# Patient Record
Sex: FEMALE | Race: WHITE | NOT HISPANIC OR LATINO | Employment: OTHER | URBAN - METROPOLITAN AREA
[De-identification: names, ages, dates, MRNs, and addresses within clinical notes are randomized per-mention and may not be internally consistent; named-entity substitution may affect disease eponyms.]

---

## 2017-01-23 ENCOUNTER — TRANSCRIBE ORDERS (OUTPATIENT)
Dept: MAMMOGRAPHY | Facility: HOSPITAL | Age: 77
End: 2017-01-23

## 2017-01-23 DIAGNOSIS — Z12.31 VISIT FOR SCREENING MAMMOGRAM: Primary | ICD-10-CM

## 2017-02-21 ENCOUNTER — HOSPITAL ENCOUNTER (OUTPATIENT)
Dept: MAMMOGRAPHY | Facility: HOSPITAL | Age: 77
Discharge: HOME | End: 2017-02-21
Attending: OBSTETRICS & GYNECOLOGY | Admitting: OBSTETRICS & GYNECOLOGY

## 2017-02-21 DIAGNOSIS — Z12.31 VISIT FOR SCREENING MAMMOGRAM: ICD-10-CM

## 2017-02-21 PROCEDURE — G0202 SCR MAMMO BI INCL CAD: HCPCS

## 2017-02-21 PROCEDURE — 77063 BREAST TOMOSYNTHESIS BI: CPT | Performed by: RADIOLOGY

## 2017-02-21 PROCEDURE — G0202 SCR MAMMO BI INCL CAD: HCPCS | Performed by: RADIOLOGY

## 2017-02-21 PROCEDURE — 77063 BREAST TOMOSYNTHESIS BI: CPT

## 2017-06-13 ENCOUNTER — TELEPHONE (OUTPATIENT)
Dept: NEUROSURGERY | Facility: CLINIC | Age: 77
End: 2017-06-13

## 2017-06-13 NOTE — TELEPHONE ENCOUNTER
"Symptoms started a month ago.  No loss of perineal sensation.  No bowel or bladder incontinence.  No focal weakness, but states that legs get tired easily and sometimes they feel they want to \"give out.\"  She gets a pressure type sensation in her groin and UTI has been ruled out.  Pain radiates from low back to lower extremities, sometimes anterolaterally and sometimes posteriorly, to the ankles.  Will discuss with Dr. Botello and advise patient.  "

## 2017-06-13 NOTE — TELEPHONE ENCOUNTER
Provider:  Edson  Caller: Judi Patel  Time of call:   10:27 AM  Phone #:  [phone]  Last visit:   08/05/16  Next visit: none scheduled    Reason for call:         ---Message from Aleksandra Brownlee----    This patient called and is having a numb sensation in her lower extremities when she tries to get up from a sitting position. She doesn't have any current imaging.     Her PCP recommended she f/u w/ Dr. Sandhu. Should she come in and see the PA ? Please let me know and i'll get her scheduled.   Thanks

## 2017-06-16 NOTE — TELEPHONE ENCOUNTER
Please call patient and let her know we have not forgotten about her.  We are still waiting on Dr Botello (Edson) to decide best next option and will call her either later today or Monday.

## 2017-06-22 NOTE — TELEPHONE ENCOUNTER
I spoke with Dr. Botello regarding this patient today and she now has appt scheduled with Dr. Botello, closing encounter.

## 2017-06-26 ENCOUNTER — OFFICE VISIT (OUTPATIENT)
Dept: NEUROSURGERY | Facility: CLINIC | Age: 77
End: 2017-06-26

## 2017-06-26 VITALS
OXYGEN SATURATION: 97 % | SYSTOLIC BLOOD PRESSURE: 134 MMHG | WEIGHT: 162.8 LBS | DIASTOLIC BLOOD PRESSURE: 82 MMHG | HEART RATE: 80 BPM | BODY MASS INDEX: 25.55 KG/M2 | HEIGHT: 67 IN

## 2017-06-26 DIAGNOSIS — M51.36 DEGENERATIVE DISC DISEASE, LUMBAR: ICD-10-CM

## 2017-06-26 DIAGNOSIS — G89.29 CHRONIC MIDLINE LOW BACK PAIN WITHOUT SCIATICA: ICD-10-CM

## 2017-06-26 DIAGNOSIS — M54.50 ACUTE BILATERAL LOW BACK PAIN WITHOUT SCIATICA: ICD-10-CM

## 2017-06-26 DIAGNOSIS — M47.817 LUMBOSACRAL SPONDYLOSIS WITHOUT MYELOPATHY: ICD-10-CM

## 2017-06-26 DIAGNOSIS — M51.36 DDD (DEGENERATIVE DISC DISEASE), LUMBAR: Primary | ICD-10-CM

## 2017-06-26 DIAGNOSIS — M41.86 OTHER FORM OF SCOLIOSIS OF LUMBAR SPINE: ICD-10-CM

## 2017-06-26 DIAGNOSIS — M54.50 CHRONIC MIDLINE LOW BACK PAIN WITHOUT SCIATICA: ICD-10-CM

## 2017-06-26 PROCEDURE — 99213 OFFICE O/P EST LOW 20 MIN: CPT | Performed by: NEUROLOGICAL SURGERY

## 2017-06-26 NOTE — PROGRESS NOTES
Patient: Judi Ptael  :  1940  Chart #:  [id number]    Date of Service: 17    Chief Complaint:   Chief Complaint   Patient presents with   • Back Pain       Back Pain   Chronicity: Patient returns today for a follow-up on her low back pain. The current episode started more than 1 year ago (She states that this has been bothersome for a little over a year now.). Progression since onset: Patient states that she wobbles when she walks. The pain is present in the lumbar spine (She states that her lower extremities are not numb.). The quality of the pain is described as burning. Radiates to: She feels like her legs are giving out on her. The pain is at a severity of 0/10 (She is not having any back pain today.). The pain is mild. The pain is worse during the day (She complains of some soreness in her right hip.). The symptoms are aggravated by bending. Stiffness is present in the morning. Risk factors include lack of exercise and sedentary lifestyle (Patient is having pressure in the vaginal region.  She complains of some burning that runs down her lower extremities bilaterally.). She has tried bed rest, ice, heat and NSAIDs (She is currently seeing her urologist for the vaginal pain.  ) for the symptoms. The treatment provided mild relief.     She has a fullness in the bladder region but has a normal exam from her urologist;  She sometimes feels the legs are weak and almost give out.  She has some abnormal sensation in the legs but not numbness or tingling.  She has not had any recent imaging studies.    Radiographic Images:   Scoliosis x-rays dated 16 show good alignment however there is degenerative arthritis noted. There is sigmoid scoliosis in lumbar region. Some straightening of the lumbar curve but SVA is normal. PI is 46 deg; LL (L1-S1) is 42 deg.        Past Medical History:   Diagnosis Date   • Anxiety    • Asthma    • Back problem    • Compression fracture of T12 vertebra    • Cystocele     • Epilepsy    • Family history of hip fracture     mother, father   • Hyperlipidemia    • Hypertension    • IBS (irritable bowel syndrome)    • Osteoporosis    • Postmenopausal    • Seizure disorder    • Vaginal atrophy    • Vitamin D deficiency      Current Outpatient Prescriptions   Medication Sig Dispense Refill   • albuterol (PROVENTIL HFA;VENTOLIN HFA) 108 (90 BASE) MCG/ACT inhaler Inhale 2 puffs every 4 (four) hours as needed for shortness of air.     • albuterol (PROVENTIL) (2.5 MG/3ML) 0.083% nebulizer solution Take 2.5 mg by nebulization every 4 (four) hours as needed for shortness of air.     • Calcium-Phosphorus-Vitamin D (CITRACAL +D3 PO) Take  by mouth 2 (two) times a day.     • Cholecalciferol (VITAMIN D) 2000 UNITS capsule Take 2,000 Units by mouth daily.     • Coenzyme Q10 (COQ10) 200 MG capsule Take  by mouth daily.     • lamoTRIgine (LaMICtal) 100 MG tablet Take 100 mg by mouth daily.     • MAGNESIUM PO Take  by mouth daily.     • meloxicam (MOBIC) 7.5 MG tablet Take 1 tablet by mouth 2 (two) times a day. 60 tablet 1   • Multiple Vitamins-Minerals (ZINC PO) Take  by mouth 2 (two) times a day.     • Omega-3 Fatty Acids (FISH OIL PO) Take 800 mg by mouth daily.     • Ospemifene 60 MG tablet Take 60 mg by mouth Daily. 90 tablet 3   • Probiotic Product (PROBIOTIC PO) Take  by mouth daily. 50 billion     • TURMERIC CURCUMIN PO Take  by mouth daily.     • Biotin 2.5 MG capsule Take  by mouth daily.     • losartan (COZAAR) 50 MG tablet Take 50 mg by mouth daily.     • methocarbamol (ROBAXIN) 750 MG tablet Take 1 tablet by mouth 2 (two) times a day. 60 tablet 1   • montelukast (SINGULAIR) 10 MG tablet Take 10 mg by mouth every night.       No current facility-administered medications for this visit.       Allergies   Allergen Reactions   • Oxycodone-Acetaminophen    • Sulfa Antibiotics      Social History     Social History   • Marital status:      Spouse name: N/A   • Number of children: N/A   •  "Years of education: N/A     Social History Main Topics   • Smoking status: Never Smoker   • Smokeless tobacco: None   • Alcohol use No   • Drug use: No   • Sexual activity: Yes     Partners: Male     Birth control/ protection: Surgical     Other Topics Concern   • None     Social History Narrative     Family History   Problem Relation Age of Onset   • Fibromyalgia Daughter    • Thyroid cancer Daughter    • Hypothyroidism Daughter      Past Surgical History:   Procedure Laterality Date   • ABDOMINAL SURGERY     • APPENDECTOMY     • BASAL CELL CARCINOMA EXCISION Right 01/12/2010    Nose   • CHOLECYSTECTOMY  01/01/1991   • CYST REMOVAL  01/01/1990    Right Foot   • DILATATION AND CURETTAGE     • FUNCTIONAL ENDOSCOPIC SINUS SURGERY  11/16/2010    Dr. Mathews   • KNEE ARTHROSCOPY Left 01/01/2006   • KNEE ARTHROSCOPY W/ MENISCAL REPAIR Right 11/01/2010   • SINUS SURGERY     • TOTAL ABDOMINAL HYSTERECTOMY WITH SALPINGO OOPHORECTOMY Bilateral    • WRIST SURGERY Left 01/01/2004     Review of Systems   HENT: Positive for tinnitus.    Genitourinary: Positive for frequency.   Musculoskeletal: Positive for back pain.   All other systems reviewed and are negative.    Vitals:    06/26/17 0856   BP: 134/82   BP Location: Right arm   Patient Position: Sitting   Cuff Size: Adult   Pulse: 80   SpO2: 97%   Weight: 162 lb 12.8 oz (73.8 kg)   Height: 67\" (170.2 cm)     Physical Exam  Neurologic Exam  Constitutional: She is oriented to person, place, and time. Vital signs are normal. She appears well-developed and well-nourished. No distress.   Neat healthy female   Cardiovascular:   Pulses:  Carotid pulses are 2+ on the right side, and 2+ on the left side.  Radial pulses are 2+ on the right side, and 2+ on the left side.   Dorsalis pedis pulses are 1+ on the right side, and 1+ on the left side.   Posterior tibial pulses are 2+ on the right side, and 2+ on the left side.   Musculoskeletal:   Lumbar back: She exhibits deformity but no " pain. She exhibits normal range of motion and no spasm.   Mild stiffness; Lef thoracolumbar scoliosis; SLR caused mild back pain; No atrophy; hip ROM normal     Neurologic Exam      Mental Status   Oriented to person, place, and time.   Attention: normal. Concentration: normal.   Speech: speech is normal   Level of consciousness: alert  Knowledge: good and consistent with education.   Normal comprehension.      Cranial Nerves   Cranial nerves II through XII intact.      Motor Exam   Muscle bulk: normal  Overall muscle tone: normal    Strength   Strength 5/5 throughout.      Sensory Exam   Light touch normal.   Proprioception normal.      Gait, Coordination, and Reflexes      Gait  Gait: normal (list to right when tired)     Tremor   Resting tremor: absent  Intention tremor: absent  Action tremor: absent     Reflexes   Right brachioradialis: 1+  Left brachioradialis: 1+  Right biceps: 1+  Left biceps: 1+  Right triceps: 1+  Left triceps: 1+  Right patellar: 2+  Left patellar: 2+  Right achilles: 1+  Left achilles: 1+  Right Buck: absent  Left Buck: absent  Right ankle clonus: absent  Left ankle clonus: absent     Judi was seen today for back pain.    Diagnoses and all orders for this visit:    DDD (degenerative disc disease), lumbar  -     MRI Lumbar Spine Without Contrast; Future    Degenerative disc disease, lumbar  -     MRI Lumbar Spine Without Contrast; Future    Chronic midline low back pain without sciatica  -     MRI Lumbar Spine Without Contrast; Future    Other form of scoliosis of lumbar spine  -     MRI Lumbar Spine Without Contrast; Future    Lumbosacral spondylosis without myelopathy  -     MRI Lumbar Spine Without Contrast; Future    Acute bilateral low back pain without sciatica  -     MRI Lumbar Spine Without Contrast; Future      Plan: order lumbar spine MRI;  Monitor symptoms for review at next visit after MRI.    I, Dr. Ilir Botello, personally performed the services described in the  documentation as scribed in my presence, and the documentation is both accurate and complete.    Ilir Botello MD   Scribed for Ilir Botello MD by Rosalia Polo, CACHORRO @. 6/26/2017  9:16 AM

## 2017-08-03 ENCOUNTER — HOSPITAL ENCOUNTER (OUTPATIENT)
Dept: MRI IMAGING | Facility: HOSPITAL | Age: 77
Discharge: HOME | End: 2017-08-03
Attending: NEUROLOGICAL SURGERY

## 2017-08-07 ENCOUNTER — HOSPITAL ENCOUNTER (OUTPATIENT)
Dept: MRI IMAGING | Facility: HOSPITAL | Age: 77
Discharge: HOME | End: 2017-08-07
Attending: NEUROLOGICAL SURGERY | Admitting: NEUROLOGICAL SURGERY

## 2017-08-07 DIAGNOSIS — M41.86 OTHER FORM OF SCOLIOSIS OF LUMBAR SPINE: ICD-10-CM

## 2017-08-07 DIAGNOSIS — M54.50 CHRONIC MIDLINE LOW BACK PAIN WITHOUT SCIATICA: ICD-10-CM

## 2017-08-07 DIAGNOSIS — M51.36 DEGENERATIVE DISC DISEASE, LUMBAR: ICD-10-CM

## 2017-08-07 DIAGNOSIS — M54.50 ACUTE BILATERAL LOW BACK PAIN WITHOUT SCIATICA: ICD-10-CM

## 2017-08-07 DIAGNOSIS — G89.29 CHRONIC MIDLINE LOW BACK PAIN WITHOUT SCIATICA: ICD-10-CM

## 2017-08-07 DIAGNOSIS — M47.817 LUMBOSACRAL SPONDYLOSIS WITHOUT MYELOPATHY: ICD-10-CM

## 2017-08-07 DIAGNOSIS — M51.36 DDD (DEGENERATIVE DISC DISEASE), LUMBAR: ICD-10-CM

## 2017-08-07 PROCEDURE — 72148 MRI LUMBAR SPINE W/O DYE: CPT

## 2017-09-25 ENCOUNTER — HOSPITAL ENCOUNTER (OUTPATIENT)
Dept: GENERAL RADIOLOGY | Facility: HOSPITAL | Age: 77
Discharge: HOME | End: 2017-09-25
Attending: NEUROLOGICAL SURGERY | Admitting: NEUROLOGICAL SURGERY

## 2017-09-25 ENCOUNTER — OFFICE VISIT (OUTPATIENT)
Dept: NEUROSURGERY | Facility: CLINIC | Age: 77
End: 2017-09-25

## 2017-09-25 VITALS
HEIGHT: 67 IN | BODY MASS INDEX: 25.9 KG/M2 | SYSTOLIC BLOOD PRESSURE: 130 MMHG | TEMPERATURE: 97.9 F | HEART RATE: 73 BPM | DIASTOLIC BLOOD PRESSURE: 74 MMHG | OXYGEN SATURATION: 96 % | WEIGHT: 165 LBS

## 2017-09-25 DIAGNOSIS — M54.50 CHRONIC BILATERAL LOW BACK PAIN WITHOUT SCIATICA: ICD-10-CM

## 2017-09-25 DIAGNOSIS — M54.50 ACUTE BILATERAL LOW BACK PAIN WITHOUT SCIATICA: ICD-10-CM

## 2017-09-25 DIAGNOSIS — M51.36 DEGENERATIVE DISC DISEASE, LUMBAR: ICD-10-CM

## 2017-09-25 DIAGNOSIS — M51.36 DDD (DEGENERATIVE DISC DISEASE), LUMBAR: Primary | ICD-10-CM

## 2017-09-25 DIAGNOSIS — M51.36 DDD (DEGENERATIVE DISC DISEASE), LUMBAR: ICD-10-CM

## 2017-09-25 DIAGNOSIS — G89.29 CHRONIC BILATERAL LOW BACK PAIN WITHOUT SCIATICA: ICD-10-CM

## 2017-09-25 DIAGNOSIS — M47.817 LUMBOSACRAL SPONDYLOSIS WITHOUT MYELOPATHY: ICD-10-CM

## 2017-09-25 DIAGNOSIS — M41.86 OTHER FORM OF SCOLIOSIS OF LUMBAR SPINE: ICD-10-CM

## 2017-09-25 DIAGNOSIS — M41.20 SCOLIOSIS (AND KYPHOSCOLIOSIS), IDIOPATHIC: ICD-10-CM

## 2017-09-25 PROCEDURE — 99213 OFFICE O/P EST LOW 20 MIN: CPT | Performed by: NEUROLOGICAL SURGERY

## 2017-09-25 PROCEDURE — 72082 X-RAY EXAM ENTIRE SPI 2/3 VW: CPT

## 2017-09-25 NOTE — PROGRESS NOTES
Patient: Judi Patel  :  1940  Chart #:  [id number]    Date of Service: 17    Chief Complaint:   Chief Complaint   Patient presents with   • Back Pain       Back Pain   Chronicity: Patient returns today for a follow-up on her low back pain. Episode onset: She no longer has the pressure in her back that she did before.. The problem occurs intermittently. The problem has been gradually improving (She interprets for the deaf on , so prolonged sitting tends to increase her pain.  She is very careful not to lift anything heavy. ) since onset. The pain is present in the lumbar spine. The quality of the pain is described as aching (Patient states that she continues to wobble in the morning.). The pain does not radiate. The pain is at a severity of 2/10. The pain is mild. Worse during: She has some stiffness in the morning. The symptoms are aggravated by bending. Stiffness is present: Her  broke his foot, so she has had to do the gardening.  This increases her pain. Pertinent negatives include no abdominal pain, chest pain, dysuria, fever, headaches, numbness or weakness. Risk factors include sedentary lifestyle (She has some balance issues with her right lower extremity.). She has tried bed rest and heat (She has not had to take any anti-inflammatories.  Her activities are not limited.) for the symptoms. The treatment provided moderate relief.     She has some pain and stiffness in the low back in the morning.  She has more discomfort than pain.  She does not use any NSAIDs.  She has not had any radicular leg pain.      Radiographic Images:   MRI of the lumbar spine dated 17 shows lumbar scoliosis and lumbar degenerative changes very similar to previous 2016 exam. No clearly new disease, new trauma or new abnormality of alignment is seen.  2. Multilevel bony foraminal stenosis, and relatively mild L2-3 and L3-4 canal stenosis, but not significantly changed from previous 2016  study.  She has scoliosis concaved to the left from L2-S1.  She has thoracolumbar scoliosis with the apex at L1-L2.  She has disc dessication throughout the thoracolumbar spine.  She has an old compression fracture at T-12 with lateral recess stenosis at L5-S1 on the left, and L4-L5 on the left.  She has modic changes at L1-L2.      Scoliosis x-rays dated 08/01/16 show good alignment however there is degenerative arthritis noted. There is sigmoid scoliosis in lumbar region. Some straightening of the lumbar curve but SVA is normal. PI is 46 deg; LL (L1-S1) is 42 deg.     Past Medical History:   Diagnosis Date   • Anxiety    • Asthma    • Back problem    • Compression fracture of T12 vertebra    • Cystocele    • Epilepsy    • Family history of hip fracture     mother, father   • Hyperlipidemia    • Hypertension    • IBS (irritable bowel syndrome)    • Osteoporosis    • Postmenopausal    • Seizure disorder    • Vaginal atrophy    • Vitamin D deficiency      Current Outpatient Prescriptions   Medication Sig Dispense Refill   • albuterol (PROVENTIL HFA;VENTOLIN HFA) 108 (90 BASE) MCG/ACT inhaler Inhale 2 puffs every 4 (four) hours as needed for shortness of air.     • albuterol (PROVENTIL) (2.5 MG/3ML) 0.083% nebulizer solution Take 2.5 mg by nebulization every 4 (four) hours as needed for shortness of air.     • Biotin 2.5 MG capsule Take  by mouth daily.     • Calcium-Phosphorus-Vitamin D (CITRACAL +D3 PO) Take  by mouth 2 (two) times a day.     • Cholecalciferol (VITAMIN D) 2000 UNITS capsule Take 2,000 Units by mouth daily.     • Coenzyme Q10 (COQ10) 200 MG capsule Take  by mouth daily.     • lamoTRIgine (LaMICtal) 100 MG tablet Take 100 mg by mouth daily.     • losartan (COZAAR) 50 MG tablet Take 50 mg by mouth daily.     • MAGNESIUM PO Take  by mouth daily.     • meloxicam (MOBIC) 7.5 MG tablet Take 1 tablet by mouth 2 (two) times a day. 60 tablet 1   • montelukast (SINGULAIR) 10 MG tablet Take 10 mg by mouth every  night.     • Multiple Vitamins-Minerals (ZINC PO) Take  by mouth 2 (two) times a day.     • Omega-3 Fatty Acids (FISH OIL PO) Take 800 mg by mouth daily.     • Probiotic Product (PROBIOTIC PO) Take  by mouth daily. 50 billion     • TURMERIC CURCUMIN PO Take  by mouth daily.       No current facility-administered medications for this visit.       Allergies   Allergen Reactions   • Oxycodone-Acetaminophen    • Sulfa Antibiotics      Social History     Social History   • Marital status:      Spouse name: N/A   • Number of children: N/A   • Years of education: N/A     Social History Main Topics   • Smoking status: Never Smoker   • Smokeless tobacco: Never Used   • Alcohol use No   • Drug use: No   • Sexual activity: Yes     Partners: Male     Birth control/ protection: Surgical     Other Topics Concern   • None     Social History Narrative     Family History   Problem Relation Age of Onset   • Fibromyalgia Daughter    • Thyroid cancer Daughter    • Hypothyroidism Daughter      Past Surgical History:   Procedure Laterality Date   • ABDOMINAL SURGERY     • APPENDECTOMY     • BASAL CELL CARCINOMA EXCISION Right 01/12/2010    Nose   • CHOLECYSTECTOMY  01/01/1991   • CYST REMOVAL  01/01/1990    Right Foot   • DILATATION AND CURETTAGE     • FUNCTIONAL ENDOSCOPIC SINUS SURGERY  11/16/2010    Dr. Mathews   • KNEE ARTHROSCOPY Left 01/01/2006   • KNEE ARTHROSCOPY W/ MENISCAL REPAIR Right 11/01/2010   • SINUS SURGERY     • TOTAL ABDOMINAL HYSTERECTOMY WITH SALPINGO OOPHORECTOMY Bilateral    • WRIST SURGERY Left 01/01/2004     Review of Systems   Constitutional: Negative for activity change, appetite change, chills, diaphoresis, fatigue, fever and unexpected weight change.   HENT: Positive for tinnitus. Negative for congestion, dental problem, drooling, ear discharge, ear pain, facial swelling, hearing loss, mouth sores, nosebleeds, postnasal drip, rhinorrhea, sinus pressure, sneezing, sore throat, trouble swallowing and  "voice change.    Eyes: Negative for photophobia, pain, discharge, redness, itching and visual disturbance.   Respiratory: Negative for apnea, cough, choking, chest tightness, shortness of breath, wheezing and stridor.    Cardiovascular: Negative for chest pain, palpitations and leg swelling.   Gastrointestinal: Negative for abdominal distention, abdominal pain, anal bleeding, blood in stool, constipation, diarrhea, nausea, rectal pain and vomiting.   Endocrine: Negative for cold intolerance, heat intolerance, polydipsia, polyphagia and polyuria.   Genitourinary: Negative for decreased urine volume, difficulty urinating, dysuria, enuresis, flank pain, frequency, genital sores, hematuria and urgency.   Musculoskeletal: Positive for back pain. Negative for arthralgias, gait problem, joint swelling, myalgias, neck pain and neck stiffness.   Skin: Negative for color change, pallor, rash and wound.   Allergic/Immunologic: Negative for environmental allergies, food allergies and immunocompromised state.   Neurological: Negative for dizziness, tremors, seizures, syncope, facial asymmetry, speech difficulty, weakness, light-headedness, numbness and headaches.   Hematological: Negative for adenopathy. Does not bruise/bleed easily.   Psychiatric/Behavioral: Negative for agitation, behavioral problems, confusion, decreased concentration, dysphoric mood, hallucinations, self-injury, sleep disturbance and suicidal ideas. The patient is not nervous/anxious and is not hyperactive.    All other systems reviewed and are negative.    Vitals:    09/25/17 1214   BP: 130/74   BP Location: Right arm   Patient Position: Sitting   Pulse: 73   Temp: 97.9 °F (36.6 °C)   TempSrc: Temporal Artery    SpO2: 96%   Weight: 165 lb (74.8 kg)   Height: 67\" (170.2 cm)     Physical Exam  Neurologic Exam  Constitutional: She is oriented to person, place, and time. Vital signs are normal. She appears well-developed and well-nourished. No distress.   Neat " healthy female   Cardiovascular:   Pulses:  Carotid pulses are 2+ on the right side, and 2+ on the left side.  Radial pulses are 2+ on the right side, and 2+ on the left side.   Dorsalis pedis pulses are 1+ on the right side, and 1+ on the left side.   Posterior tibial pulses are 2+ on the right side, and 2+ on the left side.   Musculoskeletal:   Lumbar back: She exhibits deformity but no pain. She exhibits normal range of motion and no spasm.   Mild stiffness; Left thoracolumbar scoliosis; SLR caused mild back pain; No atrophy; hip ROM normal      Neurologic Exam       Mental Status   Oriented to person, place, and time.   Attention: normal. Concentration: normal.   Speech: speech is normal   Level of consciousness: alert  Knowledge: good and consistent with education.   Normal comprehension.       Cranial Nerves   Cranial nerves II through XII intact.       Motor Exam   Muscle bulk: normal  Overall muscle tone: normal     Strength   Strength 5/5 throughout.       Sensory Exam   Light touch normal.   Proprioception normal.       Gait, Coordination, and Reflexes       Gait  Gait: normal (list to right when tired)     Tremor   Resting tremor: absent  Intention tremor: absent  Action tremor: absent      Reflexes   Right brachioradialis: 1+  Left brachioradialis: 1+  Right biceps: 1+  Left biceps: 1+  Right triceps: 1+  Left triceps: 1+  Right patellar: 2+  Left patellar: 2+  Right achilles: 1+  Left achilles: 1+  Right Buck: absent  Left Buck: absent  Right ankle clonus: absent  Left ankle clonus: absent      Judi was seen today for back pain.    Diagnoses and all orders for this visit:    DDD (degenerative disc disease), lumbar  -     XR Spine Scoliosis 2 or 3 Views; Future    Acute bilateral low back pain without sciatica  -     XR Spine Scoliosis 2 or 3 Views; Future    Degenerative disc disease, lumbar  -     XR Spine Scoliosis 2 or 3 Views; Future    Scoliosis (and kyphoscoliosis), idiopathic  -     XR  Spine Scoliosis 2 or 3 Views; Future    Other form of scoliosis of lumbar spine  -     XR Spine Scoliosis 2 or 3 Views; Future    Chronic bilateral low back pain without sciatica  -     XR Spine Scoliosis 2 or 3 Views; Future    Lumbosacral spondylosis without myelopathy  -     XR Spine Scoliosis 2 or 3 Views; Future      Plan: Order full spine scoliosis x-rays;  Monitor symptoms for review after study    I, Dr. Ilir Botello, personally performed the services described in the documentation as scribed in my presence, and the documentation is both accurate and complete.    Ilir Botello MD   Scribed for Ilir Botello MD by Rosalia Polo CMA @. 9/25/2017  1:00 PM

## 2017-11-01 ENCOUNTER — OFFICE VISIT (OUTPATIENT)
Dept: OBSTETRICS AND GYNECOLOGY | Facility: CLINIC | Age: 77
End: 2017-11-01

## 2017-11-01 VITALS
WEIGHT: 165.4 LBS | SYSTOLIC BLOOD PRESSURE: 150 MMHG | BODY MASS INDEX: 25.96 KG/M2 | DIASTOLIC BLOOD PRESSURE: 90 MMHG | HEIGHT: 67 IN

## 2017-11-01 DIAGNOSIS — N95.2 VAGINAL ATROPHY: ICD-10-CM

## 2017-11-01 DIAGNOSIS — Z01.411 ENCOUNTER FOR GYNECOLOGICAL EXAMINATION WITH ABNORMAL FINDING: ICD-10-CM

## 2017-11-01 DIAGNOSIS — N81.11 MIDLINE CYSTOCELE: ICD-10-CM

## 2017-11-01 DIAGNOSIS — M85.89 OSTEOPENIA OF MULTIPLE SITES: ICD-10-CM

## 2017-11-01 DIAGNOSIS — Z78.0 MENOPAUSE: Primary | ICD-10-CM

## 2017-11-01 PROCEDURE — 99397 PER PM REEVAL EST PAT 65+ YR: CPT | Performed by: OBSTETRICS & GYNECOLOGY

## 2017-11-01 NOTE — PROGRESS NOTES
Chief Complaint   Patient presents with   • Gynecologic Exam       Judi Patel is a 77 y.o. year old  presenting to be seen for her annual exam.This patient has previously had an AH/BSO.  She has had a cholecystectomy and an appendectomy.  She denies severe menopausal symptoms.  She has a diagnosis of osteopenia of the hip, femoral neck, and radius.  She has had 3 doses of Prolia subcutaneously every 6 months.  She has had no fractures.  She was diagnosed with vaginal atrophy last year and Osphena was prescribed however she did not get that prescription filled.  She complains of vaginal dryness, irritation, and superficial dyspareunia.  She has some urinary urgency but denies stress incontinence.    SCREENING TESTS    Year 2012   Age                         PAP                         HPV high risk                         Mammogram     benign benign                   YOMI score                         Breast MRI                         Lipids                         Vitamin D                         Colonoscopy                         DEXA  Frax (hip/any)     osteopenia                    Ovarian Screen                             She exercises regularly: yes.  She wears her seat belt: yes.  She has concerns about domestic violence: no.  She has noticed changes in height: no    GYN screening history:  · Last mammogram: was done on approximately 2017 and the result was: Birads I (Normal).  · Last DEXA: was done on approximately 2016 and the results were: osteopenia of hips and osteopenia of the femoral neck and osteopenia of the radius.    No Additional Complaints Reported    The following portions of the patient's history were reviewed and updated as appropriate:vital signs and   She  does not have any pertinent problems on file.  She  has a past surgical history that includes Abdominal  surgery; Appendectomy; Sinus surgery; Cyst Removal (01/01/1990); Cholecystectomy (01/01/1991); Wrist surgery (Left, 01/01/2004); Knee arthroscopy (Left, 01/01/2006); Excision basal cell carcinoma (Right, 01/12/2010); Knee arthroscopy w/ meniscal repair (Right, 11/01/2010); Functional endoscopic sinus surgery (11/16/2010); Total abdominal hysterectomy w/ bilateral salpingoophorectomy (Bilateral); and Dilation and curettage of uterus.  Her family history includes Fibromyalgia in her daughter; Hypothyroidism in her daughter; Thyroid cancer in her daughter.  She  reports that she has never smoked. She has never used smokeless tobacco. She reports that she does not drink alcohol or use illicit drugs.  Current Outpatient Prescriptions   Medication Sig Dispense Refill   • albuterol (PROVENTIL HFA;VENTOLIN HFA) 108 (90 BASE) MCG/ACT inhaler Inhale 2 puffs every 4 (four) hours as needed for shortness of air.     • albuterol (PROVENTIL) (2.5 MG/3ML) 0.083% nebulizer solution Take 2.5 mg by nebulization every 4 (four) hours as needed for shortness of air.     • Calcium-Phosphorus-Vitamin D (CITRACAL +D3 PO) Take  by mouth 2 (two) times a day.     • Cholecalciferol (VITAMIN D) 2000 UNITS capsule Take 2,000 Units by mouth daily.     • Coenzyme Q10 (COQ10) 200 MG capsule Take  by mouth daily.     • lamoTRIgine (LaMICtal) 100 MG tablet Take 100 mg by mouth daily.     • MAGNESIUM PO Take  by mouth daily.     • meloxicam (MOBIC) 7.5 MG tablet Take 1 tablet by mouth 2 (two) times a day. 60 tablet 1   • Multiple Vitamins-Minerals (ZINC PO) Take  by mouth 2 (two) times a day.     • Omega-3 Fatty Acids (FISH OIL PO) Take 800 mg by mouth daily.     • Probiotic Product (PROBIOTIC PO) Take  by mouth daily. 50 billion     • TURMERIC CURCUMIN PO Take  by mouth daily.     • Biotin 2.5 MG capsule Take  by mouth daily.     • losartan (COZAAR) 50 MG tablet Take 50 mg by mouth daily.     • montelukast (SINGULAIR) 10 MG tablet Take 10 mg by mouth  "every night.     • Ospemifene 60 MG tablet Take 60 mg by mouth Daily. 90 tablet 3     No current facility-administered medications for this visit.      She is allergic to oxycodone-acetaminophen and sulfa antibiotics..    Review of Systems  A comprehensive review of systems was taken.  Constitutional: negative for fever, chills, activity change, appetite change, fatigue and unexpected weight change.  Respiratory: positive for wheezing  Cardiovascular: negative  Gastrointestinal: negative  Genitourinary:positive for urgency  Musculoskeletal:negative  Behavioral/Psych: negative       /90  Ht 67\" (170.2 cm)  Wt 165 lb 6.4 oz (75 kg)  LMP  (LMP Unknown)  BMI 25.91 kg/m2    Physical Exam    General:  alert; cooperative; well developed; well nourished   Skin:  No suspicious lesions seen   Thyroid: normal to inspection and palpation   Lungs:  clear to auscultation bilaterally   Heart:  regular rate and rhythm, S1, S2 normal, no murmur, click, rub or gallop   Breasts:  Examined in supine position  Symmetric without masses or skin dimpling  Nipples normal without inversion, lesions or discharge  There are no palpable axillary nodes   Abdomen: soft, non-tender; no masses  no umbilical or inginual hernias are present  no hepato-splenomegaly   Pelvis: Clinical staff was present for exam  External genitalia:  normal appearance of the external genitalia including Bartholin's and Bobo's glands.  Vaginal:  atrophic mucosal changes are present;  Cervix:  absent.  Uterus:  absent.  Adnexa:  absent, bilateral.  Rectal:  anus visually normal appearing. recto-vaginal exam unremarkable and confirms findings;     Lab Review   No data reviewed    Imaging  Mammogram results- benign,  and DEXA- osteopenia         ASSESSMENT  Problems Addressed this Visit        Musculoskeletal and Integument    Osteopenia    Relevant Orders    DEXA Bone Density Axial       Genitourinary    Menopause - Primary    Vaginal atrophy    Relevant " Medications    Ospemifene 60 MG tablet    Midline cystocele      Other Visit Diagnoses     Encounter for gynecological examination with abnormal finding              PLAN    Medications prescribed this encounter:    New Medications Ordered This Visit   Medications   • Ospemifene 60 MG tablet     Sig: Take 60 mg by mouth Daily.     Dispense:  90 tablet     Refill:  3   · DEXA in June of 2018  · Prolia in April of 2018  · Calcium, 600 mg/ Vit. D, 400 IU daily; regular weight-bearing exercise  · Follow up: 12 month(s)  *Please note that portions of this documentation may have been completed with a voice recognition program.  Efforts were made to edit this dictation, but occasional words may have been mistranscribed.       This note was electronically signed.    JANNIE Lopez MD  November 1, 2017  10:51 AM

## 2017-11-14 ENCOUNTER — OFFICE VISIT (OUTPATIENT)
Dept: NEUROSURGERY | Facility: CLINIC | Age: 77
End: 2017-11-14

## 2017-11-14 VITALS
WEIGHT: 166.2 LBS | DIASTOLIC BLOOD PRESSURE: 78 MMHG | SYSTOLIC BLOOD PRESSURE: 132 MMHG | HEIGHT: 67 IN | TEMPERATURE: 98.7 F | BODY MASS INDEX: 26.09 KG/M2

## 2017-11-14 DIAGNOSIS — M54.50 CHRONIC BILATERAL LOW BACK PAIN WITHOUT SCIATICA: ICD-10-CM

## 2017-11-14 DIAGNOSIS — G89.29 CHRONIC MIDLINE LOW BACK PAIN WITHOUT SCIATICA: ICD-10-CM

## 2017-11-14 DIAGNOSIS — M54.50 ACUTE BILATERAL LOW BACK PAIN WITHOUT SCIATICA: ICD-10-CM

## 2017-11-14 DIAGNOSIS — M41.20 SCOLIOSIS (AND KYPHOSCOLIOSIS), IDIOPATHIC: ICD-10-CM

## 2017-11-14 DIAGNOSIS — M51.36 DDD (DEGENERATIVE DISC DISEASE), LUMBAR: Primary | ICD-10-CM

## 2017-11-14 DIAGNOSIS — M47.817 LUMBOSACRAL SPONDYLOSIS WITHOUT MYELOPATHY: ICD-10-CM

## 2017-11-14 DIAGNOSIS — M51.36 DEGENERATIVE DISC DISEASE, LUMBAR: ICD-10-CM

## 2017-11-14 DIAGNOSIS — M54.50 ACUTE MIDLINE LOW BACK PAIN WITHOUT SCIATICA: ICD-10-CM

## 2017-11-14 DIAGNOSIS — M41.86 OTHER FORM OF SCOLIOSIS OF LUMBAR SPINE: ICD-10-CM

## 2017-11-14 DIAGNOSIS — M54.50 CHRONIC MIDLINE LOW BACK PAIN WITHOUT SCIATICA: ICD-10-CM

## 2017-11-14 DIAGNOSIS — G89.29 CHRONIC BILATERAL LOW BACK PAIN WITHOUT SCIATICA: ICD-10-CM

## 2017-11-14 PROCEDURE — 99213 OFFICE O/P EST LOW 20 MIN: CPT | Performed by: NEUROLOGICAL SURGERY

## 2017-11-14 NOTE — PROGRESS NOTES
Patient: Judi Patel  :  1940  Chart #:  [id number]    Date of Service: 17    Chief Complaint:   Chief Complaint   Patient presents with   • Back Pain       Back Pain   Chronicity: Mrs. Patel returns today for a follow up on her low back pain. Episode onset: She states at times her back is more aggrivated than it is painful. The problem occurs intermittently (She has great flexibility today.  Most of her pain is left sided.  She also complains of pain in her right hip.). The problem has been gradually improving (She continues to have issues with her gait.  ) since onset. The pain is present in the lumbar spine. The quality of the pain is described as aching. The pain does not radiate. The pain is at a severity of 3/10. The pain is mild (Her pain increases when she is ambulatoy.). The pain is worse during the day. The symptoms are aggravated by position and standing (Her daughter is a physical therapist and she is continually telling her to straighten up.). Stiffness is present in the morning. Pertinent negatives include no abdominal pain, chest pain, dysuria, fever, headaches, numbness or weakness. Risk factors include lack of exercise and sedentary lifestyle (She doesn't have any issues with walking, however if she goes for too long, her pain will increase.  Patient is osteopenic.). She has tried NSAIDs and heat (She continues to take Mobic for her pain.) for the symptoms. The treatment provided mild relief.     She takes Mobic intermittently;  She has some low back pain more on the left than the right.  She is not having any leg symptoms.      Radiographic Images:   Scoliosis x-rays dated 17 shows she has cervical kyphosis.  Her SVA is 2 cm.  She has a lumbar scoliosis with the curve from T12-L5 measuring 39 degrees.  She had x-rays done 16 which were the same.      Past Medical History:   Diagnosis Date   • Anxiety    • Asthma    • Back problem    • Compression fracture of T12 vertebra     • Cystocele    • Epilepsy    • Family history of hip fracture     mother, father   • Hyperlipidemia    • Hypertension    • IBS (irritable bowel syndrome)    • Osteoporosis    • Postmenopausal    • Seizure disorder    • Vaginal atrophy    • Vitamin D deficiency      Current Outpatient Prescriptions   Medication Sig Dispense Refill   • albuterol (PROVENTIL HFA;VENTOLIN HFA) 108 (90 BASE) MCG/ACT inhaler Inhale 2 puffs every 4 (four) hours as needed for shortness of air.     • albuterol (PROVENTIL) (2.5 MG/3ML) 0.083% nebulizer solution Take 2.5 mg by nebulization every 4 (four) hours as needed for shortness of air.     • Biotin 2.5 MG capsule Take  by mouth daily.     • Calcium-Phosphorus-Vitamin D (CITRACAL +D3 PO) Take  by mouth 2 (two) times a day.     • Cholecalciferol (VITAMIN D) 2000 UNITS capsule Take 2,000 Units by mouth daily.     • Coenzyme Q10 (COQ10) 200 MG capsule Take  by mouth daily.     • lamoTRIgine (LaMICtal) 100 MG tablet Take 100 mg by mouth daily.     • losartan (COZAAR) 50 MG tablet Take 50 mg by mouth daily.     • MAGNESIUM PO Take  by mouth daily.     • montelukast (SINGULAIR) 10 MG tablet Take 10 mg by mouth every night.     • Multiple Vitamins-Minerals (ZINC PO) Take  by mouth 2 (two) times a day.     • Omega-3 Fatty Acids (FISH OIL PO) Take 800 mg by mouth daily.     • Ospemifene 60 MG tablet Take 60 mg by mouth Daily. 90 tablet 3   • Probiotic Product (PROBIOTIC PO) Take  by mouth daily. 50 billion     • TURMERIC CURCUMIN PO Take  by mouth daily.       No current facility-administered medications for this visit.       Allergies   Allergen Reactions   • Oxycodone-Acetaminophen    • Sulfa Antibiotics      Social History     Social History   • Marital status:      Spouse name: N/A   • Number of children: N/A   • Years of education: N/A     Social History Main Topics   • Smoking status: Never Smoker   • Smokeless tobacco: Never Used   • Alcohol use No   • Drug use: No   • Sexual  activity: Yes     Partners: Male     Birth control/ protection: Surgical, Post-menopausal     Other Topics Concern   • None     Social History Narrative     Family History   Problem Relation Age of Onset   • Fibromyalgia Daughter    • Thyroid cancer Daughter    • Hypothyroidism Daughter      Past Surgical History:   Procedure Laterality Date   • ABDOMINAL SURGERY     • APPENDECTOMY     • BASAL CELL CARCINOMA EXCISION Right 01/12/2010    Nose   • CHOLECYSTECTOMY  01/01/1991   • CYST REMOVAL  01/01/1990    Right Foot   • DILATATION AND CURETTAGE     • FUNCTIONAL ENDOSCOPIC SINUS SURGERY  11/16/2010    Dr. Mathews   • KNEE ARTHROSCOPY Left 01/01/2006   • KNEE ARTHROSCOPY W/ MENISCAL REPAIR Right 11/01/2010   • SINUS SURGERY     • TOTAL ABDOMINAL HYSTERECTOMY WITH SALPINGO OOPHORECTOMY Bilateral    • WRIST SURGERY Left 01/01/2004     Review of Systems   Constitutional: Negative for activity change, appetite change, chills, diaphoresis, fatigue, fever and unexpected weight change.   HENT: Positive for tinnitus. Negative for congestion, dental problem, drooling, ear discharge, ear pain, facial swelling, hearing loss, mouth sores, nosebleeds, postnasal drip, rhinorrhea, sinus pressure, sneezing, sore throat, trouble swallowing and voice change.    Eyes: Negative for photophobia, pain, discharge, redness, itching and visual disturbance.   Respiratory: Negative for apnea, cough, choking, chest tightness, shortness of breath, wheezing and stridor.    Cardiovascular: Negative for chest pain, palpitations and leg swelling.   Gastrointestinal: Negative for abdominal distention, abdominal pain, anal bleeding, blood in stool, constipation, diarrhea, nausea, rectal pain and vomiting.   Endocrine: Negative for cold intolerance, heat intolerance, polydipsia, polyphagia and polyuria.   Genitourinary: Negative for decreased urine volume, difficulty urinating, dysuria, enuresis, flank pain, frequency, genital sores, hematuria and  "urgency.   Musculoskeletal: Positive for back pain. Negative for arthralgias, gait problem, joint swelling, myalgias, neck pain and neck stiffness.   Skin: Negative for color change, pallor, rash and wound.   Allergic/Immunologic: Negative for environmental allergies, food allergies and immunocompromised state.   Neurological: Negative for dizziness, tremors, seizures, syncope, facial asymmetry, speech difficulty, weakness, light-headedness, numbness and headaches.   Hematological: Negative for adenopathy. Does not bruise/bleed easily.   Psychiatric/Behavioral: Negative for agitation, behavioral problems, confusion, decreased concentration, dysphoric mood, hallucinations, self-injury, sleep disturbance and suicidal ideas. The patient is not nervous/anxious and is not hyperactive.    All other systems reviewed and are negative.    Vitals:    11/14/17 0940   BP: 132/78   BP Location: Right arm   Patient Position: Sitting   Temp: 98.7 °F (37.1 °C)   TempSrc: Temporal Artery    Weight: 166 lb 3.2 oz (75.4 kg)   Height: 67\" (170.2 cm)     Physical Exam  Neurologic Exam  Constitutional: She is oriented to person, place, and time. Vital signs are normal. She appears well-developed and well-nourished. No distress.   Neat healthy female   Cardiovascular:   Pulses:  Carotid pulses are 2+ on the right side, and 2+ on the left side.  Radial pulses are 2+ on the right side, and 2+ on the left side.   Dorsalis pedis pulses are 1+ on the right side, and 1+ on the left side.   Posterior tibial pulses are 2+ on the right side, and 2+ on the left side.   Musculoskeletal:   Lumbar back: She exhibits deformity but no pain. She exhibits normal range of motion and no spasm.   Mild stiffness; Left thoracolumbar scoliosis; SLR caused mild back pain; No atrophy; hip ROM normal       Neurologic Exam       Mental Status   Oriented to person, place, and time.   Attention: normal. Concentration: normal.   Speech: speech is normal   Level of " consciousness: alert  Knowledge: good and consistent with education.   Normal comprehension.       Cranial Nerves   Cranial nerves II through XII intact.       Motor Exam   Muscle bulk: normal  Overall muscle tone: normal      Strength   Strength 5/5 throughout.       Sensory Exam   Light touch normal.   Proprioception normal.       Gait, Coordination, and Reflexes      Gait: normal (list to right when tired)      Tremor   Resting tremor: absent  Intention tremor: absent  Action tremor: absent      Reflexes   Right brachioradialis: 1+  Left brachioradialis: 1+  Right biceps: 1+  Left biceps: 1+  Right triceps: 1+  Left triceps: 1+  Right patellar: 2+  Left patellar: 2+  Right achilles: 1+  Left achilles: 1+  Right Buck: absent  Left Buck: absent  Right ankle clonus: absent  Left ankle clonus: absent      Judi was seen today for back pain.    Diagnoses and all orders for this visit:    DDD (degenerative disc disease), lumbar    Acute bilateral low back pain without sciatica    Degenerative disc disease, lumbar    Scoliosis (and kyphoscoliosis), idiopathic    Other form of scoliosis of lumbar spine    Chronic bilateral low back pain without sciatica    Lumbosacral spondylosis without myelopathy    Chronic midline low back pain without sciatica    Acute midline low back pain without sciatica      Plan: I recommend using ibuprofen 600 - 800 mg at a time prn for her back pain instead of intermittent mobic.  I do not recommend any surgery at this time.  I will see her again prn if she has more trouble with her back or develops new neurologic symptoms.      I, Dr. Ilir Botello, personally performed the services described in the documentation as scribed in my presence, and the documentation is both accurate and complete.    Ilir Botello MD

## 2018-04-16 ENCOUNTER — TRANSCRIBE ORDERS (OUTPATIENT)
Dept: OBSTETRICS AND GYNECOLOGY | Facility: CLINIC | Age: 78
End: 2018-04-16

## 2018-04-16 DIAGNOSIS — Z12.31 VISIT FOR SCREENING MAMMOGRAM: Primary | ICD-10-CM

## 2018-04-17 ENCOUNTER — HOSPITAL ENCOUNTER (OUTPATIENT)
Dept: MAMMOGRAPHY | Facility: HOSPITAL | Age: 78
Discharge: HOME | End: 2018-04-17
Attending: OBSTETRICS & GYNECOLOGY | Admitting: OBSTETRICS & GYNECOLOGY

## 2018-04-17 DIAGNOSIS — Z12.31 VISIT FOR SCREENING MAMMOGRAM: ICD-10-CM

## 2018-04-17 PROCEDURE — 77063 BREAST TOMOSYNTHESIS BI: CPT | Performed by: RADIOLOGY

## 2018-04-17 PROCEDURE — 77067 SCR MAMMO BI INCL CAD: CPT

## 2018-04-17 PROCEDURE — 77063 BREAST TOMOSYNTHESIS BI: CPT

## 2018-04-17 PROCEDURE — 77067 SCR MAMMO BI INCL CAD: CPT | Performed by: RADIOLOGY

## 2018-06-04 ENCOUNTER — HOSPITAL ENCOUNTER (OUTPATIENT)
Dept: BONE DENSITY | Facility: HOSPITAL | Age: 78
Discharge: HOME | End: 2018-06-04
Attending: OBSTETRICS & GYNECOLOGY | Admitting: OBSTETRICS & GYNECOLOGY

## 2018-06-04 DIAGNOSIS — M85.89 OSTEOPENIA OF MULTIPLE SITES: ICD-10-CM

## 2018-06-04 PROCEDURE — 77080 DXA BONE DENSITY AXIAL: CPT

## 2018-09-08 ENCOUNTER — HOSPITAL ENCOUNTER (EMERGENCY)
Facility: HOSPITAL | Age: 78
Discharge: HOME | End: 2018-09-08
Attending: EMERGENCY MEDICINE | Admitting: EMERGENCY MEDICINE

## 2018-09-08 ENCOUNTER — APPOINTMENT (OUTPATIENT)
Dept: GENERAL RADIOLOGY | Facility: HOSPITAL | Age: 78
End: 2018-09-08

## 2018-09-08 ENCOUNTER — APPOINTMENT (OUTPATIENT)
Dept: MRI IMAGING | Facility: HOSPITAL | Age: 78
End: 2018-09-08

## 2018-09-08 ENCOUNTER — APPOINTMENT (OUTPATIENT)
Dept: CARDIOLOGY | Facility: HOSPITAL | Age: 78
End: 2018-09-08
Attending: EMERGENCY MEDICINE

## 2018-09-08 VITALS
WEIGHT: 160 LBS | RESPIRATION RATE: 18 BRPM | HEART RATE: 81 BPM | OXYGEN SATURATION: 98 % | TEMPERATURE: 98.2 F | HEIGHT: 68 IN | SYSTOLIC BLOOD PRESSURE: 163 MMHG | BODY MASS INDEX: 24.25 KG/M2 | DIASTOLIC BLOOD PRESSURE: 87 MMHG

## 2018-09-08 DIAGNOSIS — I10 HYPERTENSION, UNSPECIFIED TYPE: ICD-10-CM

## 2018-09-08 DIAGNOSIS — M54.12 CERVICAL RADICULOPATHY: Primary | ICD-10-CM

## 2018-09-08 LAB
ALBUMIN SERPL-MCNC: 4.61 G/DL (ref 3.2–4.8)
ALBUMIN/GLOB SERPL: 1.6 G/DL (ref 1.5–2.5)
ALP SERPL-CCNC: 49 U/L (ref 25–100)
ALT SERPL W P-5'-P-CCNC: 18 U/L (ref 7–40)
ANION GAP SERPL CALCULATED.3IONS-SCNC: 9 MMOL/L (ref 3–11)
AST SERPL-CCNC: 21 U/L (ref 0–33)
BASOPHILS # BLD AUTO: 0.03 10*3/MM3 (ref 0–0.2)
BASOPHILS NFR BLD AUTO: 0.4 % (ref 0–1)
BH CV ECHO MEAS - BSA(HAYCOCK): 1.9 M^2
BH CV ECHO MEAS - BSA: 1.9 M^2
BH CV ECHO MEAS - BZI_BMI: 24.3 KILOGRAMS/M^2
BH CV ECHO MEAS - BZI_METRIC_HEIGHT: 172.7 CM
BH CV ECHO MEAS - BZI_METRIC_WEIGHT: 72.6 KG
BH CV UPPER VENOUS LEFT AXILLARY AUGMENT: NORMAL
BH CV UPPER VENOUS LEFT AXILLARY COMPRESS: NORMAL
BH CV UPPER VENOUS LEFT AXILLARY PHASIC: NORMAL
BH CV UPPER VENOUS LEFT AXILLARY SPONT: NORMAL
BH CV UPPER VENOUS LEFT BASILIC FOREARM COMPRESS: NORMAL
BH CV UPPER VENOUS LEFT BASILIC UPPER COMPRESS: NORMAL
BH CV UPPER VENOUS LEFT BRACHIAL COMPRESS: NORMAL
BH CV UPPER VENOUS LEFT CEPHALIC FOREARM COMPRESS: NORMAL
BH CV UPPER VENOUS LEFT CEPHALIC UPPER COMPRESS: NORMAL
BH CV UPPER VENOUS LEFT INTERNAL JUGULAR AUGMENT: NORMAL
BH CV UPPER VENOUS LEFT INTERNAL JUGULAR COMPRESS: NORMAL
BH CV UPPER VENOUS LEFT INTERNAL JUGULAR PHASIC: NORMAL
BH CV UPPER VENOUS LEFT RADIAL COMPRESS: NORMAL
BH CV UPPER VENOUS LEFT SUBCLAVIAN AUGMENT: NORMAL
BH CV UPPER VENOUS LEFT SUBCLAVIAN PHASIC: NORMAL
BH CV UPPER VENOUS LEFT ULNAR COMPRESS: NORMAL
BH CV UPPER VENOUS RIGHT INTERNAL JUGULAR AUGMENT: NORMAL
BH CV UPPER VENOUS RIGHT INTERNAL JUGULAR COMPRESS: NORMAL
BH CV UPPER VENOUS RIGHT INTERNAL JUGULAR PHASIC: NORMAL
BILIRUB SERPL-MCNC: 0.4 MG/DL (ref 0.3–1.2)
BUN BLD-MCNC: 20 MG/DL (ref 9–23)
BUN/CREAT SERPL: 29.9 (ref 7–25)
CALCIUM SPEC-SCNC: 9.6 MG/DL (ref 8.7–10.4)
CHLORIDE SERPL-SCNC: 104 MMOL/L (ref 99–109)
CO2 SERPL-SCNC: 28 MMOL/L (ref 20–31)
CREAT BLD-MCNC: 0.67 MG/DL (ref 0.6–1.3)
DEPRECATED RDW RBC AUTO: 45.2 FL (ref 37–54)
EOSINOPHIL # BLD AUTO: 0.03 10*3/MM3 (ref 0–0.3)
EOSINOPHIL NFR BLD AUTO: 0.4 % (ref 0–3)
ERYTHROCYTE [DISTWIDTH] IN BLOOD BY AUTOMATED COUNT: 13.7 % (ref 11.3–14.5)
GFR SERPL CREATININE-BSD FRML MDRD: 86 ML/MIN/1.73
GLOBULIN UR ELPH-MCNC: 2.9 GM/DL
GLUCOSE BLD-MCNC: 98 MG/DL (ref 70–100)
HCT VFR BLD AUTO: 41.6 % (ref 34.5–44)
HGB BLD-MCNC: 14 G/DL (ref 11.5–15.5)
HOLD SPECIMEN: NORMAL
HOLD SPECIMEN: NORMAL
IMM GRANULOCYTES # BLD: 0.02 10*3/MM3 (ref 0–0.03)
IMM GRANULOCYTES NFR BLD: 0.3 % (ref 0–0.6)
LYMPHOCYTES # BLD AUTO: 1.56 10*3/MM3 (ref 0.6–4.8)
LYMPHOCYTES NFR BLD AUTO: 23.3 % (ref 24–44)
MCH RBC QN AUTO: 30.2 PG (ref 27–31)
MCHC RBC AUTO-ENTMCNC: 33.7 G/DL (ref 32–36)
MCV RBC AUTO: 89.7 FL (ref 80–99)
MONOCYTES # BLD AUTO: 0.53 10*3/MM3 (ref 0–1)
MONOCYTES NFR BLD AUTO: 7.9 % (ref 0–12)
NEUTROPHILS # BLD AUTO: 4.55 10*3/MM3 (ref 1.5–8.3)
NEUTROPHILS NFR BLD AUTO: 68 % (ref 41–71)
PLATELET # BLD AUTO: 243 10*3/MM3 (ref 150–450)
PMV BLD AUTO: 10.4 FL (ref 6–12)
POTASSIUM BLD-SCNC: 4.1 MMOL/L (ref 3.5–5.5)
PROT SERPL-MCNC: 7.5 G/DL (ref 5.7–8.2)
RBC # BLD AUTO: 4.64 10*6/MM3 (ref 3.89–5.14)
SODIUM BLD-SCNC: 141 MMOL/L (ref 132–146)
TROPONIN I SERPL-MCNC: 0 NG/ML (ref 0–0.07)
WBC NRBC COR # BLD: 6.7 10*3/MM3 (ref 3.5–10.8)
WHOLE BLOOD HOLD SPECIMEN: NORMAL
WHOLE BLOOD HOLD SPECIMEN: NORMAL

## 2018-09-08 PROCEDURE — 96374 THER/PROPH/DIAG INJ IV PUSH: CPT

## 2018-09-08 PROCEDURE — 80053 COMPREHEN METABOLIC PANEL: CPT | Performed by: EMERGENCY MEDICINE

## 2018-09-08 PROCEDURE — 25010000002 ONDANSETRON PER 1 MG: Performed by: PHYSICIAN ASSISTANT

## 2018-09-08 PROCEDURE — 25010000002 KETOROLAC TROMETHAMINE PER 15 MG: Performed by: EMERGENCY MEDICINE

## 2018-09-08 PROCEDURE — 85025 COMPLETE CBC W/AUTO DIFF WBC: CPT | Performed by: EMERGENCY MEDICINE

## 2018-09-08 PROCEDURE — 71045 X-RAY EXAM CHEST 1 VIEW: CPT

## 2018-09-08 PROCEDURE — 25010000002 PROMETHAZINE PER 50 MG: Performed by: PHYSICIAN ASSISTANT

## 2018-09-08 PROCEDURE — 25010000002 ONDANSETRON PER 1 MG: Performed by: EMERGENCY MEDICINE

## 2018-09-08 PROCEDURE — 93005 ELECTROCARDIOGRAM TRACING: CPT | Performed by: EMERGENCY MEDICINE

## 2018-09-08 PROCEDURE — 99285 EMERGENCY DEPT VISIT HI MDM: CPT

## 2018-09-08 PROCEDURE — 96375 TX/PRO/DX INJ NEW DRUG ADDON: CPT

## 2018-09-08 PROCEDURE — 93971 EXTREMITY STUDY: CPT

## 2018-09-08 PROCEDURE — 84484 ASSAY OF TROPONIN QUANT: CPT

## 2018-09-08 PROCEDURE — 93005 ELECTROCARDIOGRAM TRACING: CPT

## 2018-09-08 PROCEDURE — 96376 TX/PRO/DX INJ SAME DRUG ADON: CPT

## 2018-09-08 PROCEDURE — 72141 MRI NECK SPINE W/O DYE: CPT

## 2018-09-08 PROCEDURE — 25010000002 HYDROMORPHONE PER 4 MG: Performed by: EMERGENCY MEDICINE

## 2018-09-08 RX ORDER — HYDROCODONE BITARTRATE AND ACETAMINOPHEN 7.5; 325 MG/1; MG/1
1 TABLET ORAL EVERY 6 HOURS PRN
Qty: 12 TABLET | Refills: 0 | Status: SHIPPED | OUTPATIENT
Start: 2018-09-08 | End: 2020-01-14

## 2018-09-08 RX ORDER — LOSARTAN POTASSIUM 50 MG/1
50 TABLET ORAL ONCE
Status: COMPLETED | OUTPATIENT
Start: 2018-09-08 | End: 2018-09-08

## 2018-09-08 RX ORDER — CYCLOBENZAPRINE HCL 10 MG
10 TABLET ORAL ONCE
Status: COMPLETED | OUTPATIENT
Start: 2018-09-08 | End: 2018-09-08

## 2018-09-08 RX ORDER — ONDANSETRON 2 MG/ML
4 INJECTION INTRAMUSCULAR; INTRAVENOUS ONCE
Status: COMPLETED | OUTPATIENT
Start: 2018-09-08 | End: 2018-09-08

## 2018-09-08 RX ORDER — ONDANSETRON 4 MG/1
4 TABLET, ORALLY DISINTEGRATING ORAL EVERY 6 HOURS PRN
Qty: 15 TABLET | Refills: 0 | Status: SHIPPED | OUTPATIENT
Start: 2018-09-08 | End: 2019-10-22

## 2018-09-08 RX ORDER — LORAZEPAM 1 MG/1
1 TABLET ORAL ONCE
Status: DISCONTINUED | OUTPATIENT
Start: 2018-09-08 | End: 2018-09-08

## 2018-09-08 RX ORDER — LOSARTAN POTASSIUM 50 MG/1
50 TABLET ORAL DAILY
Qty: 30 TABLET | Refills: 0 | Status: SHIPPED | OUTPATIENT
Start: 2018-09-08 | End: 2018-10-08

## 2018-09-08 RX ORDER — HYDROMORPHONE HYDROCHLORIDE 1 MG/ML
0.5 INJECTION, SOLUTION INTRAMUSCULAR; INTRAVENOUS; SUBCUTANEOUS ONCE
Status: COMPLETED | OUTPATIENT
Start: 2018-09-08 | End: 2018-09-08

## 2018-09-08 RX ORDER — CYCLOBENZAPRINE HCL 10 MG
10 TABLET ORAL 3 TIMES DAILY PRN
Qty: 15 TABLET | Refills: 0 | Status: SHIPPED | OUTPATIENT
Start: 2018-09-08 | End: 2019-10-22

## 2018-09-08 RX ORDER — KETOROLAC TROMETHAMINE 15 MG/ML
15 INJECTION, SOLUTION INTRAMUSCULAR; INTRAVENOUS ONCE
Status: COMPLETED | OUTPATIENT
Start: 2018-09-08 | End: 2018-09-08

## 2018-09-08 RX ORDER — PROMETHAZINE HYDROCHLORIDE 25 MG/ML
12.5 INJECTION, SOLUTION INTRAMUSCULAR; INTRAVENOUS ONCE
Status: COMPLETED | OUTPATIENT
Start: 2018-09-08 | End: 2018-09-08

## 2018-09-08 RX ADMIN — PROMETHAZINE HYDROCHLORIDE 12.5 MG: 25 INJECTION INTRAMUSCULAR; INTRAVENOUS at 20:59

## 2018-09-08 RX ADMIN — HYDROMORPHONE HYDROCHLORIDE 0.5 MG: 1 INJECTION, SOLUTION INTRAMUSCULAR; INTRAVENOUS; SUBCUTANEOUS at 19:40

## 2018-09-08 RX ADMIN — ONDANSETRON 4 MG: 2 INJECTION INTRAMUSCULAR; INTRAVENOUS at 19:39

## 2018-09-08 RX ADMIN — LOSARTAN POTASSIUM 50 MG: 50 TABLET ORAL at 21:38

## 2018-09-08 RX ADMIN — HYDROMORPHONE HYDROCHLORIDE 0.5 MG: 1 INJECTION, SOLUTION INTRAMUSCULAR; INTRAVENOUS; SUBCUTANEOUS at 16:59

## 2018-09-08 RX ADMIN — ONDANSETRON 4 MG: 2 INJECTION INTRAMUSCULAR; INTRAVENOUS at 16:58

## 2018-09-08 RX ADMIN — CYCLOBENZAPRINE HYDROCHLORIDE 10 MG: 10 TABLET, FILM COATED ORAL at 21:38

## 2018-09-08 RX ADMIN — KETOROLAC TROMETHAMINE 15 MG: 15 INJECTION, SOLUTION INTRAMUSCULAR; INTRAVENOUS at 19:39

## 2018-09-08 NOTE — ED PROVIDER NOTES
Subjective   Pt is a 78 yo female presenting to ED with left arm pain. She states the pain began gradually about 2 weeks ago and has worsened. She experiences aching and shooting pain from her left shoulder / neck down to her fingers with the majority of her pain on the outer aspect of her arm and into all of her fingers. The pain has radiated into the left lateral aspect of her chest as well. She denies numbness, tingling or weakness. She denies specific midline neck pain or limited movement of neck or arm. She has been seen by her PCP and Physical Therapy. She was placed on Prednisone several days ago and given a Cortizone shot with no relief. She denies swelling or redness to left arm. No reports of injury or prior hx of left arm pain. She has been followed by Neurosurgery Dr. Botello in the past for lumbar stenosis. Pt and family concerned due to noticeable elevation in her BP with new left arm pain. She denies prior cardiac hx. She has significant hx for HTN, HLD, IBS, Epilepsy, Asthma and Anxiety. She admits to not taking her BP meds as prescribed because she did not think she needed them and her only daily med is Lamictal along with OTC supplements / vitamins.         History provided by:  Patient and relative  Arm Pain   Associated symptoms: myalgias (Left arm)    Associated symptoms: no abdominal pain, no chest pain, no congestion, no cough, no diarrhea, no ear pain, no fever, no headaches, no nausea, no rash, no shortness of breath, no sore throat and no vomiting        Review of Systems   Constitutional: Negative for chills and fever.   HENT: Negative for congestion, ear pain, sore throat and trouble swallowing.    Eyes: Negative for pain, redness and visual disturbance.   Respiratory: Negative for cough, chest tightness and shortness of breath.    Cardiovascular: Negative for chest pain and leg swelling.   Gastrointestinal: Negative for abdominal pain, constipation, diarrhea, nausea and vomiting.    Genitourinary: Negative for difficulty urinating, dysuria, flank pain, hematuria and vaginal bleeding.   Musculoskeletal: Positive for myalgias (Left arm). Negative for arthralgias, back pain, joint swelling, neck pain and neck stiffness.   Skin: Negative for rash and wound.   Neurological: Negative for dizziness, syncope, speech difficulty, weakness, numbness and headaches.   Psychiatric/Behavioral: Negative for confusion.   All other systems reviewed and are negative.      Past Medical History:   Diagnosis Date   • Anxiety    • Asthma    • Back problem    • Compression fracture of T12 vertebra (CMS/HCC)    • Cystocele    • Epilepsy (CMS/HCC)    • Family history of hip fracture     mother, father   • Hyperlipidemia    • Hypertension    • IBS (irritable bowel syndrome)    • Osteoporosis    • Postmenopausal    • Seizure disorder (CMS/HCC)    • Vaginal atrophy    • Vitamin D deficiency        Allergies   Allergen Reactions   • Oxycodone-Acetaminophen    • Sulfa Antibiotics        Past Surgical History:   Procedure Laterality Date   • ABDOMINAL SURGERY     • APPENDECTOMY     • BASAL CELL CARCINOMA EXCISION Right 01/12/2010    Nose   • CHOLECYSTECTOMY  01/01/1991   • CYST REMOVAL  01/01/1990    Right Foot   • DILATATION AND CURETTAGE     • FUNCTIONAL ENDOSCOPIC SINUS SURGERY  11/16/2010    Dr. Mathews   • KNEE ARTHROSCOPY Left 01/01/2006   • KNEE ARTHROSCOPY W/ MENISCAL REPAIR Right 11/01/2010   • SINUS SURGERY     • TOTAL ABDOMINAL HYSTERECTOMY WITH SALPINGO OOPHORECTOMY Bilateral    • WRIST SURGERY Left 01/01/2004       Family History   Problem Relation Age of Onset   • Fibromyalgia Daughter    • Thyroid cancer Daughter    • Hypothyroidism Daughter        Social History     Social History   • Marital status:      Social History Main Topics   • Smoking status: Never Smoker   • Smokeless tobacco: Never Used   • Alcohol use No   • Drug use: No   • Sexual activity: Yes     Partners: Male     Birth control/  protection: Surgical, Post-menopausal     Other Topics Concern   • Not on file           Objective   Physical Exam   Constitutional: She is oriented to person, place, and time. Vital signs are normal. She appears well-developed.   HENT:   Head: Atraumatic.   Nose: Nose normal.   Mouth/Throat: Mucous membranes are normal.   Eyes: Pupils are equal, round, and reactive to light. Conjunctivae, EOM and lids are normal.   Neck: Normal range of motion. Neck supple.   Cardiovascular: Normal rate, regular rhythm and normal heart sounds.    Pulmonary/Chest: Effort normal and breath sounds normal. She has no wheezes.   Abdominal: Soft. She exhibits no distension. There is no tenderness. There is no rebound and no guarding.   Musculoskeletal: Normal range of motion. She exhibits no edema.        Cervical back: She exhibits tenderness (Left lateral soft tissue, no midline TTP). She exhibits normal range of motion and no swelling.        Thoracic back: She exhibits normal range of motion and no tenderness.        Lumbar back: She exhibits normal range of motion and no tenderness.   Diffuse TTP along lateral aspect of left arm from shoulder down to left hand with no obvious deformity, swelling, erythema, altered sensation. Full ROM, 5/5 strength.    Neurological: She is alert and oriented to person, place, and time. No sensory deficit.   Skin: Skin is warm and dry. No rash noted. No erythema.   Psychiatric: Her speech is normal and behavior is normal. Her mood appears anxious.   Nursing note and vitals reviewed.      Procedures           ED Course      Re-examined patient several times. Pt feeling better but still having episodes of pain radiating down left arm. Pt's family explains they recently found out patient has not been taking her BP med (Losartan) for possibly 1-2 years and that she has high anxiety and needs to be back on anxiety meds as well. She has been followed by Neurosurgery Dr. Botello in the past and will f/u with  him as well as PCP. Pt restarted on Losartan and will keep a BP log at home. Pt will finish steroids from PCP and will be given short course of Norco and Flexeril from ED. Went over concern sx to return to ED. Pt and family agreeable with ED course and discharge plan. Discussed occipital cyst and f/u with PCP.    Reviewed old records.     DIANA query complete. Treatment plan to include limited course of prescribed  controlled substance. Risks including addiction, benefits, and alternatives presented to patient.       Recent Results (from the past 24 hour(s))   Comprehensive Metabolic Panel    Collection Time: 09/08/18  4:56 PM   Result Value Ref Range    Glucose 98 70 - 100 mg/dL    BUN 20 9 - 23 mg/dL    Creatinine 0.67 0.60 - 1.30 mg/dL    Sodium 141 132 - 146 mmol/L    Potassium 4.1 3.5 - 5.5 mmol/L    Chloride 104 99 - 109 mmol/L    CO2 28.0 20.0 - 31.0 mmol/L    Calcium 9.6 8.7 - 10.4 mg/dL    Total Protein 7.5 5.7 - 8.2 g/dL    Albumin 4.61 3.20 - 4.80 g/dL    ALT (SGPT) 18 7 - 40 U/L    AST (SGOT) 21 0 - 33 U/L    Alkaline Phosphatase 49 25 - 100 U/L    Total Bilirubin 0.4 0.3 - 1.2 mg/dL    eGFR Non African Amer 86 >60 mL/min/1.73    Globulin 2.9 gm/dL    A/G Ratio 1.6 1.5 - 2.5 g/dL    BUN/Creatinine Ratio 29.9 (H) 7.0 - 25.0    Anion Gap 9.0 3.0 - 11.0 mmol/L   CBC Auto Differential    Collection Time: 09/08/18  4:56 PM   Result Value Ref Range    WBC 6.70 3.50 - 10.80 10*3/mm3    RBC 4.64 3.89 - 5.14 10*6/mm3    Hemoglobin 14.0 11.5 - 15.5 g/dL    Hematocrit 41.6 34.5 - 44.0 %    MCV 89.7 80.0 - 99.0 fL    MCH 30.2 27.0 - 31.0 pg    MCHC 33.7 32.0 - 36.0 g/dL    RDW 13.7 11.3 - 14.5 %    RDW-SD 45.2 37.0 - 54.0 fl    MPV 10.4 6.0 - 12.0 fL    Platelets 243 150 - 450 10*3/mm3    Neutrophil % 68.0 41.0 - 71.0 %    Lymphocyte % 23.3 (L) 24.0 - 44.0 %    Monocyte % 7.9 0.0 - 12.0 %    Eosinophil % 0.4 0.0 - 3.0 %    Basophil % 0.4 0.0 - 1.0 %    Immature Grans % 0.3 0.0 - 0.6 %    Neutrophils, Absolute 4.55  1.50 - 8.30 10*3/mm3    Lymphocytes, Absolute 1.56 0.60 - 4.80 10*3/mm3    Monocytes, Absolute 0.53 0.00 - 1.00 10*3/mm3    Eosinophils, Absolute 0.03 0.00 - 0.30 10*3/mm3    Basophils, Absolute 0.03 0.00 - 0.20 10*3/mm3    Immature Grans, Absolute 0.02 0.00 - 0.03 10*3/mm3   Light Blue Top    Collection Time: 09/08/18  4:56 PM   Result Value Ref Range    Extra Tube hold for add-on    Green Top (Gel)    Collection Time: 09/08/18  4:56 PM   Result Value Ref Range    Extra Tube Hold for add-ons.    Lavender Top    Collection Time: 09/08/18  4:56 PM   Result Value Ref Range    Extra Tube hold for add-on    Gold Top - SST    Collection Time: 09/08/18  4:56 PM   Result Value Ref Range    Extra Tube Hold for add-ons.    POC Troponin, Rapid    Collection Time: 09/08/18  5:01 PM   Result Value Ref Range    Troponin I 0.00 0.00 - 0.07 ng/mL   Duplex Venous Upper Extremity - Left    Collection Time: 09/08/18  6:04 PM   Result Value Ref Range    BSA 1.9 m^2     CV ECHO MEKHI - BZI_BMI 24.3 kilograms/m^2     CV ECHO MEKHI - BSA(HAYCOCK) 1.9 m^2     CV ECHO MEKHI - BZI_METRIC_WEIGHT 72.6 kg     CV ECHO MEKHI - BZI_METRIC_HEIGHT 172.7 cm    Right Internal Jugular Augment Y     Right Internal Jugular Compress C     Right Internal Jugular Phasic Y     Left Internal Jugular Augment Y     Left Internal Jugular Compress C     Left Internal Jugular Phasic Y     Left Subclavian Augment Y     Left Subclavian Phasic Y     Left Axillary Augment Y     Left Axillary Compress C     Left Axillary Phasic Y     Left Axillary Spont Y     Left Brachial Compress C     Left Radial Compress C     Left Ulnar Compress C     Left Basilic Upper Compress C     Left Basilic Forearm Compress C     Left Cephalic Upper Compress C     Left Cephalic Forearm Compress C      Note: In addition to lab results from this visit, the labs listed above may include labs taken at another facility or during a different encounter within the last 24 hours. Please  correlate lab times with ED admission and discharge times for further clarification of the services performed during this visit.    MRI Cervical Spine Without Contrast   Final Result     Moderate chronic degenerative changes resulting in LEFT C4-C5 foraminal    narrowing detailed above.        Markedly disc height from C3-C4 to C6-7 and type II endplate changes with a    capacious spinal canal without spinal stenosis.        Normal appearing cervical cord without evidence of cord compression, no    abnormal cord signal changes and no evidence of syrinx.         THIS DOCUMENT HAS BEEN ELECTRONICALLY SIGNED BY KAILEE TIERNEY MD      XR Chest 1 View   Preliminary Result   Stable chest exam with no evidence of active disease.       DICTATED:   9/8/2018   EDITED/ls :   9/8/2018                 Vitals:    09/08/18 2000 09/08/18 2030 09/08/18 2100 09/08/18 2130   BP: 176/98 (!) 188/106 177/98 163/87   BP Location:       Patient Position:       Pulse:       Resp:       Temp:       TempSrc:       SpO2: 96% 100% 95% 98%   Weight:       Height:         Medications   HYDROmorphone (DILAUDID) injection 0.5 mg (0.5 mg Intravenous Given 9/8/18 1659)   ondansetron (ZOFRAN) injection 4 mg (4 mg Intravenous Given 9/8/18 1658)   HYDROmorphone (DILAUDID) injection 0.5 mg (0.5 mg Intravenous Given 9/8/18 1940)   ketorolac (TORADOL) injection 15 mg (15 mg Intravenous Given 9/8/18 1939)   ondansetron (ZOFRAN) injection 4 mg (4 mg Intravenous Given 9/8/18 1939)   cyclobenzaprine (FLEXERIL) tablet 10 mg (10 mg Oral Given 9/8/18 2138)   losartan (COZAAR) tablet 50 mg (50 mg Oral Given 9/8/18 2138)   promethazine (PHENERGAN) injection 12.5 mg (12.5 mg Intravenous Given 9/8/18 2059)     ECG/EMG Results (last 24 hours)     Procedure Component Value Units Date/Time    ECG 12 Lead [[id number]] Collected:  09/08/18 1417     Updated:  09/08/18 1417                  MDM      Final diagnoses:   Cervical radiculopathy   Hypertension, unspecified type             Shraddha Perez PA  09/08/18 2210       Shraddha Perez PA  09/08/18 2212

## 2018-09-10 ENCOUNTER — TELEPHONE (OUTPATIENT)
Dept: NEUROSURGERY | Facility: CLINIC | Age: 78
End: 2018-09-10

## 2018-09-10 NOTE — TELEPHONE ENCOUNTER
Provider:  Edson  Caller: Judi Patel  Time of call:   10:32 AM  Phone #:  [phone]  Last visit:   11/14/17  Next visit: 09/24/18    Reason for call:       Patient was in our ER over the weekend with severe left arm pain and increased BP due to pain (see ER note 09/08/18 in [facility]). She said Carolyne was unable to schedule her sooner than 09/24. I spoke to Carolyne and she said that the patient told her that she was supposed to leave on vacation on 09/17 and asked for her appt to be after that.     I called patient, she confirmed was Carolyne said but that if her pain was still this back she would not go on vacation next week. I adv pt that there were no openings for Dr. Sandhu or Jenna GRIMALDO this week, but that there were just a few openings for Dr. Sandhu / Jenna GRIMALDO next week so that if she wants to move her appt up sooner she would need to call us soon, she understood. She said she has an appt with her PCP today and if she decided that she wants to be seen next week instead she would give us a call.

## 2018-09-13 ENCOUNTER — APPOINTMENT (OUTPATIENT)
Dept: SLEEP MEDICINE | Facility: HOSPITAL | Age: 78
End: 2018-09-13

## 2018-09-24 ENCOUNTER — OFFICE VISIT (OUTPATIENT)
Dept: NEUROSURGERY | Facility: CLINIC | Age: 78
End: 2018-09-24

## 2018-09-24 VITALS
SYSTOLIC BLOOD PRESSURE: 122 MMHG | BODY MASS INDEX: 25.01 KG/M2 | HEIGHT: 68 IN | TEMPERATURE: 98.6 F | WEIGHT: 165 LBS | DIASTOLIC BLOOD PRESSURE: 76 MMHG

## 2018-09-24 DIAGNOSIS — M41.20 SCOLIOSIS (AND KYPHOSCOLIOSIS), IDIOPATHIC: ICD-10-CM

## 2018-09-24 DIAGNOSIS — M50.30 DEGENERATIVE DISC DISEASE, CERVICAL: Primary | ICD-10-CM

## 2018-09-24 PROCEDURE — 99213 OFFICE O/P EST LOW 20 MIN: CPT | Performed by: NEUROLOGICAL SURGERY

## 2018-09-24 RX ORDER — LISINOPRIL AND HYDROCHLOROTHIAZIDE 20; 12.5 MG/1; MG/1
1 TABLET ORAL DAILY
COMMUNITY
End: 2019-10-22

## 2018-09-24 RX ORDER — NABUMETONE 750 MG/1
750 TABLET, FILM COATED ORAL 2 TIMES DAILY
Qty: 60 TABLET | Refills: 1 | Status: SHIPPED | OUTPATIENT
Start: 2018-09-24 | End: 2018-11-06

## 2018-09-24 RX ORDER — GABAPENTIN 300 MG/1
300 CAPSULE ORAL
Qty: 90 CAPSULE | Refills: 0 | Status: SHIPPED | OUTPATIENT
Start: 2018-09-24 | End: 2018-12-20 | Stop reason: SDUPTHER

## 2018-09-24 RX ORDER — METHOCARBAMOL 750 MG/1
750 TABLET, FILM COATED ORAL 2 TIMES DAILY
Qty: 30 TABLET | Refills: 1 | Status: SHIPPED | OUTPATIENT
Start: 2018-09-24 | End: 2019-10-22

## 2018-09-24 NOTE — PROGRESS NOTES
Judi Patel  1940  [id number]                        CHIEF COMPLAINT:  [Cervical and left arm pain ]         MEDICAL HISTORY SINCE LAST ENCOUNTER:  [This 77-year-old female presents with a history of pain in her neck radiating to her left upper extremity unassociated with numbness or weakness.  Pain extends stent was from her elbow to the volar aspect of her arm into her fifth digit.  She has no pain at the elbow, she has no symptoms to suggest ulnar or median neuropathy.  She has been followed by Dr. Botello in the past with lumbar spinal stenosis.    She has been at physical therapy with her daughter with cervical traction which provide some relief.  She is referred for neurosurgical consultation.  She has had EMG/NCV and cervical MRI. ]           Past Medical History:   Diagnosis Date   • Anxiety    • Asthma    • Back problem    • Compression fracture of T12 vertebra (CMS/HCC)    • Cystocele    • Epilepsy (CMS/HCC)    • Family history of hip fracture     mother, father   • Hyperlipidemia    • Hypertension    • IBS (irritable bowel syndrome)    • Osteoporosis    • Postmenopausal    • Seizure disorder (CMS/HCC)    • Vaginal atrophy    • Vitamin D deficiency               Past Surgical History:   Procedure Laterality Date   • ABDOMINAL SURGERY     • APPENDECTOMY     • BASAL CELL CARCINOMA EXCISION Right 01/12/2010    Nose   • CHOLECYSTECTOMY  01/01/1991   • CYST REMOVAL  01/01/1990    Right Foot   • DILATATION AND CURETTAGE     • FUNCTIONAL ENDOSCOPIC SINUS SURGERY  11/16/2010    Dr. Mathews   • KNEE ARTHROSCOPY Left 01/01/2006   • KNEE ARTHROSCOPY W/ MENISCAL REPAIR Right 11/01/2010   • SINUS SURGERY     • TOTAL ABDOMINAL HYSTERECTOMY WITH SALPINGO OOPHORECTOMY Bilateral    • WRIST SURGERY Left 01/01/2004              Family History   Problem Relation Age of Onset   • Fibromyalgia Daughter    • Thyroid cancer Daughter    • Hypothyroidism Daughter               Social History     Social History   • Marital  status:      Spouse name: N/A   • Number of children: N/A   • Years of education: N/A     Occupational History   • Not on file.     Social History Main Topics   • Smoking status: Never Smoker   • Smokeless tobacco: Never Used   • Alcohol use No   • Drug use: No   • Sexual activity: Yes     Partners: Male     Birth control/ protection: Surgical, Post-menopausal     Other Topics Concern   • Not on file     Social History Narrative   • No narrative on file              Allergies   Allergen Reactions   • Oxycodone-Acetaminophen    • Sulfa Antibiotics               Current Outpatient Prescriptions:   •  albuterol (PROVENTIL HFA;VENTOLIN HFA) 108 (90 BASE) MCG/ACT inhaler, Inhale 2 puffs every 4 (four) hours as needed for shortness of air., Disp: , Rfl:   •  albuterol (PROVENTIL) (2.5 MG/3ML) 0.083% nebulizer solution, Take 2.5 mg by nebulization every 4 (four) hours as needed for shortness of air., Disp: , Rfl:   •  Biotin 2.5 MG capsule, Take  by mouth daily., Disp: , Rfl:   •  Calcium-Phosphorus-Vitamin D (CITRACAL +D3 PO), Take  by mouth 2 (two) times a day., Disp: , Rfl:   •  Cholecalciferol (VITAMIN D) 2000 UNITS capsule, Take 2,000 Units by mouth daily., Disp: , Rfl:   •  Coenzyme Q10 (COQ10) 200 MG capsule, Take  by mouth daily., Disp: , Rfl:   •  cyclobenzaprine (FLEXERIL) 10 MG tablet, Take 1 tablet by mouth 3 (Three) Times a Day As Needed for Muscle Spasms for up to 15 doses., Disp: 15 tablet, Rfl: 0  •  HYDROcodone-acetaminophen (NORCO) 7.5-325 MG per tablet, Take 1 tablet by mouth Every 6 (Six) Hours As Needed for Moderate Pain  for up to 12 doses., Disp: 12 tablet, Rfl: 0  •  lamoTRIgine (LaMICtal) 100 MG tablet, Take 100 mg by mouth daily., Disp: , Rfl:   •  lisinopril-hydrochlorothiazide (PRINZIDE,ZESTORETIC) 20-12.5 MG per tablet, Take 1 tablet by mouth Daily., Disp: , Rfl:   •  MAGNESIUM PO, Take  by mouth daily., Disp: , Rfl:   •  montelukast (SINGULAIR) 10 MG tablet, Take 10 mg by mouth every  night., Disp: , Rfl:   •  Multiple Vitamins-Minerals (ZINC PO), Take  by mouth 2 (two) times a day., Disp: , Rfl:   •  Omega-3 Fatty Acids (FISH OIL PO), Take 800 mg by mouth daily., Disp: , Rfl:   •  ondansetron ODT (ZOFRAN-ODT) 4 MG disintegrating tablet, Take 1 tablet by mouth Every 6 (Six) Hours As Needed for Nausea or Vomiting for up to 15 doses., Disp: 15 tablet, Rfl: 0  •  Ospemifene 60 MG tablet, Take 60 mg by mouth Daily., Disp: 90 tablet, Rfl: 3  •  Probiotic Product (PROBIOTIC PO), Take  by mouth daily. 50 billion, Disp: , Rfl:   •  TURMERIC CURCUMIN PO, Take  by mouth daily., Disp: , Rfl:   •  losartan (COZAAR) 50 MG tablet, Take 1 tablet by mouth Daily for 30 days., Disp: 30 tablet, Rfl: 0         Review of Systems   Constitutional: Positive for activity change. Negative for appetite change, chills, diaphoresis, fatigue, fever and unexpected weight change.   HENT: Positive for tinnitus. Negative for congestion, dental problem, drooling, ear discharge, ear pain, facial swelling, hearing loss, mouth sores, nosebleeds, postnasal drip, rhinorrhea, sinus pressure, sneezing, sore throat, trouble swallowing and voice change.    Eyes: Negative for photophobia, pain, discharge, redness, itching and visual disturbance.   Respiratory: Negative for apnea, cough, choking, chest tightness, shortness of breath, wheezing and stridor.    Cardiovascular: Negative for chest pain, palpitations and leg swelling.   Gastrointestinal: Negative for abdominal distention, abdominal pain, anal bleeding, blood in stool, constipation, diarrhea, nausea, rectal pain and vomiting.   Endocrine: Negative for cold intolerance, heat intolerance, polydipsia, polyphagia and polyuria.   Genitourinary: Negative for decreased urine volume, difficulty urinating, dysuria, enuresis, flank pain, frequency, genital sores, hematuria and urgency.   Musculoskeletal: Negative for arthralgias, back pain, gait problem, joint swelling, myalgias, neck pain  "and neck stiffness.   Skin: Negative for color change, pallor, rash and wound.   Allergic/Immunologic: Negative for environmental allergies, food allergies and immunocompromised state.   Neurological: Positive for tremors, seizures and numbness. Negative for dizziness, syncope, facial asymmetry, speech difficulty, weakness, light-headedness and headaches.   Hematological: Negative for adenopathy. Does not bruise/bleed easily.   Psychiatric/Behavioral: Negative for agitation, behavioral problems, confusion, decreased concentration, dysphoric mood, hallucinations, self-injury, sleep disturbance and suicidal ideas. The patient is not nervous/anxious and is not hyperactive.                Vitals:    09/24/18 1427   BP: 122/76   BP Location: Right arm   Patient Position: Sitting   Temp: 98.6 °F (37 °C)   TempSrc: Temporal Artery    Weight: 74.8 kg (165 lb)   Height: 172.7 cm (67.99\")               EXAMINATION: She has slight decreased range of motion of the cervical spine.  However I'm unable to detect weakness, sensory loss or reflex asymmetry.  Her gait is normal.  No Babinski Marsha or clonus.            MEDICAL DECISION MAKING: The EMG and NCV is been interpreted as showing a mild C5 6-C6 radiculopathy.  The cervical MRI shows cervical degenerative changes.  An suggest and neural foraminal closure at C4/C5 on the left.  Otherwise although there is extensive arthritic changes there is no compromise of either central canal or the Cruz.           ASSESSMENT/DISPOSITION: I've given her a prescription for physical therapy to my: Given her a prescription also for Neurontin 300 mg at night, Relafen 750 mg twice a day Robaxin 750 night.  She will call me in 1 week to let me know how things are going.  If she continues to have issues I would recommend cervical myelography.              I APPRECIATE THE OPPORTUNITY OF THIS REFERRAL. PLEASE CALL IF ANY       QUESTIONS [phone]    Scribed for Ziyad Sandhu MD by " Sundar Shrestha CMA. 9/24/2018  2:52 PM    I have read and concur with the information provided by the scribe.  Ziyad Sandhu MD

## 2018-09-24 NOTE — PATIENT INSTRUCTIONS
Call Dr. Sandhu on a Monday  with an update.   Ask for  Marianne  and leave a message for  Dr. Sandhu.  He will call you back at the end of the day as soon as he can.     [phone]

## 2018-10-25 DIAGNOSIS — N95.2 VAGINAL ATROPHY: ICD-10-CM

## 2018-10-26 RX ORDER — OSPEMIFENE 60 MG/1
TABLET, FILM COATED ORAL
Qty: 90 TABLET | OUTPATIENT
Start: 2018-10-26

## 2018-10-26 NOTE — TELEPHONE ENCOUNTER
Judi has an appointment 11/6/18 for annual exam.  I called her to see if she needs a prescription refill prior to that time.  She states that she does not need it at this time.  We agreed that I would deny this request, and Dr. Lopez will renew X 1 year at the time of her annual exam.

## 2018-11-06 ENCOUNTER — OFFICE VISIT (OUTPATIENT)
Dept: OBSTETRICS AND GYNECOLOGY | Facility: CLINIC | Age: 78
End: 2018-11-06

## 2018-11-06 VITALS
DIASTOLIC BLOOD PRESSURE: 72 MMHG | SYSTOLIC BLOOD PRESSURE: 120 MMHG | BODY MASS INDEX: 26.4 KG/M2 | HEIGHT: 67 IN | WEIGHT: 168.2 LBS

## 2018-11-06 DIAGNOSIS — N95.2 VAGINAL ATROPHY: ICD-10-CM

## 2018-11-06 DIAGNOSIS — M85.89 OSTEOPENIA OF MULTIPLE SITES: ICD-10-CM

## 2018-11-06 DIAGNOSIS — N94.11 SUPERFICIAL DYSPAREUNIA: ICD-10-CM

## 2018-11-06 DIAGNOSIS — Z78.0 MENOPAUSE: Primary | ICD-10-CM

## 2018-11-06 DIAGNOSIS — Z01.419 ENCOUNTER FOR GYNECOLOGICAL EXAMINATION WITHOUT ABNORMAL FINDING: ICD-10-CM

## 2018-11-06 PROCEDURE — 99397 PER PM REEVAL EST PAT 65+ YR: CPT | Performed by: OBSTETRICS & GYNECOLOGY

## 2018-11-06 NOTE — PROGRESS NOTES
Chief Complaint   Patient presents with   • Gynecologic Exam     patient has been taking Osphena 60 mg as directed X 1 yr.  patient states that she does not feel that it is working.  c/o dyspareunia.       Judi Patel is a 78 y.o. year old  presenting to be seen for her annual exam.  This patient has previously had an AH/BSO.  She has had an appendectomy and a cholecystectomy.  She developed superficial dyspareunia and vaginal atrophy.  She was treated with Osphena, 60 mg daily and states that this has not improved her symptoms.  She desires a change in medication.  She denies bowel or urinary symptoms.  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  She is treated with Prolia subcutaneous injections, twice a year with rheumatology.  She has no side effects on this medication.    SCREENING TESTS    Year 2012   Age                         PAP                         HPV high risk                         Mammogram      benign benign                  YOMI score                         Breast MRI                         Lipids                         Vitamin D                         Colonoscopy                         DEXA  Frax (hip/any)       osteopenia                  Ovarian Screen                             She exercises regularly: yes.  She wears her seat belt: yes.  She has concerns about domestic violence: no.  She has noticed changes in height: no    GYN screening history:  · Last mammogram: was done on approximately 2018 and the result was: Birads I (Normal).  · Last DEXA: was done on approximately 2018 and the results were: osteopenia of hips, osteopenia of spine and osteopenia of the femoral neck and  radius.    No Additional Complaints Reported    The following portions of the patient's history were  reviewed and updated as appropriate:vital signs and   She  has a past medical history of Anxiety; Asthma; Back problem; Compression fracture of T12 vertebra (CMS/HCC); Cystocele; Epilepsy (CMS/HCC); Family history of hip fracture; Hyperlipidemia; Hypertension; IBS (irritable bowel syndrome); Osteoporosis; Pinched nerve in neck; Postmenopausal; Seizure disorder (CMS/HCC); Vaginal atrophy; and Vitamin D deficiency.  She  does not have any pertinent problems on file.  She  has a past surgical history that includes Abdominal surgery; Appendectomy; Sinus surgery; Cyst Removal (01/01/1990); Cholecystectomy (01/01/1991); Wrist surgery (Left, 01/01/2004); Knee arthroscopy (Left, 01/01/2006); Excision basal cell carcinoma (Right, 01/12/2010); Knee arthroscopy w/ meniscal repair (Right, 11/01/2010); Functional endoscopic sinus surgery (11/16/2010); Total abdominal hysterectomy w/ bilateral salpingoophorectomy (Bilateral); and Dilation and curettage of uterus.  Her family history includes Fibromyalgia in her daughter; Hypothyroidism in her daughter; Thyroid cancer in her daughter.  She  reports that she has never smoked. She has never used smokeless tobacco. She reports that she does not drink alcohol or use drugs.  Current Outpatient Prescriptions   Medication Sig Dispense Refill   • albuterol (PROVENTIL HFA;VENTOLIN HFA) 108 (90 BASE) MCG/ACT inhaler Inhale 2 puffs every 4 (four) hours as needed for shortness of air.     • albuterol (PROVENTIL) (2.5 MG/3ML) 0.083% nebulizer solution Take 2.5 mg by nebulization every 4 (four) hours as needed for shortness of air.     • Calcium-Phosphorus-Vitamin D (CITRACAL +D3 PO) Take  by mouth 2 (two) times a day.     • Cholecalciferol (VITAMIN D) 2000 UNITS capsule Take 2,000 Units by mouth daily.     • Coenzyme Q10 (COQ10) 200 MG capsule Take  by mouth daily.     • cyclobenzaprine (FLEXERIL) 10 MG tablet Take 1 tablet by mouth 3 (Three) Times a Day As Needed for Muscle Spasms for up to  "15 doses. 15 tablet 0   • gabapentin (NEURONTIN) 300 MG capsule Take 1 capsule by mouth every night at bedtime. 90 capsule 0   • HYDROcodone-acetaminophen (NORCO) 7.5-325 MG per tablet Take 1 tablet by mouth Every 6 (Six) Hours As Needed for Moderate Pain  for up to 12 doses. 12 tablet 0   • lamoTRIgine (LaMICtal) 100 MG tablet Take 100 mg by mouth daily.     • lisinopril-hydrochlorothiazide (PRINZIDE,ZESTORETIC) 20-12.5 MG per tablet Take 1 tablet by mouth Daily.     • MAGNESIUM PO Take  by mouth daily.     • methocarbamol (ROBAXIN) 750 MG tablet Take 1 tablet by mouth 2 (Two) Times a Day. 30 tablet 1   • montelukast (SINGULAIR) 10 MG tablet Take 10 mg by mouth every night.     • Multiple Vitamins-Minerals (ZINC PO) Take  by mouth 2 (two) times a day.     • Omega-3 Fatty Acids (FISH OIL PO) Take 800 mg by mouth daily.     • ondansetron ODT (ZOFRAN-ODT) 4 MG disintegrating tablet Take 1 tablet by mouth Every 6 (Six) Hours As Needed for Nausea or Vomiting for up to 15 doses. 15 tablet 0   • Probiotic Product (PROBIOTIC PO) Take  by mouth daily. 50 billion     • TURMERIC CURCUMIN PO Take  by mouth daily.       No current facility-administered medications for this visit.      She is allergic to oxycodone-acetaminophen and sulfa antibiotics..    Review of Systems  A comprehensive review of systems was taken.  Constitutional: negative for fever, chills, activity change, appetite change, fatigue and unexpected weight change.  Respiratory: negative  Cardiovascular: negative  Gastrointestinal: positive for urgency of stool  Genitourinary:negative  Musculoskeletal:negative  Behavioral/Psych: negative       /72   Ht 170.2 cm (67\")   Wt 76.3 kg (168 lb 3.2 oz)   LMP  (LMP Unknown)   BMI 26.34 kg/m²     Physical Exam    General:  alert; cooperative; well developed; well nourished   Skin:  No suspicious lesions seen   Thyroid: normal to inspection and palpation   Lungs:  clear to auscultation bilaterally   Heart:  " regular rate and rhythm, S1, S2 normal, no murmur, click, rub or gallop   Breasts:  Examined in supine position  Symmetric without masses or skin dimpling  Nipples normal without inversion, lesions or discharge  There are no palpable axillary nodes   Abdomen: soft, non-tender; no masses  no umbilical or inginual hernias are present  no hepato-splenomegaly   Pelvis: Clinical staff was present for exam  External genitalia:  normal appearance of the external genitalia including Bartholin's and [place]'s glands.  Vaginal:  normal pink mucosa without prolapse or lesions.  Cervix:  absent.  Uterus:  absent.  Adnexa:  absent, bilateral.  Rectal:  anus visually normal appearing. recto-vaginal exam unremarkable and confirms findings;     Lab Review   No data reviewed    Imaging  Mammogram results- Birads I, and DEXA- Severe osteopenia of the radius and right femoral neck, with mild osteopenia of the spine and right hip         ASSESSMENT  Problems Addressed this Visit        Musculoskeletal and Integument    Osteopenia       Genitourinary    Menopause - Primary    Vaginal atrophy      Other Visit Diagnoses     Encounter for gynecological examination without abnormal finding        Superficial dyspareunia              PLAN    · Medications prescribed this encounter:  No orders of the defined types were placed in this encounter.  · Rheumatology follow-up  · I have discontinued Osphena and started the patient on compounded, estradiol cream 0.1 mg/gram to be inserted as 1 g twice a week;she will notify me if she does not have a good response.  · Calcium, 600 mg/ Vit. D, 400 IU daily; regular weight-bearing exercise  · Follow up: 12 month(s)  *Please note that portions of this documentation may have been completed with a voice recognition program.  Efforts were made to edit this dictation, but occasional words may have been mistranscribed.       This note was electronically signed.    JANNIE Lopez MD  November 6, 2018  11:15  AM

## 2018-11-12 ENCOUNTER — TRANSCRIBE ORDERS (OUTPATIENT)
Dept: ADMINISTRATIVE | Facility: HOSPITAL | Age: 78
End: 2018-11-12

## 2018-11-12 ENCOUNTER — HOSPITAL ENCOUNTER (OUTPATIENT)
Dept: GENERAL RADIOLOGY | Facility: HOSPITAL | Age: 78
Discharge: HOME | End: 2018-11-12
Attending: FAMILY MEDICINE | Admitting: FAMILY MEDICINE

## 2018-11-12 DIAGNOSIS — R05.3 PERSISTENT COUGH: Primary | ICD-10-CM

## 2018-11-12 PROCEDURE — 71046 X-RAY EXAM CHEST 2 VIEWS: CPT

## 2018-12-20 DIAGNOSIS — M50.30 DDD (DEGENERATIVE DISC DISEASE), CERVICAL: Primary | ICD-10-CM

## 2018-12-20 RX ORDER — GABAPENTIN 300 MG/1
CAPSULE ORAL
Qty: 90 CAPSULE | Refills: 0 | Status: SHIPPED | OUTPATIENT
Start: 2018-12-20 | End: 2019-03-18 | Stop reason: SDUPTHER

## 2018-12-20 NOTE — TELEPHONE ENCOUNTER
Is patient still having pain? Dr. Sandhu suggested cervical myelogram in his note from Sept. May need to see her if this is the case

## 2018-12-20 NOTE — TELEPHONE ENCOUNTER
I approved the refill. Need to check with Jenna or Dr. Sandhu to see if we can proceed with myelogram or if she should be seen first. Thanks

## 2018-12-20 NOTE — TELEPHONE ENCOUNTER
She does still have significant neck, shoulder and arm pain with some tingling in her pinky, left side. She is ready to proceed with the myelogram however she will be unavailable next week. She also does require a refill of the gabapentin.

## 2018-12-20 NOTE — TELEPHONE ENCOUNTER
Provider:  Edson  Caller: Taisha  Time of call:   n/a  Phone #:  [phone]  Last visit:  9/24/18  Next visit: none    Reason for call:         Automated refill request.

## 2019-03-18 DIAGNOSIS — M50.30 DDD (DEGENERATIVE DISC DISEASE), CERVICAL: ICD-10-CM

## 2019-03-18 RX ORDER — GABAPENTIN 300 MG/1
CAPSULE ORAL
Qty: 90 CAPSULE | Refills: 0 | Status: SHIPPED | OUTPATIENT
Start: 2019-03-18 | End: 2019-06-16 | Stop reason: SDUPTHER

## 2019-03-18 NOTE — TELEPHONE ENCOUNTER
Prescription called into [facility] pharmacy. Automated refill request, no further action required, closing encounter.

## 2019-03-18 NOTE — TELEPHONE ENCOUNTER
Provider: Edson  Caller: Pharm  Time of call:  610a  Phone #:  [phone]  Surgery:  n/a  Surgery Date: n/a    Last visit: 9/24/18  Next visit: STELLA ORTIZ:         09/10/2018 Gabapentin 300MG 1941 14 14 [address]  [facility] [address] [place] [address] 1    09/25/2018 Gabapentin 300MG 1941 90 90 [address]AD [facility] [address] 1    12/22/2018 Gabapentin 300MG 1941 90 90 [address] [facility] [address] 1    Requested Prescriptions     Pending Prescriptions Disp Refills   • gabapentin (NEURONTIN) 300 MG capsule [Pharmacy Med Name: GABAPENTIN 300 MG CAPSULE] 90 capsule 0     Sig: TAKE ONE CAPSULE BY MOUTH EVERY NIGHT AT BEDTIME       Automated refill request. It looks like pt was supposed to have Myelogram with Dr. Awad but pt never followed through.     If pt wants to proceed, okay to sched Myelo, or does pt need to be re-evaluated?

## 2019-06-16 DIAGNOSIS — M50.30 DDD (DEGENERATIVE DISC DISEASE), CERVICAL: ICD-10-CM

## 2019-06-17 RX ORDER — GABAPENTIN 300 MG/1
CAPSULE ORAL
Qty: 90 CAPSULE | Refills: 0 | OUTPATIENT
Start: 2019-06-17 | End: 2020-01-14

## 2019-06-17 NOTE — TELEPHONE ENCOUNTER
Provider: Edson  Caller: Pharm/mahesh  Time of call:  n/a  Phone #:  n/a    Last visit: 9/24/18  Next visit: STELLA ORTIZ:         09/25/2018 Gabapentin 300MG 1941 90 90 [address] AW[address] [facility] [address] [place] [address] 1  12/22/2018 Gabapentin 300MG 1941 90 90 [address] [facility] [address] 1  03/22/2019 Gabapentin 300MG 1941 90 90 [address] [facility] [address] [place] [address] 1    Reason for call:         Automated refill request.

## 2019-07-22 ENCOUNTER — TRANSCRIBE ORDERS (OUTPATIENT)
Dept: ADMINISTRATIVE | Facility: HOSPITAL | Age: 79
End: 2019-07-22

## 2019-07-22 DIAGNOSIS — Z12.31 VISIT FOR SCREENING MAMMOGRAM: Primary | ICD-10-CM

## 2019-07-23 ENCOUNTER — HOSPITAL ENCOUNTER (OUTPATIENT)
Dept: MAMMOGRAPHY | Facility: HOSPITAL | Age: 79
Discharge: HOME | End: 2019-07-23
Admitting: OBSTETRICS & GYNECOLOGY

## 2019-07-23 DIAGNOSIS — Z12.31 VISIT FOR SCREENING MAMMOGRAM: ICD-10-CM

## 2019-07-23 PROCEDURE — 77063 BREAST TOMOSYNTHESIS BI: CPT | Performed by: RADIOLOGY

## 2019-07-23 PROCEDURE — 77067 SCR MAMMO BI INCL CAD: CPT | Performed by: RADIOLOGY

## 2019-07-23 PROCEDURE — 77063 BREAST TOMOSYNTHESIS BI: CPT

## 2019-07-23 PROCEDURE — 77067 SCR MAMMO BI INCL CAD: CPT

## 2019-10-22 ENCOUNTER — OFFICE VISIT (OUTPATIENT)
Dept: OBSTETRICS AND GYNECOLOGY | Facility: CLINIC | Age: 79
End: 2019-10-22

## 2019-10-22 VITALS
SYSTOLIC BLOOD PRESSURE: 126 MMHG | BODY MASS INDEX: 26.56 KG/M2 | WEIGHT: 169.2 LBS | DIASTOLIC BLOOD PRESSURE: 76 MMHG | HEIGHT: 67 IN

## 2019-10-22 DIAGNOSIS — N95.2 VAGINAL ATROPHY: ICD-10-CM

## 2019-10-22 DIAGNOSIS — N81.10 CYSTOCELE WITH RECTOCELE: ICD-10-CM

## 2019-10-22 DIAGNOSIS — Z78.0 MENOPAUSE: Primary | ICD-10-CM

## 2019-10-22 DIAGNOSIS — N81.6 CYSTOCELE WITH RECTOCELE: ICD-10-CM

## 2019-10-22 PROCEDURE — 99213 OFFICE O/P EST LOW 20 MIN: CPT | Performed by: OBSTETRICS & GYNECOLOGY

## 2019-10-22 RX ORDER — OMEPRAZOLE 40 MG/1
1 CAPSULE, DELAYED RELEASE ORAL AS NEEDED
COMMUNITY

## 2019-10-22 RX ORDER — ALBUTEROL SULFATE 90 UG/1
2 AEROSOL, METERED RESPIRATORY (INHALATION) AS NEEDED
COMMUNITY
End: 2020-01-14

## 2019-10-22 RX ORDER — ESTRADIOL 0.1 MG/G
CREAM VAGINAL
Qty: 42.5 G | Refills: 2 | Status: SHIPPED | OUTPATIENT
Start: 2019-10-22 | End: 2020-01-14 | Stop reason: SDUPTHER

## 2019-10-22 RX ORDER — LOSARTAN POTASSIUM AND HYDROCHLOROTHIAZIDE 12.5; 1 MG/1; MG/1
1 TABLET ORAL DAILY
COMMUNITY

## 2019-10-22 RX ORDER — EZETIMIBE 10 MG/1
1 TABLET ORAL DAILY
COMMUNITY

## 2019-10-22 RX ORDER — PROPRANOLOL HYDROCHLORIDE 40 MG/1
1 TABLET ORAL EVERY 12 HOURS SCHEDULED
COMMUNITY
End: 2021-02-23

## 2019-10-22 NOTE — PROGRESS NOTES
Chief Complaint   Patient presents with   • Abdominal Pain     bloaating, nausea   • Vaginal Prolapse     c/o pressure       Judi Patel is a 79 y.o. year old  presenting to be seen because of complaints of vaginal fullness and dryness.  This patient has previously had a AH/BSO.  She has had an appendectomy and a cholecystectomy.  She is known to have a grade 1 midline cystocele and rectocele.  She has no urinary symptoms.  She has no bowel symptoms.  She has been complaining of abdominal bloating and nausea.  She has an appointment with Dr. Cortez.  She is treated for severe osteopenia with Prolia subcutaneous injections.    Social History     Substance and Sexual Activity   Sexual Activity Yes   • Partners: Male   • Birth control/protection: Surgical, Post-menopausal     SCREENING TESTS    Year 2012   Age                         PAP                         HPV high risk                         Mammogram       benign benign                 YOMI score                         Breast MRI                         Lipids                         Vitamin D                         Colonoscopy                         DEXA  Frax (hip/any)                         Ovarian Screen                             No Additional Complaints Reported    The following portions of the patient's history were reviewed and updated as appropriate:vital signs and   She  has a past medical history of Anxiety, Asthma, Back problem, Compression fracture of T12 vertebra (CMS/HCC), Cystocele, Epilepsy (CMS/HCC), Family history of hip fracture, Hyperlipidemia, Hypertension, IBS (irritable bowel syndrome), Osteoporosis, Pinched nerve in neck, Postmenopausal, Seizure disorder (CMS/HCC), Vaginal atrophy, and Vitamin D deficiency.  She does not have any pertinent problems on file.  She  has a past surgical history that includes Abdominal  surgery; Appendectomy; Sinus surgery; Cyst Removal (01/01/1990); Cholecystectomy (01/01/1991); Wrist surgery (Left, 01/01/2004); Knee arthroscopy (Left, 01/01/2006); Excision basal cell carcinoma (Right, 01/12/2010); Knee arthroscopy w/ meniscal repair (Right, 11/01/2010); Functional endoscopic sinus surgery (11/16/2010); Total abdominal hysterectomy w/ bilateral salpingoophorectomy (Bilateral); and Dilation and curettage of uterus.  Current Outpatient Medications   Medication Sig Dispense Refill   • albuterol sulfate HFA (VENTOLIN HFA) 108 (90 Base) MCG/ACT inhaler Inhale 2 puffs As Needed.     • Calcium-Phosphorus-Vitamin D (CITRACAL +D3 PO) Take  by mouth 2 (two) times a day.     • Cholecalciferol (VITAMIN D) 2000 UNITS capsule Take 2,000 Units by mouth daily.     • Coenzyme Q10 (COQ10) 200 MG capsule Take  by mouth daily.     • denosumab (PROLIA) 60 MG/ML solution prefilled syringe syringe Inject 1 mL as directed Every 6 (Six) Months.     • ezetimibe (ZETIA) 10 MG tablet Take 1 tablet by mouth Daily.     • Fluticasone Furoate-Vilanterol (BREO ELLIPTA) 200-25 MCG/INH inhaler Breo Ellipta 200 mcg-25 mcg/dose powder for inhalation   Inhale 1 puff every day by inhalation route.     • gabapentin (NEURONTIN) 300 MG capsule TAKE ONE CAPSULE BY MOUTH EVERY NIGHT AT BEDTIME 90 capsule 0   • HYDROcodone-acetaminophen (NORCO) 7.5-325 MG per tablet Take 1 tablet by mouth Every 6 (Six) Hours As Needed for Moderate Pain  for up to 12 doses. 12 tablet 0   • lamoTRIgine (LaMICtal) 100 MG tablet Take 100 mg by mouth daily.     • losartan-hydrochlorothiazide (HYZAAR) 100-12.5 MG per tablet Take 1 tablet by mouth Daily.     • MAGNESIUM PO Take  by mouth daily.     • montelukast (SINGULAIR) 10 MG tablet Take 10 mg by mouth every night.     • Multiple Vitamins-Minerals (ZINC PO) Take  by mouth 2 (two) times a day.     • mupirocin (BACTROBAN) 2 % ointment mupirocin 2 % topical ointment   APPLY A SMALL AMOUNT TO THE AFFECTED AREA BY  "TOPICAL ROUTE 3 TIMES PER DAY     • Omega-3 Fatty Acids (FISH OIL PO) Take 800 mg by mouth daily.     • omeprazole (priLOSEC) 40 MG capsule Take 1 capsule by mouth As Needed.     • Probiotic Product (PROBIOTIC PO) Take  by mouth daily. 50 billion     • propranolol (INDERAL) 40 MG tablet Take 1 tablet by mouth Every 12 (Twelve) Hours.     • sertraline (ZOLOFT) 50 MG tablet Take 1 tablet by mouth Daily.     • TURMERIC CURCUMIN PO Take  by mouth daily.       No current facility-administered medications for this visit.      She is allergic to oxycodone-acetaminophen and sulfa antibiotics..        Review of Systems  A comprehensive review of systems was done.  Constitutional: negative for fever, chills, activity change, appetite change, fatigue and unexpected weight change.  Respiratory: negative  Cardiovascular: negative  Gastrointestinal: positive for nausea and bloating  Genitourinary:positive for vaginal dryness  Musculoskeletal:negative  Behavioral/Psych: negative          /76   Ht 170.2 cm (67\")   Wt 76.7 kg (169 lb 3.2 oz)   LMP  (LMP Unknown)   Breastfeeding? No   BMI 26.50 kg/m²     Physical Exam    General:  alert; cooperative; well developed; well nourished   Skin:  Not performed.   Thyroid: not examined   Lungs:  clear to auscultation bilaterally   Heart:  regular rate and rhythm, S1, S2 normal, no murmur, click, rub or gallop   Breasts:  Not performed.   Abdomen: soft, non-tender; no masses  no umbilical or inguinal hernias are present  no hepato-splenomegaly   Pelvis: Clinical staff was present for exam  External genitalia:  normal appearance of the external genitalia including Bartholin's and [place]'s glands.  Vaginal:  atrophic mucosal changes are present;  Cervix:  absent.  Uterus:  absent.  Adnexa:  absent, bilateral.  Rectal:  anus visually normal appearing. recto-vaginal exam unremarkable and confirms findings;  Cystocele GRADE 1  Rectocele GRADE 1     Lab Review   No data " reviewed    Imaging   Mammogram results    Medical counseling was given to patient for the following topics: diagnostic results, instructions for management, risk factor reductions, impressions, risks and benefits of treatment options and importance of treatment compliance . Total time of the encounter was 21 minute(s) and 12 minute(s)  was spent in face to face counseling.  I have counseled the patient that her grade 1 midline cystocele and grade 1 rectocele are stable.  I have counseled her that since she discontinued Osphena her vaginal atrophy and dryness have worsened significantly.  I have counseled her that this needs to be treated, and she prefers an estrogen vaginal cream.  I have counseled her that the estrogen cream is primarily absorbed locally and there are not significant systemic side effects.  I have reviewed with her the risks of cardiovascular disease and breast disease.  I have strongly counseled the patient to have gastrointestinal follow-up for her bloating and nausea.          ASSESSMENT  Problems Addressed this Visit        Genitourinary    Menopause - Primary    Vaginal atrophy      Other Visit Diagnoses     Cystocele with rectocele               Substance History:    reports that she has never smoked. She has never used smokeless tobacco.    reports that she does not drink alcohol.    reports that she does not use drugs.    Substance use counseling is not indicated based on patient history.      PLAN    · Medications prescribed this encounter:  No orders of the defined types were placed in this encounter.  · GI follow-up  · Estradiol vaginal cream, 0.5 g intravaginally twice a week  · Follow up: 3 month(s) for a wellness visit and to assess her response to estrogen vaginal cream  · Calcium, 600 mg/ Vit. D, 400 IU daily     *Please note that portions of this documentation may have been completed with a voice recognition program.  Efforts were made to edit this dictation, but occasional words  may have been mistranscribed.     This note was electronically signed.    JANNIE Lopez MD  October 22, 2019  10:25 AM

## 2019-12-03 ENCOUNTER — OFFICE VISIT (OUTPATIENT)
Dept: GASTROENTEROLOGY | Facility: CLINIC | Age: 79
End: 2019-12-03

## 2019-12-03 VITALS
BODY MASS INDEX: 27.42 KG/M2 | HEART RATE: 76 BPM | OXYGEN SATURATION: 98 % | DIASTOLIC BLOOD PRESSURE: 78 MMHG | TEMPERATURE: 97.4 F | RESPIRATION RATE: 20 BRPM | WEIGHT: 175.08 LBS | SYSTOLIC BLOOD PRESSURE: 122 MMHG

## 2019-12-03 DIAGNOSIS — K21.9 GASTROESOPHAGEAL REFLUX DISEASE WITHOUT ESOPHAGITIS: ICD-10-CM

## 2019-12-03 DIAGNOSIS — R14.0 ABDOMINAL BLOATING: ICD-10-CM

## 2019-12-03 DIAGNOSIS — K58.2 IRRITABLE BOWEL SYNDROME WITH CONSTIPATION AND DIARRHEA: Primary | ICD-10-CM

## 2019-12-03 PROCEDURE — 99213 OFFICE O/P EST LOW 20 MIN: CPT | Performed by: INTERNAL MEDICINE

## 2019-12-03 RX ORDER — CELECOXIB 200 MG/1
200 CAPSULE ORAL DAILY
COMMUNITY
End: 2020-01-14

## 2019-12-03 NOTE — PROGRESS NOTES
Chief Complaint: Nick is here today because of abdominal discomfort change in her bowel habits and bloating.      HPI over the last several months Judi has noticed intermittent diarrhea.  She has known irritable bowel syndrome.  She has been laxative dependent for years.  She is using MiraLAX.  He adjusts it accordingly.  She has not had a regular bowel movement for some time.  She also is complaining of abdominal bloating and some left upper quadrant discomfort.  Because of the change in her symptoms she is here for evaluation.  Her last colonoscopy was done in 2016 by Dr. Cortez.    Past Medical History:   Past Medical History:   Diagnosis Date   • Anxiety    • Asthma    • Back problem    • Compression fracture of T12 vertebra (CMS/HCC)    • Cystocele    • Epilepsy (CMS/HCC)    • Family history of hip fracture     mother, father   • Hyperlipidemia    • Hypertension    • IBS (irritable bowel syndrome)    • Osteoporosis    • Pinched nerve in neck    • Postmenopausal    • Seizure disorder (CMS/HCC)    • Vaginal atrophy    • Vitamin D deficiency        Family History:  Family History   Problem Relation Age of Onset   • Fibromyalgia Daughter    • Thyroid cancer Daughter    • Hypothyroidism Daughter    • Breast cancer Sister 53   • Ovarian cancer Neg Hx        Social History:   reports that she has never smoked. She has never used smokeless tobacco. She reports that she does not drink alcohol or use drugs.    Medications:     Current Outpatient Medications:   •  albuterol sulfate HFA (VENTOLIN HFA) 108 (90 Base) MCG/ACT inhaler, Inhale 2 puffs As Needed., Disp: , Rfl:   •  Calcium-Phosphorus-Vitamin D (CITRACAL +D3 PO), Take  by mouth 2 (two) times a day., Disp: , Rfl:   •  celecoxib (CeleBREX) 200 MG capsule, Take 200 mg by mouth Daily., Disp: , Rfl:   •  Cholecalciferol (VITAMIN D) 2000 UNITS capsule, Take 2,000 Units by mouth daily., Disp: , Rfl:   •  Coenzyme Q10 (COQ10) 200 MG capsule, Take  by mouth daily.,  Disp: , Rfl:   •  denosumab (PROLIA) 60 MG/ML solution prefilled syringe syringe, Inject 1 mL as directed Every 6 (Six) Months., Disp: , Rfl:   •  estradiol (ESTRACE VAGINAL) 0.1 MG/GM vaginal cream, Insert 0.5 gm intravaginally 2 times each week, Disp: 42.5 g, Rfl: 2  •  ezetimibe (ZETIA) 10 MG tablet, Take 1 tablet by mouth Daily., Disp: , Rfl:   •  Fluticasone Furoate-Vilanterol (BREO ELLIPTA) 200-25 MCG/INH inhaler, Breo Ellipta 200 mcg-25 mcg/dose powder for inhalation  Inhale 1 puff every day by inhalation route., Disp: , Rfl:   •  gabapentin (NEURONTIN) 300 MG capsule, TAKE ONE CAPSULE BY MOUTH EVERY NIGHT AT BEDTIME, Disp: 90 capsule, Rfl: 0  •  HYDROcodone-acetaminophen (NORCO) 7.5-325 MG per tablet, Take 1 tablet by mouth Every 6 (Six) Hours As Needed for Moderate Pain  for up to 12 doses., Disp: 12 tablet, Rfl: 0  •  lamoTRIgine (LaMICtal) 100 MG tablet, Take 100 mg by mouth daily., Disp: , Rfl:   •  losartan-hydrochlorothiazide (HYZAAR) 100-12.5 MG per tablet, Take 1 tablet by mouth Daily., Disp: , Rfl:   •  MAGNESIUM PO, Take  by mouth daily., Disp: , Rfl:   •  montelukast (SINGULAIR) 10 MG tablet, Take 10 mg by mouth every night., Disp: , Rfl:   •  Multiple Vitamins-Minerals (ZINC PO), Take  by mouth 2 (two) times a day., Disp: , Rfl:   •  mupirocin (BACTROBAN) 2 % ointment, mupirocin 2 % topical ointment  APPLY A SMALL AMOUNT TO THE AFFECTED AREA BY TOPICAL ROUTE 3 TIMES PER DAY, Disp: , Rfl:   •  Omega-3 Fatty Acids (FISH OIL PO), Take 800 mg by mouth daily., Disp: , Rfl:   •  omeprazole (priLOSEC) 40 MG capsule, Take 1 capsule by mouth As Needed., Disp: , Rfl:   •  Probiotic Product (PROBIOTIC PO), Take  by mouth daily. 50 billion, Disp: , Rfl:   •  propranolol (INDERAL) 40 MG tablet, Take 1 tablet by mouth Every 12 (Twelve) Hours., Disp: , Rfl:   •  sertraline (ZOLOFT) 50 MG tablet, Take 1 tablet by mouth Daily., Disp: , Rfl:   •  TURMERIC CURCUMIN PO, Take  by mouth daily., Disp: , Rfl:      Allergies:  Oxycodone-acetaminophen and Sulfa antibiotics    Review of Systems   Constitutional: Negative.    HENT: Negative.    Eyes: Negative.    Respiratory: Negative.    Cardiovascular: Negative.    Gastrointestinal: Negative.    Endocrine: Negative.    Genitourinary: Negative.    Musculoskeletal: Negative.    Skin: Negative.    Allergic/Immunologic: Negative.    Neurological: Negative.    Hematological: Negative.    Psychiatric/Behavioral: Negative.                 Neck: Normal range of motion. Neck supple.   Cardiovascular: Normal rate, regular rhythm and normal heart sounds.    Pulmonary/Chest: Effort normal and breath sounds normal. No respiratory distress. No  wheezes.   Abdominal: Soft. Bowel sounds are normal. No hepatosplenomegaly  Skin: Skin is warm and dry.   Psychiatric: Mood, memory, affect and judgment normal.           Assessment and Plan Judi has irritable bowel.  Her stool is fluffy and not formed.  I have asked her to add Benefiber to her regimen.  I have also suggested that she go on align.  If her symptoms do not improve she will notify us.  She did mention that she occasionally has some dysphasia.  I have asked her to follow-up on that and if it recurs or becomes more frequent to contact us for appointment to perform an upper endoscopy.        ICD-10-CM ICD-9-CM   1. Irritable bowel syndrome with constipation and diarrhea K58.2 564.1   2. Abdominal bloating R14.0 787.3   3. Gastroesophageal reflux disease without esophagitis K21.9 530.81     Toro Cortez M.D.  [facility]  EMR Dragon/Transcription disclaimer:   Much of this encounter note is an electronic transcription/translation of spoken language to printed text. The electronic translation of spoken language may permit erroneous, or at times, nonsensical words or phrases to be inadvertently transcribed; Although I have reviewed the note for such errors, some may still exist.

## 2020-01-14 ENCOUNTER — OFFICE VISIT (OUTPATIENT)
Dept: OBSTETRICS AND GYNECOLOGY | Facility: CLINIC | Age: 80
End: 2020-01-14

## 2020-01-14 VITALS
DIASTOLIC BLOOD PRESSURE: 80 MMHG | BODY MASS INDEX: 27.12 KG/M2 | WEIGHT: 172.8 LBS | SYSTOLIC BLOOD PRESSURE: 128 MMHG | HEIGHT: 67 IN

## 2020-01-14 DIAGNOSIS — N95.2 VAGINAL ATROPHY: ICD-10-CM

## 2020-01-14 DIAGNOSIS — Z01.419 ENCOUNTER FOR GYNECOLOGICAL EXAMINATION WITHOUT ABNORMAL FINDING: ICD-10-CM

## 2020-01-14 DIAGNOSIS — Z78.0 MENOPAUSE: Primary | ICD-10-CM

## 2020-01-14 DIAGNOSIS — M85.89 OSTEOPENIA OF MULTIPLE SITES: ICD-10-CM

## 2020-01-14 PROCEDURE — 99397 PER PM REEVAL EST PAT 65+ YR: CPT | Performed by: OBSTETRICS & GYNECOLOGY

## 2020-01-14 RX ORDER — ESTRADIOL 0.1 MG/G
CREAM VAGINAL
Qty: 42.5 G | Refills: 2 | Status: SHIPPED | OUTPATIENT
Start: 2020-01-14 | End: 2020-12-16 | Stop reason: SDUPTHER

## 2020-01-14 NOTE — PROGRESS NOTES
Chief Complaint   Patient presents with   • Gynecologic Exam   • Med Refill       Judi Patel is a 79 y.o. year old  presenting to be seen for her annual exam.  This patient has a past history of an appendectomy; a cholecystectomy; and an abdominal hysterectomy/bilateral salpingo-oophorectomy.  She has a stable grade 1 rectocele which is asymptomatic.  She denies urinary symptoms.  She has been treated with Prolia subcutaneous injections for 3 years to treat osteopenia.  She does have a history of a compression fracture of the spine.  (By definition she is osteoporotic).    SCREENING TESTS    Year 2012   Age                         PAP                         HPV high risk                         Mammogram       benign benign                 YOMI score                         Breast MRI                         Lipids                         Vitamin D                         Colonoscopy                         DEXA  Frax (hip/any)       osteopenia                  Ovarian Screen                             She exercises regularly: yes.  She wears her seat belt: yes.  She has concerns about domestic violence: no.  She has noticed changes in height: no    GYN screening history:  · Last mammogram: was done on approximately 2019 and the result was: Birads I (Normal)..    No Additional Complaints Reported    The following portions of the patient's history were reviewed and updated as appropriate:vital signs and   She  has a past medical history of Anxiety, Asthma, Back problem, Compression fracture of T12 vertebra (CMS/HCC), Cystocele, Epilepsy (CMS/HCC), Family history of hip fracture, Hyperlipidemia, Hypertension, IBS (irritable bowel syndrome), Menopause, Osteoporosis, Pinched nerve in neck, Seizure disorder (CMS/HCC), Vaginal atrophy, and Vitamin D deficiency.  She does not have any pertinent  problems on file.  She  has a past surgical history that includes Abdominal surgery; Appendectomy; Sinus surgery; Cyst Removal (01/01/1990); Cholecystectomy (01/01/1991); Wrist surgery (Left, 01/01/2004); Knee arthroscopy (Left, 01/01/2006); Excision basal cell carcinoma (Right, 01/12/2010); Knee arthroscopy w/ meniscal repair (Right, 11/01/2010); Functional endoscopic sinus surgery (11/16/2010); Total abdominal hysterectomy w/ bilateral salpingoophorectomy (Bilateral); and Dilation and curettage of uterus.  Her family history includes Breast cancer (age of onset: 53) in her sister; Fibromyalgia in her daughter; Hypothyroidism in her daughter; Thyroid cancer in her daughter.  She  reports that she has never smoked. She has never used smokeless tobacco. She reports that she does not drink alcohol or use drugs.  Current Outpatient Medications   Medication Sig Dispense Refill   • Calcium-Phosphorus-Vitamin D (CITRACAL +D3 PO) Take  by mouth 2 (two) times a day.     • Cholecalciferol (VITAMIN D) 2000 UNITS capsule Take 2,000 Units by mouth daily.     • denosumab (PROLIA) 60 MG/ML solution prefilled syringe syringe Inject 1 mL as directed Every 6 (Six) Months.     • estradiol (ESTRACE VAGINAL) 0.1 MG/GM vaginal cream Insert 0.5 gm intravaginally 2 times each week 42.5 g 2   • ezetimibe (ZETIA) 10 MG tablet Take 1 tablet by mouth Daily.     • lamoTRIgine (LaMICtal) 100 MG tablet Take 100 mg by mouth daily.     • losartan-hydrochlorothiazide (HYZAAR) 100-12.5 MG per tablet Take 1 tablet by mouth Daily.     • MAGNESIUM PO Take  by mouth daily.     • Multiple Vitamins-Minerals (ZINC PO) Take  by mouth 2 (two) times a day.     • omeprazole (priLOSEC) 40 MG capsule Take 1 capsule by mouth As Needed.     • propranolol (INDERAL) 40 MG tablet Take 1 tablet by mouth Every 12 (Twelve) Hours.     • sertraline (ZOLOFT) 50 MG tablet Take 1 tablet by mouth Daily.       No current facility-administered medications for this visit.   "    She is allergic to oxycodone-acetaminophen and sulfa antibiotics..    Review of Systems  A comprehensive review of systems was taken.  Constitutional: negative for fever, chills, activity change, appetite change, fatigue and unexpected weight change.  Respiratory: negative  Cardiovascular: negative  Gastrointestinal: negative  Genitourinary:negative  Musculoskeletal:negative  Behavioral/Psych: negative       /80   Ht 170.2 cm (67\")   Wt 78.4 kg (172 lb 12.8 oz)   LMP  (LMP Unknown)   Breastfeeding No   BMI 27.06 kg/m²     Physical Exam    General:  alert; cooperative; well developed; well nourished   Skin:  No suspicious lesions seen   Thyroid: normal to inspection and palpation   Lungs:  clear to auscultation bilaterally   Heart:  regular rate and rhythm, S1, S2 normal, no murmur, click, rub or gallop   Breasts:  Examined in supine position  Symmetric without masses or skin dimpling  Nipples normal without inversion, lesions or discharge  There are no palpable axillary nodes  Fibrocystic changes are present both breasts without a discrete mass   Abdomen: soft, non-tender; no masses  no umbilical or inguinal hernias are present  no hepato-splenomegaly   Pelvis: Clinical staff was present for exam  External genitalia:  normal appearance of the external genitalia including Bartholin's and [place]'s glands.  Vaginal:  normal pink mucosa without prolapse or lesions.  Cervix:  absent.  Uterus:  absent.  Adnexa:  absent, bilateral.  Rectal:  anus visually normal appearing. recto-vaginal exam unremarkable and confirms findings;  Rectocele GRADE 1     Lab Review   No data reviewed    Imaging  Mammogram results         ASSESSMENT  Problems Addressed this Visit        Musculoskeletal and Integument    Osteopenia    Relevant Orders    DEXA Bone Density Axial       Genitourinary    Menopause - Primary    Vaginal atrophy    Relevant Medications    estradiol (ESTRACE VAGINAL) 0.1 MG/GM vaginal cream      Other Visit " Diagnoses     Encounter for gynecological examination without abnormal finding                  Substance History:   reports that she has never smoked. She has never used smokeless tobacco.   reports that she does not drink alcohol.   reports that she does not use drugs.    Substance use counseling is not indicated based on patient history.      PLAN    Medications prescribed this encounter:    New Medications Ordered This Visit   Medications   • estradiol (ESTRACE VAGINAL) 0.1 MG/GM vaginal cream     Sig: Insert 0.5 gm intravaginally 2 times each week     Dispense:  42.5 g     Refill:  2   · Monthly self breast assessment and annual breast imaging  · DEXA in June 2020. Follow up at [facility]  · Calcium, 600 mg/ Vit. D, 400 IU daily; regular weight-bearing exercise  · Follow up: 12 month(s)  *Please note that portions of this documentation may have been completed with a voice recognition program.  Efforts were made to edit this dictation, but occasional words may have been mistranscribed.       This note was electronically signed.    JANNIE Lopez MD  January 14, 2020  11:41 AM

## 2020-06-08 ENCOUNTER — HOSPITAL ENCOUNTER (OUTPATIENT)
Dept: BONE DENSITY | Facility: HOSPITAL | Age: 80
Discharge: HOME | End: 2020-06-08
Admitting: OBSTETRICS & GYNECOLOGY

## 2020-06-08 DIAGNOSIS — M85.89 OSTEOPENIA OF MULTIPLE SITES: ICD-10-CM

## 2020-06-08 PROCEDURE — 77080 DXA BONE DENSITY AXIAL: CPT

## 2020-09-22 ENCOUNTER — TRANSCRIBE ORDERS (OUTPATIENT)
Dept: OBSTETRICS AND GYNECOLOGY | Facility: CLINIC | Age: 80
End: 2020-09-22

## 2020-09-22 DIAGNOSIS — Z12.31 VISIT FOR SCREENING MAMMOGRAM: Primary | ICD-10-CM

## 2020-12-15 ENCOUNTER — HOSPITAL ENCOUNTER (OUTPATIENT)
Dept: MAMMOGRAPHY | Facility: HOSPITAL | Age: 80
Discharge: HOME | End: 2020-12-15
Admitting: OBSTETRICS & GYNECOLOGY

## 2020-12-15 DIAGNOSIS — Z12.31 VISIT FOR SCREENING MAMMOGRAM: ICD-10-CM

## 2020-12-15 PROCEDURE — 77067 SCR MAMMO BI INCL CAD: CPT | Performed by: RADIOLOGY

## 2020-12-15 PROCEDURE — 77067 SCR MAMMO BI INCL CAD: CPT

## 2020-12-15 PROCEDURE — 77063 BREAST TOMOSYNTHESIS BI: CPT

## 2020-12-15 PROCEDURE — 77063 BREAST TOMOSYNTHESIS BI: CPT | Performed by: RADIOLOGY

## 2020-12-16 RX ORDER — ESTRADIOL 0.1 MG/G
CREAM VAGINAL
Qty: 42.5 G | Refills: 0 | Status: SHIPPED | OUTPATIENT
Start: 2020-12-16 | End: 2022-02-02

## 2020-12-16 NOTE — TELEPHONE ENCOUNTER
Appointment 1/27/21.  Patient requests refill on estradiol vaginal cream.    Dr. Lopez, please approve prescription.

## 2021-01-27 ENCOUNTER — OFFICE VISIT (OUTPATIENT)
Dept: OBSTETRICS AND GYNECOLOGY | Facility: CLINIC | Age: 81
End: 2021-01-27

## 2021-01-27 VITALS
BODY MASS INDEX: 26.81 KG/M2 | HEIGHT: 67 IN | SYSTOLIC BLOOD PRESSURE: 120 MMHG | DIASTOLIC BLOOD PRESSURE: 82 MMHG | WEIGHT: 170.8 LBS

## 2021-01-27 DIAGNOSIS — M85.852 OSTEOPENIA OF BOTH HIPS: ICD-10-CM

## 2021-01-27 DIAGNOSIS — Z01.419 ENCOUNTER FOR GYNECOLOGICAL EXAMINATION WITHOUT ABNORMAL FINDING: ICD-10-CM

## 2021-01-27 DIAGNOSIS — N81.11 MIDLINE CYSTOCELE: ICD-10-CM

## 2021-01-27 DIAGNOSIS — Z78.0 MENOPAUSE: Primary | ICD-10-CM

## 2021-01-27 DIAGNOSIS — N95.2 VAGINAL ATROPHY: ICD-10-CM

## 2021-01-27 DIAGNOSIS — M85.851 OSTEOPENIA OF BOTH HIPS: ICD-10-CM

## 2021-01-27 PROCEDURE — 99397 PER PM REEVAL EST PAT 65+ YR: CPT | Performed by: OBSTETRICS & GYNECOLOGY

## 2021-01-27 RX ORDER — LAMOTRIGINE 100 MG/1
1 TABLET ORAL 2 TIMES DAILY
COMMUNITY

## 2021-01-27 NOTE — PROGRESS NOTES
Chief Complaint   Patient presents with   • Gynecologic Exam   • Med Refill     discuss use of estrogen vaginal cream vs Osphena   • Abdominal Pain       Judi Patel is a 80 y.o. year old  presenting to be seen for her annual exam.  This patient has a prior history of abdominal hysterectomy/bilateral salpingo-oophorectomy; and appendectomy; and a cholecystectomy.  She is menopausal and does not use systemic estrogen replacement therapy.  Vaginal atrophy is treated with compounded estradiol cream, 0.1 mg/gram in a dose of 0.5 g intravaginally twice a week.  She has relief of symptoms and has no side effects on this medication.  She has a history of osteopenia and a prior compression fracture of the spine.  [facility] and is treated with Prolia subcutaneous injections twice a year.  She is in her fourth year of Prolia therapy.  She has no side effects on this medication.  She denies bowel or urinary symptoms.    SCREENING TESTS    Year 2012   Age                         PAP                         HPV high risk                         Mammogram        bengn benign                YOMI score                         Breast MRI                         Lipids                         Vitamin D                         Colonoscopy                         DEXA  Frax (hip/any)         osteopenia                Ovarian Screen                             She exercises regularly: yes.  She wears her seat belt: yes.  She has concerns about domestic violence: no.  She has noticed changes in height: no    GYN screening history:  · Last mammogram: was done on approximately 12/15/2020 and the result was: Birads I (Normal).  · Last DEXA: was done on approximately 2020 and the results were: osteopenia of hips.    No Additional Complaints Reported    The following portions of the patient's  history were reviewed and updated as appropriate:vital signs and   She  has a past medical history of Anxiety, Asthma, Back problem, Compression fracture of T12 vertebra (CMS/HCC), Cystocele, Epilepsy (CMS/HCC), Family history of hip fracture, Hyperlipidemia, Hypertension, IBS (irritable bowel syndrome), Menopause, Osteoporosis, Pinched nerve in neck, Seizure disorder (CMS/HCC), Vaginal atrophy, and Vitamin D deficiency.  She does not have any pertinent problems on file.  She  has a past surgical history that includes Abdominal surgery; Appendectomy; Sinus surgery; Cyst Removal (01/01/1990); Cholecystectomy (01/01/1991); Wrist surgery (Left, 01/01/2004); Knee arthroscopy (Left, 01/01/2006); Excision basal cell carcinoma (Right, 01/12/2010); Knee arthroscopy w/ meniscal repair (Right, 11/01/2010); Functional endoscopic sinus surgery (11/16/2010); Total abdominal hysterectomy w/ bilateral salpingoophorectomy (Bilateral); and Dilation and curettage of uterus.  Her family history includes Breast cancer (age of onset: 53) in her sister; Fibromyalgia in her daughter; Hypothyroidism in her daughter; Thyroid cancer in her daughter.  She  reports that she has never smoked. She has never used smokeless tobacco. She reports that she does not drink alcohol or use drugs.  Current Outpatient Medications   Medication Sig Dispense Refill   • Calcium-Phosphorus-Vitamin D (CITRACAL +D3 PO) Take  by mouth 2 (two) times a day.     • Cholecalciferol (VITAMIN D) 2000 UNITS capsule Take 1,000 Units by mouth Daily.     • denosumab (PROLIA) 60 MG/ML solution prefilled syringe syringe Inject 1 mL as directed Every 6 (Six) Months.     • estradiol (ESTRACE VAGINAL) 0.1 MG/GM vaginal cream 0.5 grams by vaginal route 2 X week 42.5 g 0   • ezetimibe (ZETIA) 10 MG tablet Take 1 tablet by mouth Daily.     • lamoTRIgine (LaMICtal) 100 MG tablet Take 1 tablet by mouth 2 (two) times a day.     • losartan-hydrochlorothiazide (HYZAAR) 100-12.5 MG per  "tablet Take 1 tablet by mouth Daily.     • omeprazole (priLOSEC) 40 MG capsule Take 1 capsule by mouth As Needed.     • propranolol (INDERAL) 40 MG tablet Take 1 tablet by mouth Every 12 (Twelve) Hours.     • sertraline (ZOLOFT) 50 MG tablet Take 1 tablet by mouth Daily.       No current facility-administered medications for this visit.      She is allergic to oxycodone-acetaminophen and sulfa antibiotics..    Review of Systems  A review of systems was taken.  She denies cough, fever, shortness of breath, and loss of her sense of taste or smell  Constitutional: negative for fever, chills, activity change, appetite change, fatigue and unexpected weight change.  Respiratory: negative  Cardiovascular: negative  Gastrointestinal: negative  Genitourinary:negative  Musculoskeletal:negative  Behavioral/Psych: negative     Counseling/Anticipatory Guidance Discussed: nutrition, physical activity, healthy weight, injury prevention, immunizations, screenings and self-breast exam      /82   Ht 170.2 cm (67\")   Wt 77.5 kg (170 lb 12.8 oz)   LMP  (LMP Unknown)   Breastfeeding No   BMI 26.75 kg/m²     MEDICALLY INDICATED   Physical Exam    General:  alert; cooperative; well developed; well nourished   Skin:  No suspicious lesions seen   Thyroid: normal to inspection and palpation   Lungs:  breathing is unlabored  clear to auscultation bilaterally   Heart:  regular rate and rhythm, S1, S2 normal, no murmur, click, rub or gallop  normal apical impulse   Breasts:  Examined in supine position  Symmetric without masses or skin dimpling  Nipples normal without inversion, lesions or discharge  There are no palpable axillary nodes   Abdomen: soft, non-tender; no masses  no umbilical or inguinal hernias are present  no hepato-splenomegaly   Pelvis: Clinical staff was present for exam  External genitalia:  normal appearance of the external genitalia including Bartholin's and [place]'s glands.  Vaginal:  normal pink mucosa without " prolapse or lesions.  Cervix:  absent.  Uterus:  absent.  Adnexa:  absent, bilateral.  Rectal:  anus visually normal appearing. recto-vaginal exam unremarkable and confirms findings;                              Grade I midline cystocele- stable    Lab Review   No data reviewed    Imaging  Mammogram results and DEXA        Advance directives- YES      ASSESSMENT  Problems Addressed this Visit        Other    Menopause - Primary    Vaginal atrophy    Osteopenia    Midline cystocele      Other Visit Diagnoses     Encounter for gynecological examination without abnormal finding          Diagnoses       Codes Comments    Menopause    -  Primary ICD-10-CM: Z78.0  ICD-9-CM: 627.2     Osteopenia of both hips     ICD-10-CM: M85.851, M85.852  ICD-9-CM: 733.90     Vaginal atrophy     ICD-10-CM: N95.2  ICD-9-CM: 627.3     Midline cystocele     ICD-10-CM: N81.11  ICD-9-CM: 618.01     Encounter for gynecological examination without abnormal finding     ICD-10-CM: Z01.419  ICD-9-CM: V72.31               Substance History:   reports that she has never smoked. She has never used smokeless tobacco.   reports no history of alcohol use.   reports no history of drug use.    Substance use counseling is not indicated based on patient history.      PLAN    · Medications prescribed this encounter:  No orders of the defined types were placed in this encounter.  · I have counseled the patient and the importance of continued regular exercise and follow-up at the [facility] for Prolia injections.  · Monthly self breast assessment and annual breast imaging  · Compounded estradiol cream, 0.1 mg/gram in a dose of 0.5 g intravaginally twice a week  · Calcium, 600 mg/ Vit. D, 400 IU daily; regular weight-bearing exercise  · Follow up: 12 month(s)  *Please note that portions of this documentation may have been completed with a voice recognition program.  Efforts were made to edit this dictation, but occasional words may have been  mistranscribed.       This note was electronically signed.    JANNIE Lopez MD  January 27, 2021  10:46 EST

## 2021-02-23 ENCOUNTER — OFFICE VISIT (OUTPATIENT)
Dept: GASTROENTEROLOGY | Facility: CLINIC | Age: 81
End: 2021-02-23

## 2021-02-23 DIAGNOSIS — K64.9 HEMORRHOIDS, UNSPECIFIED HEMORRHOID TYPE: ICD-10-CM

## 2021-02-23 DIAGNOSIS — K62.5 RECTAL BLEEDING: ICD-10-CM

## 2021-02-23 DIAGNOSIS — F45.8 AEROPHAGIA: ICD-10-CM

## 2021-02-23 DIAGNOSIS — R14.0 ABDOMINAL BLOATING: ICD-10-CM

## 2021-02-23 DIAGNOSIS — K58.2 IRRITABLE BOWEL SYNDROME WITH CONSTIPATION AND DIARRHEA: Primary | ICD-10-CM

## 2021-02-23 PROCEDURE — 99214 OFFICE O/P EST MOD 30 MIN: CPT | Performed by: INTERNAL MEDICINE

## 2021-02-23 RX ORDER — PROPRANOLOL HYDROCHLORIDE 60 MG/1
TABLET ORAL
COMMUNITY
Start: 2021-02-05 | End: 2022-02-02 | Stop reason: DRUGHIGH

## 2021-02-23 RX ORDER — DENOSUMAB 60 MG/ML
1 INJECTION SUBCUTANEOUS
COMMUNITY
End: 2021-10-29 | Stop reason: ALTCHOICE

## 2021-02-23 NOTE — PROGRESS NOTES
Patient Name: Judi Patel elected today and consented to a telehealth visit with phone only  YOB: 1940   Medical Record number: [id number]       Chief Complaint: Judi is been having problems with her bowels bloating some rectal bleeding and aerophagia.    HPI Judi is an established patient of mine.  She has a very redundant colon.  Over the last several weeks she has had problems with increasing bloating.  She is occasionally had some rectal bleeding.  This is associated with straining she is trying to pass gas or get rid of the excessive bloating.  She could not relate anything historically to what she was eating that contributed to this.  She has been on no new medications.  Her weight actually has been stable as was asked because she has been avoiding eating at times because of the bloating.  She does admit readily to consuming most of her liquids with a straw.  The blood that she notes again is when she strains and its on the tissue paper only.  There is been no blood in her stool.  Her last colonoscopy was approximately 5 years ago.  No polyps were seen.  Her terminal ileum was also looked at.  She is had no fevers chills or other constitutional symptoms.  Her recent GYN exam annual was unremarkable.  She does have osteoporosis.  Past Medical History:   Past Medical History:   Diagnosis Date   • Anxiety    • Asthma    • Back problem    • Compression fracture of T12 vertebra (CMS/HCC)    • Cystocele    • Epilepsy (CMS/HCC)    • Family history of hip fracture     mother, father   • Hyperlipidemia    • Hypertension    • IBS (irritable bowel syndrome)    • Menopause    • Osteoporosis    • Pinched nerve in neck    • Seizure disorder (CMS/HCC)    • Vaginal atrophy    • Vitamin D deficiency        Family History:  Family History   Problem Relation Age of Onset   • Fibromyalgia Daughter    • Thyroid cancer Daughter    • Hypothyroidism Daughter    • Breast cancer Sister 53   • Ovarian cancer Neg Hx         Social History:   reports that she has never smoked. She has never used smokeless tobacco. She reports that she does not drink alcohol or use drugs.    Medications:     Current Outpatient Medications:   •  Breo Ellipta 200-25 MCG/INH inhaler, , Disp: , Rfl:   •  Calcium-Phosphorus-Vitamin D (CITRACAL +D3 PO), Take  by mouth 2 (two) times a day., Disp: , Rfl:   •  Cholecalciferol (VITAMIN D) 2000 UNITS capsule, Take 1,000 Units by mouth Daily., Disp: , Rfl:   •  denosumab (PROLIA) 60 MG/ML solution prefilled syringe syringe, Inject 1 mL as directed Every 6 (Six) Months., Disp: , Rfl:   •  estradiol (ESTRACE VAGINAL) 0.1 MG/GM vaginal cream, 0.5 grams by vaginal route 2 X week, Disp: 42.5 g, Rfl: 0  •  ezetimibe (ZETIA) 10 MG tablet, Take 1 tablet by mouth Daily., Disp: , Rfl:   •  lamoTRIgine (LaMICtal) 100 MG tablet, Take 1 tablet by mouth 2 (two) times a day., Disp: , Rfl:   •  losartan-hydrochlorothiazide (HYZAAR) 100-12.5 MG per tablet, Take 1 tablet by mouth Daily., Disp: , Rfl:   •  omeprazole (priLOSEC) 40 MG capsule, Take 1 capsule by mouth As Needed., Disp: , Rfl:   •  propranolol (INDERAL) 60 MG tablet, , Disp: , Rfl:   •  sertraline (ZOLOFT) 50 MG tablet, Take 1 tablet by mouth Daily., Disp: , Rfl:   •  denosumab (Prolia) 60 MG/ML solution prefilled syringe syringe, 1 mL., Disp: , Rfl:     Allergies:  Oxycodone-acetaminophen and Sulfa antibiotics    Review of Systems        Assessment and Plan Judi was instructed to go on the probiotic align.  I think this will help quite a bit with the bloating.  I also instructed her to avoid using a straw.  The pursing of her lips and sucking is contributing and causing sure a lot of the aerophagia and bloating.  She is continued on her Benefiber.  I also told her that if she was straining that she could use some MiraLAX on a as needed basis.  I did not want her doing this.  With regards to her hemorrhoid it is nontender it only bleeds occasionally.  I told her  if this became a problem I could have her referred to the colorectal people or she could come in and we could evaluate this in person.  It only occurs once or twice a month and it is a scant amount of blood.  All of her questions were answered.  She did write down the align the probiotic to stop.  At least 30 minutes was spent on this visit pre during and post care.  Appropriate [id number] was billed.      ICD-10-CM ICD-9-CM   1. Irritable bowel syndrome with constipation and diarrhea  K58.2 564.1   2. Abdominal bloating  R14.0 787.3   3. Aerophagia  F45.8 306.4   4. Rectal bleeding  K62.5 569.3   5. Hemorrhoids, unspecified hemorrhoid type  K64.9 455.6     Toro Cortez M.D.  [facility]     EMR Dragon/Transcription disclaimer:   Much of this encounter note is an electronic transcription/translation of spoken language to printed text. The electronic translation of spoken language may permit erroneous, or at times, nonsensical words or phrases to be inadvertently transcribed; Although I have reviewed the note for such errors, some may still exist.

## 2021-10-29 ENCOUNTER — OFFICE VISIT (OUTPATIENT)
Dept: PULMONOLOGY | Facility: CLINIC | Age: 81
End: 2021-10-29

## 2021-10-29 VITALS
SYSTOLIC BLOOD PRESSURE: 122 MMHG | HEIGHT: 67 IN | TEMPERATURE: 97.6 F | OXYGEN SATURATION: 95 % | DIASTOLIC BLOOD PRESSURE: 72 MMHG | HEART RATE: 78 BPM | BODY MASS INDEX: 27 KG/M2 | WEIGHT: 172 LBS

## 2021-10-29 DIAGNOSIS — J32.9 CHRONIC SINUSITIS, UNSPECIFIED LOCATION: ICD-10-CM

## 2021-10-29 DIAGNOSIS — J45.909 ASTHMA, UNSPECIFIED ASTHMA SEVERITY, UNSPECIFIED WHETHER COMPLICATED, UNSPECIFIED WHETHER PERSISTENT: Primary | ICD-10-CM

## 2021-10-29 DIAGNOSIS — J30.2 SEASONAL ALLERGIES: ICD-10-CM

## 2021-10-29 DIAGNOSIS — R06.02 SHORTNESS OF BREATH: ICD-10-CM

## 2021-10-29 PROCEDURE — 99204 OFFICE O/P NEW MOD 45 MIN: CPT | Performed by: INTERNAL MEDICINE

## 2021-10-29 RX ORDER — ALBUTEROL SULFATE 90 UG/1
AEROSOL, METERED RESPIRATORY (INHALATION) AS NEEDED
COMMUNITY
Start: 2021-09-20

## 2021-10-29 NOTE — PROGRESS NOTES
New Pulmonary Patient Office Visit      Patient Name: Judi Patel    Referring Physician: Gerald Jaramillo MD    Chief Complaint:    Chief Complaint   Patient presents with   • Shortness of Breath       History of Present Illness: Judi Patel is a 81 y.o. female who is here today to establish care with Pulmonary.      Very pleasant 81-year-old female with past medical history of asthma diagnosed back in 2014, seasonal allergies, osteopenia, GERD and chronic sinusitis presenting for initial evaluation.  Patient was previously seen here in 2015 by Dr. Claros.  She was placed on Advair at that time.  However patient was having more symptoms of cough and wheezing and chest tightness so she was seen by her allergist and started on allergy shots about 2 years ago.  Also she was switched over from Advair to Breo and albuterol combination which she has been doing for 2 years.  States that every since she switched to Breo and albuterol combination she has not had any further episodes of bronchitis.  She denies any ongoing cough symptoms.  Denies any chest pain or wheezing.  No chest tightness.  She has been doing well.  However she has noticed getting slight more short of breath compared to before.  She finds herself very shallow breathing.  She denies any hemoptysis.  No recent flareups requiring prednisone use or emergency room visits or hospitalization.    Patient also sees ENT apparently for chronic sinusitis and has been on treatment for them.  She was told that surgery likely will not help her.  Patient is quite limited in physical activity as she has pretty significant kyphoscoliosis and has been seen by neurosurgery and orthopedics before she was told that surgery likely will not help with her pain and other disability symptoms.  Patient is unable to do much activity.  Advised to start some water exercises and patient has been thinking about that.    Patient denies any leg edema.  Denies any orthopnea or  PND symptoms.    Patient has  received COVID-19 immunization.  She has also received influenza vaccination for the season as well.      Subjective      Review of Systems:   Review of Systems   Constitutional: Negative.    HENT: Positive for hearing loss, postnasal drip, sinus pressure, tinnitus and voice change.    Respiratory: Positive for shortness of breath and wheezing.    Cardiovascular: Negative.    Gastrointestinal: Positive for abdominal pain and rectal pain.   Endocrine: Negative.    Musculoskeletal: Positive for back pain and gait problem.   Skin: Negative.    Neurological: Positive for tremors and seizures.   Hematological: Negative.    Psychiatric/Behavioral: Negative.    All other systems reviewed and are negative.      Past Medical History:   Past Medical History:   Diagnosis Date   • Anxiety    • Asthma    • Back problem    • Compression fracture of T12 vertebra (HCC)    • Cystocele    • Epilepsy (HCC)    • Family history of hip fracture     mother, father   • Hyperlipidemia    • Hypertension    • IBS (irritable bowel syndrome)    • Menopause    • Osteoporosis    • Pinched nerve in neck    • Seizure disorder (HCC)    • Vaginal atrophy    • Vitamin D deficiency        Past Surgical History:   Past Surgical History:   Procedure Laterality Date   • ABDOMINAL SURGERY     • APPENDECTOMY     • BASAL CELL CARCINOMA EXCISION Right 01/12/2010    Nose   • CHOLECYSTECTOMY  01/01/1991   • CYST REMOVAL  01/01/1990    Right Foot   • DILATATION AND CURETTAGE     • FUNCTIONAL ENDOSCOPIC SINUS SURGERY  11/16/2010    Dr. Mathews   • KNEE ARTHROSCOPY Left 01/01/2006   • KNEE ARTHROSCOPY W/ MENISCAL REPAIR Right 11/01/2010   • SINUS SURGERY     • TOTAL ABDOMINAL HYSTERECTOMY WITH SALPINGO OOPHORECTOMY Bilateral    • WRIST SURGERY Left 01/01/2004       Family History:   Family History   Problem Relation Age of Onset   • Fibromyalgia Daughter    • Thyroid cancer Daughter    • Hypothyroidism Daughter    • Breast cancer  "Sister 53   • Ovarian cancer Neg Hx        Social History:   Social History     Socioeconomic History   • Marital status:    Tobacco Use   • Smoking status: Never Smoker   • Smokeless tobacco: Never Used   Substance and Sexual Activity   • Alcohol use: No   • Drug use: No   • Sexual activity: Yes     Partners: Male     Birth control/protection: Surgical, Post-menopausal       Medications:     Current Outpatient Medications:   •  albuterol sulfate  (90 Base) MCG/ACT inhaler, , Disp: , Rfl:   •  Breo Ellipta 200-25 MCG/INH inhaler, , Disp: , Rfl:   •  Calcium-Phosphorus-Vitamin D (CITRACAL +D3 PO), Take  by mouth 2 (two) times a day., Disp: , Rfl:   •  Cholecalciferol (VITAMIN D) 2000 UNITS capsule, Take 1,000 Units by mouth Daily., Disp: , Rfl:   •  denosumab (PROLIA) 60 MG/ML solution prefilled syringe syringe, Inject 1 mL as directed Every 6 (Six) Months., Disp: , Rfl:   •  ezetimibe (ZETIA) 10 MG tablet, Take 1 tablet by mouth Daily., Disp: , Rfl:   •  lamoTRIgine (LaMICtal) 100 MG tablet, Take 1 tablet by mouth 2 (two) times a day., Disp: , Rfl:   •  losartan-hydrochlorothiazide (HYZAAR) 100-12.5 MG per tablet, Take 1 tablet by mouth Daily., Disp: , Rfl:   •  omeprazole (priLOSEC) 40 MG capsule, Take 1 capsule by mouth As Needed., Disp: , Rfl:   •  propranolol (INDERAL) 60 MG tablet, , Disp: , Rfl:   •  sertraline (ZOLOFT) 50 MG tablet, Take 1 tablet by mouth Daily., Disp: , Rfl:   •  estradiol (ESTRACE VAGINAL) 0.1 MG/GM vaginal cream, 0.5 grams by vaginal route 2 X week, Disp: 42.5 g, Rfl: 0    Allergies:   Allergies   Allergen Reactions   • Oxycodone-Acetaminophen Nausea And Vomiting   • Sulfa Antibiotics Hives       Objective     Physical Exam:  Vital Signs:   Vitals:    10/29/21 1311   BP: 122/72   Pulse: 78   Temp: 97.6 °F (36.4 °C)   SpO2: 95%  Comment: resting, room air   Weight: 78 kg (172 lb)   Height: 170.2 cm (67\")       Physical Exam  Vitals and nursing note reviewed.   Constitutional:  "      General: She is not in acute distress.     Appearance: She is well-developed. She is not diaphoretic.   HENT:      Head: Normocephalic and atraumatic.      Comments: NO thrush noted     Nose: Nose normal.      Mouth/Throat:      Pharynx: No oropharyngeal exudate.   Eyes:      General: No scleral icterus.        Right eye: No discharge.         Left eye: No discharge.      Pupils: Pupils are equal, round, and reactive to light.   Neck:      Thyroid: No thyromegaly.      Trachea: No tracheal deviation.   Cardiovascular:      Rate and Rhythm: Normal rate and regular rhythm.      Heart sounds: Normal heart sounds. No murmur heard.  No friction rub. No gallop.    Pulmonary:      Effort: Pulmonary effort is normal. No respiratory distress.      Breath sounds: Normal breath sounds. No stridor. No wheezing or rales.   Abdominal:      General: There is no distension.      Palpations: Abdomen is soft.   Musculoskeletal:         General: No tenderness.      Cervical back: Neck supple.      Right lower leg: No edema.      Left lower leg: No edema.      Comments: Clubbing: none   Lymphadenopathy:      Cervical: No cervical adenopathy.   Neurological:      Mental Status: She is alert and oriented to person, place, and time.      Cranial Nerves: No cranial nerve deficit.      Coordination: Coordination normal.   Psychiatric:         Behavior: Behavior normal.         Thought Content: Thought content normal.         Judgment: Judgment normal.         Results Review:   I reviewed the patient's new clinical results.     Chest x-ray reviewed personally and does not show any acute cardiopulmonary disease process.  No infiltrates or suspicious lung nodules or masses noted.  No pleural effusions.    Assessment / Plan      Assessment:   Problem List Items Addressed This Visit        Pulmonary and Pneumonias    Asthma - Primary    Relevant Medications    Breo Ellipta 200-25 MCG/INH inhaler    albuterol sulfate  (90 Base)  MCG/ACT inhaler    Other Relevant Orders    XR Chest PA & Lateral      Other Visit Diagnoses     Shortness of breath        Chronic sinusitis, unspecified location        Seasonal allergies              Plan:   1.  Patient  with past medical history of asthma presenting with intermittent shortness of breath with activity.  She really does not complain about any ongoing bronchospastic symptoms such as chest tightness, wheezing or chronic cough.  She seems to be tolerating Breo inhaler well without any ongoing side effects from medication.  She is rinsing mouth after.  She is not having any thrush symptoms either.  Discussed with her that her asthma seems to be under very good control with her current medication regimen.  Discussed that if she was having any other ongoing symptoms we could try her on a different inhaler such as Spiriva but given that her symptoms are under control and has not had a flareup for 2 years I do not think we can do better than that.  I advised her to continue using albuterol as needed which he only uses once a week maybe.  Continue Breo inhaler.  No side effects noted.  Prescription renewed for another year for now.    2.  Patient does have osteopenia and is worried about effect of inhaled steroids.  She is on calcium and vitamin D as well as Prolia.  Advised that if she continues to have frequent flareup requiring prednisone that will likely cause more damage to her bones inhaled steroids at this point.  If her symptoms remain under control 1 could try her on lower dose of Breo such as 100 mcg dose.   patient then told me that she is doing very well with therapy and she does not  necessity want to change anything for now.  Will monitor.    3.  Patient is up-to-date on immunization including COVID-19 as well as influenza vaccination.    4.  GERD control advised.  Continue chronic sinusitis management as destabilization of that might lead to asthma flareups as well.    5.  One of the other  etiology of her exertional shortness of breath could be deconditioning.  She is not limited in her activity due to chronic back pain and gait difficulties.  Advised to consider water aerobics to see if that will help improve her stamina.  Upper body exercises advised as well.    Patient had no further questions at this time.  Will follow.  Patient set of PFTs but otherwise we will monitor her clinically.  Istates that she had a tough time doing PFTs and she is not looking forward to it.  Discussed that if her symptoms change will consider getting she is comfortable with this plan.    Follow Up:   1 year or sooner as needed    Discussed plan of care in detail with patient today. Patient verbally understands and agrees.      Steve Hilliard MD  Pulmonary Critical Care and Sleep Medicine      Please note that portions of this note may have been completed with a voice recognition program. Efforts were made to edit the dictations, but occasionally words are mistranscribed.

## 2022-01-31 ENCOUNTER — HOSPITAL ENCOUNTER (OUTPATIENT)
Dept: GENERAL RADIOLOGY | Facility: HOSPITAL | Age: 82
Discharge: HOME | End: 2022-01-31
Admitting: FAMILY MEDICINE

## 2022-01-31 ENCOUNTER — TRANSCRIBE ORDERS (OUTPATIENT)
Dept: ADMINISTRATIVE | Facility: HOSPITAL | Age: 82
End: 2022-01-31

## 2022-01-31 DIAGNOSIS — R07.9 CHEST PAIN, UNSPECIFIED TYPE: ICD-10-CM

## 2022-01-31 DIAGNOSIS — R07.9 CHEST PAIN, UNSPECIFIED TYPE: Primary | ICD-10-CM

## 2022-01-31 PROCEDURE — 71046 X-RAY EXAM CHEST 2 VIEWS: CPT

## 2022-02-01 ENCOUNTER — TRANSCRIBE ORDERS (OUTPATIENT)
Dept: ADMINISTRATIVE | Facility: HOSPITAL | Age: 82
End: 2022-02-01

## 2022-02-01 DIAGNOSIS — R07.9 CHEST PAIN, UNSPECIFIED TYPE: Primary | ICD-10-CM

## 2022-02-02 ENCOUNTER — OFFICE VISIT (OUTPATIENT)
Dept: OBSTETRICS AND GYNECOLOGY | Facility: CLINIC | Age: 82
End: 2022-02-02

## 2022-02-02 VITALS
SYSTOLIC BLOOD PRESSURE: 142 MMHG | DIASTOLIC BLOOD PRESSURE: 88 MMHG | HEIGHT: 67 IN | WEIGHT: 173.8 LBS | BODY MASS INDEX: 27.28 KG/M2

## 2022-02-02 DIAGNOSIS — N95.2 ATROPHY OF VAGINA: ICD-10-CM

## 2022-02-02 DIAGNOSIS — Z01.411 ENCOUNTER FOR GYNECOLOGICAL EXAMINATION WITH ABNORMAL FINDING: Primary | ICD-10-CM

## 2022-02-02 PROCEDURE — 99397 PER PM REEVAL EST PAT 65+ YR: CPT | Performed by: NURSE PRACTITIONER

## 2022-02-02 RX ORDER — ESTRADIOL 10 UG/1
1 INSERT VAGINAL 2 TIMES WEEKLY
Qty: 8 TABLET | Refills: 12 | Status: SHIPPED | OUTPATIENT
Start: 2022-02-03 | End: 2022-04-15

## 2022-02-02 RX ORDER — PROPRANOLOL HYDROCHLORIDE 80 MG/1
1 TABLET ORAL DAILY
COMMUNITY
Start: 2022-01-27

## 2022-02-02 RX ORDER — AZELASTINE 1 MG/ML
SPRAY, METERED NASAL
COMMUNITY
Start: 2022-01-25 | End: 2022-04-15

## 2022-02-02 NOTE — PROGRESS NOTES
Chief Complaint  Judi Patel is a 81 y.o.  female presenting for Gynecologic Exam, Med Refill (patient desires prescription for Vagifem.), and Dyspareunia (occasional post-coital bleeding.)    History of Present Illness  Very pleasant, alert & articulate 82 yo, delightful woman here for gyn exam.  She is reminiscing with Nallely (my MA) re: doing life here with Dr. Lopez for > 35 yrs.  See c/o above.  Mammo is just a month late; she will call to sched the appt.  DEXA scan is scheduled for /July.  Colonoscopy is up to date.  She has appt in March with her gastroenterologist.  (Having intermittent bloating feeling and excessive gas.  Has intermittent bldg hemorrhoid.)  Otherwise, ROS neg.    The following portions of the patient's history were reviewed and updated as appropriate: allergies, current medications, past family history, past medical history, past social history, past surgical history and problem list.    Allergies   Allergen Reactions   • Oxycodone-Acetaminophen Nausea And Vomiting   • Sulfa Antibiotics Hives         Current Outpatient Medications:   •  albuterol sulfate  (90 Base) MCG/ACT inhaler, , Disp: , Rfl:   •  azelastine (ASTELIN) 0.1 % nasal spray, , Disp: , Rfl:   •  Breo Ellipta 200-25 MCG/INH inhaler, Inhale 1 puff Daily., Disp: 1 each, Rfl: 11  •  Calcium-Phosphorus-Vitamin D (CITRACAL +D3 PO), Take  by mouth 2 (two) times a day., Disp: , Rfl:   •  Cholecalciferol (VITAMIN D) 2000 UNITS capsule, Take 1,000 Units by mouth Daily., Disp: , Rfl:   •  denosumab (PROLIA) 60 MG/ML solution prefilled syringe syringe, Inject 1 mL as directed Every 6 (Six) Months., Disp: , Rfl:   •  [START ON 2/3/2022] estradiol (Yuvafem) 10 MCG tablet vaginal tablet, Insert 1 tablet into the vagina 2 (Two) Times a Week., Disp: 8 tablet, Rfl: 12  •  ezetimibe (ZETIA) 10 MG tablet, Take 1 tablet by mouth Daily., Disp: , Rfl:   •  lamoTRIgine (LaMICtal) 100 MG tablet, Take 1 tablet by mouth 2 (two) times  "a day., Disp: , Rfl:   •  losartan-hydrochlorothiazide (HYZAAR) 100-12.5 MG per tablet, Take 1 tablet by mouth Daily., Disp: , Rfl:   •  omeprazole (priLOSEC) 40 MG capsule, Take 1 capsule by mouth As Needed., Disp: , Rfl:   •  propranolol (INDERAL) 80 MG tablet, Take 1 tablet by mouth Daily., Disp: , Rfl:   •  sertraline (ZOLOFT) 50 MG tablet, Take 1 tablet by mouth Daily., Disp: , Rfl:     Past Medical History:   Diagnosis Date   • Anxiety    • Asthma    • Back problem    • Compression fracture of T12 vertebra (HCC)    • Cystocele    • Epilepsy (HCC)    • Family history of hip fracture     mother, father   • Hyperlipidemia    • Hypertension    • IBS (irritable bowel syndrome)    • Menopause    • Osteoporosis    • Pinched nerve in neck    • Seizure disorder (HCC)    • Vaginal atrophy    • Vitamin D deficiency         Past Surgical History:   Procedure Laterality Date   • ABDOMINAL SURGERY     • APPENDECTOMY     • BASAL CELL CARCINOMA EXCISION Right 01/12/2010    Nose   • CHOLECYSTECTOMY  01/01/1991   • CYST REMOVAL  01/01/1990    Right Foot   • DILATATION AND CURETTAGE     • FUNCTIONAL ENDOSCOPIC SINUS SURGERY  11/16/2010    Dr. Mathews   • KNEE ARTHROSCOPY Left 01/01/2006   • KNEE ARTHROSCOPY W/ MENISCAL REPAIR Right 11/01/2010   • SINUS SURGERY     • TOTAL ABDOMINAL HYSTERECTOMY WITH SALPINGO OOPHORECTOMY Bilateral    • WRIST SURGERY Left 01/01/2004       Objective  /88   Ht 170.2 cm (67\")   Wt 78.8 kg (173 lb 12.8 oz)   LMP  (LMP Unknown)   Breastfeeding No   BMI 27.22 kg/m²     Physical Exam  Exam conducted with a chaperone present.   Constitutional:       Appearance: Normal appearance.   HENT:      Head: Normocephalic.   Neck:      Thyroid: No thyroid mass or thyromegaly.   Cardiovascular:      Rate and Rhythm: Normal rate and regular rhythm.      Heart sounds: Normal heart sounds.   Pulmonary:      Effort: Pulmonary effort is normal.      Breath sounds: Normal breath sounds.   Chest:   Breasts: "      Right: No inverted nipple, mass, nipple discharge, axillary adenopathy or supraclavicular adenopathy.      Left: No inverted nipple, mass, nipple discharge, axillary adenopathy or supraclavicular adenopathy.       Abdominal:      Palpations: Abdomen is soft. There is no mass.      Tenderness: There is no abdominal tenderness.   Genitourinary:     General: Normal vulva.      Labia:         Right: No lesion.         Left: No lesion.       Vagina: Erythema present.      Uterus: Absent.       Adnexa:         Right: No mass or tenderness.          Left: No mass or tenderness.        Comments: The outer 3/4 of vaginal vault is well estrogenized, but marked atrophy with some stippling up at the apex.  We discussed trying to get the medicine up a little higher.  Anus appears wnl.  No rectal exam performed.  Lymphadenopathy:      Upper Body:      Right upper body: No supraclavicular or axillary adenopathy.      Left upper body: No supraclavicular or axillary adenopathy.   Neurological:      Mental Status: She is alert.         Assessment/Plan   Diagnoses and all orders for this visit:    1. Encounter for gynecological examination with abnormal finding (Primary)    2. Atrophy of vagina    Other orders  -     estradiol (Yuvafem) 10 MCG tablet vaginal tablet; Insert 1 tablet into the vagina 2 (Two) Times a Week.  Dispense: 8 tablet; Refill: 12    Couns re: SBE, mammo & other pvt health procedures.      Procedures        Return in about 1 year (around 2/2/2023) for Annual physical.    Aleksandra Medina, APRN  02/02/2022

## 2022-02-21 ENCOUNTER — TRANSCRIBE ORDERS (OUTPATIENT)
Dept: ADMINISTRATIVE | Facility: HOSPITAL | Age: 82
End: 2022-02-21

## 2022-02-21 DIAGNOSIS — Z12.31 VISIT FOR SCREENING MAMMOGRAM: Primary | ICD-10-CM

## 2022-03-01 ENCOUNTER — OFFICE VISIT (OUTPATIENT)
Dept: GASTROENTEROLOGY | Facility: CLINIC | Age: 82
End: 2022-03-01

## 2022-03-01 DIAGNOSIS — K62.5 RECTAL BLEEDING: ICD-10-CM

## 2022-03-01 DIAGNOSIS — K58.2 IRRITABLE BOWEL SYNDROME WITH CONSTIPATION AND DIARRHEA: Primary | ICD-10-CM

## 2022-03-01 DIAGNOSIS — R14.0 ABDOMINAL BLOATING: ICD-10-CM

## 2022-03-01 PROBLEM — Z78.9 STATIN INTOLERANCE: Status: ACTIVE | Noted: 2022-03-01

## 2022-03-01 PROBLEM — R06.83 SNORING: Status: ACTIVE | Noted: 2022-03-01

## 2022-03-01 PROBLEM — J30.9 ALLERGIC RHINITIS: Status: ACTIVE | Noted: 2022-03-01

## 2022-03-01 PROBLEM — M62.81 MUSCLE WEAKNESS: Status: ACTIVE | Noted: 2018-09-13

## 2022-03-01 PROBLEM — R25.2 MUSCLE CRAMPS: Status: ACTIVE | Noted: 2022-03-01

## 2022-03-01 PROBLEM — R25.1 TREMOR: Status: ACTIVE | Noted: 2018-09-13

## 2022-03-01 PROBLEM — K21.9 GASTRO-ESOPHAGEAL REFLUX DISEASE WITHOUT ESOPHAGITIS: Status: ACTIVE | Noted: 2022-03-01

## 2022-03-01 PROBLEM — R53.83 FATIGUE: Status: ACTIVE | Noted: 2022-03-01

## 2022-03-01 PROBLEM — R93.89 ABNORMAL CHEST X-RAY: Status: ACTIVE | Noted: 2022-03-01

## 2022-03-01 PROBLEM — J45.40 MODERATE PERSISTENT ASTHMA: Status: ACTIVE | Noted: 2022-03-01

## 2022-03-01 PROBLEM — R32 INCONTINENCE: Status: ACTIVE | Noted: 2022-03-01

## 2022-03-01 PROBLEM — M62.830 SPASM OF BACK MUSCLES: Status: ACTIVE | Noted: 2018-10-05

## 2022-03-01 PROBLEM — E78.2 MIXED HYPERLIPIDEMIA: Status: ACTIVE | Noted: 2022-03-01

## 2022-03-01 PROBLEM — M54.2 NECK PAIN: Status: ACTIVE | Noted: 2018-09-13

## 2022-03-01 PROBLEM — J32.9 CHRONIC INFECTION OF SINUS: Status: ACTIVE | Noted: 2022-03-01

## 2022-03-01 PROCEDURE — 99213 OFFICE O/P EST LOW 20 MIN: CPT | Performed by: INTERNAL MEDICINE

## 2022-03-01 RX ORDER — CALCIUM CARBONATE/VITAMIN D3 500 MG-10
TABLET,CHEWABLE ORAL DAILY
COMMUNITY
Start: 2022-02-02 | End: 2022-11-01 | Stop reason: SDUPTHER

## 2022-03-01 NOTE — PROGRESS NOTES
Patient Name: Judi Patel   YOB: 1940   Medical Record number: [id number] Judi is an established patient of mine.  She requested and is receiving her healthcare via telehealth today      Chief Complaint: Bloating rectal bleeding and excessive gas      HPI Judi is an established patient of mine.  She has longstanding irritable bowel.  She has had problems both with diarrhea and constipation.  She had an episode of severe constipation.  This prompted a small bout of rectal bleeding that she relates to a hemorrhoid.  She felt a bulge and a pop.  This cleared up spontaneously.  She continues to have problems with borborygmi and bloating postprandially.  She had been using a straw and some carbonated beverages.  She is avoided this.  She is maintained on just some Benefiber and align.  Her bowels are moving 2-3 times a day.  Unfortunately because of knee and hip pain she is not as mobile as she normally is.  She complains of postprandial borborygmi and bloating.  Her weight stable.  Recent pelvic exam was unremarkable.  Because of her complaints she sought telehealth care today.    Past Medical History:   Past Medical History:   Diagnosis Date   • Anxiety    • Asthma    • Back problem    • Compression fracture of T12 vertebra (HCC)    • Cystocele    • Epilepsy (HCC)    • Family history of hip fracture     mother, father   • Hyperlipidemia    • Hypertension    • IBS (irritable bowel syndrome)    • Menopause    • Osteoporosis    • Pinched nerve in neck    • Seizure disorder (HCC)    • Vaginal atrophy    • Vitamin D deficiency        Family History:  Family History   Problem Relation Age of Onset   • Fibromyalgia Daughter    • Thyroid cancer Daughter    • Hypothyroidism Daughter    • Breast cancer Sister 53   • Ovarian cancer Neg Hx        Social History:   reports that she has never smoked. She has never used smokeless tobacco. She reports that she does not drink alcohol and does not use  drugs.    Medications:     Current Outpatient Medications:   •  albuterol sulfate  (90 Base) MCG/ACT inhaler, , Disp: , Rfl:   •  azelastine (ASTELIN) 0.1 % nasal spray, , Disp: , Rfl:   •  Breo Ellipta 200-25 MCG/INH inhaler, Inhale 1 puff Daily., Disp: 1 each, Rfl: 11  •  Calcium Carb-Cholecalciferol 500-10 MG-MCG chewable tablet, , Disp: , Rfl:   •  Calcium-Phosphorus-Vitamin D (CITRACAL +D3 PO), Take  by mouth 2 (two) times a day., Disp: , Rfl:   •  Cholecalciferol (VITAMIN D) 2000 UNITS capsule, Take 1,000 Units by mouth Daily., Disp: , Rfl:   •  denosumab (PROLIA) 60 MG/ML solution prefilled syringe syringe, Inject 1 mL as directed Every 6 (Six) Months., Disp: , Rfl:   •  estradiol (Yuvafem) 10 MCG tablet vaginal tablet, Insert 1 tablet into the vagina 2 (Two) Times a Week., Disp: 8 tablet, Rfl: 12  •  ezetimibe (ZETIA) 10 MG tablet, Take 1 tablet by mouth Daily., Disp: , Rfl:   •  lamoTRIgine (LaMICtal) 100 MG tablet, Take 1 tablet by mouth 2 (two) times a day., Disp: , Rfl:   •  losartan-hydrochlorothiazide (HYZAAR) 100-12.5 MG per tablet, Take 1 tablet by mouth Daily., Disp: , Rfl:   •  omeprazole (priLOSEC) 40 MG capsule, Take 1 capsule by mouth As Needed., Disp: , Rfl:   •  propranolol (INDERAL) 80 MG tablet, Take 1 tablet by mouth Daily., Disp: , Rfl:   •  sertraline (ZOLOFT) 50 MG tablet, Take 1 tablet by mouth Daily., Disp: , Rfl:     Allergies:  Oxycodone-acetaminophen and Sulfa antibiotics    Review of Systems   Gastrointestinal: Positive for abdominal distention and abdominal pain.   All other systems reviewed and are negative.        Assessment and Plan Judi has known irritable bowel.  I recommend that she start on some IBgard peppermint oil 30 minutes prior to each meal.  I hope this alleviates some of her bloating.  I have asked her to contact her primary care physician to see if she can get something to ease her complaints of her knees and hips so she can increase her ambulation.  I have  asked her to give the IBgard 4 to 6 weeks to see if it improves her symptoms and if not to check back with us.  At least 20 minutes of time was spent before during and after the visit and the appropriate [id number] was billed        ICD-10-CM ICD-9-CM   1. Irritable bowel syndrome with constipation and diarrhea  K58.2 564.1   2. Abdominal bloating  R14.0 787.3   3. Rectal bleeding  K62.5 569.3       Please note that portions of this note were completed with a voice recognition program.

## 2022-03-14 ENCOUNTER — HOSPITAL ENCOUNTER (OUTPATIENT)
Dept: CARDIOLOGY | Facility: HOSPITAL | Age: 82
Discharge: HOME | End: 2022-03-14
Admitting: FAMILY MEDICINE

## 2022-03-14 VITALS
BODY MASS INDEX: 27.27 KG/M2 | WEIGHT: 173.72 LBS | HEIGHT: 67 IN | DIASTOLIC BLOOD PRESSURE: 102 MMHG | SYSTOLIC BLOOD PRESSURE: 150 MMHG | HEART RATE: 85 BPM

## 2022-03-14 DIAGNOSIS — R07.9 CHEST PAIN, UNSPECIFIED TYPE: ICD-10-CM

## 2022-03-14 LAB
BH CV NUCLEAR PRIOR STUDY: 3
BH CV REST NUCLEAR ISOTOPE DOSE: 9.9 MCI
BH CV STRESS BP STAGE 1: NORMAL
BH CV STRESS DURATION MIN STAGE 1: 3
BH CV STRESS DURATION MIN STAGE 2: 1
BH CV STRESS DURATION SEC STAGE 1: 0
BH CV STRESS DURATION SEC STAGE 2: 30
BH CV STRESS GRADE STAGE 1: 10
BH CV STRESS GRADE STAGE 2: 12
BH CV STRESS HR STAGE 1: 137
BH CV STRESS HR STAGE 2: 151
BH CV STRESS METS STAGE 1: 5
BH CV STRESS METS STAGE 2: 7.5
BH CV STRESS NUCLEAR ISOTOPE DOSE: 32.9 MCI
BH CV STRESS O2 STAGE 1: 96
BH CV STRESS O2 STAGE 2: 95
BH CV STRESS PROTOCOL 1: NORMAL
BH CV STRESS RECOVERY BP: NORMAL MMHG
BH CV STRESS RECOVERY HR: 94 BPM
BH CV STRESS RECOVERY O2: 98 %
BH CV STRESS SPEED STAGE 1: 1.7
BH CV STRESS SPEED STAGE 2: 2.5
BH CV STRESS STAGE 1: 1
BH CV STRESS STAGE 2: 2
LV EF NUC BP: 76 %
MAXIMAL PREDICTED HEART RATE: 140 BPM
PERCENT MAX PREDICTED HR: 109.29 %
STRESS BASELINE BP: NORMAL MMHG
STRESS BASELINE HR: 82 BPM
STRESS O2 SAT REST: 96 %
STRESS PERCENT HR: 129 %
STRESS POST ESTIMATED WORKLOAD: 5.8 METS
STRESS POST EXERCISE DUR MIN: 4 MIN
STRESS POST EXERCISE DUR SEC: 30 SEC
STRESS POST O2 SAT PEAK: 95 %
STRESS POST PEAK BP: NORMAL MMHG
STRESS POST PEAK HR: 153 BPM
STRESS TARGET HR: 119 BPM

## 2022-03-14 PROCEDURE — A9500 TC99M SESTAMIBI: HCPCS | Performed by: FAMILY MEDICINE

## 2022-03-14 PROCEDURE — 0 TECHNETIUM SESTAMIBI: Performed by: FAMILY MEDICINE

## 2022-03-14 PROCEDURE — 78452 HT MUSCLE IMAGE SPECT MULT: CPT | Performed by: INTERNAL MEDICINE

## 2022-03-14 PROCEDURE — 93018 CV STRESS TEST I&R ONLY: CPT | Performed by: INTERNAL MEDICINE

## 2022-03-14 PROCEDURE — 93017 CV STRESS TEST TRACING ONLY: CPT

## 2022-03-14 PROCEDURE — 78452 HT MUSCLE IMAGE SPECT MULT: CPT

## 2022-03-14 RX ADMIN — TECHNETIUM TC 99M SESTAMIBI 1 DOSE: 1 INJECTION INTRAVENOUS at 13:10

## 2022-03-14 RX ADMIN — TECHNETIUM TC 99M SESTAMIBI 1 DOSE: 1 INJECTION INTRAVENOUS at 10:40

## 2022-03-17 ENCOUNTER — APPOINTMENT (OUTPATIENT)
Dept: MAMMOGRAPHY | Facility: HOSPITAL | Age: 82
End: 2022-03-17

## 2022-04-13 NOTE — PROGRESS NOTES
[facility] Cardiology    Encounter Date: 04/15/2022    Patient ID: Judi Patel is a 81 y.o. female.  : 1940     PCP: Gerald Jaramillo MD       Chief Complaint: Chest Pain and Shortness of Breath      PROBLEM LIST:  1. Chest pain  a. MPS 2015, Dr. Don: Negative treadmill stress for angina or diagnostic ST changes, normal perfusion scan with no evident of infarction or exercise-induced ischemia.  b. MPS 2022-Dr. Kirby: No significant ST or T wave changes.  EF greater than 70%.  Myocardial perfusion imaging no evidence of ischemia.  Low risk study.   2. Hypertension  3. Hyperlipidemia  4. IBS  5. Epilepsy  6. Asthma   7. Anxiety    History of Present Illness: Judi Patel is a 81 y.o. female who presents to the cardiology office today to establish care. She has a medical history significant for hypertension and hyperlipidemia. She has no history of coronary disease. She developed an episode of chest pressure in late January that lasted a few days. Her PCP ordered a stress test, results listed above. She does have occasional shortness of breath with exertion but notes that she has asthma and that this is not progressive. She has occasional palpitations and edema, but denies any dizziness, syncope, or orthopnea. No stroke like symptoms.     Past Medical History:   Past Medical History:   Diagnosis Date   • Anxiety    • Asthma    • Back problem    • Compression fracture of T12 vertebra (HCC)    • Cystocele    • Epilepsy (HCC)    • Family history of hip fracture     mother, father   • Hyperlipidemia    • Hypertension    • IBS (irritable bowel syndrome)    • Menopause    • Osteoporosis    • Pinched nerve in neck    • Seizure disorder (HCC)    • Vaginal atrophy    • Vitamin D deficiency        Past Surgical History:   Past Surgical History:   Procedure Laterality Date   • ABDOMINAL SURGERY     • APPENDECTOMY     • BASAL CELL CARCINOMA EXCISION Right 2010    Nose    • CHOLECYSTECTOMY  01/01/1991   • CYST REMOVAL  01/01/1990    Right Foot   • DILATATION AND CURETTAGE     • FUNCTIONAL ENDOSCOPIC SINUS SURGERY  11/16/2010    Dr. Mathews   • KNEE ARTHROSCOPY Left 01/01/2006   • KNEE ARTHROSCOPY W/ MENISCAL REPAIR Right 11/01/2010   • SINUS SURGERY     • TOTAL ABDOMINAL HYSTERECTOMY WITH SALPINGO OOPHORECTOMY Bilateral    • WRIST SURGERY Left 01/01/2004       Family History:   Family History   Problem Relation Age of Onset   • Fibromyalgia Daughter    • Thyroid cancer Daughter    • Hypothyroidism Daughter    • Breast cancer Sister 53   • Ovarian cancer Neg Hx        Social History:   Social History     Socioeconomic History   • Marital status:    Tobacco Use   • Smoking status: Never Smoker   • Smokeless tobacco: Never Used   Vaping Use   • Vaping Use: Never used   Substance and Sexual Activity   • Alcohol use: No   • Drug use: No   • Sexual activity: Yes     Partners: Male     Birth control/protection: Surgical, Post-menopausal       Tobacco History:   Social History     Tobacco Use   Smoking Status Never Smoker   Smokeless Tobacco Never Used       Medications:     Current Outpatient Medications:   •  albuterol sulfate  (90 Base) MCG/ACT inhaler, , Disp: , Rfl:   •  Calcium Carb-Cholecalciferol 500-10 MG-MCG chewable tablet, , Disp: , Rfl:   •  Calcium-Phosphorus-Vitamin D (CITRACAL +D3 PO), Take  by mouth 2 (two) times a day., Disp: , Rfl:   •  Cholecalciferol (VITAMIN D) 2000 UNITS capsule, Take 1,000 Units by mouth Daily., Disp: , Rfl:   •  denosumab (PROLIA) 60 MG/ML solution prefilled syringe syringe, Inject 1 mL as directed Every 6 (Six) Months., Disp: , Rfl:   •  ezetimibe (ZETIA) 10 MG tablet, Take 1 tablet by mouth Daily., Disp: , Rfl:   •  FIBER COMPLETE PO, Take 1 capsule by mouth Daily., Disp: , Rfl:   •  fluticasone-salmeterol (ADVAIR) 100-50 MCG/DOSE DISKUS, Inhale 1 puff 2 (Two) Times a Day., Disp: , Rfl:   •  lamoTRIgine (LaMICtal) 100 MG tablet,  "Take 1 tablet by mouth 2 (two) times a day., Disp: , Rfl:   •  losartan-hydrochlorothiazide (HYZAAR) 100-12.5 MG per tablet, Take 1 tablet by mouth Daily., Disp: , Rfl:   •  omeprazole (priLOSEC) 40 MG capsule, Take 1 capsule by mouth As Needed., Disp: , Rfl:   •  Probiotic Product (ALIGN PO), Take 1 capsule by mouth Daily., Disp: , Rfl:   •  propranolol (INDERAL) 80 MG tablet, Take 1 tablet by mouth Daily., Disp: , Rfl:   •  sertraline (ZOLOFT) 50 MG tablet, Take 1 tablet by mouth Daily., Disp: , Rfl:     Allergies:   Allergies   Allergen Reactions   • Oxycodone-Acetaminophen Nausea And Vomiting   • Sulfa Antibiotics Hives     The following portions of the patient's history were reviewed and updated as appropriate: allergies, current medications, past family history, past medical history, past social history, past surgical history and problem list.    Review of Systems   Constitution: Negative for chills, fever, fatigue, generalized weakness.   Cardiovascular: Negative for chest pain, dyspnea on exertion, leg swelling, palpitations, orthopnea, and syncope.   Respiratory: Negative for cough, shortness of breath, and wheezing.  HENT: Negative for ear pain, nosebleeds, and tinnitus.  Gastrointestinal: Negative for abdominal pain, constipation, diarrhea, nausea and vomiting.   Genitourinary: No urinary symptoms.  Musculoskeletal: Negative for muscle cramps.  Neurological: Negative for dizziness, headaches, loss of balance, numbness, and symptoms of stroke.  Psychiatric: Normal mental status.     All other systems reviewed and are negative.        Objective:     /84 (BP Location: Left arm, Patient Position: Sitting)   Pulse 68   Ht 170.2 cm (67\")   Wt 80.3 kg (177 lb)   LMP  (LMP Unknown)   SpO2 99%   BMI 27.72 kg/m²      Physical Exam  Constitutional: Patient appears well-developed and well-nourished.   HENT: HEENT exam unremarkable.   Neck: Neck supple. No JVD present. No carotid bruits.   Cardiovascular: " Normal rate, regular rhythm and normal heart sounds. No murmur heard.   2+ symmetric pulses.   Pulmonary/Chest: Breath sounds normal. Does not exhibit tenderness.   Abdominal: Abdomen benign.   Musculoskeletal: Does not exhibit edema.   Neurological: Neurological exam unremarkable.   Vitals reviewed.    Data Review:   Lab review 2/1/2022:  BMP: , BUN 23, creatinine 0.62, , K4.1, CL 97, CO2 29, CA 9.9  Lipid panel: , , HDL 97,   CBC: WBC 5.9, RBC 4.28, Hgb 13.3, HCT 38.8,       ECG 12 Lead    Date/Time: 4/15/2022 11:11 AM  Performed by: Joann Cowan PA-C  Authorized by: Joann Cowan PA-C   Comparison: not compared with previous ECG   Previous ECG: no previous ECG available  Rhythm: sinus rhythm  Rate: normal  BPM: 68                 Assessment:      Diagnosis Plan   1. Other chest pain  The patient had an episode of angina in late January and reports history of similar chest pain episodes occasionally in the past.  Subsequently stress test was obtained by PCP and there is no evidence of ischemia.  No recurrent angina since then.   2. Primary hypertension   to be well controlled.  Continue antihypertensive regimen.   3. Mixed hyperlipidemia   HDL to LDL ratio favorable.  Continue Zetia for triglyceride management.     Plan:   Stable cardiac status.  Continue risk factor management.  Continue current medications.   FU in 6 MO, sooner as needed.  Thank you for allowing us to participate in the care of your patient.     Scribed for Matt Don MD by Joann Cowan PA-C. 4/15/2022  12:40 EDT     I, Matt Don MD, personally performed the services described in this documentation as scribed by the above named individual in my presence, and it is both accurate and complete.  4/15/2022  13:15 EDT        Part of this note may be an electronic transcription/translation of spoken language to printed text using the Dragon Dictation System.

## 2022-04-15 ENCOUNTER — OFFICE VISIT (OUTPATIENT)
Dept: CARDIOLOGY | Facility: CLINIC | Age: 82
End: 2022-04-15

## 2022-04-15 VITALS
HEART RATE: 68 BPM | OXYGEN SATURATION: 99 % | HEIGHT: 67 IN | DIASTOLIC BLOOD PRESSURE: 84 MMHG | WEIGHT: 177 LBS | BODY MASS INDEX: 27.78 KG/M2 | SYSTOLIC BLOOD PRESSURE: 146 MMHG

## 2022-04-15 DIAGNOSIS — I10 PRIMARY HYPERTENSION: ICD-10-CM

## 2022-04-15 DIAGNOSIS — E78.2 MIXED HYPERLIPIDEMIA: ICD-10-CM

## 2022-04-15 DIAGNOSIS — R07.89 OTHER CHEST PAIN: Primary | ICD-10-CM

## 2022-04-15 PROBLEM — R07.9 CHEST PAIN: Status: ACTIVE | Noted: 2022-04-15

## 2022-04-15 PROCEDURE — 93000 ELECTROCARDIOGRAM COMPLETE: CPT | Performed by: INTERNAL MEDICINE

## 2022-04-15 PROCEDURE — 99203 OFFICE O/P NEW LOW 30 MIN: CPT | Performed by: INTERNAL MEDICINE

## 2022-05-12 ENCOUNTER — HOSPITAL ENCOUNTER (OUTPATIENT)
Dept: MAMMOGRAPHY | Facility: HOSPITAL | Age: 82
Discharge: HOME | End: 2022-05-12
Admitting: FAMILY MEDICINE

## 2022-05-12 ENCOUNTER — APPOINTMENT (OUTPATIENT)
Dept: MAMMOGRAPHY | Facility: HOSPITAL | Age: 82
End: 2022-05-12

## 2022-05-12 DIAGNOSIS — Z12.31 VISIT FOR SCREENING MAMMOGRAM: ICD-10-CM

## 2022-05-12 PROCEDURE — 77063 BREAST TOMOSYNTHESIS BI: CPT | Performed by: RADIOLOGY

## 2022-05-12 PROCEDURE — 77067 SCR MAMMO BI INCL CAD: CPT

## 2022-05-12 PROCEDURE — 77067 SCR MAMMO BI INCL CAD: CPT | Performed by: RADIOLOGY

## 2022-05-12 PROCEDURE — 77063 BREAST TOMOSYNTHESIS BI: CPT

## 2022-11-01 ENCOUNTER — OFFICE VISIT (OUTPATIENT)
Dept: NEUROLOGY | Facility: CLINIC | Age: 82
End: 2022-11-01

## 2022-11-01 VITALS
OXYGEN SATURATION: 98 % | HEIGHT: 67 IN | TEMPERATURE: 96.9 F | HEART RATE: 69 BPM | BODY MASS INDEX: 27.47 KG/M2 | WEIGHT: 175 LBS | DIASTOLIC BLOOD PRESSURE: 82 MMHG | SYSTOLIC BLOOD PRESSURE: 128 MMHG

## 2022-11-01 DIAGNOSIS — G40.909 SEIZURE DISORDER: Primary | ICD-10-CM

## 2022-11-01 DIAGNOSIS — R25.1 TREMOR: ICD-10-CM

## 2022-11-01 PROCEDURE — 99204 OFFICE O/P NEW MOD 45 MIN: CPT | Performed by: PSYCHIATRY & NEUROLOGY

## 2022-11-01 RX ORDER — ALPRAZOLAM 1 MG/1
TABLET ORAL
Qty: 2 TABLET | Refills: 0 | Status: SHIPPED | OUTPATIENT
Start: 2022-11-01

## 2022-11-01 NOTE — PROGRESS NOTES
Subjective      Patient ID: Judi Patel is a 82 y.o. female     Chief Complaint   Patient presents with   • Tremors     NP        History of Present Illness    82 y.o. female self referred for essential tremor.      Tremor in hands and voice worsening in last 3 years.  Interferes with ADL's.  Wishes to pursue other options.     Trouble writing, holding a glass.    L > R.     Last sz over 20 years ago.        Reviewed medical records:    Previously followed by Dr Rosas for Sz and tremors.     CPSz tx with LTG.  Inderal for tremors.      Could not tolerate TPM, Mysoline.      Past Medical History:   Diagnosis Date   • Anxiety    • Asthma    • Back problem    • Compression fracture of T12 vertebra (HCC)    • Cystocele    • Epilepsy (HCC)    • Family history of hip fracture     mother, father   • Hyperlipidemia    • Hypertension    • IBS (irritable bowel syndrome)    • Menopause    • Osteoporosis    • Pinched nerve in neck    • Seizure disorder (HCC)    • Vaginal atrophy    • Vitamin D deficiency      Family History   Problem Relation Age of Onset   • Fibromyalgia Daughter    • Thyroid cancer Daughter    • Hypothyroidism Daughter    • Breast cancer Sister 53   • Ovarian cancer Neg Hx      Social History     Socioeconomic History   • Marital status:    Tobacco Use   • Smoking status: Never   • Smokeless tobacco: Never   Vaping Use   • Vaping Use: Never used   Substance and Sexual Activity   • Alcohol use: No   • Drug use: No   • Sexual activity: Yes     Partners: Male     Birth control/protection: Surgical, Post-menopausal       Review of Systems   Constitutional: Negative for activity change, fatigue and unexpected weight change.   HENT: Negative for tinnitus and trouble swallowing.    Eyes: Negative for photophobia and visual disturbance.   Respiratory: Negative for apnea, cough and choking.    Cardiovascular: Negative for leg swelling.   Gastrointestinal: Negative for nausea and vomiting.   Endocrine:  "Negative for cold intolerance and heat intolerance.   Genitourinary: Negative for difficulty urinating, frequency, menstrual problem and urgency.   Musculoskeletal: Negative for back pain, gait problem, myalgias and neck pain.   Skin: Negative for color change and rash.   Allergic/Immunologic: Negative for immunocompromised state.   Neurological: Positive for tremors and seizures. Negative for dizziness, syncope, facial asymmetry, speech difficulty, weakness, light-headedness, numbness and headaches.   Hematological: Negative for adenopathy. Does not bruise/bleed easily.   Psychiatric/Behavioral: Negative for behavioral problems, confusion, decreased concentration, hallucinations and sleep disturbance.       Objective     Vitals:    11/01/22 1039   BP: 128/82   Pulse: 69   Temp: 96.9 °F (36.1 °C)   SpO2: 98%   Weight: 79.4 kg (175 lb)   Height: 170.2 cm (67.01\")       Neurologic Exam     Mental Status   Oriented to person, place, and time.   Speech: speech is normal   Level of consciousness: alert  Knowledge: good and consistent with education.   Normal comprehension.     Cranial Nerves   Cranial nerves II through XII intact.     CN II   Visual fields full to confrontation.   Visual acuity: normal  Right visual field deficit: none  Left visual field deficit: none     CN III, IV, VI   Pupils are equal, round, and reactive to light.  Extraocular motions are normal.   Nystagmus: none   Diplopia: none  Ophthalmoparesis: none  Upgaze: normal  Downgaze: normal  Conjugate gaze: present    CN V   Facial sensation intact.   Right corneal reflex: normal  Left corneal reflex: normal    CN VII   Right facial weakness: none  Left facial weakness: none    CN VIII   Hearing: intact    CN IX, X   Palate: symmetric  Right gag reflex: normal  Left gag reflex: normal    CN XI   Right sternocleidomastoid strength: normal  Left sternocleidomastoid strength: normal    CN XII   Tongue: not atrophic  Fasciculations: absent  Tongue " deviation: none    Motor Exam   Muscle bulk: normal  Overall muscle tone: normal    Strength   Strength 5/5 throughout.     Sensory Exam   Light touch normal.     Gait, Coordination, and Reflexes     Gait  Gait: normal    Tremor   Resting tremor: absent  Intention tremor: absent  Action tremor: left arm and right arm    Reflexes   Reflexes 2+ except as noted.       Physical Exam  Eyes:      Extraocular Movements: EOM normal.      Pupils: Pupils are equal, round, and reactive to light.   Neurological:      Mental Status: She is oriented to person, place, and time.      Cranial Nerves: Cranial nerves 2-12 are intact.      Motor: Motor strength is normal.      Gait: Gait is intact.   Psychiatric:         Speech: Speech normal.         Hospital Outpatient Visit on 03/14/2022   Component Date Value Ref Range Status   • Target HR (85%) 03/14/2022 119  bpm Final   • Max. Pred. HR (100%) 03/14/2022 140  bpm Final   • BH CV STRESS PROTOCOL 1 03/14/2022 Jorge   Final   • Stage 1 03/14/2022 1   Final   • HR Stage 1 03/14/2022 137   Final   • BP Stage 1 03/14/2022 182/110   Final   • O2 Stage 1 03/14/2022 96   Final   • Duration Min Stage 1 03/14/2022 3   Final   • Duration Sec Stage 1 03/14/2022 0   Final   • Grade Stage 1 03/14/2022 10   Final   • Speed Stage 1 03/14/2022 1.7   Final   • BH CV STRESS METS STAGE 1 03/14/2022 5   Final   • Stage 2 03/14/2022 2   Final   • HR Stage 2 03/14/2022 151   Final   • O2 Stage 2 03/14/2022 95   Final   • Duration Min Stage 2 03/14/2022 1   Final   • Duration Sec Stage 2 03/14/2022 30   Final   • Grade Stage 2 03/14/2022 12   Final   • Speed Stage 2 03/14/2022 2.5   Final   • BH CV STRESS METS STAGE 2 03/14/2022 7.5   Final   • Baseline HR 03/14/2022 82  bpm Final   • Baseline BP 03/14/2022 150/102  mmHg Final   • O2 sat rest 03/14/2022 96  % Final   • Peak HR 03/14/2022 153  bpm Final   • Percent Max Pred HR 03/14/2022 109.29  % Final   • Percent Target HR 03/14/2022 129  % Final   •  Peak BP 03/14/2022 194/104  mmHg Final   • O2 sat peak 03/14/2022 95  % Final   • Recovery HR 03/14/2022 94  bpm Final   • Recovery BP 03/14/2022 164/92  mmHg Final   • Recovery O2 03/14/2022 98  % Final   • Exercise duration (min) 03/14/2022 4  min Final   • Exercise duration (sec) 03/14/2022 30  sec Final   • Estimated workload 03/14/2022 5.8  METS Final   • BH CV REST NUCLEAR ISOTOPE DOSE 03/14/2022 9.9  mCi Final   • BH CV STRESS NUCLEAR ISOTOPE DOSE 03/14/2022 32.9  mCi Final   • Nuc Stress EF 03/14/2022 76  % Final   • Nuclear Prior Study 03/14/2022 3   Final         Assessment & Plan     Problem List Items Addressed This Visit        Neuro    Seizure disorder (HCC) - Primary (Chronic)    Current Assessment & Plan     Continue LTG          Relevant Medications    lamoTRIgine (LaMICtal) 100 MG tablet    ALPRAZolam (Xanax) 1 MG tablet    Other Relevant Orders    MRI Brain With & Without Contrast    Tremor (Chronic)    Current Assessment & Plan     MRI Brain    Refer to Dr Restrepo         Relevant Medications    ALPRAZolam (Xanax) 1 MG tablet    Other Relevant Orders    Ambulatory Referral to Neurosurgery (Completed)    MRI Brain With & Without Contrast          No follow-ups on file.

## 2022-11-07 ENCOUNTER — TELEPHONE (OUTPATIENT)
Dept: NEUROLOGY | Facility: CLINIC | Age: 82
End: 2022-11-07

## 2022-11-07 NOTE — TELEPHONE ENCOUNTER
Provider: PEPPER  Caller: PATIENT  Relationship to Patient: SELF  Phone Number: [phone]  Reason for Call: PATIENT WAS SEEN ON 11/1/22 AND WAS TOLD SHE WOULD BE SCHEDULED FOR AN MRI AND HAS NOT HEARD ANYTHING BACK.  PLEASE CALL PATIENT REGARDING STATUS OF THIS APPT.     PLEASE REVIEW AND ADVISE     THANK YOU

## 2022-12-12 ENCOUNTER — HOSPITAL ENCOUNTER (OUTPATIENT)
Dept: MRI IMAGING | Facility: HOSPITAL | Age: 82
Discharge: HOME | End: 2022-12-12
Admitting: PSYCHIATRY & NEUROLOGY

## 2022-12-12 DIAGNOSIS — R25.1 TREMOR: ICD-10-CM

## 2022-12-12 DIAGNOSIS — G40.909 SEIZURE DISORDER: ICD-10-CM

## 2022-12-12 PROCEDURE — 82565 ASSAY OF CREATININE: CPT

## 2022-12-12 PROCEDURE — A9577 INJ MULTIHANCE: HCPCS | Performed by: PSYCHIATRY & NEUROLOGY

## 2022-12-12 PROCEDURE — 0 GADOBENATE DIMEGLUMINE 529 MG/ML SOLUTION: Performed by: PSYCHIATRY & NEUROLOGY

## 2022-12-12 PROCEDURE — 70553 MRI BRAIN STEM W/O & W/DYE: CPT

## 2022-12-12 RX ADMIN — GADOBENATE DIMEGLUMINE 15 ML: 529 INJECTION, SOLUTION INTRAVENOUS at 16:21

## 2022-12-13 ENCOUNTER — TELEPHONE (OUTPATIENT)
Dept: NEUROLOGY | Facility: CLINIC | Age: 82
End: 2022-12-13

## 2022-12-13 NOTE — TELEPHONE ENCOUNTER
----- Message from Jamil Corea MD sent at 12/13/2022  7:48 AM EST -----  Notify pt MRI is normal for age

## 2022-12-17 LAB — CREAT BLDA-MCNC: 0.6 MG/DL (ref 0.6–1.3)

## 2023-03-07 ENCOUNTER — OFFICE VISIT (OUTPATIENT)
Dept: NEUROLOGY | Facility: CLINIC | Age: 83
End: 2023-03-07
Payer: MEDICARE

## 2023-03-07 VITALS — BODY MASS INDEX: 28.05 KG/M2 | WEIGHT: 178.7 LBS | HEART RATE: 63 BPM | OXYGEN SATURATION: 98 % | HEIGHT: 67 IN

## 2023-03-07 DIAGNOSIS — G40.909 SEIZURE DISORDER: Primary | Chronic | ICD-10-CM

## 2023-03-07 DIAGNOSIS — R25.1 TREMOR: Chronic | ICD-10-CM

## 2023-03-07 PROCEDURE — 99214 OFFICE O/P EST MOD 30 MIN: CPT | Performed by: PSYCHIATRY & NEUROLOGY

## 2023-03-07 RX ORDER — AZELASTINE 1 MG/ML
1 SPRAY, METERED NASAL
COMMUNITY

## 2023-03-07 RX ORDER — CALCIUM CARBONATE/VITAMIN D3 500 MG-10
TABLET,CHEWABLE ORAL
COMMUNITY

## 2023-03-07 RX ORDER — CYCLOBENZAPRINE HCL 10 MG
1 TABLET ORAL 3 TIMES DAILY
COMMUNITY
Start: 2023-01-28

## 2023-03-07 RX ORDER — DOCUSATE SODIUM 100 MG/1
CAPSULE, LIQUID FILLED ORAL
COMMUNITY
Start: 2023-01-05

## 2023-03-07 NOTE — PROGRESS NOTES
"Chief Complaint  Results (Check up on MRI results )    Subjective        Judi Patel presents to [facility] NEUROLOGY [facility]     History of Present Illness    82 y.o. female returns in follow up.  Last visit on 22 continued LTG, ordered MRI Brain, referred to NS.      MRI Brain, my review of films, 22 mild atrophy,     Wishes to have referral for DBS>    Tremor in hands and voice worsening in last 3 years.  Interferes with ADL's.        Trouble writing, holding a glass.     L > R.      Last sz over 20 years ago.          Reviewed medical records:     Previously followed by Dr Rosas for Sz and tremors.      CPSz tx with LTG.  Inderal for tremors.       Could not tolerate TPM, Mysoline.      Objective   Vital Signs:  Pulse 63   Ht 170.2 cm (67\")   Wt 81.1 kg (178 lb 11.2 oz)   SpO2 98%   BMI 27.99 kg/m²   Estimated body mass index is 27.99 kg/m² as calculated from the following:    Height as of this encounter: 170.2 cm (67\").    Weight as of this encounter: 81.1 kg (178 lb 11.2 oz).             Physical Exam  Eyes:      Extraocular Movements: EOM normal.      Pupils: Pupils are equal, round, and reactive to light.   Neurological:      Mental Status: She is oriented to person, place, and time.      Cranial Nerves: Cranial nerves 2-12 are intact.      Motor: Motor strength is normal.      Gait: Gait is intact.   Psychiatric:         Speech: Speech normal.          Neurologic Exam     Mental Status   Oriented to person, place, and time.   Speech: speech is normal   Level of consciousness: alert  Knowledge: good and consistent with education.   Normal comprehension.     Cranial Nerves   Cranial nerves II through XII intact.     CN II   Visual fields full to confrontation.   Visual acuity: normal  Right visual field deficit: none  Left visual field deficit: none     CN III, IV, VI   Pupils are equal, round, and reactive to light.  Extraocular motions are normal.   Nystagmus: none "   Diplopia: none  Ophthalmoparesis: none  Upgaze: normal  Downgaze: normal  Conjugate gaze: present    CN V   Facial sensation intact.   Right corneal reflex: normal  Left corneal reflex: normal    CN VII   Right facial weakness: none  Left facial weakness: none    CN VIII   Hearing: intact    CN IX, X   Palate: symmetric  Right gag reflex: normal  Left gag reflex: normal    CN XI   Right sternocleidomastoid strength: normal  Left sternocleidomastoid strength: normal    CN XII   Tongue: not atrophic  Fasciculations: absent  Tongue deviation: none    Motor Exam   Muscle bulk: normal  Overall muscle tone: normal    Strength   Strength 5/5 throughout.     Sensory Exam   Light touch normal.     Gait, Coordination, and Reflexes     Gait  Gait: normal    Tremor   Resting tremor: absent  Intention tremor: absent  Action tremor: left arm and right arm    Reflexes   Reflexes 2+ except as noted.      Result Review :  The following data was reviewed by: Jamil Corea MD on 03/07/2023:  Common labs    Common Labs 12/12/22   Creatinine 0.60      Comments are available for some flowsheets but are not being displayed.           Data reviewed: Radiologic studies MRI Brain             Assessment and Plan   Diagnoses and all orders for this visit:    1. Seizure disorder (HCC) (Primary)  Assessment & Plan:  Continue Lamictal       2. Tremor  Assessment & Plan:  Refer to  for DBS     Orders:  -     Ambulatory Referral to Neurosurgery           Follow Up   No follow-ups on file.  Patient was given instructions and counseling regarding her condition or for health maintenance advice. Please see specific information pulled into the AVS if appropriate.

## 2023-09-29 ENCOUNTER — HOSPITAL ENCOUNTER (OUTPATIENT)
Dept: MAMMOGRAPHY | Facility: HOSPITAL | Age: 83
Discharge: HOME | End: 2023-09-29
Admitting: STUDENT IN AN ORGANIZED HEALTH CARE EDUCATION/TRAINING PROGRAM
Payer: MEDICARE

## 2023-09-29 DIAGNOSIS — Z12.31 VISIT FOR SCREENING MAMMOGRAM: ICD-10-CM

## 2023-09-29 PROCEDURE — 77067 SCR MAMMO BI INCL CAD: CPT

## 2023-09-29 PROCEDURE — 77063 BREAST TOMOSYNTHESIS BI: CPT

## 2023-12-14 ENCOUNTER — TRANSCRIBE ORDERS (OUTPATIENT)
Dept: ADMINISTRATIVE | Facility: HOSPITAL | Age: 83
End: 2023-12-14
Payer: MEDICARE

## 2023-12-14 ENCOUNTER — HOSPITAL ENCOUNTER (OUTPATIENT)
Dept: MAMMOGRAPHY | Facility: HOSPITAL | Age: 83
Discharge: HOME | End: 2023-12-14
Payer: MEDICARE

## 2023-12-14 ENCOUNTER — HOSPITAL ENCOUNTER (OUTPATIENT)
Dept: ULTRASOUND IMAGING | Facility: HOSPITAL | Age: 83
Discharge: HOME | End: 2023-12-14
Payer: MEDICARE

## 2023-12-14 DIAGNOSIS — R92.8 ABNORMAL MAMMOGRAM: Primary | ICD-10-CM

## 2023-12-14 DIAGNOSIS — R92.8 ABNORMAL MAMMOGRAM: ICD-10-CM

## 2023-12-14 PROCEDURE — 76642 ULTRASOUND BREAST LIMITED: CPT

## 2023-12-14 PROCEDURE — 77065 DX MAMMO INCL CAD UNI: CPT

## 2023-12-14 PROCEDURE — G0279 TOMOSYNTHESIS, MAMMO: HCPCS

## 2024-01-18 ENCOUNTER — HOSPITAL ENCOUNTER (OUTPATIENT)
Dept: ULTRASOUND IMAGING | Facility: HOSPITAL | Age: 84
Discharge: HOME | End: 2024-01-18
Payer: MEDICARE

## 2024-01-18 ENCOUNTER — HOSPITAL ENCOUNTER (OUTPATIENT)
Dept: MAMMOGRAPHY | Facility: HOSPITAL | Age: 84
Discharge: HOME | End: 2024-01-18
Payer: MEDICARE

## 2024-01-18 DIAGNOSIS — R92.8 ABNORMAL MAMMOGRAM: ICD-10-CM

## 2024-01-18 PROCEDURE — A4648 IMPLANTABLE TISSUE MARKER: HCPCS

## 2024-01-18 PROCEDURE — 25010000002 LIDOCAINE 1 % SOLUTION: Performed by: RADIOLOGY

## 2024-01-18 RX ORDER — LIDOCAINE HYDROCHLORIDE 10 MG/ML
5 INJECTION, SOLUTION INFILTRATION; PERINEURAL ONCE
Status: COMPLETED | OUTPATIENT
Start: 2024-01-18 | End: 2024-01-18

## 2024-01-18 RX ORDER — LIDOCAINE HYDROCHLORIDE AND EPINEPHRINE 10; 10 MG/ML; UG/ML
10 INJECTION, SOLUTION INFILTRATION; PERINEURAL ONCE
Status: COMPLETED | OUTPATIENT
Start: 2024-01-18 | End: 2024-01-18

## 2024-01-18 RX ADMIN — LIDOCAINE HYDROCHLORIDE,EPINEPHRINE BITARTRATE 1 ML: 10; .01 INJECTION, SOLUTION INFILTRATION; PERINEURAL at 15:04

## 2024-01-18 RX ADMIN — Medication 2 ML: at 15:04

## 2024-01-18 NOTE — PROGRESS NOTES
Alert and orientated. family member present for post procedure instructions. Denies discomfort, no active bleeding, steri-strips not visualized, gauze dressing intact. Cold packs given. Verbalizes and demonstrates understanding of post-care instructions, written copy given.

## 2024-01-23 ENCOUNTER — TELEPHONE (OUTPATIENT)
Dept: MAMMOGRAPHY | Facility: HOSPITAL | Age: 84
End: 2024-01-23
Payer: MEDICARE

## 2024-01-23 LAB
CYTO UR: NORMAL
LAB AP CASE REPORT: NORMAL
LAB AP CLINICAL INFORMATION: NORMAL
LAB AP DIAGNOSIS COMMENT: NORMAL
PATH REPORT.FINAL DX SPEC: NORMAL
PATH REPORT.GROSS SPEC: NORMAL

## 2024-01-23 NOTE — TELEPHONE ENCOUNTER
Referring provider's office notified pathology returned as cancer and patient will be notified.     Patient notified of pathology results and recommendation. Verbalizes understanding. Denies discomfort.. Denies signs and symptoms of infection.     Patient desires first available for surgical consult. Patient will be notified of appointment. Patient verbalized understanding.    Reviewed what would be discussed at surgical consult visit, including detailed explanation of pathology report & imaging reports; treatment options & pros/cons, availability of breast nurse navigator. Patient encouraged to call back or contact breast nurse navigator with questions or concerns. Patient verbalized understanding. Breast cancer information packet offered and accepted. Patient information sent to breast nurse navigator for evaluation.

## 2024-01-23 NOTE — TELEPHONE ENCOUNTER
Patient notified of surgical consult appointment with Dr JONATHAN Platt on 1/29/24 @ 7656. Patient given office contact & location information. Told to bring photo ID, list of prescription & OTC medications, insurance information. May be accompanied by family member or friend for support. Encouraged pt to call back or contact surgeon's office with further questions. Patient verbalized understanding.

## 2024-01-25 ENCOUNTER — CONSULT (OUTPATIENT)
Dept: ONCOLOGY | Facility: CLINIC | Age: 84
End: 2024-01-25
Payer: MEDICARE

## 2024-01-25 ENCOUNTER — HOSPITAL ENCOUNTER (OUTPATIENT)
Dept: MRI IMAGING | Facility: HOSPITAL | Age: 84
Discharge: HOME | End: 2024-01-25
Admitting: SURGERY
Payer: MEDICARE

## 2024-01-25 VITALS
SYSTOLIC BLOOD PRESSURE: 163 MMHG | WEIGHT: 177 LBS | DIASTOLIC BLOOD PRESSURE: 92 MMHG | HEIGHT: 67 IN | BODY MASS INDEX: 27.78 KG/M2 | TEMPERATURE: 96.2 F | HEART RATE: 63 BPM | OXYGEN SATURATION: 97 %

## 2024-01-25 DIAGNOSIS — C50.112 MALIGNANT NEOPLASM OF CENTRAL PORTION OF LEFT BREAST IN FEMALE, ESTROGEN RECEPTOR NEGATIVE: Primary | ICD-10-CM

## 2024-01-25 DIAGNOSIS — C50.919 MALIGNANT NEOPLASM OF FEMALE BREAST, UNSPECIFIED ESTROGEN RECEPTOR STATUS, UNSPECIFIED LATERALITY, UNSPECIFIED SITE OF BREAST: ICD-10-CM

## 2024-01-25 DIAGNOSIS — Z17.1 MALIGNANT NEOPLASM OF CENTRAL PORTION OF LEFT BREAST IN FEMALE, ESTROGEN RECEPTOR NEGATIVE: Primary | ICD-10-CM

## 2024-01-25 LAB — CREAT BLDA-MCNC: 0.6 MG/DL (ref 0.6–1.3)

## 2024-01-25 PROCEDURE — 82565 ASSAY OF CREATININE: CPT

## 2024-01-25 PROCEDURE — C8908 MRI W/O FOL W/CONT, BREAST,: HCPCS

## 2024-01-25 PROCEDURE — C8937 CAD BREAST MRI: HCPCS

## 2024-01-25 PROCEDURE — 0 GADOBENATE DIMEGLUMINE 529 MG/ML SOLUTION: Performed by: SURGERY

## 2024-01-25 PROCEDURE — A9577 INJ MULTIHANCE: HCPCS | Performed by: SURGERY

## 2024-01-25 RX ADMIN — GADOBENATE DIMEGLUMINE 16 ML: 529 INJECTION, SOLUTION INTRAVENOUS at 14:11
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
## 2024-01-26 ENCOUNTER — TRANSCRIBE ORDERS (OUTPATIENT)
Dept: ADMINISTRATIVE | Facility: HOSPITAL | Age: 84
End: 2024-01-26
Payer: MEDICARE

## 2024-01-26 ENCOUNTER — TELEPHONE (OUTPATIENT)
Dept: MRI IMAGING | Facility: HOSPITAL | Age: 84
End: 2024-01-26
Payer: MEDICARE

## 2024-01-26 DIAGNOSIS — Z17.1 MALIGNANT NEOPLASM OF CENTRAL PORTION OF LEFT BREAST IN FEMALE, ESTROGEN RECEPTOR NEGATIVE: Primary | ICD-10-CM

## 2024-01-26 DIAGNOSIS — C50.412 MALIGNANT NEOPLASM OF UPPER-OUTER QUADRANT OF LEFT FEMALE BREAST, UNSPECIFIED ESTROGEN RECEPTOR STATUS: Primary | ICD-10-CM

## 2024-01-26 DIAGNOSIS — C50.112 MALIGNANT NEOPLASM OF CENTRAL PORTION OF LEFT BREAST IN FEMALE, ESTROGEN RECEPTOR NEGATIVE: Primary | ICD-10-CM

## 2024-01-30 ENCOUNTER — TRANSCRIBE ORDERS (OUTPATIENT)
Dept: ADMINISTRATIVE | Facility: HOSPITAL | Age: 84
End: 2024-01-30
Payer: MEDICARE

## 2024-01-30 DIAGNOSIS — C50.412 MALIGNANT NEOPLASM OF UPPER-OUTER QUADRANT OF LEFT FEMALE BREAST, UNSPECIFIED ESTROGEN RECEPTOR STATUS: Primary | ICD-10-CM

## 2024-02-12 ENCOUNTER — HOSPITAL ENCOUNTER (OUTPATIENT)
Dept: MAMMOGRAPHY | Facility: HOSPITAL | Age: 84
Discharge: HOME | End: 2024-02-12
Payer: MEDICARE

## 2024-02-12 ENCOUNTER — PRE-ADMISSION TESTING (OUTPATIENT)
Dept: PREADMISSION TESTING | Facility: HOSPITAL | Age: 84
End: 2024-02-12
Payer: MEDICARE

## 2024-02-12 DIAGNOSIS — C50.412 MALIGNANT NEOPLASM OF UPPER-OUTER QUADRANT OF LEFT FEMALE BREAST, UNSPECIFIED ESTROGEN RECEPTOR STATUS: ICD-10-CM

## 2024-02-12 LAB
ALBUMIN SERPL-MCNC: 4.2 G/DL (ref 3.5–5.2)
ALBUMIN/GLOB SERPL: 1.5 G/DL
ALP SERPL-CCNC: 55 U/L (ref 39–117)
ALT SERPL W P-5'-P-CCNC: 19 U/L (ref 1–33)
ANION GAP SERPL CALCULATED.3IONS-SCNC: 7 MMOL/L (ref 5–15)
AST SERPL-CCNC: 18 U/L (ref 1–32)
BILIRUB SERPL-MCNC: 0.4 MG/DL (ref 0–1.2)
BUN SERPL-MCNC: 21 MG/DL (ref 8–23)
BUN/CREAT SERPL: 29.2 (ref 7–25)
CALCIUM SPEC-SCNC: 10.2 MG/DL (ref 8.6–10.5)
CHLORIDE SERPL-SCNC: 99 MMOL/L (ref 98–107)
CO2 SERPL-SCNC: 30 MMOL/L (ref 22–29)
CREAT SERPL-MCNC: 0.72 MG/DL (ref 0.57–1)
DEPRECATED RDW RBC AUTO: 44.4 FL (ref 37–54)
EGFRCR SERPLBLD CKD-EPI 2021: 83.6 ML/MIN/1.73
ERYTHROCYTE [DISTWIDTH] IN BLOOD BY AUTOMATED COUNT: 13.2 % (ref 12.3–15.4)
GLOBULIN UR ELPH-MCNC: 2.8 GM/DL
GLUCOSE SERPL-MCNC: 115 MG/DL (ref 65–99)
HCT VFR BLD AUTO: 41.8 % (ref 34–46.6)
HGB BLD-MCNC: 13.9 G/DL (ref 12–15.9)
MCH RBC QN AUTO: 30.5 PG (ref 26.6–33)
MCHC RBC AUTO-ENTMCNC: 33.3 G/DL (ref 31.5–35.7)
MCV RBC AUTO: 91.7 FL (ref 79–97)
PLATELET # BLD AUTO: 224 10*3/MM3 (ref 140–450)
PMV BLD AUTO: 10.7 FL (ref 6–12)
POTASSIUM SERPL-SCNC: 4.6 MMOL/L (ref 3.5–5.2)
PROT SERPL-MCNC: 7 G/DL (ref 6–8.5)
QT INTERVAL: 426 MS
QTC INTERVAL: 414 MS
RBC # BLD AUTO: 4.56 10*6/MM3 (ref 3.77–5.28)
SODIUM SERPL-SCNC: 136 MMOL/L (ref 136–145)
WBC NRBC COR # BLD AUTO: 6.15 10*3/MM3 (ref 3.4–10.8)

## 2024-02-12 PROCEDURE — 19283 PERQ DEV BREAST 1ST STRTCTC: CPT | Performed by: RADIOLOGY

## 2024-02-12 PROCEDURE — A4648 IMPLANTABLE TISSUE MARKER: HCPCS

## 2024-02-12 PROCEDURE — 85027 COMPLETE CBC AUTOMATED: CPT

## 2024-02-12 PROCEDURE — 36415 COLL VENOUS BLD VENIPUNCTURE: CPT

## 2024-02-12 PROCEDURE — 25010000002 LIDOCAINE 1 % SOLUTION: Performed by: RADIOLOGY

## 2024-02-12 PROCEDURE — 93005 ELECTROCARDIOGRAM TRACING: CPT

## 2024-02-12 PROCEDURE — 80053 COMPREHEN METABOLIC PANEL: CPT

## 2024-02-12 PROCEDURE — 77065 DX MAMMO INCL CAD UNI: CPT | Performed by: RADIOLOGY

## 2024-02-12 RX ORDER — LIDOCAINE HYDROCHLORIDE 10 MG/ML
10 INJECTION, SOLUTION INFILTRATION; PERINEURAL ONCE
Status: COMPLETED | OUTPATIENT
Start: 2024-02-12 | End: 2024-02-12

## 2024-02-12 RX ADMIN — LIDOCAINE HYDROCHLORIDE 10 ML: 10 INJECTION, SOLUTION INFILTRATION; PERINEURAL at 08:41

## 2024-02-14 ENCOUNTER — LAB REQUISITION (OUTPATIENT)
Dept: LAB | Facility: HOSPITAL | Age: 84
End: 2024-02-14
Payer: MEDICARE

## 2024-02-14 ENCOUNTER — HOSPITAL ENCOUNTER (OUTPATIENT)
Dept: MAMMOGRAPHY | Facility: HOSPITAL | Age: 84
Discharge: HOME | End: 2024-02-14
Payer: MEDICARE

## 2024-02-14 ENCOUNTER — HOSPITAL ENCOUNTER (OUTPATIENT)
Dept: NUCLEAR MEDICINE | Facility: HOSPITAL | Age: 84
Discharge: HOME | End: 2024-02-14
Payer: MEDICARE

## 2024-02-14 DIAGNOSIS — C50.412 MALIGNANT NEOPLASM OF UPPER-OUTER QUADRANT OF LEFT FEMALE BREAST, UNSPECIFIED ESTROGEN RECEPTOR STATUS: ICD-10-CM

## 2024-02-14 DIAGNOSIS — C50.412 MALIGNANT NEOPLASM OF UPPER-OUTER QUADRANT OF LEFT FEMALE BREAST: ICD-10-CM

## 2024-02-14 PROCEDURE — 38792 RA TRACER ID OF SENTINL NODE: CPT

## 2024-02-14 PROCEDURE — 88307 TISSUE EXAM BY PATHOLOGIST: CPT | Performed by: SURGERY

## 2024-02-14 PROCEDURE — 76098 X-RAY EXAM SURGICAL SPECIMEN: CPT

## 2024-02-14 PROCEDURE — A9541 TC99M SULFUR COLLOID: HCPCS | Performed by: SURGERY

## 2024-02-14 PROCEDURE — 88360 TUMOR IMMUNOHISTOCHEM/MANUAL: CPT | Performed by: SURGERY

## 2024-02-14 PROCEDURE — 0 TECHNETIUM FILTERED SULFUR COLLOID: Performed by: SURGERY

## 2024-02-14 RX ADMIN — TECHNETIUM TC 99M SULFUR COLLOID 1 DOSE: KIT at 07:40

## 2024-02-16 LAB
CYTO UR: NORMAL
LAB AP CASE REPORT: NORMAL
LAB AP CLINICAL INFORMATION: NORMAL
LAB AP SPECIAL STAINS: NORMAL
PATH REPORT.FINAL DX SPEC: NORMAL
PATH REPORT.GROSS SPEC: NORMAL

## 2024-02-19 ENCOUNTER — LAB (OUTPATIENT)
Dept: LAB | Facility: HOSPITAL | Age: 84
End: 2024-02-19
Payer: MEDICARE

## 2024-02-19 ENCOUNTER — CLINICAL SUPPORT (OUTPATIENT)
Dept: GENETICS | Facility: HOSPITAL | Age: 84
End: 2024-02-19

## 2024-02-19 DIAGNOSIS — Z80.3 FAMILY HISTORY OF BREAST CANCER: ICD-10-CM

## 2024-02-19 DIAGNOSIS — C50.919 MALIGNANT NEOPLASM OF BREAST IN FEMALE, ESTROGEN RECEPTOR NEGATIVE, UNSPECIFIED LATERALITY, UNSPECIFIED SITE OF BREAST: Primary | ICD-10-CM

## 2024-02-19 DIAGNOSIS — Z17.1 MALIGNANT NEOPLASM OF BREAST IN FEMALE, ESTROGEN RECEPTOR NEGATIVE, UNSPECIFIED LATERALITY, UNSPECIFIED SITE OF BREAST: Primary | ICD-10-CM

## 2024-02-19 DIAGNOSIS — Z80.0 FAMILY HISTORY OF PANCREATIC CANCER: ICD-10-CM

## 2024-02-19 DIAGNOSIS — Z13.79 GENETIC TESTING: Primary | ICD-10-CM

## 2024-02-19 PROCEDURE — 96040: CPT | Performed by: GENETIC COUNSELOR, MS

## 2024-02-20 NOTE — PROGRESS NOTES
Judi Patel is an 83-year-old female who was referred for genetic counseling due to a personal and family history of cancer.  Ms. Patel was accompanied by her daughter Aleksandra for today's appointment.  Ms. Patel was diagnosed with a triple negative breast cancer at age 83 and has undergone a lumpectomy.  Ms. Patel had a KAREN-BSO at age 45 due to endometriosis.  Ms. Patel was interested in discussing her risk for a hereditary cancer syndrome, and decided to pursue genetic testing.   Ms. Patel opted to pursue comprehensive testing via the CancerNext panel ordered through [employer] which includes BRCA1/2 and 34 additional genes associated with an increased risk of breast cancer or other cancers (APC, ADIA, AXIN2, BARD1, BMPR1A, BRCA1, BRCA2, BRIP1, CDH1, CDK4, CDKN2A, CHEK2, DICER1, EPCAM, GREM1, HOXB13, MLH1, MRE11A, MSH2, MSH3, MSH6, MUTYH, NBN, NF1, NTHL1, PALB2, PMS2, POLD1, POLE, PTEN, RAD51C, RAD51D, RECQL, SMAD4, SMARCA4, STK11, and TP53). Blood draw was coordinated today at [facility], and results are expected in 2-3 weeks.     PERTINENT FAMILY HISTORY:  Sister:  Pancreatic cancer, 75  Sister:  Breast cancer (ER+), 52  Pat. Grandfather: Kidney cancer, 82  Daughter: Follicular thyroid cancer, 36    Melanoma, 51    RISK ASSESSMENT:  Ms. Patel's personal history of breast cancer in combination with the family history of pancreatic cancer and breast cancer raises the question of a hereditary cancer syndrome.   NCCN guidelines recommend BRCA1/2 testing for individuals diagnosed with triple negative breast cancer diagnosed at any age, therefore, testing is appropriate for Ms. Patel.  We discussed that standard approach to BRCA1/2 testing is via a multigene panel that evaluates BRCA1/2 and multiple other genes known to impact cancer risk.  This risk assessment is based on the family history information provided at the time of the appointment.  The assessment could change in the future should new  information be obtained.     GENETIC COUNSELING (35 minutes): We reviewed the family history information in detail.  Cases of breast cancer follow three general patterns: sporadic, familial, and hereditary.  While most cancer is sporadic, some cases appear to occur in family clusters.  These cases are said to be familial and account for 10-20% of breast cancer cases.  Familial cases may be due to a combination of shared genes and environmental factors among family members.  In even fewer cases (5-10%), the risk for cancer is inherited, and the genes responsible for the increased cancer risk are known.       Family histories typical of hereditary cancer syndromes usually include multiple first- and second-degree relatives diagnosed with cancer types that define a syndrome.  These cases tend to be diagnosed at younger-than-expected ages and can be bilateral or multifocal.  The cancer in these families follows an autosomal dominant inheritance pattern, which indicates the likely presence of a mutation in a cancer susceptibility gene.  Children and siblings of an individual believed to carry this mutation have a 50% chance of inheriting that mutation, thereby inheriting the increased risk to develop cancer.  These mutations can be passed down from the maternal or the paternal lineage.     Hereditary breast cancer accounts for 5-10% of all cases of breast cancer.  A significant proportion of hereditary breast cancer can be attributed to mutations in the BRCA1 and BRCA2 genes.  Mutations in these genes confer an increased risk for breast cancer, ovarian cancer, male breast cancer, prostate cancer and pancreatic cancer.  There are other genes that are known to be associated with an increased risk for breast cancer, and other cancers. Genes carry varying degrees of risk, and management recommendations vary by gene. In order to get as much information as possible regarding Ms. Patel's personal risks and potential risks for  her family, testing was pursued through a multigene panel that would look at several other genes known to increase the risk for breast cancer and other cancers.     GENETIC TESTING:  The risks, benefits and limitations of genetic testing and implications for clinical management following testing were reviewed.  DNA test results can influence decisions regarding screening, prevention and surgical management.  Genetic testing can have significant psychological implications for both individuals and families.  Also discussed was the possibility of insurance discrimination based on genetic test results and the laws ([place]) in place to prevent this.     We discussed panel testing, which would involve testing for BRCA1/2 as well as several other cancer susceptibility genes at the same time.  The benefits and limitations of genetic testing were discussed and Ms. Patel decided to pursue testing. The implications of a positive or negative test result were discussed. We discussed the possibility that, in some cases, genetic test results may be uninformative due to the identification of a genetic variant of uncertain significance. These variants may or may not be associated with an increased cancer risk.  VUSs are frequently reported through multigene panel testing, given the presence of genetic variation in the population and the number of genes being analyzed. Given her personal history, a negative test result would not eliminate all breast cancer risk to her relatives, although the risk would not be as high as it would with positive genetic testing.          PLAN: Per the patients request, her daughter Aleksandra will be contacted by telephone once results are available.  Genetic counseling remains available to Ms. Patel and her family in the future, and she is welcome to contact us at [phone] with any questions she may have.        Maria Del Carmen Almonte MS, [facility] [place], [facility]  Licensed Certified Genetic Counselor

## 2024-02-28 ENCOUNTER — OFFICE VISIT (OUTPATIENT)
Dept: RADIATION ONCOLOGY | Facility: HOSPITAL | Age: 84
End: 2024-02-28
Payer: MEDICARE

## 2024-02-28 ENCOUNTER — OFFICE VISIT (OUTPATIENT)
Dept: ONCOLOGY | Facility: CLINIC | Age: 84
End: 2024-02-28
Payer: MEDICARE

## 2024-02-28 ENCOUNTER — HOSPITAL ENCOUNTER (OUTPATIENT)
Dept: RADIATION ONCOLOGY | Facility: HOSPITAL | Age: 84
Setting detail: RADIATION/ONCOLOGY SERIES
Discharge: HOME | End: 2024-02-28
Payer: MEDICARE

## 2024-02-28 VITALS
BODY MASS INDEX: 27.54 KG/M2 | RESPIRATION RATE: 20 BRPM | SYSTOLIC BLOOD PRESSURE: 179 MMHG | OXYGEN SATURATION: 98 % | WEIGHT: 175.9 LBS | TEMPERATURE: 97.9 F | DIASTOLIC BLOOD PRESSURE: 81 MMHG | HEART RATE: 58 BPM

## 2024-02-28 VITALS
OXYGEN SATURATION: 98 % | BODY MASS INDEX: 27.47 KG/M2 | DIASTOLIC BLOOD PRESSURE: 81 MMHG | HEIGHT: 67 IN | WEIGHT: 175 LBS | SYSTOLIC BLOOD PRESSURE: 179 MMHG | HEART RATE: 58 BPM | TEMPERATURE: 97.9 F | RESPIRATION RATE: 20 BRPM

## 2024-02-28 DIAGNOSIS — Z17.1 MALIGNANT NEOPLASM OF CENTRAL PORTION OF LEFT BREAST IN FEMALE, ESTROGEN RECEPTOR NEGATIVE: Primary | ICD-10-CM

## 2024-02-28 DIAGNOSIS — C50.112 MALIGNANT NEOPLASM OF CENTRAL PORTION OF LEFT BREAST IN FEMALE, ESTROGEN RECEPTOR NEGATIVE: Primary | ICD-10-CM

## 2024-02-28 NOTE — PROGRESS NOTES
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 female  with recent diagnosis of breast cancer.  She has completed breast conserving surgery and was referred for consideration of adjuvant radiotherapy.  She was found to have suspicious change in the left breast on screening mammography 2023.  Follow-up mammography 2023 showed asymmetry in the left breast upper outer quadrant.  On ultrasound there was a suspicious hypoechoic lesion at 1230 in the left breast measuring 0.6 cm.  Biopsy 2024 showed grade 2 invasive ductal carcinoma.  MRI 2024 showed a larger 4.3 cm area in the left mid and upper breast.  Lumpectomy was performed 2024.  The invasive cancer measured only 0.8 cm and margins were clear by at least 0.2 cm on the invasive component.  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  Initial surgical margins were less than 1 mm medial and posterior.  Extended surgical margins showed additional small focus of DCIS but final margins were clear by at least 0.6 cm.  This was triple negative with no evidence of estrogen receptor, progesterone receptor or HER2.  Left sentinel lymph node biopsy was negative.    Postoperatively, she has been healing very well.    Allergy:   Allergies   Allergen Reactions    Oxycodone-Acetaminophen Nausea And Vomiting    Sulfa Antibiotics Hives    Sulfamethoxazole-Trimethoprim Other (See Comments)       Social History:   Social History     Socioeconomic History    Marital status:    Tobacco Use    Smoking  status: Never    Smokeless tobacco: Never   Vaping Use    Vaping Use: Never used   Substance and Sexual Activity    Alcohol use: No    Drug use: No    Sexual activity: Yes     Partners: Male     Birth control/protection: Surgical, Post-menopausal       Past Medical History:   Past Medical History:   Diagnosis Date    Anxiety     Asthma     Back problem     Breast cancer     Compression fracture of T12 vertebra     Cystocele     Epilepsy     Family history of hip fracture     mother, father    Hyperlipidemia     Hypertension     IBS (irritable bowel syndrome)     Menopause     Osteoporosis     Pinched nerve in neck     Seizure disorder     Vaginal atrophy     Vitamin D deficiency        Family History: family history includes Alzheimer's disease in her mother; Breast cancer (age of onset: 53) in her sister; Fibromyalgia in her daughter; Hypothyroidism in her daughter; Parkinsonism in her father; Thyroid cancer in her daughter.     Surgical History:   Past Surgical History:   Procedure Laterality Date    ABDOMINAL SURGERY      APPENDECTOMY      BASAL CELL CARCINOMA EXCISION Right 01/12/2010    Nose    CHOLECYSTECTOMY  01/01/1991    CYST REMOVAL  01/01/1990    Right Foot    DILATATION AND CURETTAGE      FUNCTIONAL ENDOSCOPIC SINUS SURGERY  11/16/2010    Dr. Mathews    KNEE ARTHROSCOPY Left 01/01/2006    KNEE ARTHROSCOPY W/ MENISCAL REPAIR Right 11/01/2010    REPLACEMENT TOTAL KNEE      SINUS SURGERY      TOTAL ABDOMINAL HYSTERECTOMY WITH SALPINGO OOPHORECTOMY Bilateral     WRIST SURGERY Left 01/01/2004        Review of Systems:   Review of Systems   Respiratory:  Positive for cough, shortness of breath and wheezing.    Musculoskeletal:  Positive for arthralgias, neck pain and neck stiffness.   Neurological:  Positive for seizures.        History of seizures 20 years ago         Age of menses: 14  Age of menopause: 51  Hormone replacement: No  Personal history of breast cancer: No  Family history of breast cancer:  sister  Age of first live birth: 23        Objective   VITAL SIGNS:   Vitals:    02/28/24 0950   BP: 179/81   Pulse: 58   Resp: 20   Temp: 97.9 °F (36.6 °C)   TempSrc: Skin   SpO2: 98%   Weight: 79.8 kg (175 lb 14.4 oz)   PainSc: 0-No pain        Karnofsky score: 80       Physical Exam:   Physical Exam  Vitals and nursing note reviewed.   Constitutional:       Appearance: She is well-developed.   HENT:      Head: Normocephalic and atraumatic.   Cardiovascular:      Rate and Rhythm: Normal rate and regular rhythm.      Heart sounds: Normal heart sounds. No murmur heard.  Pulmonary:      Effort: Pulmonary effort is normal.      Breath sounds: Normal breath sounds. No wheezing or rales.   Chest:   Breasts:     Left: Skin change (5 cm induration UOQ left breast.  well healing circumareolar incision.) present. No nipple discharge or tenderness.       Abdominal:      General: Bowel sounds are normal. There is no distension.      Palpations: Abdomen is soft.      Tenderness: There is no abdominal tenderness.   Musculoskeletal:         General: No tenderness. Normal range of motion.      Cervical back: Normal range of motion and neck supple.   Lymphadenopathy:      Cervical: No cervical adenopathy.      Upper Body:      Right upper body: No supraclavicular adenopathy.      Left upper body: No supraclavicular adenopathy.   Skin:     General: Skin is warm and dry.   Neurological:      Mental Status: She is alert and oriented to person, place, and time.      Sensory: No sensory deficit.   Psychiatric:         Behavior: Behavior normal.         Thought Content: Thought content normal.         Judgment: Judgment normal.            The following portions of the patient's history were reviewed and updated as appropriate: allergies, current medications, past family history, past medical history, past social history, past surgical history, and problem list.    Assessment:   Assessment      Carcinoma of the left breast upper outer  quadrant, stage IB (T1b, N0, M0).  This is triple negative.  She is 2 weeks status post breast conserving surgery and healing well.  She has extensive high-grade ductal carcinoma in situ.  Final surgical margins were negative on the invasive component and on the DCIS.  She is very healthy for her age.  I would recommend adjuvant radiotherapy for this lady.  Informed consent was obtained today.  I would recommend whole breast radiation.  She is seeing Dr. Victoria for consideration of systemic treatment but at her age she is not likely to receive chemotherapy.    RECOMMENDATIONS: She will return next week for simulation.  The left breast will receive a dose of 40.05 Mar delivered in 15 daily fractions over 3 weeks.      I spent a total of 50 minutes on todays visit, with more than 40 minutes in direct face to face communication, and the remainder of the time spent in reviewing the relevant history, records, available imaging, and for documentation.    Follow Up:   Return in about 1 week (around 3/6/2024) for Simulation.  There are no diagnoses linked to this encounter.     Feng Farah MD
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
## 2024-02-28 NOTE — LETTER
2024     Trey Campo MD  [address] [facility]  1st Floor  [facility] [id number]    Patient: Judi Patel   YOB: 1940   Date of Visit: 2024       Dear Trey Campo MD,    Thank you for referring Judi Patel to me for evaluation. Below is a copy of my consult note.    If you have questions, please do not hesitate to call me. I look forward to following Judi along with you.         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 female  with recent diagnosis of breast cancer.  She has completed breast conserving surgery and was referred for consideration of adjuvant radiotherapy.  She was found to have suspicious change in the left breast on screening mammography 2023.  Follow-up mammography 2023 showed asymmetry in the left breast upper outer quadrant.  On ultrasound there was a suspicious hypoechoic lesion at 1230 in the left breast measuring 0.6 cm.  Biopsy 2024 showed grade 2 invasive ductal carcinoma.  MRI 2024 showed a larger 4.3 cm area in the left mid and upper breast.  Lumpectomy was performed 2024.  The invasive cancer measured only 0.8 cm and margins were clear by at least 0.2 cm on the invasive component.  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  Initial surgical margins were less than 1 mm medial and posterior.  Extended surgical margins showed additional small focus of DCIS but final margins were clear by at least 0.6 cm.  This  was triple negative with no evidence of estrogen receptor, progesterone receptor or HER2.  Left sentinel lymph node biopsy was negative.    Postoperatively, she has been healing very well.    Allergy:   Allergies   Allergen Reactions   • Oxycodone-Acetaminophen Nausea And Vomiting   • Sulfa Antibiotics Hives   • Sulfamethoxazole-Trimethoprim Other (See Comments)       Social History:   Social History     Socioeconomic History   • Marital status:    Tobacco Use   • Smoking status: Never   • Smokeless tobacco: Never   Vaping Use   • Vaping Use: Never used   Substance and Sexual Activity   • Alcohol use: No   • Drug use: No   • Sexual activity: Yes     Partners: Male     Birth control/protection: Surgical, Post-menopausal       Past Medical History:   Past Medical History:   Diagnosis Date   • Anxiety    • Asthma    • Back problem    • Breast cancer    • Compression fracture of T12 vertebra    • Cystocele    • Epilepsy    • Family history of hip fracture     mother, father   • Hyperlipidemia    • Hypertension    • IBS (irritable bowel syndrome)    • Menopause    • Osteoporosis    • Pinched nerve in neck    • Seizure disorder    • Vaginal atrophy    • Vitamin D deficiency        Family History: family history includes Alzheimer's disease in her mother; Breast cancer (age of onset: 53) in her sister; Fibromyalgia in her daughter; Hypothyroidism in her daughter; Parkinsonism in her father; Thyroid cancer in her daughter.     Surgical History:   Past Surgical History:   Procedure Laterality Date   • ABDOMINAL SURGERY     • APPENDECTOMY     • BASAL CELL CARCINOMA EXCISION Right 01/12/2010    Nose   • CHOLECYSTECTOMY  01/01/1991   • CYST REMOVAL  01/01/1990    Right Foot   • DILATATION AND CURETTAGE     • FUNCTIONAL ENDOSCOPIC SINUS SURGERY  11/16/2010    Dr. Mathews   • KNEE ARTHROSCOPY Left 01/01/2006   • KNEE ARTHROSCOPY W/ MENISCAL REPAIR Right 11/01/2010   • REPLACEMENT TOTAL KNEE     • SINUS SURGERY     •  TOTAL ABDOMINAL HYSTERECTOMY WITH SALPINGO OOPHORECTOMY Bilateral    • WRIST SURGERY Left 01/01/2004        Review of Systems:   Review of Systems   Respiratory:  Positive for cough, shortness of breath and wheezing.    Musculoskeletal:  Positive for arthralgias, neck pain and neck stiffness.   Neurological:  Positive for seizures.        History of seizures 20 years ago         Age of menses: 14  Age of menopause: 51  Hormone replacement: No  Personal history of breast cancer: No  Family history of breast cancer: sister  Age of first live birth: 23        Objective  VITAL SIGNS:   Vitals:    02/28/24 0950   BP: 179/81   Pulse: 58   Resp: 20   Temp: 97.9 °F (36.6 °C)   TempSrc: Skin   SpO2: 98%   Weight: 79.8 kg (175 lb 14.4 oz)   PainSc: 0-No pain        Karnofsky score: 80       Physical Exam:   Physical Exam  Vitals and nursing note reviewed.   Constitutional:       Appearance: She is well-developed.   HENT:      Head: Normocephalic and atraumatic.   Cardiovascular:      Rate and Rhythm: Normal rate and regular rhythm.      Heart sounds: Normal heart sounds. No murmur heard.  Pulmonary:      Effort: Pulmonary effort is normal.      Breath sounds: Normal breath sounds. No wheezing or rales.   Chest:   Breasts:     Left: Skin change (5 cm induration UOQ left breast.  well healing circumareolar incision.) present. No nipple discharge or tenderness.       Abdominal:      General: Bowel sounds are normal. There is no distension.      Palpations: Abdomen is soft.      Tenderness: There is no abdominal tenderness.   Musculoskeletal:         General: No tenderness. Normal range of motion.      Cervical back: Normal range of motion and neck supple.   Lymphadenopathy:      Cervical: No cervical adenopathy.      Upper Body:      Right upper body: No supraclavicular adenopathy.      Left upper body: No supraclavicular adenopathy.   Skin:     General: Skin is warm and dry.   Neurological:      Mental Status: She is alert and  oriented to person, place, and time.      Sensory: No sensory deficit.   Psychiatric:         Behavior: Behavior normal.         Thought Content: Thought content normal.         Judgment: Judgment normal.            The following portions of the patient's history were reviewed and updated as appropriate: allergies, current medications, past family history, past medical history, past social history, past surgical history, and problem list.    Assessment:   Assessment     Carcinoma of the left breast upper outer quadrant, stage IB (T1b, N0, M0).  This is triple negative.  She is 2 weeks status post breast conserving surgery and healing well.  She has extensive high-grade ductal carcinoma in situ.  Final surgical margins were negative on the invasive component and on the DCIS.  She is very healthy for her age.  I would recommend adjuvant radiotherapy for this lady.  Informed consent was obtained today.  I would recommend whole breast radiation.  She is seeing Dr. Victoria for consideration of systemic treatment but at her age she is not likely to receive chemotherapy.    RECOMMENDATIONS: She will return next week for simulation.  The left breast will receive a dose of 40.05 Mar delivered in 15 daily fractions over 3 weeks.      I spent a total of 50 minutes on todays visit, with more than 40 minutes in direct face to face communication, and the remainder of the time spent in reviewing the relevant history, records, available imaging, and for documentation.    Follow Up:   Return in about 1 week (around 3/6/2024) for Simulation.  There are no diagnoses linked to this encounter.     Feng Farah MD

## 2024-03-04 ENCOUNTER — DOCUMENTATION (OUTPATIENT)
Dept: GENETICS | Facility: HOSPITAL | Age: 84
End: 2024-03-04
Payer: MEDICARE

## 2024-03-04 NOTE — PROGRESS NOTES
Judi Patel is an 83-year-old female who was referred for genetic counseling due to a personal and family history of cancer.  Ms. Patel was accompanied by her daughter Aleksandra for today's appointment.  Ms. Patel was diagnosed with a triple negative breast cancer at age 83 and has undergone a lumpectomy.  Ms. Patel had a KAREN-BSO at age 45 due to endometriosis.  Ms. Patel was interested in discussing her risk for a hereditary cancer syndrome, and decided to pursue genetic testing.   Ms. Patel opted to pursue comprehensive testing via the CancerNext panel ordered through [facility] which includes BRCA1/2 and 32 additional genes associated with an increased risk of breast cancer or other cancers (APC, ADIA, BARD1, BMPR1A, BRCA1, BRCA2, BRIP1, CDH1, CDK4, CDKN2A, CHEK2, DICER1, EPCAM, GREM1, HOXB13, MLH1, MRE11A, MSH2, MSH3, MSH6, MUTYH, NBN, NF1, NTHL1, PALB2, PMS2, POLD1, POLE, PTEN, RAD51C, RAD51D, SMAD4, SMARCA4, STK11, and TP53). Genetic testing was negative for known deleterious/pathogenic germline mutations in the 34 genes included on this panel.  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 VUSs are differences in DNA that may or may not affect the function of the gene.  VUSs are frequently reported through multigene panel testing, given the number of genes being evaluated and the presence of genetic variation in the population.  The majority (estimated to be >90%) of VUSs are eventually reclassified as benign gene variation. It is not recommended that any unaffected relatives be tested for a VUS since this is not a clinically actionable finding.  These results were discussed with Ms. Patel's daughter, as requested by Ms. Patel, by telephone on 3/4/24.     PERTINENT FAMILY HISTORY:  Sister:   Pancreatic cancer, 75  Sister:   Breast cancer (ER+), 52  Pat. Grandfather: Kidney cancer, 82  Daughter:  Follicular thyroid cancer,  36     Melanoma, 51    RISK ASSESSMENT:  Ms. Patel's personal history of breast cancer in combination with the family history of pancreatic cancer and breast cancer raises the question of a hereditary cancer syndrome.   NCCN guidelines recommend BRCA1/2 testing for individuals diagnosed with triple negative breast cancer diagnosed at any age, therefore, testing is appropriate for Ms. Patel.  We discussed that standard approach to BRCA1/2 testing is via a multigene panel that evaluates BRCA1/2 and multiple other genes known to impact cancer risk.  This risk assessment is based on the family history information provided at the time of the appointment.  The assessment could change in the future should new information be obtained.     GENETIC COUNSELING: We reviewed the family history information in detail.  Cases of breast cancer follow three general patterns: sporadic, familial, and hereditary.  While most cancer is sporadic, some cases appear to occur in family clusters.  These cases are said to be familial and account for 10-20% of breast cancer cases.  Familial cases may be due to a combination of shared genes and environmental factors among family members.  In even fewer cases (5-10%), the risk for cancer is inherited, and the genes responsible for the increased cancer risk are known.       Family histories typical of hereditary cancer syndromes usually include multiple first- and second-degree relatives diagnosed with cancer types that define a syndrome.  These cases tend to be diagnosed at younger-than-expected ages and can be bilateral or multifocal.  The cancer in these families follows an autosomal dominant inheritance pattern, which indicates the likely presence of a mutation in a cancer susceptibility gene.  Children and siblings of an individual believed to carry this mutation have a 50% chance of inheriting that mutation, thereby inheriting the increased risk to develop cancer.  These mutations can be  passed down from the maternal or the paternal lineage.     Hereditary breast cancer accounts for 5-10% of all cases of breast cancer.  A significant proportion of hereditary breast cancer can be attributed to mutations in the BRCA1 and BRCA2 genes.  Mutations in these genes confer an increased risk for breast cancer, ovarian cancer, male breast cancer, prostate cancer and pancreatic cancer.  There are other genes that are known to be associated with an increased risk for breast cancer, and other cancers. Genes carry varying degrees of risk, and management recommendations vary by gene. In order to get as much information as possible regarding Ms. Patel's personal risks and potential risks for her family, testing was pursued through a multigene panel that would look at several other genes known to increase the risk for breast cancer and other cancers.     GENETIC TESTING:  The risks, benefits and limitations of genetic testing and implications for clinical management following testing were reviewed.  DNA test results can influence decisions regarding screening, prevention and surgical management.  Genetic testing can have significant psychological implications for both individuals and families.  Also discussed was the possibility of insurance discrimination based on genetic test results and the laws ([place]) in place to prevent this.     We discussed panel testing, which would involve testing for BRCA1/2 as well as several other cancer susceptibility genes at the same time.  The benefits and limitations of genetic testing were discussed and Ms. Patel decided to pursue testing. The implications of a positive or negative test result were discussed. We discussed the possibility that, in some cases, genetic test results may be uninformative due to the identification of a genetic variant of uncertain significance. These variants may or may not be associated with an increased cancer risk.  VUSs are frequently reported through  multigene panel testing, given the presence of genetic variation in the population and the number of genes being analyzed. Given her personal history, a negative test result would not eliminate all breast cancer risk to her relatives, although the risk would not be as high as it would with positive genetic testing.       TEST RESULTS:  Genetic testing was negative for known pathogenic/deleterious mutations by sequencing and rearrangement testing of the 34 genes on the CancerNext panel.     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 VUSs are not clinically actionable findings, and therefore this result does not impact management in any way or lead to testing relatives.  ClinVar (NIH variant classification database) was referenced, and there were three other genetic testing laboratories currently classifying this specific VUS as likely benign. The identification of a VUS is common in multigene panel testing, given the number of genes being evaluated and the presence of genetic variation in the population.  The majority (>90%) of VUSs are ultimately reclassified as benign variation.  If this VUS is ever reclassified, a new report will be issued by the laboratory and released directly to the ordering physician, and our office. This assessment is based on the information provided at the time of the consultation.    Cancer Prevention:  Despite genetic test results that were negative for known deleterious/pathogenic mutations, Ms. Patel's female relatives may have a somewhat increased lifetime risk for breast cancer based on family history.  Relatives may have a personalized risk assessment performed to quantify their breast cancer risk using a family history based risk model, such as the Tyrer-Cuzick model.  Surveillance for individuals with a high lifetime risk of breast cancer (>20%), based on NCCN guidelines, would consist of semi-annual clinical breast exams and monthly self-breast  exams starting by age 18 and annual mammography starting 10 years younger than the earliest diagnosis in a close relative.  According to an American Cancer Society expert panel and NCCN guidelines, annual breast MRI should be offered to women whose lifetime risk of breast cancer is 20-25 percent or more.      PLAN: Genetic counseling remains available to Ms. Patel and her family in the future. Ms. Patel's daughter was interested in pursuing genetic testing due to her personal and paternal family history, and we encouraged her to request a referral from her provider.  We encourage Ms. Patel to contact us at [phone] with any questions she may have.        Maria Del Carmen Almonte MS, [facility], C  Licensed Certified Genetic Counselor    Cc: MD Melany Deng MD

## 2024-03-06 ENCOUNTER — HOSPITAL ENCOUNTER (OUTPATIENT)
Dept: RADIATION ONCOLOGY | Facility: HOSPITAL | Age: 84
Setting detail: RADIATION/ONCOLOGY SERIES
Discharge: HOME | End: 2024-03-06
Payer: MEDICARE

## 2024-03-06 PROCEDURE — 77290 THER RAD SIMULAJ FIELD CPLX: CPT | Performed by: RADIOLOGY

## 2024-03-06 PROCEDURE — 77333 RADIATION TREATMENT AID(S): CPT | Performed by: RADIOLOGY

## 2024-03-15 PROCEDURE — 77295 3-D RADIOTHERAPY PLAN: CPT | Performed by: RADIOLOGY

## 2024-03-15 PROCEDURE — 77300 RADIATION THERAPY DOSE PLAN: CPT | Performed by: RADIOLOGY

## 2024-03-15 PROCEDURE — 77334 RADIATION TREATMENT AID(S): CPT | Performed by: RADIOLOGY

## 2024-03-19 ENCOUNTER — HOSPITAL ENCOUNTER (OUTPATIENT)
Dept: RADIATION ONCOLOGY | Facility: HOSPITAL | Age: 84
Discharge: HOME | End: 2024-03-19

## 2024-03-19 VITALS — WEIGHT: 173.5 LBS | BODY MASS INDEX: 27.17 KG/M2

## 2024-03-19 PROCEDURE — 77280 THER RAD SIMULAJ FIELD SMPL: CPT | Performed by: RADIOLOGY

## 2024-03-20 ENCOUNTER — HOSPITAL ENCOUNTER (OUTPATIENT)
Dept: RADIATION ONCOLOGY | Facility: HOSPITAL | Age: 84
Discharge: HOME | End: 2024-03-20

## 2024-03-20 PROCEDURE — 77412 RADIATION TX DELIVERY LVL 3: CPT | Performed by: RADIOLOGY

## 2024-03-21 ENCOUNTER — HOSPITAL ENCOUNTER (OUTPATIENT)
Dept: RADIATION ONCOLOGY | Facility: HOSPITAL | Age: 84
Discharge: HOME | End: 2024-03-21

## 2024-03-21 PROCEDURE — 77412 RADIATION TX DELIVERY LVL 3: CPT | Performed by: RADIOLOGY

## 2024-03-21 PROCEDURE — 77336 RADIATION PHYSICS CONSULT: CPT | Performed by: RADIOLOGY

## 2024-03-22 ENCOUNTER — HOSPITAL ENCOUNTER (OUTPATIENT)
Dept: RADIATION ONCOLOGY | Facility: HOSPITAL | Age: 84
Discharge: HOME | End: 2024-03-22

## 2024-03-22 PROCEDURE — 77412 RADIATION TX DELIVERY LVL 3: CPT | Performed by: RADIOLOGY

## 2024-03-25 ENCOUNTER — HOSPITAL ENCOUNTER (OUTPATIENT)
Dept: RADIATION ONCOLOGY | Facility: HOSPITAL | Age: 84
Discharge: HOME | End: 2024-03-25
Payer: MEDICARE

## 2024-03-25 PROCEDURE — 77412 RADIATION TX DELIVERY LVL 3: CPT | Performed by: RADIOLOGY

## 2024-03-26 ENCOUNTER — HOSPITAL ENCOUNTER (OUTPATIENT)
Dept: RADIATION ONCOLOGY | Facility: HOSPITAL | Age: 84
Discharge: HOME | End: 2024-03-26

## 2024-03-26 VITALS — WEIGHT: 173 LBS | BODY MASS INDEX: 27.09 KG/M2

## 2024-03-26 PROCEDURE — 77412 RADIATION TX DELIVERY LVL 3: CPT | Performed by: RADIOLOGY

## 2024-03-26 PROCEDURE — 77417 THER RADIOLOGY PORT IMAGE(S): CPT | Performed by: RADIOLOGY

## 2024-03-27 ENCOUNTER — HOSPITAL ENCOUNTER (OUTPATIENT)
Dept: RADIATION ONCOLOGY | Facility: HOSPITAL | Age: 84
Discharge: HOME | End: 2024-03-27

## 2024-03-27 PROCEDURE — 77412 RADIATION TX DELIVERY LVL 3: CPT | Performed by: RADIOLOGY

## 2024-03-27 PROCEDURE — 77387 GUIDANCE FOR RADJ TX DLVR: CPT | Performed by: RADIOLOGY

## 2024-03-28 ENCOUNTER — HOSPITAL ENCOUNTER (OUTPATIENT)
Dept: RADIATION ONCOLOGY | Facility: HOSPITAL | Age: 84
Discharge: HOME | End: 2024-03-28

## 2024-03-28 PROCEDURE — 77412 RADIATION TX DELIVERY LVL 3: CPT | Performed by: RADIOLOGY

## 2024-03-28 PROCEDURE — 77336 RADIATION PHYSICS CONSULT: CPT | Performed by: RADIOLOGY

## 2024-03-29 ENCOUNTER — HOSPITAL ENCOUNTER (OUTPATIENT)
Dept: RADIATION ONCOLOGY | Facility: HOSPITAL | Age: 84
Discharge: HOME | End: 2024-03-29

## 2024-03-29 PROCEDURE — 77387 GUIDANCE FOR RADJ TX DLVR: CPT | Performed by: RADIOLOGY

## 2024-03-29 PROCEDURE — 77412 RADIATION TX DELIVERY LVL 3: CPT | Performed by: RADIOLOGY

## 2024-04-01 ENCOUNTER — HOSPITAL ENCOUNTER (OUTPATIENT)
Dept: RADIATION ONCOLOGY | Facility: HOSPITAL | Age: 84
Setting detail: RADIATION/ONCOLOGY SERIES
Discharge: HOME | End: 2024-04-01
Payer: MEDICARE

## 2024-04-01 ENCOUNTER — HOSPITAL ENCOUNTER (OUTPATIENT)
Dept: RADIATION ONCOLOGY | Facility: HOSPITAL | Age: 84
Setting detail: RADIATION/ONCOLOGY SERIES
End: 2024-04-01
Payer: MEDICARE

## 2024-04-01 PROCEDURE — 77412 RADIATION TX DELIVERY LVL 3: CPT | Performed by: RADIOLOGY

## 2024-04-02 ENCOUNTER — DOCUMENTATION (OUTPATIENT)
Dept: RADIATION ONCOLOGY | Facility: HOSPITAL | Age: 84
End: 2024-04-02
Payer: MEDICARE

## 2024-04-02 ENCOUNTER — HOSPITAL ENCOUNTER (OUTPATIENT)
Dept: RADIATION ONCOLOGY | Facility: HOSPITAL | Age: 84
Discharge: HOME | End: 2024-04-02

## 2024-04-02 VITALS
OXYGEN SATURATION: 95 % | WEIGHT: 173.3 LBS | HEART RATE: 55 BPM | BODY MASS INDEX: 27.14 KG/M2 | RESPIRATION RATE: 20 BRPM | SYSTOLIC BLOOD PRESSURE: 177 MMHG | DIASTOLIC BLOOD PRESSURE: 83 MMHG

## 2024-04-02 VITALS
DIASTOLIC BLOOD PRESSURE: 83 MMHG | OXYGEN SATURATION: 95 % | RESPIRATION RATE: 20 BRPM | SYSTOLIC BLOOD PRESSURE: 177 MMHG | HEART RATE: 55 BPM

## 2024-04-02 PROCEDURE — 77412 RADIATION TX DELIVERY LVL 3: CPT | Performed by: RADIOLOGY

## 2024-04-02 PROCEDURE — 77417 THER RADIOLOGY PORT IMAGE(S): CPT | Performed by: RADIOLOGY

## 2024-04-03 ENCOUNTER — HOSPITAL ENCOUNTER (OUTPATIENT)
Dept: RADIATION ONCOLOGY | Facility: HOSPITAL | Age: 84
Discharge: HOME | End: 2024-04-03

## 2024-04-03 PROCEDURE — 77412 RADIATION TX DELIVERY LVL 3: CPT | Performed by: RADIOLOGY

## 2024-04-04 ENCOUNTER — HOSPITAL ENCOUNTER (OUTPATIENT)
Dept: RADIATION ONCOLOGY | Facility: HOSPITAL | Age: 84
Discharge: HOME | End: 2024-04-04

## 2024-04-04 PROCEDURE — 77336 RADIATION PHYSICS CONSULT: CPT | Performed by: RADIOLOGY

## 2024-04-04 PROCEDURE — 77412 RADIATION TX DELIVERY LVL 3: CPT | Performed by: RADIOLOGY

## 2024-04-05 ENCOUNTER — HOSPITAL ENCOUNTER (OUTPATIENT)
Dept: RADIATION ONCOLOGY | Facility: HOSPITAL | Age: 84
Discharge: HOME | End: 2024-04-05

## 2024-04-05 PROCEDURE — 77412 RADIATION TX DELIVERY LVL 3: CPT | Performed by: RADIOLOGY

## 2024-04-08 ENCOUNTER — HOSPITAL ENCOUNTER (OUTPATIENT)
Dept: RADIATION ONCOLOGY | Facility: HOSPITAL | Age: 84
Discharge: HOME | End: 2024-04-08
Payer: MEDICARE

## 2024-04-08 PROCEDURE — 77412 RADIATION TX DELIVERY LVL 3: CPT | Performed by: RADIOLOGY

## 2024-04-09 ENCOUNTER — HOSPITAL ENCOUNTER (OUTPATIENT)
Dept: RADIATION ONCOLOGY | Facility: HOSPITAL | Age: 84
Discharge: HOME | End: 2024-04-09

## 2024-04-09 VITALS — BODY MASS INDEX: 26.98 KG/M2 | WEIGHT: 172.3 LBS

## 2024-04-09 PROCEDURE — 77412 RADIATION TX DELIVERY LVL 3: CPT | Performed by: RADIOLOGY

## 2024-05-09 ENCOUNTER — OFFICE VISIT (OUTPATIENT)
Dept: RADIATION ONCOLOGY | Facility: HOSPITAL | Age: 84
End: 2024-05-09
Payer: MEDICARE

## 2024-05-09 ENCOUNTER — HOSPITAL ENCOUNTER (OUTPATIENT)
Dept: RADIATION ONCOLOGY | Facility: HOSPITAL | Age: 84
Setting detail: RADIATION/ONCOLOGY SERIES
End: 2024-05-09
Payer: MEDICARE

## 2024-05-09 VITALS
WEIGHT: 177.3 LBS | BODY MASS INDEX: 27.83 KG/M2 | RESPIRATION RATE: 18 BRPM | OXYGEN SATURATION: 94 % | TEMPERATURE: 98 F | HEART RATE: 65 BPM | DIASTOLIC BLOOD PRESSURE: 77 MMHG | SYSTOLIC BLOOD PRESSURE: 162 MMHG | HEIGHT: 67 IN

## 2024-05-09 DIAGNOSIS — Z17.1 MALIGNANT NEOPLASM OF CENTRAL PORTION OF LEFT BREAST IN FEMALE, ESTROGEN RECEPTOR NEGATIVE: Primary | ICD-10-CM

## 2024-05-09 DIAGNOSIS — C50.112 MALIGNANT NEOPLASM OF CENTRAL PORTION OF LEFT BREAST IN FEMALE, ESTROGEN RECEPTOR NEGATIVE: Primary | ICD-10-CM

## 2024-05-09 PROCEDURE — G0463 HOSPITAL OUTPT CLINIC VISIT: HCPCS

## 2024-05-28 ENCOUNTER — LAB (OUTPATIENT)
Dept: LAB | Facility: HOSPITAL | Age: 84
End: 2024-05-28
Payer: MEDICARE

## 2024-05-28 ENCOUNTER — OFFICE VISIT (OUTPATIENT)
Dept: ONCOLOGY | Facility: CLINIC | Age: 84
End: 2024-05-28
Payer: MEDICARE

## 2024-05-28 VITALS
WEIGHT: 175 LBS | TEMPERATURE: 97.1 F | OXYGEN SATURATION: 95 % | HEART RATE: 61 BPM | DIASTOLIC BLOOD PRESSURE: 72 MMHG | SYSTOLIC BLOOD PRESSURE: 169 MMHG | BODY MASS INDEX: 27.47 KG/M2 | HEIGHT: 67 IN

## 2024-05-28 DIAGNOSIS — Z17.1 MALIGNANT NEOPLASM OF CENTRAL PORTION OF LEFT BREAST IN FEMALE, ESTROGEN RECEPTOR NEGATIVE: ICD-10-CM

## 2024-05-28 DIAGNOSIS — C50.112 MALIGNANT NEOPLASM OF CENTRAL PORTION OF LEFT BREAST IN FEMALE, ESTROGEN RECEPTOR NEGATIVE: Primary | ICD-10-CM

## 2024-05-28 DIAGNOSIS — C50.112 MALIGNANT NEOPLASM OF CENTRAL PORTION OF LEFT BREAST IN FEMALE, ESTROGEN RECEPTOR NEGATIVE: ICD-10-CM

## 2024-05-28 DIAGNOSIS — Z17.1 MALIGNANT NEOPLASM OF CENTRAL PORTION OF LEFT BREAST IN FEMALE, ESTROGEN RECEPTOR NEGATIVE: Primary | ICD-10-CM

## 2024-05-28 LAB
ALBUMIN SERPL-MCNC: 4.1 G/DL (ref 3.5–5.2)
ALBUMIN/GLOB SERPL: 1.3 G/DL
ALP SERPL-CCNC: 60 U/L (ref 39–117)
ALT SERPL W P-5'-P-CCNC: 16 U/L (ref 1–33)
ANION GAP SERPL CALCULATED.3IONS-SCNC: 8 MMOL/L (ref 5–15)
AST SERPL-CCNC: 23 U/L (ref 1–32)
BASOPHILS # BLD AUTO: 0.02 10*3/MM3 (ref 0–0.2)
BASOPHILS NFR BLD AUTO: 0.3 % (ref 0–1.5)
BILIRUB SERPL-MCNC: 0.2 MG/DL (ref 0–1.2)
BUN SERPL-MCNC: 19 MG/DL (ref 8–23)
BUN/CREAT SERPL: 31.7 (ref 7–25)
CALCIUM SPEC-SCNC: 9.4 MG/DL (ref 8.6–10.5)
CHLORIDE SERPL-SCNC: 98 MMOL/L (ref 98–107)
CO2 SERPL-SCNC: 30 MMOL/L (ref 22–29)
CREAT SERPL-MCNC: 0.6 MG/DL (ref 0.57–1)
DEPRECATED RDW RBC AUTO: 43.4 FL (ref 37–54)
EGFRCR SERPLBLD CKD-EPI 2021: 89.7 ML/MIN/1.73
EOSINOPHIL # BLD AUTO: 0.13 10*3/MM3 (ref 0–0.4)
EOSINOPHIL NFR BLD AUTO: 2.2 % (ref 0.3–6.2)
ERYTHROCYTE [DISTWIDTH] IN BLOOD BY AUTOMATED COUNT: 13.3 % (ref 12.3–15.4)
GLOBULIN UR ELPH-MCNC: 3.2 GM/DL
GLUCOSE SERPL-MCNC: 88 MG/DL (ref 65–99)
HCT VFR BLD AUTO: 37.7 % (ref 34–46.6)
HGB BLD-MCNC: 12.9 G/DL (ref 12–15.9)
IMM GRANULOCYTES # BLD AUTO: 0.01 10*3/MM3 (ref 0–0.05)
IMM GRANULOCYTES NFR BLD AUTO: 0.2 % (ref 0–0.5)
LYMPHOCYTES # BLD AUTO: 0.94 10*3/MM3 (ref 0.7–3.1)
LYMPHOCYTES NFR BLD AUTO: 16.1 % (ref 19.6–45.3)
MCH RBC QN AUTO: 30.1 PG (ref 26.6–33)
MCHC RBC AUTO-ENTMCNC: 34.2 G/DL (ref 31.5–35.7)
MCV RBC AUTO: 87.9 FL (ref 79–97)
MONOCYTES # BLD AUTO: 0.6 10*3/MM3 (ref 0.1–0.9)
MONOCYTES NFR BLD AUTO: 10.3 % (ref 5–12)
NEUTROPHILS NFR BLD AUTO: 4.14 10*3/MM3 (ref 1.7–7)
NEUTROPHILS NFR BLD AUTO: 70.9 % (ref 42.7–76)
PLATELET # BLD AUTO: 239 10*3/MM3 (ref 140–450)
PMV BLD AUTO: 9.5 FL (ref 6–12)
POTASSIUM SERPL-SCNC: 4 MMOL/L (ref 3.5–5.2)
PROT SERPL-MCNC: 7.3 G/DL (ref 6–8.5)
RBC # BLD AUTO: 4.29 10*6/MM3 (ref 3.77–5.28)
SODIUM SERPL-SCNC: 136 MMOL/L (ref 136–145)
WBC NRBC COR # BLD AUTO: 5.84 10*3/MM3 (ref 3.4–10.8)

## 2024-05-28 PROCEDURE — 85025 COMPLETE CBC W/AUTO DIFF WBC: CPT

## 2024-05-28 PROCEDURE — 80053 COMPREHEN METABOLIC PANEL: CPT

## 2024-05-28 PROCEDURE — 36415 COLL VENOUS BLD VENIPUNCTURE: CPT
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
## 2024-06-03 ENCOUNTER — HOSPITAL ENCOUNTER (OUTPATIENT)
Dept: CT IMAGING | Facility: HOSPITAL | Age: 84
Discharge: HOME | End: 2024-06-03
Admitting: INTERNAL MEDICINE
Payer: MEDICARE

## 2024-06-03 DIAGNOSIS — C50.112 MALIGNANT NEOPLASM OF CENTRAL PORTION OF LEFT BREAST IN FEMALE, ESTROGEN RECEPTOR NEGATIVE: ICD-10-CM

## 2024-06-03 DIAGNOSIS — Z17.1 MALIGNANT NEOPLASM OF CENTRAL PORTION OF LEFT BREAST IN FEMALE, ESTROGEN RECEPTOR NEGATIVE: ICD-10-CM

## 2024-06-03 PROCEDURE — 74177 CT ABD & PELVIS W/CONTRAST: CPT

## 2024-06-03 PROCEDURE — 25510000001 IOPAMIDOL 61 % SOLUTION: Performed by: INTERNAL MEDICINE

## 2024-06-03 PROCEDURE — 71260 CT THORAX DX C+: CPT

## 2024-06-03 RX ADMIN — IOPAMIDOL 85 ML: 612 INJECTION, SOLUTION INTRAVENOUS at 14:55

## 2024-06-06 ENCOUNTER — TRANSCRIBE ORDERS (OUTPATIENT)
Dept: ADMINISTRATIVE | Facility: HOSPITAL | Age: 84
End: 2024-06-06
Payer: MEDICARE

## 2024-06-06 DIAGNOSIS — C50.412 MALIGNANT NEOPLASM OF UPPER-OUTER QUADRANT OF LEFT FEMALE BREAST, UNSPECIFIED ESTROGEN RECEPTOR STATUS: Primary | ICD-10-CM

## 2024-06-17 ENCOUNTER — TELEPHONE (OUTPATIENT)
Dept: ONCOLOGY | Facility: CLINIC | Age: 84
End: 2024-06-17
Payer: MEDICARE

## 2024-06-17 DIAGNOSIS — Z17.1 MALIGNANT NEOPLASM OF CENTRAL PORTION OF LEFT BREAST IN FEMALE, ESTROGEN RECEPTOR NEGATIVE: Primary | ICD-10-CM

## 2024-06-17 DIAGNOSIS — C50.112 MALIGNANT NEOPLASM OF CENTRAL PORTION OF LEFT BREAST IN FEMALE, ESTROGEN RECEPTOR NEGATIVE: Primary | ICD-10-CM

## 2024-06-17 NOTE — TELEPHONE ENCOUNTER
Patient's daughter called patient got prescribed Valium from another provider, but when she saw Dr. Victoria last time she said she could prescribe something else for anxiety and she said no at the time, but they think the Valium is causing side effects. Please call.

## 2024-06-17 NOTE — TELEPHONE ENCOUNTER
"Spoke with patient and her daughter, Aleksandra (on verbal release), at 11:26.  This nurse was told that Dr. Victoria offered her something for depression/anxiety at her last office visit but patient declined.  When patient saw her neurologist 2 days later, she agreed  to be placed on Valium for anxiety and voice tremor. Patient's daughter stated since she started Valium she has had 2 episodes of dizziness and lightheadedness.  Aleksandra stated her BP and heart rate have been increased since and patient saw her PCP whom increased her BP medication dose.  She stated patient see's her cardiologist in 2 weeks.  Patient stated the Valium has not made her tremor better and she now has tremor in her lips and chin and she didn't before.  Patient stated she is a \"people person and I just don't care about doing anything now.\" Patient stated \"I cry for no reason.\"  Patient alert and oriented x4 and denied any suicidal ideation, self harm or thoughts of harming others.  Dr. De Jesus, on call physician, notified of the above and per MD, patient contacted back and offered referral to [facility], [facility]ITT[facility] Rivera, for evaluation and treatment and advised to contact her neurologist and let them know about what has happened since beginning Valium.   Patient verbalized understanding and agreed.  Patient's daughter stated they will contact her neurologist now.  Referral placed and message sent to scheduling to get patient scheduled.  "

## 2024-06-19 ENCOUNTER — OFFICE VISIT (OUTPATIENT)
Dept: PSYCHIATRY | Facility: CLINIC | Age: 84
End: 2024-06-19
Payer: MEDICARE

## 2024-06-19 DIAGNOSIS — F41.9 ANXIETY DISORDER, UNSPECIFIED TYPE: Primary | ICD-10-CM

## 2024-06-19 DIAGNOSIS — F43.21 GRIEF: ICD-10-CM

## 2024-06-19 PROCEDURE — 1160F RVW MEDS BY RX/DR IN RCRD: CPT | Performed by: NURSE PRACTITIONER

## 2024-06-19 PROCEDURE — 1159F MED LIST DOCD IN RCRD: CPT | Performed by: NURSE PRACTITIONER

## 2024-06-19 PROCEDURE — 90792 PSYCH DIAG EVAL W/MED SRVCS: CPT | Performed by: NURSE PRACTITIONER

## 2024-06-19 RX ORDER — SERTRALINE HYDROCHLORIDE 25 MG/1
25 TABLET, FILM COATED ORAL DAILY
Qty: 30 TABLET | Refills: 1 | Status: SHIPPED | OUTPATIENT
Start: 2024-06-19 | End: 2024-06-19 | Stop reason: SDUPTHER

## 2024-06-19 RX ORDER — AMLODIPINE BESYLATE 5 MG/1
5 TABLET ORAL DAILY
COMMUNITY

## 2024-06-19 RX ORDER — SERTRALINE HYDROCHLORIDE 25 MG/1
TABLET, FILM COATED ORAL
Qty: 30 TABLET | Refills: 1 | Status: SHIPPED | OUTPATIENT
Start: 2024-06-19

## 2024-06-19 NOTE — PROGRESS NOTES
"        Subjective   Judi Patel is a  83 y.o. female who is here today for initial appointment with her daughter in person face to face.  Patient was referred by: Dr. De Jesus who was on call for Dr. ANANDA Victoria who is treating patient for triple negative breast cancer. Patient is s/p lumpectomy and SLN bx. She had completed radiation treatments to breast end of April.   of renal cancer two weeks after patient was diagnosed with breast cancer. She lives in her own home with all her adult children in their own homes on same acreage in [facility].          Chief Complaint:  depression, worry        History of Present Illness  Patient reports she was on 25 mg of sertraline for anxiety/ irritability/ depressive symptoms by her PCP the year her  was diagnosed and treated for renal cancer. This was increased in January after her   and she was being treated for breast cancer. She has been on 50 mg for six months. Less irritable however family has noticed her being less socially engaged, less interested in things she used to be ie quilting \"she would be quilting all the time\". She reports frustration from inability to do things she used to. Has now increased [place], feeling her breathing is impaired and being seen for this, B/p meds being adjusted. Has increased essential tremor in neck voice which medications have not been helpful for. She gets frustrated not being able to do things she used to do \"was very independent'.  Isn't going to [employer] exercises at [employer] anymore. Denies SI, denies HI or AVH. Denies PTSD, OCD or scott. Cont to miss her  after 60 + years of marriage. Doesn't like to cook anymore and used to love it. Sleeping well at night. Does bible study each morning. Denies alcohol, nicotine, or illicit drug use.     The following portions of the patient's history were reviewed and updated as appropriate: allergies, current medications, past family history, past " "medical history, past social history, past surgical history, and problem list.    Evaluated the six pillars of health: Nutrition, Activity, Sleep, Social Engagement, Stress Management, decreasing toxins ie nicotine, alcohol, illicit drugs     Past Psych History: for anxiety and depressive symptoms sertraline 25 mg for about a year then 50 mg for about 6 months through her PCP. Dr. Rosas neurologist had placed pt on diazepam 2 mg bid for tremor and anxiety however \"had side effects of not feeling right, leg strength difficult and not thinking sharp\". She stopped this after about three weeks on it.     Substance Abuse: denies all    DIANA REVIEWED: no red flags       Family Psychiatric History:  family history includes Alzheimer's disease in her mother; Breast cancer (age of onset: 53) in her sister; Fibromyalgia in her daughter; Hypothyroidism in her daughter; Parkinsonism in her father; Thyroid cancer in her daughter.      Social History: Grew up in [place], [place]. Had 2 sisters one is alive and lives in [place]. Both parents . 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 Had been  over 60 years until Blair's death from renal cancer in 2024. Has three  adult children who all built houses around her home and are socially engaged with her and helpful. She likes to sew [employer], [employer] study and goes to [employer] every  morning and evening. Her son is the  of the [employer]. Patient believes in God and Raciel as her Savior.      Medical/Surgical History:  Past Medical History:   Diagnosis Date    Anxiety     Asthma     Back problem     Breast cancer     Compression fracture of T12 vertebra     Cystocele     Epilepsy     Family history of hip fracture     mother, father    Hyperlipidemia     Hypertension     IBS (irritable bowel syndrome)     Menopause     Osteoporosis     Pinched nerve in neck     Seizure disorder     Vaginal atrophy     Vitamin D deficiency      Past Surgical History:   Procedure " Laterality Date    ABDOMINAL SURGERY      APPENDECTOMY      BASAL CELL CARCINOMA EXCISION Right 01/12/2010    Nose    CHOLECYSTECTOMY  01/01/1991    CYST REMOVAL  01/01/1990    Right Foot    DILATATION AND CURETTAGE      FUNCTIONAL ENDOSCOPIC SINUS SURGERY  11/16/2010    Dr. Mathews    KNEE ARTHROSCOPY Left 01/01/2006    KNEE ARTHROSCOPY W/ MENISCAL REPAIR Right 11/01/2010    REPLACEMENT TOTAL KNEE      SINUS SURGERY      TOTAL ABDOMINAL HYSTERECTOMY WITH SALPINGO OOPHORECTOMY Bilateral     WRIST SURGERY Left 01/01/2004       Allergies   Allergen Reactions    Oxycodone-Acetaminophen Nausea And Vomiting    Sulfa Antibiotics Hives    Sulfamethoxazole-Trimethoprim Other (See Comments)       Current Medications:   Current Outpatient Medications   Medication Sig Dispense Refill    sertraline (ZOLOFT) 25 MG tablet Take one tablet daily with the 50 mg tablet daily 30 tablet 1    albuterol sulfate  (90 Base) MCG/ACT inhaler As Needed for Shortness of Air or Wheezing.      amLODIPine (NORVASC) 5 MG tablet Take 1 tablet by mouth Daily.      azelastine (ASTELIN) 0.1 % nasal spray 1 spray into the nostril(s) as directed by provider.      Calcium Citrate-Vitamin D (CITRACAL+D) 315-5 MG-MCG per tablet Citracal plus D 315 mg-5 mcg (200 unit) tablet      denosumab (PROLIA) 60 MG/ML solution prefilled syringe syringe Inject 1 mL as directed Every 6 (Six) Months.      ezetimibe (ZETIA) 10 MG tablet Take 1 tablet by mouth Daily.      FIBER COMPLETE PO Take 1 capsule by mouth Daily.      fluticasone-salmeterol (ADVAIR) 100-50 MCG/DOSE DISKUS Inhale 1 puff 2 (Two) Times a Day.      lamoTRIgine (LaMICtal) 100 MG tablet Take 1 tablet by mouth 2 (two) times a day.      omeprazole (priLOSEC) 40 MG capsule Take 1 capsule by mouth As Needed. Twice a day      Probiotic Product (ALIGN PO) Take 1 capsule by mouth Daily.      propranolol (INDERAL) 80 MG tablet Take 1 tablet by mouth Daily.      sertraline (ZOLOFT) 50 MG tablet Take 1  tablet by mouth Daily.       No current facility-administered medications for this visit.       Lab Results:  Lab on 05/28/2024   Component Date Value Ref Range Status    Glucose 05/28/2024 88  65 - 99 mg/dL Final    BUN 05/28/2024 19  8 - 23 mg/dL Final    Creatinine 05/28/2024 0.60  0.57 - 1.00 mg/dL Final    Sodium 05/28/2024 136  136 - 145 mmol/L Final    Potassium 05/28/2024 4.0  3.5 - 5.2 mmol/L Final    Slight hemolysis detected by analyzer. Result may be falsely elevated.    Chloride 05/28/2024 98  98 - 107 mmol/L Final    CO2 05/28/2024 30.0 (H)  22.0 - 29.0 mmol/L Final    Calcium 05/28/2024 9.4  8.6 - 10.5 mg/dL Final    Total Protein 05/28/2024 7.3  6.0 - 8.5 g/dL Final    Albumin 05/28/2024 4.1  3.5 - 5.2 g/dL Final    ALT (SGPT) 05/28/2024 16  1 - 33 U/L Final    AST (SGOT) 05/28/2024 23  1 - 32 U/L Final    Alkaline Phosphatase 05/28/2024 60  39 - 117 U/L Final    Total Bilirubin 05/28/2024 0.2  0.0 - 1.2 mg/dL Final    Globulin 05/28/2024 3.2  gm/dL Final    Calculated Result    A/G Ratio 05/28/2024 1.3  g/dL Final    BUN/Creatinine Ratio 05/28/2024 31.7 (H)  7.0 - 25.0 Final    Anion Gap 05/28/2024 8.0  5.0 - 15.0 mmol/L Final    eGFR 05/28/2024 89.7  >60.0 mL/min/1.73 Final    WBC 05/28/2024 5.84  3.40 - 10.80 10*3/mm3 Final    RBC 05/28/2024 4.29  3.77 - 5.28 10*6/mm3 Final    Hemoglobin 05/28/2024 12.9  12.0 - 15.9 g/dL Final    Hematocrit 05/28/2024 37.7  34.0 - 46.6 % Final    MCV 05/28/2024 87.9  79.0 - 97.0 fL Final    MCH 05/28/2024 30.1  26.6 - 33.0 pg Final    MCHC 05/28/2024 34.2  31.5 - 35.7 g/dL Final    RDW 05/28/2024 13.3  12.3 - 15.4 % Final    RDW-SD 05/28/2024 43.4  37.0 - 54.0 fl Final    MPV 05/28/2024 9.5  6.0 - 12.0 fL Final    Platelets 05/28/2024 239  140 - 450 10*3/mm3 Final    Neutrophil % 05/28/2024 70.9  42.7 - 76.0 % Final    Lymphocyte % 05/28/2024 16.1 (L)  19.6 - 45.3 % Final    Monocyte % 05/28/2024 10.3  5.0 - 12.0 % Final    Eosinophil % 05/28/2024 2.2  0.3 - 6.2 %  Final    Basophil % 05/28/2024 0.3  0.0 - 1.5 % Final    Immature Grans % 05/28/2024 0.2  0.0 - 0.5 % Final    Neutrophils, Absolute 05/28/2024 4.14  1.70 - 7.00 10*3/mm3 Final    Lymphocytes, Absolute 05/28/2024 0.94  0.70 - 3.10 10*3/mm3 Final    Monocytes, Absolute 05/28/2024 0.60  0.10 - 0.90 10*3/mm3 Final    Eosinophils, Absolute 05/28/2024 0.13  0.00 - 0.40 10*3/mm3 Final    Basophils, Absolute 05/28/2024 0.02  0.00 - 0.20 10*3/mm3 Final    Immature Grans, Absolute 05/28/2024 0.01  0.00 - 0.05 10*3/mm3 Final   Lab Requisition on 02/14/2024   Component Date Value Ref Range Status    Case Report 02/14/2024    Final                    Value:Surgical Pathology Report                         Case: [id number]                                  Authorizing Provider:  Trey Campo MD        Collected:           02/14/2024 08:04 AM          Ordering Location:     [facility]   Received:            02/14/2024 09:37 AM                                 LABORATORY                                                                   Pathologist:           Shon Parker MD                                                          Specimens:   1) - Breast, Left, left breast lumpectomy                                                           2) - [place] Lymph Node, left sentinel node biopsy                                                 3) - Breast, extended superior/posterior margin + medial                                   Clinical Information 02/14/2024    Final                    Value:This result contains rich text formatting which cannot be displayed here.    Final Diagnosis 02/14/2024    Final                    Value:This result contains rich text formatting which cannot be displayed here.    Gross Description 02/14/2024    Final                    Value:This result contains rich text formatting which cannot be displayed here.    Special Stains 02/14/2024    Final                    Value:This result  contains rich text formatting which cannot be displayed here.    Microscopic Description 02/14/2024    Final                    Value:This result contains rich text formatting which cannot be displayed here.   Pre-Admission Testing on 02/12/2024   Component Date Value Ref Range Status    WBC 02/12/2024 6.15  3.40 - 10.80 10*3/mm3 Final    RBC 02/12/2024 4.56  3.77 - 5.28 10*6/mm3 Final    Hemoglobin 02/12/2024 13.9  12.0 - 15.9 g/dL Final    Hematocrit 02/12/2024 41.8  34.0 - 46.6 % Final    MCV 02/12/2024 91.7  79.0 - 97.0 fL Final    MCH 02/12/2024 30.5  26.6 - 33.0 pg Final    MCHC 02/12/2024 33.3  31.5 - 35.7 g/dL Final    RDW 02/12/2024 13.2  12.3 - 15.4 % Final    RDW-SD 02/12/2024 44.4  37.0 - 54.0 fl Final    MPV 02/12/2024 10.7  6.0 - 12.0 fL Final    Platelets 02/12/2024 224  140 - 450 10*3/mm3 Final    Glucose 02/12/2024 115 (H)  65 - 99 mg/dL Final    BUN 02/12/2024 21  8 - 23 mg/dL Final    Creatinine 02/12/2024 0.72  0.57 - 1.00 mg/dL Final    Sodium 02/12/2024 136  136 - 145 mmol/L Final    Potassium 02/12/2024 4.6  3.5 - 5.2 mmol/L Final    Chloride 02/12/2024 99  98 - 107 mmol/L Final    CO2 02/12/2024 30.0 (H)  22.0 - 29.0 mmol/L Final    Calcium 02/12/2024 10.2  8.6 - 10.5 mg/dL Final    Total Protein 02/12/2024 7.0  6.0 - 8.5 g/dL Final    Albumin 02/12/2024 4.2  3.5 - 5.2 g/dL Final    ALT (SGPT) 02/12/2024 19  1 - 33 U/L Final    AST (SGOT) 02/12/2024 18  1 - 32 U/L Final    Alkaline Phosphatase 02/12/2024 55  39 - 117 U/L Final    Total Bilirubin 02/12/2024 0.4  0.0 - 1.2 mg/dL Final    Globulin 02/12/2024 2.8  gm/dL Final    Calculated Result    A/G Ratio 02/12/2024 1.5  g/dL Final    BUN/Creatinine Ratio 02/12/2024 29.2 (H)  7.0 - 25.0 Final    Anion Gap 02/12/2024 7.0  5.0 - 15.0 mmol/L Final    eGFR 02/12/2024 83.6  >60.0 mL/min/1.73 Final    QT Interval 02/12/2024 426  ms Final    QTC Interval 02/12/2024 414  ms Final   Hospital Outpatient Visit on 01/25/2024   Component Date Value Ref  Range Status    Creatinine 01/25/2024 0.60  0.60 - 1.30 mg/dL Final    Serial Number: [id number]  [id number]   Hospital Outpatient Visit on 01/18/2024   Component Date Value Ref Range Status    Case Report 01/18/2024    Final                    Value:Surgical Pathology Report                         Case: [id number]                                  Authorizing Provider:  Ana Foley MD           Collected:           01/18/2024 02:58 PM          Ordering Location:     [facility]   Received:            01/19/2024 06:42 AM                                 [id number]                                                                                  ULTRASOUND                                                                   Pathologist:           Dago Holt MD                                                       Specimen:    Breast, Left,  6 CM FN IRREGULAR MASS                                                Clinical Information 01/18/2024    Final                    Value:This result contains rich text formatting which cannot be displayed here.    Final Diagnosis 01/18/2024    Final                    Value:This result contains rich text formatting which cannot be displayed here.    Comment 01/18/2024    Final                    Value:This result contains rich text formatting which cannot be displayed here.    Gross Description 01/18/2024    Final                    Value:This result contains rich text formatting which cannot be displayed here.    Microscopic Description 01/18/2024    Final                    Value:This result contains rich text formatting which cannot be displayed here.         Review of Systems Constitutional: Negative for appetite change, chills, diaphoresis, fatigue, fever and unexpected weight change.   HENT: Negative for hearing loss, sore throat, trouble swallowing and voice change.    Eyes: Negative for photophobia and visual disturbance.   Respiratory:  Negative for cough, chest tightness and shortness of breath.    Cardiovascular: Negative for chest pain and palpitations.   Gastrointestinal: Negative for abdominal pain, constipation, nausea and vomiting.   Endocrine: Negative for cold intolerance and heat intolerance.   Genitourinary: Negative for dysuria and frequency.   Musculoskeletal: Negative for arthralgia, back pain, joint swelling and neck stiffness.   Skin: Negative for color change and wound.   Allergic/Immunologic: Negative for environmental allergies and immunocompromised state.   Neurological: Negative for dizziness, tremors, seizures, syncope, weakness, light-headedness and headaches.   Hematological: Negative for adenopathy. Does not bruise/bleed easily.    Objective   Physical Exam  not currently breastfeeding.    SERGIO-7:    Over the last two weeks, how often have you been bothered by the following problems?  Feeling nervous, anxious or on edge: Several days  Not being able to stop or control worrying: Several days  Worrying too much about different things: More than half the days  Trouble Relaxing: Not at all  Being so restless that it is hard to sit still: Not at all  Becoming easily annoyed or irritable: Several days  SERGIO 7 Total Score: 5  If you checked any problems, how difficult have these problems made it for you to do your work, take care of things at home, or get along with other people: Somewhat difficult  0-4: Minimal anxiety  5-9: Mild anxiety  10-14: Moderate anxiety  15-21: Severe anxiety    PHQ-9:      6/19/2024     2:00 PM   PHQ-2/PHQ-9 Depression Screening   Little Interest or Pleasure in Doing Things 2-->more than half the days   Feeling Down, Depressed or Hopeless 2-->more than half the days   Trouble Falling or Staying Asleep, or Sleeping Too Much 0-->not at all   Feeling Tired or Having Little Energy 2-->more than half the days   Poor Appetite or Overeating 1-->several days   Feeling Bad about Yourself - or that You are a  Failure or Have Let Yourself or Your Family Down 2-->more than half the days   Trouble Concentrating on Things, Such as Reading the Newspaper or Watching Television 0-->not at all   Moving or Speaking So Slowly that Other People Could Have Noticed? Or the Opposite - Being So Fidgety 2-->more than half the days   PHQ-9: Brief Depression Severity Measure Score 11   If You Checked Off Any Problems, How Difficult Have These Problems Made It For You to Do Your Work, Take Care of Things at Home, or Get Along with Other People? somewhat difficult      5-9: Minimal symptoms  10-14: Major depression mild  15-19: Major depression moderate  Greater then 20: Major depression severe        Mental Status Exam:   Appearance: appropriate  Hygiene:   good  Cooperation:  Cooperative  Eye Contact:  Good  Psychomotor Behavior:   essential tremor neck voice  Mood:  anxious and decreased range  Affect:  Appropriate  Hopelessness: 4  Speech:   has essential tremor so talks WNL volume, but pace is difficult   Thought Process:  Linear  Thought Content:  Normal  Suicidal:  None  Homicidal:  None  Hallucinations:  None  Delusion:  None  Memory:  Intact  Orientation:  Person, Place, Time, and Situation  Reliability:  good  Insight:  Fair  Judgement:  Good  Impulse Control:  Good        Short-term goals: Patient will be compliant with clinic appointments.  Patient will be engaged in therapy, medication compliant with minimal side effects. Patient  will report decrease of symptoms and frequency.    Long-term goals: Patient will have minimal symptoms of mental health disorder with continued treatment. Patient will be compliant with treatment and appointments.       Problem list:   Strengths: patient appears motivated for treatment          Assessment & Plan   Diagnoses and all orders for this visit:    1. Anxiety disorder, unspecified type (Primary)    2. Grief    Other orders  -     Discontinue: sertraline (ZOLOFT) 25 MG tablet; Take 1 tablet by  mouth Daily.  Dispense: 30 tablet; Refill: 1  -     sertraline (ZOLOFT) 25 MG tablet; Take one tablet daily with the 50 mg tablet daily  Dispense: 30 tablet; Refill: 1    TO TAKE SERTRALINE 50 MG AND ADDED 25 MG TOTAL 75 MG DAILY FOR ANXIETY/DEPRESSIVE SYMPTOMS,     A psychological evaluation was conducted in order to assess past and current level of functioning. Areas assessed included, but were not limited to: perception of social support, perception of ability to face and deal with challenges in life (positive functioning), anxiety symptoms, depressive symptoms, perspective on beliefs/belief system, coping skills for stress, intelligence level,  Therapeutic rapport was established.     Assisted patient in processing above session content; acknowledged and normalized patient’s thoughts, feelings, and concerns.  Applied  positive coping skills and behavior management in session. Allowed patient to freely discuss issues without interruption or judgment. Provided safe, confidential environment to facilitate the development of positive therapeutic relationship and encourage open, honest communication.     Assisted patient in identifying risk factors which would indicate the need for higher level of care including thoughts to harm self or others and/or self-harming behavior and encouraged patient to contact this office, call 911, or present to the nearest emergency room should any of these events occur. Discussed crisis intervention services and means to access.  Patient adamantly and convincingly denies current suicidal or homicidal ideation or perceptual disturbance.    Discussed diagnosis and recommendations for treatment:SEE ABOVE FOR MED RECOMMENDATION PT AGREES WITH     PROVIDE: Cognitive Behavioral Therapy and Solution Focused Therapy to improve functioning, maintain stability, and avoid decompensation and the need for higher level of care.        We discussed risks, benefits,goals and side effects of the above  medication and the patient was agreeable with the plan.Patient was educated on the importance of compliance with treatment and follow-up appointments.To call for questions or concerns and return early if necessary. Crisis plan reviewed including going to the Emergency department.       Treatment Plan: stabilize mood,  patient will stay out of the hospital and be at optimal level of functioning, take all medication as prescribed. Patient verbalized  understanding and agreement to plan.      Return in about 4 weeks (around 7/17/2024).

## 2024-06-26 PROBLEM — E78.5 DYSLIPIDEMIA: Status: ACTIVE | Noted: 2024-06-26

## 2024-06-26 NOTE — PROGRESS NOTES
[facility] Cardiology    Encounter Date: 2024    Patient ID: Judi Patel is a 83 y.o. female.  : 1940     PCP: Jone Solorio MD       Chief Complaint: Other chest pain      PROBLEM LIST:  Chest pain  MPS 2015, Dr. Don: Negative treadmill stress for angina or diagnostic ST changes, normal perfusion scan with no evident of infarction or exercise-induced ischemia.  MPS 2022-Dr. Kirby: No significant ST or T wave changes.  EF greater than 70%.  Myocardial perfusion imaging no evidence of ischemia.  Low risk study.   CT chest 2024: no coronary artery calcification  Hypertension  Hyperlipidemia  Breast cancer, diagnosed 2024  IBS  Epilepsy  Asthma   Anxiety    History of Present Illness  Patient presents today for a 1 year follow-up with a history of chest pain and cardiac risk factors. Since last visit, patient reports that she has been under a lot of stress.  Her  passed away in January and then 2 weeks later she was diagnosed with breast cancer.  Additionally, she did have an episode of syncope not too long ago but she was taking Valium at this time.  Valium has been discontinued and she has had no recurrent episodes.  Patient and daughter report that she had a EKG at her PCP and this revealed several PACs.  She has been having dyspnea on exertion and chest x-ray did reveal pleural effusion, felt to be secondary to radiation.  She does have an appointment with pulmonology coming up.  She has been having issues with her blood pressure and her primary care increased her amlodipine but discontinued her losartan-HCTZ.  Her blood pressure has remained elevated. She denies any chest pain or pressure. Had CT chest with no evidence of coronary artery calcification.      Allergies   Allergen Reactions    Oxycodone-Acetaminophen Nausea And Vomiting    Sulfa Antibiotics Hives    Sulfamethoxazole-Trimethoprim Other (See Comments)         Current Outpatient  Medications:     albuterol sulfate  (90 Base) MCG/ACT inhaler, As Needed for Shortness of Air or Wheezing., Disp: , Rfl:     amLODIPine (NORVASC) 5 MG tablet, Take 1 tablet by mouth Daily., Disp: , Rfl:     azelastine (ASTELIN) 0.1 % nasal spray, 1 spray into the nostril(s) as directed by provider., Disp: , Rfl:     Calcium Citrate-Vitamin D (CITRACAL+D) 315-5 MG-MCG per tablet, Citracal plus D 315 mg-5 mcg (200 unit) tablet, Disp: , Rfl:     denosumab (PROLIA) 60 MG/ML solution prefilled syringe syringe, Inject 1 mL as directed Every 6 (Six) Months., Disp: , Rfl:     ezetimibe (ZETIA) 10 MG tablet, Take 1 tablet by mouth Daily., Disp: , Rfl:     FIBER COMPLETE PO, Take 1 capsule by mouth Daily., Disp: , Rfl:     fluticasone-salmeterol (ADVAIR) 100-50 MCG/DOSE DISKUS, Inhale 1 puff 2 (Two) Times a Day., Disp: , Rfl:     guaiFENesin (MUCINEX) 600 MG 12 hr tablet, Take 1 tablet by mouth Daily., Disp: , Rfl:     lamoTRIgine (LaMICtal) 100 MG tablet, Take 1 tablet by mouth 2 (two) times a day., Disp: , Rfl:     omeprazole (priLOSEC) 40 MG capsule, Take 1 capsule by mouth As Needed. Twice a day, Disp: , Rfl:     Probiotic Product (ALIGN PO), Take 1 capsule by mouth Daily., Disp: , Rfl:     propranolol (INDERAL) 80 MG tablet, Take 1 tablet by mouth Daily., Disp: , Rfl:     sertraline (ZOLOFT) 25 MG tablet, Take one tablet daily with the 50 mg tablet daily, Disp: 30 tablet, Rfl: 1    sertraline (ZOLOFT) 50 MG tablet, Take 1 tablet by mouth Daily., Disp: , Rfl:     losartan (Cozaar) 25 MG tablet, Take 1 tablet by mouth Daily., Disp: 90 tablet, Rfl: 1    The following portions of the patient's history were reviewed and updated as appropriate: allergies, current medications, past family history, past medical history, past social history, past surgical history and problem list.    ROS  Review of Systems   14 point ROS negative except for that listed in the HPI.         Objective:     /82 (BP Location: Right arm,  "Patient Position: Sitting, Cuff Size: Adult)   Pulse 64   Ht 171.5 cm (67.5\")   Wt 80.4 kg (177 lb 3.2 oz)   LMP  (LMP Unknown)   SpO2 96%   BMI 27.34 kg/m²      Physical Exam  Constitutional: Patient appears well-developed and well-nourished.   HENT: HEENT exam unremarkable.   Neck: Neck supple. No JVD present.  Cardiovascular: Normal rate, regular rhythm and normal heart sounds. No murmur heard.   2+ symmetric pulses.   Pulmonary/Chest: Breath sounds normal. Does not exhibit tenderness.  Musculoskeletal: Does not exhibit edema.   Neurological: Neurological exam unremarkable.   Vitals reviewed.    Data Review:   Lab date: 01/24/2024  FLP: , , ,   HbA1c: 6.1    Lab Results   Component Value Date    GLUCOSE 88 05/28/2024    BUN 19 05/28/2024    CREATININE 0.60 05/28/2024    EGFR 89.7 05/28/2024    BCR 31.7 (H) 05/28/2024     05/28/2024    K 4.0 05/28/2024    CO2 30.0 (H) 05/28/2024    CALCIUM 9.4 05/28/2024    ALBUMIN 4.1 05/28/2024    AST 23 05/28/2024    ALT 16 05/28/2024     Lab Results   Component Value Date    WBC 5.84 05/28/2024    RBC 4.29 05/28/2024    HGB 12.9 05/28/2024    HCT 37.7 05/28/2024    MCV 87.9 05/28/2024     05/28/2024     Procedures       Advance Care Planning   ACP discussion was held with the patient during this visit. Patient does not have an advance directive, declines further assistance.           Assessment and plan:      Diagnosis Plan   1. Other chest pain  Has had normal myocardial perfusion study in the past and recent CTA chest did not reveal any evidence of coronary artery calcifications.  There is no need for ischemic evaluation at this time.    Adult Transthoracic Echo Complete w/ Color, Spectral and Contrast if necessary per protocol      2. Dyspnea on exertion  Will obtain an echocardiogram to assess LV and valvular function due to history of mitral valve prolapse and radiation for breast cancer.      3. Essential hypertension  " Uncontrolled.  Begin losartan 25 mg daily.  Further increase to 50 mg daily if SBP remains greater than 140 after one week.      4. Dyslipidemia   but HDL is 100, which is favorable.  She has no evidence of coronary artery calcification and there is no need to initiate statin therapy.  Continue Zetia.      5. Abnormal ECG  Holter Monitor - 48 Hour        Obtain 48-hour Holter monitor to assess underlying rate and rhythm due to frequent PACs on EKG at PCP and overall unsteady feeling.  Further recommendations to follow EKG.  Stable cardiac status.   Continue current medications.   FU in 6 MO, sooner as needed.  Thank you for allowing us to participate in the care of your patient.       Joann Cowan PA-C      Please note that portions of this note may have been completed with a voice recognition program. Efforts were made to edit the dictations, but occasionally words are mistranscribed.

## 2024-06-28 ENCOUNTER — OFFICE VISIT (OUTPATIENT)
Dept: CARDIOLOGY | Facility: CLINIC | Age: 84
End: 2024-06-28
Payer: MEDICARE

## 2024-06-28 VITALS
DIASTOLIC BLOOD PRESSURE: 82 MMHG | HEIGHT: 68 IN | SYSTOLIC BLOOD PRESSURE: 152 MMHG | HEART RATE: 64 BPM | WEIGHT: 177.2 LBS | OXYGEN SATURATION: 96 % | BODY MASS INDEX: 26.86 KG/M2

## 2024-06-28 DIAGNOSIS — E78.5 DYSLIPIDEMIA: ICD-10-CM

## 2024-06-28 DIAGNOSIS — I10 ESSENTIAL HYPERTENSION: ICD-10-CM

## 2024-06-28 DIAGNOSIS — R06.09 DYSPNEA ON EXERTION: ICD-10-CM

## 2024-06-28 DIAGNOSIS — R07.89 OTHER CHEST PAIN: Primary | ICD-10-CM

## 2024-06-28 DIAGNOSIS — R94.31 ABNORMAL ECG: ICD-10-CM

## 2024-06-28 RX ORDER — LOSARTAN POTASSIUM 25 MG/1
25 TABLET ORAL DAILY
Qty: 90 TABLET | Refills: 1 | Status: SHIPPED | OUTPATIENT
Start: 2024-06-28

## 2024-06-28 RX ORDER — GUAIFENESIN 600 MG/1
600 TABLET, EXTENDED RELEASE ORAL DAILY
COMMUNITY

## 2024-07-01 ENCOUNTER — TELEPHONE (OUTPATIENT)
Dept: ONCOLOGY | Facility: CLINIC | Age: 84
End: 2024-07-01
Payer: MEDICARE

## 2024-07-01 NOTE — TELEPHONE ENCOUNTER
Patient called her left breast is red and sore, and it is sore underneath her arm as well. Please call.

## 2024-07-01 NOTE — TELEPHONE ENCOUNTER
Spoke with patient at 10:20.  Patient stated her left breast is uncomfortable, not painful.  She stated last night it began getting red, warm and swollen.  She denied fever/chills, nipple discharge.  Patient stated she has to wear her bra to make it more comfortable.  Patient stated she has a pinching feeling in that area. She stated she had her yearly follow up with her cardiologist last week and was told her BP was elevated and they adjusted some medications.  She stated she contacted Dr. Campo's office and was told to call her oncologist.  Dr. Victoria notified and per MD, patient advised that Dr. Campo needs to know and that she needs to follow up with an exam as soon as possible.  Patient offered appointment today and tomorrow.  Patient verbalized understanding and accepted appointment with Dr. Victoria tomorrow.  Message sent to scheduling to schedule patient.

## 2024-07-02 ENCOUNTER — OFFICE VISIT (OUTPATIENT)
Dept: ONCOLOGY | Facility: CLINIC | Age: 84
End: 2024-07-02
Payer: MEDICARE

## 2024-07-02 VITALS
SYSTOLIC BLOOD PRESSURE: 146 MMHG | BODY MASS INDEX: 26.07 KG/M2 | TEMPERATURE: 97.1 F | HEIGHT: 68 IN | DIASTOLIC BLOOD PRESSURE: 69 MMHG | HEART RATE: 91 BPM | OXYGEN SATURATION: 96 % | WEIGHT: 172 LBS

## 2024-07-02 DIAGNOSIS — C50.112 MALIGNANT NEOPLASM OF CENTRAL PORTION OF LEFT BREAST IN FEMALE, ESTROGEN RECEPTOR NEGATIVE: Primary | ICD-10-CM

## 2024-07-02 DIAGNOSIS — Z17.1 MALIGNANT NEOPLASM OF CENTRAL PORTION OF LEFT BREAST IN FEMALE, ESTROGEN RECEPTOR NEGATIVE: Primary | ICD-10-CM

## 2024-07-02 RX ORDER — CEPHALEXIN 500 MG/1
500 CAPSULE ORAL 2 TIMES DAILY
Qty: 14 CAPSULE | Refills: 0 | Status: SHIPPED | OUTPATIENT
Start: 2024-07-02
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
## 2024-07-15 ENCOUNTER — HOSPITAL ENCOUNTER (OUTPATIENT)
Dept: CARDIOLOGY | Facility: HOSPITAL | Age: 84
Discharge: HOME | End: 2024-07-15
Payer: MEDICARE

## 2024-07-15 VITALS — BODY MASS INDEX: 26.99 KG/M2 | WEIGHT: 171.96 LBS | HEIGHT: 67 IN

## 2024-07-15 DIAGNOSIS — R07.89 OTHER CHEST PAIN: ICD-10-CM

## 2024-07-15 LAB
ASCENDING AORTA: 3.3 CM
BH CV ECHO MEAS - AO MAX PG: 8 MMHG
BH CV ECHO MEAS - AO MEAN PG: 4 MMHG
BH CV ECHO MEAS - AO ROOT DIAM: 2 CM
BH CV ECHO MEAS - AO V2 MAX: 140.5 CM/SEC
BH CV ECHO MEAS - AO V2 VTI: 26.4 CM
BH CV ECHO MEAS - AVA(I,D): 2.7 CM2
BH CV ECHO MEAS - EDV(CUBED): 68.9 ML
BH CV ECHO MEAS - EDV(MOD-SP2): 45.8 ML
BH CV ECHO MEAS - EDV(MOD-SP4): 40.8 ML
BH CV ECHO MEAS - EF(MOD-BP): 62 %
BH CV ECHO MEAS - EF(MOD-SP2): 60.5 %
BH CV ECHO MEAS - EF(MOD-SP4): 63.5 %
BH CV ECHO MEAS - ESV(CUBED): 22 ML
BH CV ECHO MEAS - ESV(MOD-SP2): 18.1 ML
BH CV ECHO MEAS - ESV(MOD-SP4): 14.9 ML
BH CV ECHO MEAS - FS: 31.7 %
BH CV ECHO MEAS - IVS/LVPW: 1.08 CM
BH CV ECHO MEAS - IVSD: 1.3 CM
BH CV ECHO MEAS - LA DIMENSION: 3.9 CM
BH CV ECHO MEAS - LAT PEAK E' VEL: 12.5 CM/SEC
BH CV ECHO MEAS - LV DIASTOLIC VOL/BSA (35-75): 21.5 CM2
BH CV ECHO MEAS - LV MASS(C)D: 182.5 GRAMS
BH CV ECHO MEAS - LV MAX PG: 4.9 MMHG
BH CV ECHO MEAS - LV MEAN PG: 2.7 MMHG
BH CV ECHO MEAS - LV SYSTOLIC VOL/BSA (12-30): 7.9 CM2
BH CV ECHO MEAS - LV V1 MAX: 109.9 CM/SEC
BH CV ECHO MEAS - LV V1 VTI: 22.8 CM
BH CV ECHO MEAS - LVIDD: 4.1 CM
BH CV ECHO MEAS - LVIDS: 2.8 CM
BH CV ECHO MEAS - LVOT AREA: 3.1 CM2
BH CV ECHO MEAS - LVOT DIAM: 2 CM
BH CV ECHO MEAS - LVPWD: 1.2 CM
BH CV ECHO MEAS - MED PEAK E' VEL: 8.8 CM/SEC
BH CV ECHO MEAS - MV A MAX VEL: 53.5 CM/SEC
BH CV ECHO MEAS - MV DEC SLOPE: 242 CM/SEC2
BH CV ECHO MEAS - MV DEC TIME: 0.25 SEC
BH CV ECHO MEAS - MV E MAX VEL: 84.3 CM/SEC
BH CV ECHO MEAS - MV E/A: 1.58
BH CV ECHO MEAS - MV MAX PG: 3.5 MMHG
BH CV ECHO MEAS - MV MEAN PG: 1 MMHG
BH CV ECHO MEAS - MV P1/2T: 116.4 MSEC
BH CV ECHO MEAS - MV V2 VTI: 40.2 CM
BH CV ECHO MEAS - MVA(P1/2T): 1.89 CM2
BH CV ECHO MEAS - MVA(VTI): 1.78 CM2
BH CV ECHO MEAS - PA ACC TIME: 0.15 SEC
BH CV ECHO MEAS - SV(LVOT): 71.7 ML
BH CV ECHO MEAS - SV(MOD-SP2): 27.7 ML
BH CV ECHO MEAS - SV(MOD-SP4): 25.9 ML
BH CV ECHO MEAS - SVI(LVOT): 37.8 ML/M2
BH CV ECHO MEAS - SVI(MOD-SP2): 14.6 ML/M2
BH CV ECHO MEAS - SVI(MOD-SP4): 13.7 ML/M2
BH CV ECHO MEAS - TAPSE (>1.6): 2.04 CM
BH CV ECHO MEAS - TR MAX PG: 23.3 MMHG
BH CV ECHO MEAS - TR MAX VEL: 240.2 CM/SEC
BH CV ECHO MEASUREMENTS AVERAGE E/E' RATIO: 7.92
BH CV VAS BP RIGHT ARM: NORMAL MMHG
BH CV XLRA - RV BASE: 3.4 CM
BH CV XLRA - RV LENGTH: 5.5 CM
BH CV XLRA - RV MID: 2.7 CM
BH CV XLRA - TDI S': 13.7 CM/SEC
IVRT: 88 MS
LEFT ATRIUM VOLUME INDEX: 37.2 ML/M2

## 2024-07-15 PROCEDURE — 93306 TTE W/DOPPLER COMPLETE: CPT | Performed by: INTERNAL MEDICINE

## 2024-07-15 PROCEDURE — 93306 TTE W/DOPPLER COMPLETE: CPT

## 2024-07-16 ENCOUNTER — OFFICE VISIT (OUTPATIENT)
Dept: PSYCHIATRY | Facility: CLINIC | Age: 84
End: 2024-07-16
Payer: MEDICARE

## 2024-07-16 ENCOUNTER — HOSPITAL ENCOUNTER (OUTPATIENT)
Dept: CARDIOLOGY | Facility: HOSPITAL | Age: 84
Discharge: HOME | End: 2024-07-16
Payer: MEDICARE

## 2024-07-16 ENCOUNTER — OFFICE VISIT (OUTPATIENT)
Dept: ONCOLOGY | Facility: CLINIC | Age: 84
End: 2024-07-16
Payer: MEDICARE

## 2024-07-16 ENCOUNTER — TELEPHONE (OUTPATIENT)
Dept: CARDIOLOGY | Facility: CLINIC | Age: 84
End: 2024-07-16
Payer: MEDICARE

## 2024-07-16 ENCOUNTER — HOSPITAL ENCOUNTER (OUTPATIENT)
Dept: GENERAL RADIOLOGY | Facility: HOSPITAL | Age: 84
Discharge: HOME | End: 2024-07-16
Payer: MEDICARE

## 2024-07-16 ENCOUNTER — LAB (OUTPATIENT)
Dept: LAB | Facility: HOSPITAL | Age: 84
End: 2024-07-16
Payer: MEDICARE

## 2024-07-16 VITALS
BODY MASS INDEX: 27.47 KG/M2 | DIASTOLIC BLOOD PRESSURE: 73 MMHG | TEMPERATURE: 96.9 F | HEART RATE: 97 BPM | SYSTOLIC BLOOD PRESSURE: 157 MMHG | WEIGHT: 175 LBS | OXYGEN SATURATION: 98 % | HEIGHT: 67 IN

## 2024-07-16 DIAGNOSIS — F41.9 ANXIETY DISORDER, UNSPECIFIED TYPE: Primary | ICD-10-CM

## 2024-07-16 DIAGNOSIS — C50.112 MALIGNANT NEOPLASM OF CENTRAL PORTION OF LEFT BREAST IN FEMALE, ESTROGEN RECEPTOR NEGATIVE: ICD-10-CM

## 2024-07-16 DIAGNOSIS — Z17.1 MALIGNANT NEOPLASM OF CENTRAL PORTION OF LEFT BREAST IN FEMALE, ESTROGEN RECEPTOR NEGATIVE: ICD-10-CM

## 2024-07-16 DIAGNOSIS — F43.21 GRIEF: ICD-10-CM

## 2024-07-16 DIAGNOSIS — M79.89 LEFT LEG SWELLING: ICD-10-CM

## 2024-07-16 DIAGNOSIS — Z17.1 MALIGNANT NEOPLASM OF CENTRAL PORTION OF LEFT BREAST IN FEMALE, ESTROGEN RECEPTOR NEGATIVE: Primary | ICD-10-CM

## 2024-07-16 DIAGNOSIS — C50.112 MALIGNANT NEOPLASM OF CENTRAL PORTION OF LEFT BREAST IN FEMALE, ESTROGEN RECEPTOR NEGATIVE: Primary | ICD-10-CM

## 2024-07-16 LAB
ALBUMIN SERPL-MCNC: 3.9 G/DL (ref 3.5–5.2)
ALBUMIN/GLOB SERPL: 1.1 G/DL
ALP SERPL-CCNC: 58 U/L (ref 39–117)
ALT SERPL W P-5'-P-CCNC: 13 U/L (ref 1–33)
ANION GAP SERPL CALCULATED.3IONS-SCNC: 9 MMOL/L (ref 5–15)
AST SERPL-CCNC: 19 U/L (ref 1–32)
BACTERIA UR QL AUTO: NORMAL /HPF
BASOPHILS # BLD AUTO: 0.03 10*3/MM3 (ref 0–0.2)
BASOPHILS NFR BLD AUTO: 0.5 % (ref 0–1.5)
BH CV LOWER VASCULAR LEFT COMMON FEMORAL AUGMENT: NORMAL
BH CV LOWER VASCULAR LEFT COMMON FEMORAL COMPRESS: NORMAL
BH CV LOWER VASCULAR LEFT COMMON FEMORAL PHASIC: NORMAL
BH CV LOWER VASCULAR LEFT COMMON FEMORAL SPONT: NORMAL
BH CV LOWER VASCULAR LEFT DISTAL FEMORAL COMPRESS: NORMAL
BH CV LOWER VASCULAR LEFT GASTRONEMIUS COMPRESS: NORMAL
BH CV LOWER VASCULAR LEFT GREATER SAPH AK COMPRESS: NORMAL
BH CV LOWER VASCULAR LEFT GREATER SAPH BK COMPRESS: NORMAL
BH CV LOWER VASCULAR LEFT LESSER SAPH COMPRESS: NORMAL
BH CV LOWER VASCULAR LEFT MID FEMORAL AUGMENT: NORMAL
BH CV LOWER VASCULAR LEFT MID FEMORAL COMPRESS: NORMAL
BH CV LOWER VASCULAR LEFT MID FEMORAL PHASIC: NORMAL
BH CV LOWER VASCULAR LEFT MID FEMORAL SPONT: NORMAL
BH CV LOWER VASCULAR LEFT PERONEAL COMPRESS: NORMAL
BH CV LOWER VASCULAR LEFT POPLITEAL AUGMENT: NORMAL
BH CV LOWER VASCULAR LEFT POPLITEAL COMPRESS: NORMAL
BH CV LOWER VASCULAR LEFT POPLITEAL PHASIC: NORMAL
BH CV LOWER VASCULAR LEFT POPLITEAL SPONT: NORMAL
BH CV LOWER VASCULAR LEFT POSTERIOR TIBIAL COMPRESS: NORMAL
BH CV LOWER VASCULAR LEFT PROXIMAL FEMORAL COMPRESS: NORMAL
BH CV LOWER VASCULAR LEFT SAPHENOFEMORAL JUNCTION COMPRESS: NORMAL
BH CV LOWER VASCULAR RIGHT COMMON FEMORAL AUGMENT: NORMAL
BH CV LOWER VASCULAR RIGHT COMMON FEMORAL PHASIC: NORMAL
BH CV LOWER VASCULAR RIGHT COMMON FEMORAL SPONT: NORMAL
BILIRUB SERPL-MCNC: 0.3 MG/DL (ref 0–1.2)
BUN SERPL-MCNC: 20 MG/DL (ref 8–23)
BUN/CREAT SERPL: 26.7 (ref 7–25)
CALCIUM SPEC-SCNC: 9.8 MG/DL (ref 8.6–10.5)
CHLORIDE SERPL-SCNC: 101 MMOL/L (ref 98–107)
CO2 SERPL-SCNC: 29 MMOL/L (ref 22–29)
CREAT SERPL-MCNC: 0.75 MG/DL (ref 0.57–1)
DEPRECATED RDW RBC AUTO: 47.4 FL (ref 37–54)
EGFRCR SERPLBLD CKD-EPI 2021: 79.6 ML/MIN/1.73
EOSINOPHIL # BLD AUTO: 0.12 10*3/MM3 (ref 0–0.4)
EOSINOPHIL NFR BLD AUTO: 1.9 % (ref 0.3–6.2)
ERYTHROCYTE [DISTWIDTH] IN BLOOD BY AUTOMATED COUNT: 14.3 % (ref 12.3–15.4)
GLOBULIN UR ELPH-MCNC: 3.4 GM/DL
GLUCOSE SERPL-MCNC: 131 MG/DL (ref 65–99)
HCT VFR BLD AUTO: 38.7 % (ref 34–46.6)
HGB BLD-MCNC: 12.8 G/DL (ref 12–15.9)
HYALINE CASTS UR QL AUTO: NORMAL /LPF
IMM GRANULOCYTES # BLD AUTO: 0.01 10*3/MM3 (ref 0–0.05)
IMM GRANULOCYTES NFR BLD AUTO: 0.2 % (ref 0–0.5)
LYMPHOCYTES # BLD AUTO: 0.97 10*3/MM3 (ref 0.7–3.1)
LYMPHOCYTES NFR BLD AUTO: 15 % (ref 19.6–45.3)
MCH RBC QN AUTO: 29.6 PG (ref 26.6–33)
MCHC RBC AUTO-ENTMCNC: 33.1 G/DL (ref 31.5–35.7)
MCV RBC AUTO: 89.4 FL (ref 79–97)
MONOCYTES # BLD AUTO: 0.65 10*3/MM3 (ref 0.1–0.9)
MONOCYTES NFR BLD AUTO: 10.1 % (ref 5–12)
NEUTROPHILS NFR BLD AUTO: 4.68 10*3/MM3 (ref 1.7–7)
NEUTROPHILS NFR BLD AUTO: 72.3 % (ref 42.7–76)
PLATELET # BLD AUTO: 195 10*3/MM3 (ref 140–450)
PMV BLD AUTO: 9.6 FL (ref 6–12)
POTASSIUM SERPL-SCNC: 4.7 MMOL/L (ref 3.5–5.2)
PROT SERPL-MCNC: 7.3 G/DL (ref 6–8.5)
RBC # BLD AUTO: 4.33 10*6/MM3 (ref 3.77–5.28)
RBC # UR STRIP: NORMAL /HPF
REF LAB TEST METHOD: NORMAL
SODIUM SERPL-SCNC: 139 MMOL/L (ref 136–145)
SQUAMOUS #/AREA URNS HPF: NORMAL /HPF
WBC # UR STRIP: NORMAL /HPF
WBC NRBC COR # BLD AUTO: 6.46 10*3/MM3 (ref 3.4–10.8)

## 2024-07-16 PROCEDURE — 3077F SYST BP >= 140 MM HG: CPT | Performed by: INTERNAL MEDICINE

## 2024-07-16 PROCEDURE — 93971 EXTREMITY STUDY: CPT

## 2024-07-16 PROCEDURE — 85025 COMPLETE CBC W/AUTO DIFF WBC: CPT

## 2024-07-16 PROCEDURE — 1160F RVW MEDS BY RX/DR IN RCRD: CPT | Performed by: NURSE PRACTITIONER

## 2024-07-16 PROCEDURE — 87086 URINE CULTURE/COLONY COUNT: CPT

## 2024-07-16 PROCEDURE — 36415 COLL VENOUS BLD VENIPUNCTURE: CPT

## 2024-07-16 PROCEDURE — 80053 COMPREHEN METABOLIC PANEL: CPT

## 2024-07-16 PROCEDURE — 1159F MED LIST DOCD IN RCRD: CPT | Performed by: INTERNAL MEDICINE

## 2024-07-16 PROCEDURE — 99214 OFFICE O/P EST MOD 30 MIN: CPT | Performed by: NURSE PRACTITIONER

## 2024-07-16 PROCEDURE — 1159F MED LIST DOCD IN RCRD: CPT | Performed by: NURSE PRACTITIONER

## 2024-07-16 PROCEDURE — 72070 X-RAY EXAM THORAC SPINE 2VWS: CPT

## 2024-07-16 PROCEDURE — 99214 OFFICE O/P EST MOD 30 MIN: CPT | Performed by: INTERNAL MEDICINE

## 2024-07-16 PROCEDURE — 71046 X-RAY EXAM CHEST 2 VIEWS: CPT

## 2024-07-16 PROCEDURE — 81001 URINALYSIS AUTO W/SCOPE: CPT

## 2024-07-16 PROCEDURE — 1160F RVW MEDS BY RX/DR IN RCRD: CPT | Performed by: INTERNAL MEDICINE

## 2024-07-16 PROCEDURE — 1125F AMNT PAIN NOTED PAIN PRSNT: CPT | Performed by: INTERNAL MEDICINE

## 2024-07-16 PROCEDURE — 72100 X-RAY EXAM L-S SPINE 2/3 VWS: CPT

## 2024-07-16 PROCEDURE — 3078F DIAST BP <80 MM HG: CPT | Performed by: INTERNAL MEDICINE

## 2024-07-16 RX ORDER — METOPROLOL TARTRATE 50 MG/1
50 TABLET, FILM COATED ORAL 2 TIMES DAILY
Qty: 180 TABLET | Refills: 3 | Status: SHIPPED | OUTPATIENT
Start: 2024-07-16

## 2024-07-16 RX ORDER — SERTRALINE HYDROCHLORIDE 100 MG/1
100 TABLET, FILM COATED ORAL DAILY
Qty: 30 TABLET | Refills: 1 | Status: SHIPPED | OUTPATIENT
Start: 2024-07-16
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
## 2024-07-16 NOTE — PROGRESS NOTES
"  Subjective   Judi Patel is a 83 y.o. female who is here today for medication management follow up. In person face to face with her daughter present.     TIME IN:1230  TIME OUT:1p    I spent 30 minutes in patient care: reviewing records prior to the visit, assessing the patient, entering orders and documentation.          Chief Complaint: Anxiety , grief     History of Present Illness Patient reports tolerating increase in sertraline to 75 mg daily iwthout side effects. Thinks she was feeling some better, but medication for HTN have had to be adjusted up and she has infection, mastitis in her left breast. She still has a lot of grief and tearful when discussing her 's passing earlier in this year and would have been their 64 year of marriage. She showed this provider her engagement ring stating 63 years ago she got that ring. She doesn't like living with out him. 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 She endorses not feeling in control of her life with his death and her breast cancer. Had completed radiation treatment to her breast this past April and still feels significant fatigue \"but then I'll have a good day but not many\". Has all her adult children on same land in their homes and gets lots of support and attention. Her daughter witnesses pt's anxiety and rumination about problems. Denies adverse effects from medications. Patient talked about current stressors, primarily having a difficult time dealing with health challenges and missing her . Venting of frustrations was conducted. Feelings were processed and validated, both negative and positive. Flushing out worries and concerns was conducted in order to diminish emotional tension. Processing current treatment was conducted. Ways in which patient may take stress off themselves in a purposeful manner was discussed. Addressed   pillars of health: Nutrition, Activity, Sleep, Social Engagement, Stress Management, .  Utilized " motivational interviewing techniques including complex reflections to attempt to assist the patient and focusing on the positive and to maintain and encourage calm outlook.      The following portions of the patient's history were reviewed and updated as appropriate: allergies, current medications, past family history, past medical history, past social history, past surgical history, and problem list.    Review of Systems  A 14 point review of systems was performed and is negative except as noted above.    Objective   Physical Exam  not currently breastfeeding.    Allergies   Allergen Reactions    Oxycodone-Acetaminophen Nausea And Vomiting    Sulfa Antibiotics Hives    Sulfamethoxazole-Trimethoprim Other (See Comments)       Current Medications:   Current Outpatient Medications   Medication Sig Dispense Refill    sertraline (ZOLOFT) 100 MG tablet Take 1 tablet by mouth Daily. 30 tablet 1    albuterol sulfate  (90 Base) MCG/ACT inhaler As Needed for Shortness of Air or Wheezing.      amLODIPine (NORVASC) 5 MG tablet Take 1 tablet by mouth Daily.      azelastine (ASTELIN) 0.1 % nasal spray 1 spray into the nostril(s) as directed by provider.      Calcium Citrate-Vitamin D (CITRACAL+D) 315-5 MG-MCG per tablet Citracal plus D 315 mg-5 mcg (200 unit) tablet      cephalexin (KEFLEX) 500 MG capsule Take 1 capsule by mouth 2 (Two) Times a Day. 14 capsule 0    denosumab (PROLIA) 60 MG/ML solution prefilled syringe syringe Inject 1 mL as directed Every 6 (Six) Months.      ezetimibe (ZETIA) 10 MG tablet Take 1 tablet by mouth Daily.      FIBER COMPLETE PO Take 1 capsule by mouth Daily.      fluticasone-salmeterol (ADVAIR) 100-50 MCG/DOSE DISKUS Inhale 1 puff 2 (Two) Times a Day.      guaiFENesin (MUCINEX) 600 MG 12 hr tablet Take 1 tablet by mouth Daily.      lamoTRIgine (LaMICtal) 100 MG tablet Take 1 tablet by mouth 2 (two) times a day.      losartan (Cozaar) 25 MG tablet Take 1 tablet by mouth Daily. (Patient  taking differently: Take 2 tablets by mouth Daily.) 90 tablet 1    metoprolol tartrate (LOPRESSOR) 50 MG tablet Take 1 tablet by mouth 2 (Two) Times a Day. 180 tablet 3    omeprazole (priLOSEC) 40 MG capsule Take 1 capsule by mouth As Needed. Twice a day      Probiotic Product (ALIGN PO) Take 1 capsule by mouth Daily.       No current facility-administered medications for this visit.       Lab Results:  Lab on 07/16/2024   Component Date Value Ref Range Status    Color, UA 07/16/2024 Yellow  Yellow, Straw Final    Appearance, UA 07/16/2024 Clear  Clear Final    pH, UA 07/16/2024 6.0  5.0 - 8.0 Final    Specific Gravity, UA 07/16/2024 1.018  1.001 - 1.030 Final    Glucose, UA 07/16/2024 Negative  Negative Final    Ketones, UA 07/16/2024 Negative  Negative Final    Bilirubin, UA 07/16/2024 Negative  Negative Final    Blood, UA 07/16/2024 Negative  Negative Final    Protein, UA 07/16/2024 Negative  Negative Final    Leuk Esterase, UA 07/16/2024 Trace (A)  Negative Final    Nitrite, UA 07/16/2024 Negative  Negative Final    Urobilinogen, UA 07/16/2024 0.2 E.U./dL  0.2 - 1.0 E.U./dL Final    Glucose 07/16/2024 131 (H)  65 - 99 mg/dL Final    BUN 07/16/2024 20  8 - 23 mg/dL Final    Creatinine 07/16/2024 0.75  0.57 - 1.00 mg/dL Final    Sodium 07/16/2024 139  136 - 145 mmol/L Final    Potassium 07/16/2024 4.7  3.5 - 5.2 mmol/L Final    Slight hemolysis detected by analyzer. Result may be falsely elevated.    Chloride 07/16/2024 101  98 - 107 mmol/L Final    CO2 07/16/2024 29.0  22.0 - 29.0 mmol/L Final    Calcium 07/16/2024 9.8  8.6 - 10.5 mg/dL Final    Total Protein 07/16/2024 7.3  6.0 - 8.5 g/dL Final    Albumin 07/16/2024 3.9  3.5 - 5.2 g/dL Final    ALT (SGPT) 07/16/2024 13  1 - 33 U/L Final    AST (SGOT) 07/16/2024 19  1 - 32 U/L Final    Alkaline Phosphatase 07/16/2024 58  39 - 117 U/L Final    Total Bilirubin 07/16/2024 0.3  0.0 - 1.2 mg/dL Final    Globulin 07/16/2024 3.4  gm/dL Final    Calculated Result    A/G  Ratio 07/16/2024 1.1  g/dL Final    BUN/Creatinine Ratio 07/16/2024 26.7 (H)  7.0 - 25.0 Final    Anion Gap 07/16/2024 9.0  5.0 - 15.0 mmol/L Final    eGFR 07/16/2024 79.6  >60.0 mL/min/1.73 Final    WBC 07/16/2024 6.46  3.40 - 10.80 10*3/mm3 Final    RBC 07/16/2024 4.33  3.77 - 5.28 10*6/mm3 Final    Hemoglobin 07/16/2024 12.8  12.0 - 15.9 g/dL Final    Hematocrit 07/16/2024 38.7  34.0 - 46.6 % Final    MCV 07/16/2024 89.4  79.0 - 97.0 fL Final    MCH 07/16/2024 29.6  26.6 - 33.0 pg Final    MCHC 07/16/2024 33.1  31.5 - 35.7 g/dL Final    RDW 07/16/2024 14.3  12.3 - 15.4 % Final    RDW-SD 07/16/2024 47.4  37.0 - 54.0 fl Final    MPV 07/16/2024 9.6  6.0 - 12.0 fL Final    Platelets 07/16/2024 195  140 - 450 10*3/mm3 Final    Neutrophil % 07/16/2024 72.3  42.7 - 76.0 % Final    Lymphocyte % 07/16/2024 15.0 (L)  19.6 - 45.3 % Final    Monocyte % 07/16/2024 10.1  5.0 - 12.0 % Final    Eosinophil % 07/16/2024 1.9  0.3 - 6.2 % Final    Basophil % 07/16/2024 0.5  0.0 - 1.5 % Final    Immature Grans % 07/16/2024 0.2  0.0 - 0.5 % Final    Neutrophils, Absolute 07/16/2024 4.68  1.70 - 7.00 10*3/mm3 Final    Lymphocytes, Absolute 07/16/2024 0.97  0.70 - 3.10 10*3/mm3 Final    Monocytes, Absolute 07/16/2024 0.65  0.10 - 0.90 10*3/mm3 Final    Eosinophils, Absolute 07/16/2024 0.12  0.00 - 0.40 10*3/mm3 Final    Basophils, Absolute 07/16/2024 0.03  0.00 - 0.20 10*3/mm3 Final    Immature Grans, Absolute 07/16/2024 0.01  0.00 - 0.05 10*3/mm3 Final    RBC, UA 07/16/2024 0-2  None Seen, 0-2 /HPF Final    WBC, UA 07/16/2024 0-2  None Seen, 0-2 /HPF Final    Bacteria, UA 07/16/2024 None Seen  None Seen, Trace /HPF Final    Squamous Epithelial Cells, UA 07/16/2024 0-2  None Seen, 0-2 /HPF Final    Hyaline Casts, UA 07/16/2024 0-6  0 - 6 /LPF Final    Methodology 07/16/2024 Automated Microscopy   Final   Hospital Outpatient Visit on 07/15/2024   Component Date Value Ref Range Status    EF(MOD-bp) 07/15/2024 62.0  % Final    LVIDd  07/15/2024 4.1  cm Final    LVIDs 07/15/2024 2.8  cm Final    IVSd 07/15/2024 1.30  cm Final    LVPWd 07/15/2024 1.20  cm Final    FS 07/15/2024 31.7  % Final    IVS/LVPW 07/15/2024 1.08  cm Final    ESV(cubed) 07/15/2024 22.0  ml Final    LV Sys Vol (BSA corrected) 07/15/2024 7.9  cm2 Final    EDV(cubed) 07/15/2024 68.9  ml Final    LV Chambers Vol (BSA corrected) 07/15/2024 21.5  cm2 Final    LV mass(C)d 07/15/2024 182.5  grams Final    LVOT area 07/15/2024 3.1  cm2 Final    LVOT diam 07/15/2024 2.00  cm Final    EDV(MOD-sp2) 07/15/2024 45.8  ml Final    EDV(MOD-sp4) 07/15/2024 40.8  ml Final    ESV(MOD-sp2) 07/15/2024 18.1  ml Final    ESV(MOD-sp4) 07/15/2024 14.9  ml Final    SV(MOD-sp2) 07/15/2024 27.7  ml Final    SV(MOD-sp4) 07/15/2024 25.9  ml Final    SVi(MOD-SP2) 07/15/2024 14.6  ml/m2 Final    SVi(MOD-SP4) 07/15/2024 13.7  ml/m2 Final    SVi (LVOT) 07/15/2024 37.8  ml/m2 Final    EF(MOD-sp2) 07/15/2024 60.5  % Final    EF(MOD-sp4) 07/15/2024 63.5  % Final    MV E max alexis 07/15/2024 84.3  cm/sec Final    MV A max alexis 07/15/2024 53.5  cm/sec Final    MV dec time 07/15/2024 0.25  sec Final    MV E/A 07/15/2024 1.58   Final    LA ESV Index (BP) 07/15/2024 37.2  ml/m2 Final    Med Peak E' Alexis 07/15/2024 8.8  cm/sec Final    Lat Peak E' Alexis 07/15/2024 12.5  cm/sec Final    TR max alexis 07/15/2024 240.2  cm/sec Final    Avg E/e' ratio 07/15/2024 7.92   Final    SV(LVOT) 07/15/2024 71.7  ml Final    RV Base 07/15/2024 3.4  cm Final    RV Mid 07/15/2024 2.7  cm Final    RV Length 07/15/2024 5.5  cm Final    TAPSE (>1.6) 07/15/2024 2.04  cm Final    RV S' 07/15/2024 13.7  cm/sec Final    LA dimension (2D)  07/15/2024 3.9  cm Final    LV V1 max 07/15/2024 109.9  cm/sec Final    LV V1 max PG 07/15/2024 4.9  mmHg Final    LV V1 mean PG 07/15/2024 2.7  mmHg Final    LV V1 VTI 07/15/2024 22.8  cm Final    Ao pk alexis 07/15/2024 140.5  cm/sec Final    Ao max PG 07/15/2024 8.0  mmHg Final    Ao mean PG 07/15/2024 4.0  mmHg Final     Ao V2 VTI 07/15/2024 26.4  cm Final    TU(I,D) 07/15/2024 2.7  cm2 Final    MV max PG 07/15/2024 3.5  mmHg Final    MV mean PG 07/15/2024 1.00  mmHg Final    MV V2 VTI 07/15/2024 40.2  cm Final    MV P1/2t 07/15/2024 116.4  msec Final    MVA(P1/2t) 07/15/2024 1.89  cm2 Final    MVA(VTI) 07/15/2024 1.78  cm2 Final    MV dec slope 07/15/2024 242.0  cm/sec2 Final    TR max PG 07/15/2024 23.3  mmHg Final    PA acc time 07/15/2024 0.15  sec Final    Ao root diam 07/15/2024 2.00  cm Final    IVRT 07/15/2024 88.0  ms Final    Ascending aorta 07/15/2024 3.3  cm Final    BH CV VAS BP RIGHT ARM 07/15/2024 138/85  mmHg Final   Lab on 05/28/2024   Component Date Value Ref Range Status    Glucose 05/28/2024 88  65 - 99 mg/dL Final    BUN 05/28/2024 19  8 - 23 mg/dL Final    Creatinine 05/28/2024 0.60  0.57 - 1.00 mg/dL Final    Sodium 05/28/2024 136  136 - 145 mmol/L Final    Potassium 05/28/2024 4.0  3.5 - 5.2 mmol/L Final    Slight hemolysis detected by analyzer. Result may be falsely elevated.    Chloride 05/28/2024 98  98 - 107 mmol/L Final    CO2 05/28/2024 30.0 (H)  22.0 - 29.0 mmol/L Final    Calcium 05/28/2024 9.4  8.6 - 10.5 mg/dL Final    Total Protein 05/28/2024 7.3  6.0 - 8.5 g/dL Final    Albumin 05/28/2024 4.1  3.5 - 5.2 g/dL Final    ALT (SGPT) 05/28/2024 16  1 - 33 U/L Final    AST (SGOT) 05/28/2024 23  1 - 32 U/L Final    Alkaline Phosphatase 05/28/2024 60  39 - 117 U/L Final    Total Bilirubin 05/28/2024 0.2  0.0 - 1.2 mg/dL Final    Globulin 05/28/2024 3.2  gm/dL Final    Calculated Result    A/G Ratio 05/28/2024 1.3  g/dL Final    BUN/Creatinine Ratio 05/28/2024 31.7 (H)  7.0 - 25.0 Final    Anion Gap 05/28/2024 8.0  5.0 - 15.0 mmol/L Final    eGFR 05/28/2024 89.7  >60.0 mL/min/1.73 Final    WBC 05/28/2024 5.84  3.40 - 10.80 10*3/mm3 Final    RBC 05/28/2024 4.29  3.77 - 5.28 10*6/mm3 Final    Hemoglobin 05/28/2024 12.9  12.0 - 15.9 g/dL Final    Hematocrit 05/28/2024 37.7  34.0 - 46.6 % Final    MCV  05/28/2024 87.9  79.0 - 97.0 fL Final    MCH 05/28/2024 30.1  26.6 - 33.0 pg Final    MCHC 05/28/2024 34.2  31.5 - 35.7 g/dL Final    RDW 05/28/2024 13.3  12.3 - 15.4 % Final    RDW-SD 05/28/2024 43.4  37.0 - 54.0 fl Final    MPV 05/28/2024 9.5  6.0 - 12.0 fL Final    Platelets 05/28/2024 239  140 - 450 10*3/mm3 Final    Neutrophil % 05/28/2024 70.9  42.7 - 76.0 % Final    Lymphocyte % 05/28/2024 16.1 (L)  19.6 - 45.3 % Final    Monocyte % 05/28/2024 10.3  5.0 - 12.0 % Final    Eosinophil % 05/28/2024 2.2  0.3 - 6.2 % Final    Basophil % 05/28/2024 0.3  0.0 - 1.5 % Final    Immature Grans % 05/28/2024 0.2  0.0 - 0.5 % Final    Neutrophils, Absolute 05/28/2024 4.14  1.70 - 7.00 10*3/mm3 Final    Lymphocytes, Absolute 05/28/2024 0.94  0.70 - 3.10 10*3/mm3 Final    Monocytes, Absolute 05/28/2024 0.60  0.10 - 0.90 10*3/mm3 Final    Eosinophils, Absolute 05/28/2024 0.13  0.00 - 0.40 10*3/mm3 Final    Basophils, Absolute 05/28/2024 0.02  0.00 - 0.20 10*3/mm3 Final    Immature Grans, Absolute 05/28/2024 0.01  0.00 - 0.05 10*3/mm3 Final   Lab Requisition on 02/14/2024   Component Date Value Ref Range Status    Case Report 02/14/2024    Final                    Value:Surgical Pathology Report                         Case: [id number]                                  Authorizing Provider:  Trey Campo MD        Collected:           02/14/2024 08:04 AM          Ordering Location:     [facility]   Received:            02/14/2024 09:37 AM                                 LABORATORY                                                                   Pathologist:           Shon Parker MD                                                          Specimens:   1) - Breast, Left, left breast lumpectomy                                                           2) - [place] Lymph Node, left sentinel node biopsy                                                 3) - Breast, extended superior/posterior margin + medial                                    Clinical Information 02/14/2024    Final                    Value:This result contains rich text formatting which cannot be displayed here.    Final Diagnosis 02/14/2024    Final                    Value:This result contains rich text formatting which cannot be displayed here.    Gross Description 02/14/2024    Final                    Value:This result contains rich text formatting which cannot be displayed here.    Special Stains 02/14/2024    Final                    Value:This result contains rich text formatting which cannot be displayed here.    Microscopic Description 02/14/2024    Final                    Value:This result contains rich text formatting which cannot be displayed here.   Pre-Admission Testing on 02/12/2024   Component Date Value Ref Range Status    WBC 02/12/2024 6.15  3.40 - 10.80 10*3/mm3 Final    RBC 02/12/2024 4.56  3.77 - 5.28 10*6/mm3 Final    Hemoglobin 02/12/2024 13.9  12.0 - 15.9 g/dL Final    Hematocrit 02/12/2024 41.8  34.0 - 46.6 % Final    MCV 02/12/2024 91.7  79.0 - 97.0 fL Final    MCH 02/12/2024 30.5  26.6 - 33.0 pg Final    MCHC 02/12/2024 33.3  31.5 - 35.7 g/dL Final    RDW 02/12/2024 13.2  12.3 - 15.4 % Final    RDW-SD 02/12/2024 44.4  37.0 - 54.0 fl Final    MPV 02/12/2024 10.7  6.0 - 12.0 fL Final    Platelets 02/12/2024 224  140 - 450 10*3/mm3 Final    Glucose 02/12/2024 115 (H)  65 - 99 mg/dL Final    BUN 02/12/2024 21  8 - 23 mg/dL Final    Creatinine 02/12/2024 0.72  0.57 - 1.00 mg/dL Final    Sodium 02/12/2024 136  136 - 145 mmol/L Final    Potassium 02/12/2024 4.6  3.5 - 5.2 mmol/L Final    Chloride 02/12/2024 99  98 - 107 mmol/L Final    CO2 02/12/2024 30.0 (H)  22.0 - 29.0 mmol/L Final    Calcium 02/12/2024 10.2  8.6 - 10.5 mg/dL Final    Total Protein 02/12/2024 7.0  6.0 - 8.5 g/dL Final    Albumin 02/12/2024 4.2  3.5 - 5.2 g/dL Final    ALT (SGPT) 02/12/2024 19  1 - 33 U/L Final    AST (SGOT) 02/12/2024 18  1 - 32 U/L Final     Alkaline Phosphatase 02/12/2024 55  39 - 117 U/L Final    Total Bilirubin 02/12/2024 0.4  0.0 - 1.2 mg/dL Final    Globulin 02/12/2024 2.8  gm/dL Final    Calculated Result    A/G Ratio 02/12/2024 1.5  g/dL Final    BUN/Creatinine Ratio 02/12/2024 29.2 (H)  7.0 - 25.0 Final    Anion Gap 02/12/2024 7.0  5.0 - 15.0 mmol/L Final    eGFR 02/12/2024 83.6  >60.0 mL/min/1.73 Final    QT Interval 02/12/2024 426  ms Final    QTC Interval 02/12/2024 414  ms Final   Hospital Outpatient Visit on 01/25/2024   Component Date Value Ref Range Status    Creatinine 01/25/2024 0.60  0.60 - 1.30 mg/dL Final    Serial Number: [id number]:  [id number]   Hospital Outpatient Visit on 01/18/2024   Component Date Value Ref Range Status    Case Report 01/18/2024    Final                    Value:Surgical Pathology Report                         Case: [id number]                                  Authorizing Provider:  Ana Foley MD           Collected:           01/18/2024 02:58 PM          Ordering Location:     [facility]   Received:            01/19/2024 06:42 AM                                 [facility]                                                                                  ULTRASOUND                                                                   Pathologist:           Dago Holt MD                                                       Specimen:    Breast, Left,  6 CM FN IRREGULAR MASS                                                Clinical Information 01/18/2024    Final                    Value:This result contains rich text formatting which cannot be displayed here.    Final Diagnosis 01/18/2024    Final                    Value:This result contains rich text formatting which cannot be displayed here.    Comment 01/18/2024    Final                    Value:This result contains rich text formatting which cannot be displayed here.    Gross Description 01/18/2024    Final                     Value:This result contains rich text formatting which cannot be displayed here.    Microscopic Description 01/18/2024    Final                    Value:This result contains rich text formatting which cannot be displayed here.         Appearance: WNL  Hygiene:  good  Cooperation:  Cooperative  Eye Contact:  direct   Psychomotor Behavior:  denies psychomotor agitation/retardation, No EPS, No motor tics  Mood:  grieving   Affect:  congruent   Hopelessness: Denies  Speech:  Normal  Thought Process:  Linear  Thought Content:  Normal  Concentration: Normal   Suicidal: denies  Homicidal:  None  Hallucinations:  None  Delusion:  None  Memory:  Intact  Orientation:  Person, Place, Time and Situation  Reliability:  good  Insight:  Fair  Judgement: good  Impulse Control: good  Estimated Intelligence: average range    DIANA REVIEWED NO RED FLAGS    Assessment & Plan   Diagnoses and all orders for this visit:    1. Anxiety disorder, unspecified type (Primary)    2. Grief    Other orders  -     sertraline (ZOLOFT) 100 MG tablet; Take 1 tablet by mouth Daily.  Dispense: 30 tablet; Refill: 1          PLAN: increase sertraline to 100 mg daily for SERGIO/sadness     We discussed risks, benefits, and side effects of the above medications and the patient was agreeable with the plan. Patient was educated on the importance of compliance with treatment and follow-up appointments.      Provide Cognitive Behavioral Therapy and Solution Focused Therapy to improve functioning, maintain stability, and avoid decompensation and the need for higher level of care.    Counseled patient regarding multimodal approach with encouragement of healthy nutrition, healthy sleep, regular physical mobility, social involvement, counseling, and medication compliance.     Assisted patient in identifying risk factors which would indicate the need for higher level of care including thoughts to harm self or others and/or self-harming behavior and encouraged patient  to contact this office, call 911, or present to the nearest emergency room should any of these events occur. Discussed crisis intervention services and means to access.  Patient adamantly and convincingly denies current suicidal or homicidal ideation or perceptual disturbance.    Treatment Plan: stabilize mood, patient will stay out of psychiatric hospital and be at optimal level of functioning with therapy and take all medication as prescribed. Patient verbalized  understanding and agreement to plan.    Instructed to call for questions or concerns and return early if necessary.     Greater than 50% time was spent in coordination of care, and counseling the patient regarding current assessment, symptoms, plan of care going forward, supportive therapy.  Answered any questions patient had regarding medications and plan of care.    Return in about 6 weeks (around 8/28/2024). At 2pm in person just prior to f/u with Dr. Victoria Medical Oncologist

## 2024-07-16 NOTE — TELEPHONE ENCOUNTER
Please call patient and let her know that her echocardiogram shows normal EF with no hemodynamically significant valvular heart disease.  Her Holter monitor does show a significant amount of PACs.  Please see if she would be willing to discontinue her propranolol and start metoprolol 50 mg twice daily.

## 2024-07-17 ENCOUNTER — TELEPHONE (OUTPATIENT)
Dept: ONCOLOGY | Facility: CLINIC | Age: 84
End: 2024-07-17
Payer: MEDICARE

## 2024-07-17 LAB
BILIRUB UR QL STRIP: NEGATIVE
CLARITY UR: CLEAR
COLOR UR: YELLOW
GLUCOSE UR STRIP-MCNC: NEGATIVE MG/DL
HGB UR QL STRIP.AUTO: NEGATIVE
KETONES UR QL STRIP: NEGATIVE
LEUKOCYTE ESTERASE UR QL STRIP.AUTO: ABNORMAL
NITRITE UR QL STRIP: NEGATIVE
PH UR STRIP.AUTO: 6 [PH] (ref 5–8)
PROT UR QL STRIP: NEGATIVE
SP GR UR STRIP: 1.02 (ref 1–1.03)
UROBILINOGEN UR QL STRIP: ABNORMAL

## 2024-07-17 NOTE — TELEPHONE ENCOUNTER
I called and discussed results with Ms. Patel.  She continues to feel poorly.  She does not have any respiratory symptoms or cough.  She continues to have exquisite pain in her left back and now is experiencing weakness in her legs.  I do not have any explanation for her current symptoms with her UA results, chest x-ray results, or results of her lumbar spine x-rays.  I recommended she go to the ED for reevaluation given clinical worsening as I think she would benefit from further imaging with likely a CT scan of her abdomen pelvis and potentially her spine to further evaluate her acute and worsening symptoms.  I discussed this recommendation with the patient and her daughter by phone.    With regard to her pulmonary infiltrates she is scheduled to see her pulmonologist next week and wants to defer any further chest imaging until that time.

## 2024-07-17 NOTE — TELEPHONE ENCOUNTER
Aleksandra (on verbal release) stated that all of patient's symptoms are the same since Dr. Victoria evaluated her in office yesterday except that patient is now having tingling in her legs and is now weak.  She stated patient was not able to sleep with the pain in her back in the area MD evaluated and it hurts worse with movement.  Advised her that culture of urine sent today per MD request.  Dr. Victoria notified of the above and that urine culture sent today that it wasn't initially performed.  Aleksandra contacted back and advised that there is not an explanation for the tingling or weakness that patient is now experiencing and that it is recommended that she go to the nearest emergency room for evaluation.  Aleksandra verbalized understanding.

## 2024-07-17 NOTE — TELEPHONE ENCOUNTER
Caller: Aleksandra Melton    Relationship: Emergency Contact    Best call back number: [phone]    Caller requesting test results: LABS     What test was performed: LABS    When was the test performed: 7/16/2024    Where was the test performed: ONC OFFICE IN [place]    Additional notes: CALL TO GO OVER THE LAB RESULTS FROM 7/16/2024 THE CBC HAS NOT BEEN RELEASED TO THE PATIENT. POSSIBLY UTI AND INFECTION WITH INFECTION IN HER URINE AND BLOOD TEST.. WILL YOU SEND AN ANTIBIOTIC?    ALSO DISCUSS HER PAIN AND SWELLING ON THE LEFT SIDE OF HER BACK, HURTS TO TOUCH AND  IN A LOT OF PAIN.    IF SCRIPTS ARE SENT SEND TO [facility] [address] [place] [address]

## 2024-07-18 LAB — BACTERIA SPEC AEROBE CULT: NO GROWTH

## 2024-07-22 ENCOUNTER — TRANSCRIBE ORDERS (OUTPATIENT)
Dept: ADMINISTRATIVE | Facility: HOSPITAL | Age: 84
End: 2024-07-22
Payer: MEDICARE

## 2024-07-22 DIAGNOSIS — R20.0 TACTILE ANESTHESIA: Primary | ICD-10-CM

## 2024-07-22 DIAGNOSIS — M54.6 PAIN IN THORACIC SPINE: ICD-10-CM

## 2024-08-02 ENCOUNTER — HOSPITAL ENCOUNTER (OUTPATIENT)
Facility: HOSPITAL | Age: 84
Discharge: HOME | End: 2024-08-02
Payer: MEDICARE

## 2024-08-02 DIAGNOSIS — M54.6 PAIN IN THORACIC SPINE: ICD-10-CM

## 2024-08-02 DIAGNOSIS — R20.0 TACTILE ANESTHESIA: ICD-10-CM

## 2024-08-02 PROCEDURE — A9577 INJ MULTIHANCE: HCPCS | Performed by: SURGERY

## 2024-08-02 PROCEDURE — 72158 MRI LUMBAR SPINE W/O & W/DYE: CPT

## 2024-08-02 PROCEDURE — 0 GADOBENATE DIMEGLUMINE 529 MG/ML SOLUTION: Performed by: SURGERY

## 2024-08-02 RX ADMIN — GADOBENATE DIMEGLUMINE 16 ML: 529 INJECTION, SOLUTION INTRAVENOUS at 08:57

## 2024-08-15 ENCOUNTER — APPOINTMENT (OUTPATIENT)
Facility: HOSPITAL | Age: 84
DRG: 196 | End: 2024-08-15
Payer: MEDICARE

## 2024-08-15 ENCOUNTER — HOSPITAL ENCOUNTER (INPATIENT)
Facility: HOSPITAL | Age: 84
LOS: 8 days | Discharge: HOME | DRG: 196 | End: 2024-08-24
Attending: STUDENT IN AN ORGANIZED HEALTH CARE EDUCATION/TRAINING PROGRAM | Admitting: INTERNAL MEDICINE
Payer: MEDICARE

## 2024-08-15 DIAGNOSIS — R91.8 PULMONARY INFILTRATES: ICD-10-CM

## 2024-08-15 DIAGNOSIS — J45.909 ASTHMA, UNSPECIFIED ASTHMA SEVERITY, UNSPECIFIED WHETHER COMPLICATED, UNSPECIFIED WHETHER PERSISTENT: ICD-10-CM

## 2024-08-15 DIAGNOSIS — R91.8 PULMONARY INFILTRATE: ICD-10-CM

## 2024-08-15 DIAGNOSIS — J18.9 PNEUMONIA: ICD-10-CM

## 2024-08-15 DIAGNOSIS — R06.09 DYSPNEA ON EXERTION: ICD-10-CM

## 2024-08-15 DIAGNOSIS — J96.01 ACUTE RESPIRATORY FAILURE WITH HYPOXIA: ICD-10-CM

## 2024-08-15 DIAGNOSIS — J96.01 ACUTE HYPOXIC RESPIRATORY FAILURE: Primary | ICD-10-CM

## 2024-08-15 DIAGNOSIS — J18.9 MULTIFOCAL PNEUMONIA: ICD-10-CM

## 2024-08-15 LAB
ALBUMIN SERPL-MCNC: 3.4 G/DL (ref 3.5–5.2)
ALBUMIN/GLOB SERPL: 1 G/DL
ALP SERPL-CCNC: 62 U/L (ref 39–117)
ALT SERPL W P-5'-P-CCNC: 36 U/L (ref 1–33)
ANION GAP SERPL CALCULATED.3IONS-SCNC: 13.6 MMOL/L (ref 5–15)
ARTERIAL PATENCY WRIST A: ABNORMAL
AST SERPL-CCNC: 39 U/L (ref 1–32)
ATMOSPHERIC PRESS: ABNORMAL MM[HG]
BASE EXCESS BLDA CALC-SCNC: 3.4 MMOL/L (ref 0–2)
BASOPHILS # BLD AUTO: 0.03 10*3/MM3 (ref 0–0.2)
BASOPHILS NFR BLD AUTO: 0.3 % (ref 0–1.5)
BDY SITE: ABNORMAL
BILIRUB SERPL-MCNC: 0.5 MG/DL (ref 0–1.2)
BILIRUB UR QL STRIP: NEGATIVE
BODY TEMPERATURE: 37
BUN SERPL-MCNC: 14 MG/DL (ref 8–23)
BUN/CREAT SERPL: 26.9 (ref 7–25)
CALCIUM SPEC-SCNC: 9.4 MG/DL (ref 8.6–10.5)
CHLORIDE SERPL-SCNC: 97 MMOL/L (ref 98–107)
CLARITY UR: CLEAR
CO2 BLDA-SCNC: 27.5 MMOL/L (ref 22–33)
CO2 SERPL-SCNC: 25.4 MMOL/L (ref 22–29)
COHGB MFR BLD: 0.8 % (ref 0–2)
COLOR UR: YELLOW
CREAT SERPL-MCNC: 0.52 MG/DL (ref 0.57–1)
D-LACTATE SERPL-SCNC: 0.9 MMOL/L (ref 0.5–2)
DEPRECATED RDW RBC AUTO: 44.1 FL (ref 37–54)
EGFRCR SERPLBLD CKD-EPI 2021: 92.9 ML/MIN/1.73
EOSINOPHIL # BLD AUTO: 0.42 10*3/MM3 (ref 0–0.4)
EOSINOPHIL NFR BLD AUTO: 4.1 % (ref 0.3–6.2)
EPAP: 0
ERYTHROCYTE [DISTWIDTH] IN BLOOD BY AUTOMATED COUNT: 13.7 % (ref 12.3–15.4)
FLUAV RNA RESP QL NAA+PROBE: NOT DETECTED
FLUBV RNA RESP QL NAA+PROBE: NOT DETECTED
GLOBULIN UR ELPH-MCNC: 3.5 GM/DL
GLUCOSE SERPL-MCNC: 115 MG/DL (ref 65–99)
GLUCOSE UR STRIP-MCNC: NEGATIVE MG/DL
HCO3 BLDA-SCNC: 26.5 MMOL/L (ref 20–26)
HCT VFR BLD AUTO: 33.3 % (ref 34–46.6)
HCT VFR BLD CALC: 34.2 %
HGB BLD-MCNC: 11 G/DL (ref 12–15.9)
HGB BLDA-MCNC: 11.2 G/DL (ref 14–18)
HGB UR QL STRIP.AUTO: NEGATIVE
HOLD SPECIMEN: NORMAL
IMM GRANULOCYTES # BLD AUTO: 0.02 10*3/MM3 (ref 0–0.05)
IMM GRANULOCYTES NFR BLD AUTO: 0.2 % (ref 0–0.5)
INHALED O2 CONCENTRATION: 21 %
IPAP: 0
KETONES UR QL STRIP: NEGATIVE
LEUKOCYTE ESTERASE UR QL STRIP.AUTO: NEGATIVE
LYMPHOCYTES # BLD AUTO: 0.85 10*3/MM3 (ref 0.7–3.1)
LYMPHOCYTES NFR BLD AUTO: 8.3 % (ref 19.6–45.3)
MCH RBC QN AUTO: 28.5 PG (ref 26.6–33)
MCHC RBC AUTO-ENTMCNC: 33 G/DL (ref 31.5–35.7)
MCV RBC AUTO: 86.3 FL (ref 79–97)
METHGB BLD QL: 0.7 % (ref 0–1.5)
MODALITY: ABNORMAL
MONOCYTES # BLD AUTO: 1 10*3/MM3 (ref 0.1–0.9)
MONOCYTES NFR BLD AUTO: 9.8 % (ref 5–12)
NEUTROPHILS NFR BLD AUTO: 7.89 10*3/MM3 (ref 1.7–7)
NEUTROPHILS NFR BLD AUTO: 77.3 % (ref 42.7–76)
NITRITE UR QL STRIP: NEGATIVE
NT-PROBNP SERPL-MCNC: 2187 PG/ML (ref 0–1800)
OXYHGB MFR BLDV: 86.6 % (ref 94–99)
PAW @ PEAK INSP FLOW SETTING VENT: 0 CMH2O
PCO2 BLDA: 34.2 MM HG (ref 35–45)
PCO2 TEMP ADJ BLD: 34.2 MM HG (ref 35–45)
PH BLDA: 7.5 PH UNITS (ref 7.35–7.45)
PH UR STRIP.AUTO: 7 [PH] (ref 5–8)
PH, TEMP CORRECTED: 7.5 PH UNITS
PLATELET # BLD AUTO: 347 10*3/MM3 (ref 140–450)
PMV BLD AUTO: 9.9 FL (ref 6–12)
PO2 BLDA: 51.5 MM HG (ref 83–108)
PO2 TEMP ADJ BLD: 51.5 MM HG (ref 83–108)
POTASSIUM SERPL-SCNC: 3.3 MMOL/L (ref 3.5–5.2)
PROCALCITONIN SERPL-MCNC: 0.08 NG/ML (ref 0–0.25)
PROT SERPL-MCNC: 6.9 G/DL (ref 6–8.5)
PROT UR QL STRIP: NEGATIVE
RBC # BLD AUTO: 3.86 10*6/MM3 (ref 3.77–5.28)
RSV RNA RESP QL NAA+PROBE: NOT DETECTED
SARS-COV-2 RNA RESP QL NAA+PROBE: NOT DETECTED
SODIUM SERPL-SCNC: 136 MMOL/L (ref 136–145)
SP GR UR STRIP: 1.01 (ref 1–1.03)
TOTAL RATE: 0 BREATHS/MINUTE
TROPONIN T SERPL HS-MCNC: 12 NG/L
UROBILINOGEN UR QL STRIP: NORMAL
WBC NRBC COR # BLD AUTO: 10.21 10*3/MM3 (ref 3.4–10.8)
WHOLE BLOOD HOLD COAG: NORMAL
WHOLE BLOOD HOLD SPECIMEN: NORMAL

## 2024-08-15 PROCEDURE — 25010000002 PIPERACILLIN SOD-TAZOBACTAM PER 1 G: Performed by: PHYSICIAN ASSISTANT

## 2024-08-15 PROCEDURE — 36415 COLL VENOUS BLD VENIPUNCTURE: CPT

## 2024-08-15 PROCEDURE — 83605 ASSAY OF LACTIC ACID: CPT | Performed by: PHYSICIAN ASSISTANT

## 2024-08-15 PROCEDURE — 83880 ASSAY OF NATRIURETIC PEPTIDE: CPT | Performed by: STUDENT IN AN ORGANIZED HEALTH CARE EDUCATION/TRAINING PROGRAM

## 2024-08-15 PROCEDURE — 82375 ASSAY CARBOXYHB QUANT: CPT

## 2024-08-15 PROCEDURE — 25510000001 IOPAMIDOL PER 1 ML: Performed by: STUDENT IN AN ORGANIZED HEALTH CARE EDUCATION/TRAINING PROGRAM

## 2024-08-15 PROCEDURE — 93005 ELECTROCARDIOGRAM TRACING: CPT | Performed by: EMERGENCY MEDICINE

## 2024-08-15 PROCEDURE — 80053 COMPREHEN METABOLIC PANEL: CPT | Performed by: STUDENT IN AN ORGANIZED HEALTH CARE EDUCATION/TRAINING PROGRAM

## 2024-08-15 PROCEDURE — 71275 CT ANGIOGRAPHY CHEST: CPT

## 2024-08-15 PROCEDURE — 87040 BLOOD CULTURE FOR BACTERIA: CPT | Performed by: PHYSICIAN ASSISTANT

## 2024-08-15 PROCEDURE — 83050 HGB METHEMOGLOBIN QUAN: CPT

## 2024-08-15 PROCEDURE — 25010000002 VANCOMYCIN HCL IN NACL 1.5-0.9 GM/500ML-% SOLUTION: Performed by: PHYSICIAN ASSISTANT

## 2024-08-15 PROCEDURE — 84145 PROCALCITONIN (PCT): CPT | Performed by: PHYSICIAN ASSISTANT

## 2024-08-15 PROCEDURE — 81003 URINALYSIS AUTO W/O SCOPE: CPT | Performed by: PHYSICIAN ASSISTANT

## 2024-08-15 PROCEDURE — 36600 WITHDRAWAL OF ARTERIAL BLOOD: CPT

## 2024-08-15 PROCEDURE — 71045 X-RAY EXAM CHEST 1 VIEW: CPT

## 2024-08-15 PROCEDURE — 93005 ELECTROCARDIOGRAM TRACING: CPT | Performed by: STUDENT IN AN ORGANIZED HEALTH CARE EDUCATION/TRAINING PROGRAM

## 2024-08-15 PROCEDURE — 87637 SARSCOV2&INF A&B&RSV AMP PRB: CPT | Performed by: PHYSICIAN ASSISTANT

## 2024-08-15 PROCEDURE — 25010000002 FUROSEMIDE PER 20 MG: Performed by: PHYSICIAN ASSISTANT

## 2024-08-15 PROCEDURE — 85025 COMPLETE CBC W/AUTO DIFF WBC: CPT | Performed by: STUDENT IN AN ORGANIZED HEALTH CARE EDUCATION/TRAINING PROGRAM

## 2024-08-15 PROCEDURE — 94799 UNLISTED PULMONARY SVC/PX: CPT

## 2024-08-15 PROCEDURE — 99291 CRITICAL CARE FIRST HOUR: CPT

## 2024-08-15 PROCEDURE — 82805 BLOOD GASES W/O2 SATURATION: CPT

## 2024-08-15 PROCEDURE — 25810000003 SODIUM CHLORIDE 0.9 % SOLUTION: Performed by: PHYSICIAN ASSISTANT

## 2024-08-15 PROCEDURE — 84484 ASSAY OF TROPONIN QUANT: CPT | Performed by: STUDENT IN AN ORGANIZED HEALTH CARE EDUCATION/TRAINING PROGRAM

## 2024-08-15 RX ORDER — VANCOMYCIN/0.9 % SOD CHLORIDE 1.5G/250ML
20 PLASTIC BAG, INJECTION (ML) INTRAVENOUS ONCE
Status: COMPLETED | OUTPATIENT
Start: 2024-08-15 | End: 2024-08-15

## 2024-08-15 RX ORDER — IOPAMIDOL 755 MG/ML
100 INJECTION, SOLUTION INTRAVASCULAR
Status: COMPLETED | OUTPATIENT
Start: 2024-08-15 | End: 2024-08-15

## 2024-08-15 RX ORDER — SODIUM CHLORIDE 0.9 % (FLUSH) 0.9 %
10 SYRINGE (ML) INJECTION AS NEEDED
Status: DISCONTINUED | OUTPATIENT
Start: 2024-08-15 | End: 2024-08-24 | Stop reason: HOSPADM

## 2024-08-15 RX ORDER — FUROSEMIDE 10 MG/ML
20 INJECTION INTRAMUSCULAR; INTRAVENOUS ONCE
Status: COMPLETED | OUTPATIENT
Start: 2024-08-15 | End: 2024-08-15

## 2024-08-15 RX ADMIN — PIPERACILLIN SODIUM AND TAZOBACTAM SODIUM 4.5 G: 4; .5 INJECTION, POWDER, LYOPHILIZED, FOR SOLUTION INTRAVENOUS at 18:37

## 2024-08-15 RX ADMIN — FUROSEMIDE 20 MG: 10 INJECTION, SOLUTION INTRAMUSCULAR; INTRAVENOUS at 21:12

## 2024-08-15 RX ADMIN — Medication 1500 MG: at 19:48

## 2024-08-15 RX ADMIN — SODIUM CHLORIDE 2436 ML: 9 INJECTION, SOLUTION INTRAVENOUS at 18:35

## 2024-08-15 RX ADMIN — IOPAMIDOL 75 ML: 755 INJECTION, SOLUTION INTRAVENOUS at 19:37

## 2024-08-16 PROBLEM — J18.9 PNEUMONIA: Status: ACTIVE | Noted: 2024-08-16

## 2024-08-16 PROBLEM — J96.01 ACUTE RESPIRATORY FAILURE WITH HYPOXIA: Status: ACTIVE | Noted: 2024-08-16

## 2024-08-16 LAB
ALBUMIN SERPL-MCNC: 3 G/DL (ref 3.5–5.2)
ALBUMIN/GLOB SERPL: 1.3 G/DL
ALP SERPL-CCNC: 58 U/L (ref 39–117)
ALT SERPL W P-5'-P-CCNC: 36 U/L (ref 1–33)
ANION GAP SERPL CALCULATED.3IONS-SCNC: 9 MMOL/L (ref 5–15)
AST SERPL-CCNC: 37 U/L (ref 1–32)
BASOPHILS # BLD AUTO: 0.05 10*3/MM3 (ref 0–0.2)
BASOPHILS NFR BLD AUTO: 0.6 % (ref 0–1.5)
BILIRUB SERPL-MCNC: 0.3 MG/DL (ref 0–1.2)
BUN SERPL-MCNC: 9 MG/DL (ref 8–23)
BUN/CREAT SERPL: 18.8 (ref 7–25)
CALCIUM SPEC-SCNC: 8.3 MG/DL (ref 8.6–10.5)
CHLORIDE SERPL-SCNC: 97 MMOL/L (ref 98–107)
CK SERPL-CCNC: 37 U/L (ref 20–180)
CO2 SERPL-SCNC: 29 MMOL/L (ref 22–29)
CREAT SERPL-MCNC: 0.48 MG/DL (ref 0.57–1)
CRP SERPL-MCNC: 13.68 MG/DL (ref 0–0.5)
DEPRECATED RDW RBC AUTO: 42.4 FL (ref 37–54)
EGFRCR SERPLBLD CKD-EPI 2021: 94.7 ML/MIN/1.73
EOSINOPHIL # BLD AUTO: 0.46 10*3/MM3 (ref 0–0.4)
EOSINOPHIL NFR BLD AUTO: 5.1 % (ref 0.3–6.2)
ERYTHROCYTE [DISTWIDTH] IN BLOOD BY AUTOMATED COUNT: 13.7 % (ref 12.3–15.4)
GLOBULIN UR ELPH-MCNC: 2.3 GM/DL
GLUCOSE SERPL-MCNC: 194 MG/DL (ref 65–99)
HCT VFR BLD AUTO: 29.1 % (ref 34–46.6)
HGB BLD-MCNC: 9.8 G/DL (ref 12–15.9)
IMM GRANULOCYTES # BLD AUTO: 0.05 10*3/MM3 (ref 0–0.05)
IMM GRANULOCYTES NFR BLD AUTO: 0.6 % (ref 0–0.5)
L PNEUMO1 AG UR QL IA: NEGATIVE
LYMPHOCYTES # BLD AUTO: 0.47 10*3/MM3 (ref 0.7–3.1)
LYMPHOCYTES NFR BLD AUTO: 5.2 % (ref 19.6–45.3)
MCH RBC QN AUTO: 28.3 PG (ref 26.6–33)
MCHC RBC AUTO-ENTMCNC: 33.7 G/DL (ref 31.5–35.7)
MCV RBC AUTO: 84.1 FL (ref 79–97)
MONOCYTES # BLD AUTO: 0.83 10*3/MM3 (ref 0.1–0.9)
MONOCYTES NFR BLD AUTO: 9.1 % (ref 5–12)
MRSA DNA SPEC QL NAA+PROBE: NEGATIVE
NEUTROPHILS NFR BLD AUTO: 7.22 10*3/MM3 (ref 1.7–7)
NEUTROPHILS NFR BLD AUTO: 79.4 % (ref 42.7–76)
NRBC BLD AUTO-RTO: 0 /100 WBC (ref 0–0.2)
PLATELET # BLD AUTO: 308 10*3/MM3 (ref 140–450)
PMV BLD AUTO: 9.6 FL (ref 6–12)
POTASSIUM SERPL-SCNC: 2.9 MMOL/L (ref 3.5–5.2)
PROT SERPL-MCNC: 5.3 G/DL (ref 6–8.5)
QT INTERVAL: 374 MS
QTC INTERVAL: 436 MS
RBC # BLD AUTO: 3.46 10*6/MM3 (ref 3.77–5.28)
S PNEUM AG SPEC QL LA: NEGATIVE
SODIUM SERPL-SCNC: 135 MMOL/L (ref 136–145)
TSH SERPL DL<=0.05 MIU/L-ACNC: 1.22 UIU/ML (ref 0.27–4.2)
WBC NRBC COR # BLD AUTO: 9.08 10*3/MM3 (ref 3.4–10.8)

## 2024-08-16 PROCEDURE — 84132 ASSAY OF SERUM POTASSIUM: CPT | Performed by: INTERNAL MEDICINE

## 2024-08-16 PROCEDURE — 87040 BLOOD CULTURE FOR BACTERIA: CPT | Performed by: NURSE PRACTITIONER

## 2024-08-16 PROCEDURE — 99223 1ST HOSP IP/OBS HIGH 75: CPT | Performed by: INTERNAL MEDICINE

## 2024-08-16 PROCEDURE — 99233 SBSQ HOSP IP/OBS HIGH 50: CPT | Performed by: INTERNAL MEDICINE

## 2024-08-16 PROCEDURE — 25810000003 SODIUM CHLORIDE 0.9 % SOLUTION 250 ML FLEX CONT

## 2024-08-16 PROCEDURE — 97161 PT EVAL LOW COMPLEX 20 MIN: CPT

## 2024-08-16 PROCEDURE — 97166 OT EVAL MOD COMPLEX 45 MIN: CPT

## 2024-08-16 PROCEDURE — 99222 1ST HOSP IP/OBS MODERATE 55: CPT | Performed by: INTERNAL MEDICINE

## 2024-08-16 PROCEDURE — 82550 ASSAY OF CK (CPK): CPT | Performed by: INTERNAL MEDICINE

## 2024-08-16 PROCEDURE — 25010000002 VANCOMYCIN HCL 1.25 G RECONSTITUTED SOLUTION 1 EACH VIAL

## 2024-08-16 PROCEDURE — 80053 COMPREHEN METABOLIC PANEL: CPT | Performed by: NURSE PRACTITIONER

## 2024-08-16 PROCEDURE — 25010000002 HEPARIN (PORCINE) PER 1000 UNITS: Performed by: NURSE PRACTITIONER

## 2024-08-16 PROCEDURE — 85025 COMPLETE CBC W/AUTO DIFF WBC: CPT | Performed by: NURSE PRACTITIONER

## 2024-08-16 PROCEDURE — 86140 C-REACTIVE PROTEIN: CPT | Performed by: INTERNAL MEDICINE

## 2024-08-16 PROCEDURE — 25010000002 PIPERACILLIN SOD-TAZOBACTAM PER 1 G: Performed by: PHYSICIAN ASSISTANT

## 2024-08-16 PROCEDURE — 97535 SELF CARE MNGMENT TRAINING: CPT

## 2024-08-16 PROCEDURE — 94761 N-INVAS EAR/PLS OXIMETRY MLT: CPT

## 2024-08-16 PROCEDURE — 84443 ASSAY THYROID STIM HORMONE: CPT | Performed by: INTERNAL MEDICINE

## 2024-08-16 PROCEDURE — 87449 NOS EACH ORGANISM AG IA: CPT | Performed by: NURSE PRACTITIONER

## 2024-08-16 PROCEDURE — 87641 MR-STAPH DNA AMP PROBE: CPT | Performed by: NURSE PRACTITIONER

## 2024-08-16 PROCEDURE — 25010000002 PIPERACILLIN SOD-TAZOBACTAM PER 1 G: Performed by: NURSE PRACTITIONER

## 2024-08-16 PROCEDURE — 94664 DEMO&/EVAL PT USE INHALER: CPT

## 2024-08-16 PROCEDURE — 87899 AGENT NOS ASSAY W/OPTIC: CPT | Performed by: NURSE PRACTITIONER

## 2024-08-16 PROCEDURE — 94640 AIRWAY INHALATION TREATMENT: CPT

## 2024-08-16 PROCEDURE — 94799 UNLISTED PULMONARY SVC/PX: CPT

## 2024-08-16 RX ORDER — SODIUM CHLORIDE 9 MG/ML
40 INJECTION, SOLUTION INTRAVENOUS AS NEEDED
Status: DISCONTINUED | OUTPATIENT
Start: 2024-08-16 | End: 2024-08-24 | Stop reason: HOSPADM

## 2024-08-16 RX ORDER — PROPRANOLOL HYDROCHLORIDE 10 MG/1
50 TABLET ORAL 2 TIMES DAILY
Status: ON HOLD | COMMUNITY
End: 2024-08-16

## 2024-08-16 RX ORDER — BUDESONIDE AND FORMOTEROL FUMARATE DIHYDRATE 160; 4.5 UG/1; UG/1
1 AEROSOL RESPIRATORY (INHALATION)
Status: DISCONTINUED | OUTPATIENT
Start: 2024-08-16 | End: 2024-08-24 | Stop reason: HOSPADM

## 2024-08-16 RX ORDER — LAMOTRIGINE 100 MG/1
100 TABLET ORAL EVERY 12 HOURS SCHEDULED
Status: DISCONTINUED | OUTPATIENT
Start: 2024-08-16 | End: 2024-08-24 | Stop reason: HOSPADM

## 2024-08-16 RX ORDER — SERTRALINE HYDROCHLORIDE 25 MG/1
25 TABLET, FILM COATED ORAL DAILY
COMMUNITY

## 2024-08-16 RX ORDER — HEPARIN SODIUM 5000 [USP'U]/ML
5000 INJECTION, SOLUTION INTRAVENOUS; SUBCUTANEOUS EVERY 12 HOURS SCHEDULED
Status: DISCONTINUED | OUTPATIENT
Start: 2024-08-16 | End: 2024-08-24 | Stop reason: HOSPADM

## 2024-08-16 RX ORDER — IPRATROPIUM BROMIDE AND ALBUTEROL SULFATE 2.5; .5 MG/3ML; MG/3ML
3 SOLUTION RESPIRATORY (INHALATION) EVERY 4 HOURS PRN
Status: DISCONTINUED | OUTPATIENT
Start: 2024-08-16 | End: 2024-08-24 | Stop reason: HOSPADM

## 2024-08-16 RX ORDER — SODIUM CHLORIDE 0.9 % (FLUSH) 0.9 %
10 SYRINGE (ML) INJECTION EVERY 12 HOURS SCHEDULED
Status: DISCONTINUED | OUTPATIENT
Start: 2024-08-16 | End: 2024-08-24 | Stop reason: HOSPADM

## 2024-08-16 RX ORDER — SODIUM CHLORIDE 0.9 % (FLUSH) 0.9 %
10 SYRINGE (ML) INJECTION AS NEEDED
Status: DISCONTINUED | OUTPATIENT
Start: 2024-08-16 | End: 2024-08-24 | Stop reason: HOSPADM

## 2024-08-16 RX ORDER — SERTRALINE HYDROCHLORIDE 25 MG/1
25 TABLET, FILM COATED ORAL DAILY
Status: DISCONTINUED | OUTPATIENT
Start: 2024-08-16 | End: 2024-08-24 | Stop reason: HOSPADM

## 2024-08-16 RX ORDER — BISACODYL 5 MG/1
5 TABLET, DELAYED RELEASE ORAL DAILY PRN
Status: DISCONTINUED | OUTPATIENT
Start: 2024-08-16 | End: 2024-08-24 | Stop reason: HOSPADM

## 2024-08-16 RX ORDER — LOSARTAN POTASSIUM 25 MG/1
25 TABLET ORAL DAILY
Status: DISCONTINUED | OUTPATIENT
Start: 2024-08-16 | End: 2024-08-24 | Stop reason: HOSPADM

## 2024-08-16 RX ORDER — ACETAMINOPHEN 650 MG/1
650 SUPPOSITORY RECTAL EVERY 4 HOURS PRN
Status: DISCONTINUED | OUTPATIENT
Start: 2024-08-16 | End: 2024-08-24 | Stop reason: HOSPADM

## 2024-08-16 RX ORDER — SERTRALINE HYDROCHLORIDE 100 MG/1
100 TABLET, FILM COATED ORAL DAILY
Status: DISCONTINUED | OUTPATIENT
Start: 2024-08-16 | End: 2024-08-16

## 2024-08-16 RX ORDER — AMOXICILLIN 250 MG
2 CAPSULE ORAL 2 TIMES DAILY PRN
Status: DISCONTINUED | OUTPATIENT
Start: 2024-08-16 | End: 2024-08-24 | Stop reason: HOSPADM

## 2024-08-16 RX ORDER — PROPRANOLOL HYDROCHLORIDE 20 MG/1
80 TABLET ORAL EVERY 12 HOURS SCHEDULED
Status: DISCONTINUED | OUTPATIENT
Start: 2024-08-16 | End: 2024-08-24 | Stop reason: HOSPADM

## 2024-08-16 RX ORDER — PROPRANOLOL HYDROCHLORIDE 80 MG/1
80 CAPSULE, EXTENDED RELEASE ORAL 2 TIMES DAILY
COMMUNITY

## 2024-08-16 RX ORDER — FLUTICASONE FUROATE, UMECLIDINIUM BROMIDE AND VILANTEROL TRIFENATATE 100; 62.5; 25 UG/1; UG/1; UG/1
1 POWDER RESPIRATORY (INHALATION)
COMMUNITY
End: 2024-08-24 | Stop reason: HOSPADM

## 2024-08-16 RX ORDER — POLYETHYLENE GLYCOL 3350 17 G/17G
17 POWDER, FOR SOLUTION ORAL DAILY PRN
Status: DISCONTINUED | OUTPATIENT
Start: 2024-08-16 | End: 2024-08-24 | Stop reason: HOSPADM

## 2024-08-16 RX ORDER — GUAIFENESIN 600 MG/1
600 TABLET, EXTENDED RELEASE ORAL DAILY
Status: DISCONTINUED | OUTPATIENT
Start: 2024-08-16 | End: 2024-08-24 | Stop reason: HOSPADM

## 2024-08-16 RX ORDER — ACETAMINOPHEN 160 MG/5ML
650 SOLUTION ORAL EVERY 4 HOURS PRN
Status: DISCONTINUED | OUTPATIENT
Start: 2024-08-16 | End: 2024-08-24 | Stop reason: HOSPADM

## 2024-08-16 RX ORDER — AMLODIPINE BESYLATE 5 MG/1
5 TABLET ORAL DAILY
Status: DISCONTINUED | OUTPATIENT
Start: 2024-08-16 | End: 2024-08-24 | Stop reason: HOSPADM

## 2024-08-16 RX ORDER — POTASSIUM CHLORIDE 1500 MG/1
40 TABLET, EXTENDED RELEASE ORAL EVERY 4 HOURS
Status: COMPLETED | OUTPATIENT
Start: 2024-08-16 | End: 2024-08-16

## 2024-08-16 RX ORDER — ACETAMINOPHEN 325 MG/1
650 TABLET ORAL EVERY 4 HOURS PRN
Status: DISCONTINUED | OUTPATIENT
Start: 2024-08-16 | End: 2024-08-24 | Stop reason: HOSPADM

## 2024-08-16 RX ORDER — AZELASTINE 1 MG/ML
1 SPRAY, METERED NASAL NIGHTLY PRN
Status: DISCONTINUED | OUTPATIENT
Start: 2024-08-16 | End: 2024-08-24 | Stop reason: HOSPADM

## 2024-08-16 RX ORDER — BISACODYL 10 MG
10 SUPPOSITORY, RECTAL RECTAL DAILY PRN
Status: DISCONTINUED | OUTPATIENT
Start: 2024-08-16 | End: 2024-08-24 | Stop reason: HOSPADM

## 2024-08-16 RX ORDER — IPRATROPIUM BROMIDE AND ALBUTEROL SULFATE 2.5; .5 MG/3ML; MG/3ML
3 SOLUTION RESPIRATORY (INHALATION)
Status: DISCONTINUED | OUTPATIENT
Start: 2024-08-16 | End: 2024-08-24 | Stop reason: HOSPADM

## 2024-08-16 RX ADMIN — AMLODIPINE BESYLATE 5 MG: 5 TABLET ORAL at 09:51

## 2024-08-16 RX ADMIN — SERTRALINE 50 MG: 50 TABLET, FILM COATED ORAL at 19:32

## 2024-08-16 RX ADMIN — LAMOTRIGINE 100 MG: 100 TABLET ORAL at 09:51

## 2024-08-16 RX ADMIN — POTASSIUM CHLORIDE 40 MEQ: 1500 TABLET, EXTENDED RELEASE ORAL at 15:45

## 2024-08-16 RX ADMIN — BUDESONIDE AND FORMOTEROL FUMARATE DIHYDRATE 1 PUFF: 160; 4.5 AEROSOL RESPIRATORY (INHALATION) at 08:43

## 2024-08-16 RX ADMIN — VANCOMYCIN HYDROCHLORIDE 1250 MG: 1.25 INJECTION, POWDER, LYOPHILIZED, FOR SOLUTION INTRAVENOUS at 14:40

## 2024-08-16 RX ADMIN — IPRATROPIUM BROMIDE AND ALBUTEROL SULFATE 3 ML: 2.5; .5 SOLUTION RESPIRATORY (INHALATION) at 16:02

## 2024-08-16 RX ADMIN — PIPERACILLIN SODIUM AND TAZOBACTAM SODIUM 4.5 G: 4; .5 INJECTION, POWDER, LYOPHILIZED, FOR SOLUTION INTRAVENOUS at 00:22

## 2024-08-16 RX ADMIN — IPRATROPIUM BROMIDE AND ALBUTEROL SULFATE 3 ML: 2.5; .5 SOLUTION RESPIRATORY (INHALATION) at 19:35

## 2024-08-16 RX ADMIN — PIPERACILLIN AND TAZOBACTAM 3.38 G: 3; .375 INJECTION, POWDER, LYOPHILIZED, FOR SOLUTION INTRAVENOUS at 09:51

## 2024-08-16 RX ADMIN — Medication 10 ML: at 19:33

## 2024-08-16 RX ADMIN — POTASSIUM CHLORIDE 40 MEQ: 1500 TABLET, EXTENDED RELEASE ORAL at 11:31

## 2024-08-16 RX ADMIN — IPRATROPIUM BROMIDE AND ALBUTEROL SULFATE 3 ML: 2.5; .5 SOLUTION RESPIRATORY (INHALATION) at 12:56

## 2024-08-16 RX ADMIN — BUDESONIDE AND FORMOTEROL FUMARATE DIHYDRATE 1 PUFF: 160; 4.5 AEROSOL RESPIRATORY (INHALATION) at 21:01

## 2024-08-16 RX ADMIN — POTASSIUM CHLORIDE 40 MEQ: 1500 TABLET, EXTENDED RELEASE ORAL at 19:11

## 2024-08-16 RX ADMIN — SERTRALINE 25 MG: 25 TABLET, FILM COATED ORAL at 14:40

## 2024-08-16 RX ADMIN — HEPARIN SODIUM 5000 UNITS: 5000 INJECTION INTRAVENOUS; SUBCUTANEOUS at 09:51

## 2024-08-16 RX ADMIN — DORNASE ALFA 2.5 MG: 1 SOLUTION RESPIRATORY (INHALATION) at 20:59

## 2024-08-16 RX ADMIN — HEPARIN SODIUM 5000 UNITS: 5000 INJECTION INTRAVENOUS; SUBCUTANEOUS at 19:33

## 2024-08-16 RX ADMIN — PIPERACILLIN AND TAZOBACTAM 3.38 G: 3; .375 INJECTION, POWDER, LYOPHILIZED, FOR SOLUTION INTRAVENOUS at 17:03

## 2024-08-16 RX ADMIN — LAMOTRIGINE 100 MG: 100 TABLET ORAL at 19:32

## 2024-08-16 RX ADMIN — IPRATROPIUM BROMIDE AND ALBUTEROL SULFATE 3 ML: 2.5; .5 SOLUTION RESPIRATORY (INHALATION) at 08:43

## 2024-08-16 RX ADMIN — GUAIFENESIN 600 MG: 600 TABLET, EXTENDED RELEASE ORAL at 09:51

## 2024-08-16 RX ADMIN — PROPRANOLOL HYDROCHLORIDE 80 MG: 20 TABLET ORAL at 10:05

## 2024-08-16 NOTE — PLAN OF CARE
Goal Outcome Evaluation:  Plan of Care Reviewed With: patient, daughter           Outcome Evaluation: PT eval complete. Pt presents with minor balance deficits, generalized weakness, MAYORGA, and decreased activity tolerance warranting skilled IP PT to facilitate return to PLOF. Pt amb 40'+40' w/ FWW, CGA +chair follow for safety. SpO2 dropping to 87% on 4L O2, recovering quickly w/ rest and VCs for PLB. PT rec home w/ assist and OP PT when medically appropriate.      Anticipated Discharge Disposition (PT): home with assist, home with outpatient therapy services

## 2024-08-16 NOTE — CONSULTS
Referring Provider: Latesha Duran MD  Reason for Consultation:     Bilateral pulmonary infiltrates    Patient Care Team:  Jone Solorio MD as PCP - General (General Practice)  Matt Don MD as Consulting Physician (Cardiology)  Joann Cowan PA-C as Physician Assistant (Physician Assistant)  Melany Victoria MD as Referring Physician (Hematology and Oncology)  Feng Farah MD as Consulting Physician (Radiation Oncology)  Albert Rosas MD as Consulting Physician (Neurology)  Trey Campo MD as Consulting Physician (General Surgery)      Subjective .     History of present illness: 83-year-old woman non-smoker, recently  January 2024, with a history of asthma, hypertension, dyslipidemia, SVT, seizure disorder, breast cancer postlumpectomy and radiation 4 months prior to admission hospitalized with dyspnea,cough, hypoxia last night.  2 weeks prior to admission she was walking independently.  She has extensive infiltrate in the left lung and patchy infiltrates in the right lung with a left effusion.  These were not present on the June CT scan.  Patient reports being diagnosed with asthma approximately 30 years ago.  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  Even after she retired she continued to have intermittent symptoms and was on Advair as a maintenance inhaler.  She saw a new pulmonary physician at the [facility] in July who told her that she had COPD and changed her to Trelegy.  She already had a home nebulizer that she used as needed.  Approximately 10 to 12 days ago she developed increased wheezing and shortness of air and was using her nebulizer more.  She saw physician at [facility] and was treated with steroid taper and doxycycline.  She finished antibiotics and steroids 2 days prior to admission.  She continued to be breathless and presented to the emergency room last night.  She currently has a  nonproductive cough.  She does have chest tightness.  She denies noxious fume exposure.  She is a native of [place].  She has never been exposed to TB or had a positive test.  When she did have a productive cough it was yellow to green mucus.  She denies hemoptysis.      In the emergency room room air saturation was 82%.  Imaging revealed a multifocal pneumonia.  She did swab negative for COVID, RSV and influenza.  She has gotten all of her outpatient care at the [facility] where she has been evaluated for moderate asthma, allergic rhinitis and recurrent sinusitis.  She does receive allergy shots for her allergic rhinitis.  She did undergo her lumpectomy and node dissection February14, 2024 and had 8 mm invasive cancer with negative margins and negative sentinel node.  Primary lesion was 4.5 cm in size.   She completed radiation April 9, 2024.  CT scan of the chest June 5 revealed no metastatic disease, a sliding hiatal hernia, anterior left upper lobe subpleural changes consistent with radiation.  She was seen for shortness of air July 25 and was wheezing.  She was taking propranolol for benign essential tremor and was transition to metoprolol.  Her tremor worsened and she switched back to propranolol.  Duplex of her left lower extremity was negative for DVT.  Recent CT scan of the chest in June revealed no PE, some pleural-parenchymal scarring and a left upper lobe peripheral scarring from radiation.  Follow-up August 7 noted a negative treadmill stress test and normal perfusion scan.  Echo revealed normal LV systolic function with an EF of 62% and some mild LVH.  She does not have fever.  White count is normal.  ABG revealed a pH of 7.49, CO2 34, O2 of 51 on room air.  proBNP was elevated at 2187 with normal renal function.  Procalcitonin is low at 0.08.    Review of Systems  Review of Systems   Constitutional:  Positive for activity change and fatigue. Negative for fever.   HENT:  Negative for congestion,  mouth sores, nosebleeds and trouble swallowing.    Eyes:  Negative for redness and itching.   Respiratory:  Positive for cough, chest tightness, shortness of breath and wheezing.    Cardiovascular:  Negative for chest pain and leg swelling.   Gastrointestinal:  Positive for constipation. Negative for abdominal pain.   Endocrine: Negative.    Genitourinary:  Negative for dysuria.   Musculoskeletal:  Positive for arthralgias, back pain and neck pain.   Skin:  Negative for rash.   Allergic/Immunologic: Positive for environmental allergies.   Neurological:  Positive for headaches.   Hematological:  Does not bruise/bleed easily.   Psychiatric/Behavioral:  Positive for dysphoric mood. The patient is nervous/anxious.        Current Medications  amLODIPine, 5 mg, Oral, Daily  budesonide-formoterol, 1 puff, Inhalation, BID - RT  guaiFENesin, 600 mg, Oral, Daily  heparin (porcine), 5,000 Units, Subcutaneous, Q12H  ipratropium-albuterol, 3 mL, Nebulization, 4x Daily - RT  lamoTRIgine, 100 mg, Oral, Q12H  losartan, 25 mg, Oral, Daily  piperacillin-tazobactam, 3.375 g, Intravenous, Q8H  potassium chloride ER, 40 mEq, Oral, Q4H  propranolol, 80 mg, Oral, Q12H  sertraline, 100 mg, Oral, Daily  sodium chloride, 10 mL, Intravenous, Q12H  vancomycin, 15 mg/kg, Intravenous, Q18H        History  Past Medical History:   Diagnosis Date    Anxiety     Asthma     Back problem     Breast cancer     Cancer Breast    Compression fracture of T12 vertebra     Cystocele     Epilepsy     Family history of hip fracture     mother, father    Hyperlipidemia     Hypertension     IBS (irritable bowel syndrome)     Menopause     Osteoporosis     Pinched nerve in neck     Seizure disorder     Vaginal atrophy     Vitamin D deficiency    ,   Past Surgical History:   Procedure Laterality Date    ABDOMINAL SURGERY      APPENDECTOMY      BASAL CELL CARCINOMA EXCISION Right 01/12/2010    Nose    CHOLECYSTECTOMY  01/01/1991    CYST REMOVAL  01/01/1990    Right  "Foot    DILATATION AND CURETTAGE      FUNCTIONAL ENDOSCOPIC SINUS SURGERY  11/16/2010    Dr. Mathews    KNEE ARTHROSCOPY Left 01/01/2006    KNEE ARTHROSCOPY W/ MENISCAL REPAIR Right 11/01/2010    REPLACEMENT TOTAL KNEE      SINUS SURGERY      TOTAL ABDOMINAL HYSTERECTOMY WITH SALPINGO OOPHORECTOMY Bilateral     WRIST SURGERY Left 01/01/2004   ,   Family History   Problem Relation Age of Onset    Alzheimer's disease Mother     Parkinsonism Father     Breast cancer Sister 53    Fibromyalgia Daughter     Thyroid cancer Daughter     Hypothyroidism Daughter     Ovarian cancer Neg Hx    ,   Social History     Tobacco Use    Smoking status: Never    Smokeless tobacco: Never   Vaping Use    Vaping status: Never Used   Substance Use Topics    Alcohol use: No    Drug use: No    and Allergies:  Oxycodone-acetaminophen, Sulfa antibiotics, and Sulfamethoxazole-trimethoprim    Objective     Vital Signs   Blood pressure 137/71, pulse 77, temperature 98.2 °F (36.8 °C), temperature source Oral, resp. rate 18, height 170.2 cm (67\"), weight 80.1 kg (176 lb 9.4 oz), SpO2 95%, not currently breastfeeding.    Physical Exam:          General Appearance: Thin elderly woman in no respiratory distress sitting upright in bed                                 HEENT: Pupils small and equal, conjunctiva pink, oral mucosa moist, nasal mucosa without edema                                    Neck: Limited range of motion, trachea midline                                   Chest: Diminished chest excursion on the left with diminished breath sounds on the left and inspiratory crackles, right lung is clear, no wheezing but general diminished breath sounds                                    Heart: Regular rhythm, S1, S2 auscultated, no murmur                             Abdomen: Nondistended, bowel sounds present, soft                             Extremities:    No pitting edema or clubbing                                      Skin: Warm and dry without " rash                         Lymphatics:   No cervical adenopathy                       Neurological:   Alert, oriented, speech fluent, subtle resting tremor                          Psychiatric: Normal affect    Results Review:  Lab Results (last 24 hours)       Procedure Component Value Units Date/Time    S. Pneumo Ag Urine or CSF - Urine, Urine, Clean Catch [[id number]] Collected: 08/16/24 1019    Specimen: Urine, Clean Catch Updated: 08/16/24 1019    Legionella Antigen, Urine - Urine, Urine, Clean Catch [[id number]] Collected: 08/16/24 1019    Specimen: Urine, Clean Catch Updated: 08/16/24 1019    Comprehensive Metabolic Panel [[id number]]  (Abnormal) Collected: 08/16/24 0912    Specimen: Blood Updated: 08/16/24 1001     Glucose 194 mg/dL      BUN 9 mg/dL      Creatinine 0.48 mg/dL      Sodium 135 mmol/L      Potassium 2.9 mmol/L      Chloride 97 mmol/L      CO2 29.0 mmol/L      Calcium 8.3 mg/dL      Total Protein 5.3 g/dL      Albumin 3.0 g/dL      ALT (SGPT) 36 U/L      AST (SGOT) 37 U/L      Alkaline Phosphatase 58 U/L      Total Bilirubin 0.3 mg/dL      Globulin 2.3 gm/dL      Comment: Calculated Result        A/G Ratio 1.3 g/dL      BUN/Creatinine Ratio 18.8     Anion Gap 9.0 mmol/L      eGFR 94.7 mL/min/1.73     Narrative:      GFR Normal >60  Chronic Kidney Disease <60  Kidney Failure <15    The GFR formula is only valid for adults with stable renal function between ages 18 and 70.    Blood Culture - Blood, Hand, Right [[id number]] Collected: 08/16/24 0902    Specimen: Blood from Hand, Right Updated: 08/16/24 0934    Blood Culture - Blood, Arm, Right [[id number]] Collected: 08/16/24 0912    Specimen: Blood from Arm, Right Updated: 08/16/24 0933    CBC & Differential [[id number]]  (Abnormal) Collected: 08/16/24 0912    Specimen: Blood Updated: 08/16/24 0929    Narrative:      The following orders were created for panel order CBC & Differential.  Procedure                               Abnormality         Status                      ---------                               -----------         ------                     CBC Auto Differential[[id number]]        Abnormal            Final result                 Please view results for these tests on the individual orders.    CBC Auto Differential [[id number]]  (Abnormal) Collected: 08/16/24 0912    Specimen: Blood Updated: 08/16/24 0929     WBC 9.08 10*3/mm3      RBC 3.46 10*6/mm3      Hemoglobin 9.8 g/dL      Hematocrit 29.1 %      MCV 84.1 fL      MCH 28.3 pg      MCHC 33.7 g/dL      RDW 13.7 %      RDW-SD 42.4 fl      MPV 9.6 fL      Platelets 308 10*3/mm3      Neutrophil % 79.4 %      Lymphocyte % 5.2 %      Monocyte % 9.1 %      Eosinophil % 5.1 %      Basophil % 0.6 %      Immature Grans % 0.6 %      Neutrophils, Absolute 7.22 10*3/mm3      Lymphocytes, Absolute 0.47 10*3/mm3      Monocytes, Absolute 0.83 10*3/mm3      Eosinophils, Absolute 0.46 10*3/mm3      Basophils, Absolute 0.05 10*3/mm3      Immature Grans, Absolute 0.05 10*3/mm3      nRBC 0.0 /100 WBC     MRSA Screen, PCR (Inpatient) - Swab, Nares [[id number]]  (Normal) Collected: 08/16/24 0635    Specimen: Swab from Nares Updated: 08/16/24 0822     MRSA PCR Negative    Narrative:      The negative predictive value of this diagnostic test is high and should only be used to consider de-escalating anti-MRSA therapy. A positive result may indicate colonization with MRSA and must be correlated clinically.  MRSA Negative    COVID-19, FLU A/B, RSV PCR 1 HR TAT - Swab, Nasopharynx [[id number]]  (Normal) Collected: 08/15/24 1923    Specimen: Swab from Nasopharynx Updated: 08/15/24 2013     COVID19 Not Detected     Influenza A PCR Not Detected     Influenza B PCR Not Detected     RSV, PCR Not Detected    Narrative:      Fact sheet for providers: https://www.fda.gov/media/630970/download    Fact sheet for patients: https://www.fda.gov/media/321830/download    Test performed by PCR.    Urinalysis With Microscopic If Indicated (No  Culture) - Urine, Clean Catch [[id number]]  (Normal) Collected: 08/15/24 1923    Specimen: Urine, Clean Catch Updated: 08/15/24 1939     Color, UA Yellow     Appearance, UA Clear     pH, UA 7.0     Specific Gravity, UA 1.015     Glucose, UA Negative     Ketones, UA Negative     Bilirubin, UA Negative     Blood, UA Negative     Protein, UA Negative     Leuk Esterase, UA Negative     Nitrite, UA Negative     Urobilinogen, UA 0.2 E.U./dL    Narrative:      Urine microscopic not indicated.    Blood Culture - Blood, Arm, Right [[id number]] Collected: 08/15/24 1750    Specimen: Blood from Arm, Right Updated: 08/15/24 1930    Comprehensive Metabolic Panel [[id number]]  (Abnormal) Collected: 08/15/24 1755    Specimen: Blood Updated: 08/15/24 1844     Glucose 115 mg/dL      BUN 14 mg/dL      Creatinine 0.52 mg/dL      Sodium 136 mmol/L      Potassium 3.3 mmol/L      Chloride 97 mmol/L      CO2 25.4 mmol/L      Calcium 9.4 mg/dL      Total Protein 6.9 g/dL      Albumin 3.4 g/dL      ALT (SGPT) 36 U/L      AST (SGOT) 39 U/L      Alkaline Phosphatase 62 U/L      Total Bilirubin 0.5 mg/dL      Globulin 3.5 gm/dL      A/G Ratio 1.0 g/dL      BUN/Creatinine Ratio 26.9     Anion Gap 13.6 mmol/L      eGFR 92.9 mL/min/1.73     Narrative:      GFR Normal >60  Chronic Kidney Disease <60  Kidney Failure <15    The GFR formula is only valid for adults with stable renal function between ages 18 and 70.    Procalcitonin [[id number]]  (Normal) Collected: 08/15/24 1755    Specimen: Blood Updated: 08/15/24 1830     Procalcitonin 0.08 ng/mL     BNP [[id number]]  (Abnormal) Collected: 08/15/24 1755    Specimen: Blood Updated: 08/15/24 1823     proBNP 2,187.0 pg/mL     Narrative:      This assay is used as an aid in the diagnosis of individuals suspected of having heart failure. It can be used as an aid in the diagnosis of acute decompensated heart failure (ADHF) in patients presenting with signs and symptoms of ADHF to the emergency department (ED).  In addition, NT-proBNP of <300 pg/mL indicates ADHF is not likely.    Age Range Result Interpretation  NT-proBNP Concentration (pg/mL:      <50             Positive            >450                   Gray                 300-450                    Negative             <300    50-75           Positive            >900                  Gray                300-900                  Negative            <300      >75             Positive            >1800                  Gray                300-1800                  Negative            <300    Lactic Acid, Plasma [[id number]]  (Normal) Collected: 08/15/24 1755    Specimen: Blood Updated: 08/15/24 1823     Lactate 0.9 mmol/L     Single High Sensitivity Troponin T [[id number]]  (Normal) Collected: 08/15/24 1755    Specimen: Blood Updated: 08/15/24 1821     HS Troponin T 12 ng/L     [place] Draw [[id number]] Collected: 08/15/24 1755    Specimen: Blood Updated: 08/15/24 1815    Narrative:      The following orders were created for panel order [place] Draw.  Procedure                               Abnormality         Status                     ---------                               -----------         ------                     Green Top (Gel)[[id number]]                                  Final result               Lavender Top[[id number]]                                     Final result               Gold Top - SST[[id number]]                                   Final result               Mar Top[[id number]]                                         Final result               Light Blue Top[[id number]]                                   Final result                 Please view results for these tests on the individual orders.    Green Top (Gel) [[id number]] Collected: 08/15/24 1755    Specimen: Blood Updated: 08/15/24 1815     Extra Tube Hold for add-ons.     Comment: Auto resulted.       Lavender Top [[id number]] Collected: 08/15/24 1755    Specimen: Blood Updated: 08/15/24 1815     Extra  Tube hold for add-on     Comment: Auto resulted       Gold Top - SST [[id number]] Collected: 08/15/24 1755    Specimen: Blood Updated: 08/15/24 1815     Extra Tube Hold for add-ons.     Comment: Auto resulted.       Gray Top [[id number]] Collected: 08/15/24 1755    Specimen: Blood Updated: 08/15/24 1815     Extra Tube Hold for add-ons.     Comment: Auto resulted.       Light Blue Top [[id number]] Collected: 08/15/24 1755    Specimen: Blood Updated: 08/15/24 1815     Extra Tube Hold for add-ons.     Comment: Auto resulted       Blood Gas, Arterial With Co-Ox [[id number]]  (Abnormal) Collected: 08/15/24 1813    Specimen: Arterial Blood Updated: 08/15/24 1813     Site Right Brachial     Isaiah's Test N/A     pH, Arterial 7.498 pH units      Comment: 83 Value above reference range        pCO2, Arterial 34.2 mm Hg      Comment: 84 Value below reference range        pO2, Arterial 51.5 mm Hg      Comment: 84 Value below reference range        HCO3, Arterial 26.5 mmol/L      Base Excess, Arterial 3.4 mmol/L      Hemoglobin, Blood Gas 11.2 g/dL      Comment: 84 Value below reference range        Hematocrit, Blood Gas 34.2 %      Oxyhemoglobin 86.6 %      Comment: 84 Value below reference range        Methemoglobin 0.70 %      Carboxyhemoglobin 0.8 %      CO2 Content 27.5 mmol/L      Temperature 37.0     Barometric Pressure for Blood Gas --     Comment: N/A        Modality Room Air     FIO2 21 %      Rate 0 Breaths/minute      PIP 0 cmH2O      Comment: Meter: [id number]     :  [id number]        IPAP 0     EPAP 0     pH, Temp Corrected 7.498 pH Units      pCO2, Temperature Corrected 34.2 mm Hg      pO2, Temperature Corrected 51.5 mm Hg     CBC & Differential [[id number]]  (Abnormal) Collected: 08/15/24 1755    Specimen: Blood Updated: 08/15/24 1812    Narrative:      The following orders were created for panel order CBC & Differential.  Procedure                               Abnormality         Status                      ---------                               -----------         ------                     CBC Auto Differential[[id number]]        Abnormal            Final result                 Please view results for these tests on the individual orders.    CBC Auto Differential [[id number]]  (Abnormal) Collected: 08/15/24 1755    Specimen: Blood Updated: 08/15/24 1812     WBC 10.21 10*3/mm3      RBC 3.86 10*6/mm3      Hemoglobin 11.0 g/dL      Hematocrit 33.3 %      MCV 86.3 fL      MCH 28.5 pg      MCHC 33.0 g/dL      RDW 13.7 %      RDW-SD 44.1 fl      MPV 9.9 fL      Platelets 347 10*3/mm3      Neutrophil % 77.3 %      Lymphocyte % 8.3 %      Monocyte % 9.8 %      Eosinophil % 4.1 %      Basophil % 0.3 %      Immature Grans % 0.2 %      Neutrophils, Absolute 7.89 10*3/mm3      Lymphocytes, Absolute 0.85 10*3/mm3      Monocytes, Absolute 1.00 10*3/mm3      Eosinophils, Absolute 0.42 10*3/mm3      Basophils, Absolute 0.03 10*3/mm3      Immature Grans, Absolute 0.02 10*3/mm3           Imaging Results (Last 24 Hours)       Procedure Component Value Units Date/Time    CT Angiogram Chest Pulmonary Embolism [[id number]] Collected: 08/15/24 2032     Updated: 08/15/24 2042    Narrative:      CT ANGIOGRAM CHEST PULMONARY EMBOLISM    Date of Exam: 8/15/2024 7:33 PM EDT    Indication: Dyspnea, hypoxia, fever, hx of breast cancer.    Comparison: Same day chest radiograph. Contrast-enhanced CT of the chest performed on June 30, 2024    Technique: Axial CT images were obtained of the chest after the uneventful intravenous administration of Isovue-370, 75 mL utilizing pulmonary embolism protocol.  Reconstructed coronal and sagittal images were also obtained. Automated exposure control   and iterative construction methods were used.      Findings:    Pulmonary arteries: Adequate opacification of the pulmonary arteries. No evidence of acute pulmonary embolism.    Thoracic aorta: The thoracic aorta is not opacified with contrast. Thoracic aorta is  normal in caliber and contour.    Cardiovascular: The heart is mildly enlarged. No evidence of pericardial effusion. Coronary calcifications are not visualized.    Thyroid: The visualized portion of the thyroid is unremarkable.    Lungs and Pleura: Extensive patchy bilateral airspace opacities are visualized, left greater than right. There is a moderate left-sided pleural effusion. No pneumothorax. Central airways are patent.    Mediastinum/Bee: No mediastinal or hilar lymphadenopathy.    Lymph nodes: No axillary or supraclavicular lymphadenopathy.    Bones and Soft Tissue: No acute fracture or aggressive lesions. There is levoscoliosis of the upper lumbar spine. There is evidence of diffuse skin thickening of the left breast. Surgical clips are visualized in the left breast.    Upper Abdomen: No acute process in the upper abdomen. Partial visualization of multiple hepatic cysts which are grossly stable in size when compared to prior CT. Left parapelvic renal cyst is visualized.        Impression:      Impression:    No evidence of pulmonary embolism.    Findings compatible with extensive multifocal pneumonia, left greater than right.    Moderate left pleural effusion.    Diffuse skin thickening of the left breast, of uncertain etiology. Clinically correlate.        Electronically Signed: Herbert Batres MD    8/15/2024 8:39 PM EDT    Workstation ID: [id number]    XR Chest 1 View [[id number]] Collected: 08/15/24 1809     Updated: 08/15/24 1815    Narrative:      XR CHEST 1 VW    Date of Exam: 8/15/2024 5:41 PM EDT    Indication: SOA triage protocol    Comparison: Chest radiograph 7/16/2024    Findings:  Multifocal airspace consolidation most significant in the left upper and lower lobes new from prior comparison. Blunted costophrenic angle on the left suggesting small effusion. Mild cardiomegaly. Mild increase interstitial opacities relative to prior   exam which could reflect component of superimposed edema. No  pneumothorax. Degenerative related osseous changes including probable full-thickness right rotator cuff tear. Chronic L1 compression fracture with associated curvature.      Impression:      Impression:  Multifocal pneumonia new from prior comparison. Small left pleural effusion as well as suspected mild superimposed interstitial edema noted.     Recommend radiographic follow-up to resolution typically performed in 6 to 8 weeks.      Electronically Signed: Dio Bauer MD    8/15/2024 6:11 PM EDT    Workstation ID: [id number]              Acute respiratory failure with hypoxia    Essential hypertension    Asthma    Seizure disorder    Mixed hyperlipidemia    Pneumonia      Assessment & Plan     83-year-old woman, non-smoker, undergoing lumpectomy and radiation for left breast cancer.  She completed radiation April 9, 2024.  CT of the chest abdomen and pelvis in June 2024 was negative for any metastatic disease.  She had some minimal scarring in her left upper lobe laterally from radiation and some apical scarring, bilaterally with a large calcified azygous node on the right.  She now presents with shortness of air, hypoxia and multifocal pneumonia, worse in the left lung with moderate left effusion.  No clinical history to suggest aspiration.  She has been sick now for 2 weeks so this could represent an atypical infection, or a pneumonia that was resistant to the doxycycline she took.  She did vaccinate with Prevnar 20 July 22.  I do not have a recent Tdap so pertussis is in the differential.  It should have been covered by doxycycline.  She does have mildly elevated eosinophils.  Eosinophils are typically suppressed with steroids so having 4.9% eosinophils after recent steroid taper is unusual.  This does bring up the possibility of an eosinophilic pneumonia.  She does not given a positive exposure history.  She has evidence of old granulomatous disease so reactivated histoplasmosis is also in the differential.   However she did have a fairly recent negative image making this a little less likely.  Need to always consider noninfectious etiologies such as vasculitis, less likely pulmonary hemorrhage as she has no hemoptysis, ABPA, eosinophilic pneumonia.  Risk of lymphangitic cancer is extremely low given her negative margins and negative lymph nodes with clear imaging June 2024.  Clinically she is on 5 L to maintain a saturation in the low to mid 90s.  ABG in the emergency room on room air revealed a pO2 in the 50s.  She does not hyperventilate.  She has no evidence of emphysema and is a lifetime non-smoker.    -Sed rate, ANCA, IgE level, Aspergillus serologies, histo, blasto urinary antigens, fungal serologies    -Zosyn, vancomycin, and azithromycin    -Thoracentesis if effusion enlarges    -Make n.p.o. after midnight on Sunday for potential bronchoscopy Monday    -Maintain oxygen saturation above 90%.        I discussed the patients findings and my recommendations with patient, her son and granddaughter    Jennifer Arce MD  08/16/24  12:03 EDT    Time: 55 minutes    Please note that portions of this note were completed with a voice recognition program.

## 2024-08-16 NOTE — THERAPY EVALUATION
Patient Name: Judi Patel  : 1940    MRN: [id number]                              Today's Date: 2024       Admit Date: 8/15/2024    Visit Dx:     ICD-10-CM ICD-9-CM   1. Acute hypoxic respiratory failure  J96.01 518.81   2. Multifocal pneumonia  J18.9 486     Patient Active Problem List   Diagnosis    Degenerative disc disease, lumbar    Scoliosis of lumbar spine    Menopause    Vaginal atrophy    Osteopenia    Midline cystocele    Essential hypertension    Asthma    Anxiety    Seizure disorder    Pinched nerve in neck    Abnormal chest x-ray    Acquired cystic kidney disease    Allergic rhinitis    Acute bronchitis    Acute sinusitis    Chronic infection of sinus    Eustachian tube disorder    Fatigue    Gastro-esophageal reflux disease without esophagitis    Hypertrophy of nasal turbinates    Muscle cramps    Moderate persistent asthma    Mixed hyperlipidemia    Low back pain    Incontinence    Tremor    Statin intolerance    Snoring    Renal colic    Otalgia    Neurological finding    Neck pain    Muscle weakness    Spasm of back muscles    Partial epilepsy with impairment of consciousness    Chest pain    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 breast in female, estrogen receptor negative    Dyslipidemia    Dyspnea on exertion    Acute respiratory failure with hypoxia    Pneumonia     Past Medical History:   Diagnosis Date    Anxiety     Asthma     Back problem     Breast cancer     Cancer Breast    Compression fracture of T12 vertebra     Cystocele     Epilepsy     Family history of hip fracture     mother, father    Hyperlipidemia     Hypertension     IBS (irritable bowel syndrome)     Menopause     Osteoporosis     Pinched nerve in neck     Seizure disorder     Vaginal atrophy     Vitamin D deficiency      Past Surgical History:   Procedure Laterality Date    ABDOMINAL SURGERY      APPENDECTOMY      BASAL CELL CARCINOMA EXCISION Right 2010    Nose    CHOLECYSTECTOMY  1991     CYST REMOVAL  01/01/1990    Right Foot    DILATATION AND CURETTAGE      FUNCTIONAL ENDOSCOPIC SINUS SURGERY  11/16/2010    Dr. Mathews    KNEE ARTHROSCOPY Left 01/01/2006    KNEE ARTHROSCOPY W/ MENISCAL REPAIR Right 11/01/2010    REPLACEMENT TOTAL KNEE      SINUS SURGERY      TOTAL ABDOMINAL HYSTERECTOMY WITH SALPINGO OOPHORECTOMY Bilateral     WRIST SURGERY Left 01/01/2004      General Information       Row Name 08/16/24 1052          Physical Therapy Time and Intention    Document Type evaluation  -KE     Mode of Treatment physical therapy  -       Row Name 08/16/24 1052          General Information    Patient Profile Reviewed yes  -KE     Prior Level of Function independent:;all household mobility;community mobility;ADL's;driving;using stairs;gait  does not use AD at baseline, has been using rollator for ~1 wk d/t increased weakness; endorses one recent fall; tub shower w/ seat  -KE     Existing Precautions/Restrictions fall;oxygen therapy device and L/min;other (see comments)  monitor O2  -KE     Barriers to Rehab medically complex;previous functional deficit  -KE       Row Name 08/16/24 1052          Living Environment    People in Home alone;other (see comments)  children live next door/ in neighborhood and can assist as needed  -KE       Row Name 08/16/24 1052          Home Main Entrance    Number of Stairs, Main Entrance two  -KE     Stair Railings, Main Entrance railings on both sides of stairs  -       Row Name 08/16/24 1052          Stairs Within Home, Primary    Number of Stairs, Within Home, Primary none  -       Row Name 08/16/24 1052          Cognition    Orientation Status (Cognition) oriented x 3  -KE       Row Name 08/16/24 1052          Safety Issues, Functional Mobility    Safety Issues Affecting Function (Mobility) awareness of need for assistance;safety precaution awareness;insight into deficits/self-awareness  -KE     Impairments Affecting Function (Mobility) balance;endurance/activity  tolerance;shortness of breath;strength  -KE               User Key  (r) = Recorded By, (t) = Taken By, (c) = Cosigned By      Initials Name Provider Type    Nhung Barnes PT Physical Therapist                   Mobility       Row Name 08/16/24 1055          Bed Mobility    Comment, (Bed Mobility) found standing in room w/ OT, left UIC  -KE       Row Name 08/16/24 1055          Transfers    Comment, (Transfers) SpO2 dropping to 87% lowest w/ mobility on 4L O2 NC, pt recovering to >90% w/ standing rest and VCs for PLB  -KE       Row Name 08/16/24 1055          Sit-Stand Transfer    Sit-Stand [place] (Transfers) contact guard  -KE     Assistive Device (Sit-Stand Transfers) walker, front-wheeled  -KE       Row Name 08/16/24 1055          Gait/Stairs (Locomotion)    [place] Level (Gait) contact guard;1 person assist;1 person to manage equipment  -KE     Assistive Device (Gait) walker, front-wheeled  -KE     Patient was able to Ambulate yes  -KE     Distance in Feet (Gait) 40  +40'  -KE     Deviations/Abnormal Patterns (Gait) bilateral deviations;gait speed decreased;stride length decreased;base of support, narrow  -KE     Bilateral Gait Deviations heel strike decreased  -KE     Comment, (Gait/Stairs) Pt demo step through gait pattern with short step length, decreased heel strike, and narrow RAMANA. Req 1 standing rest d/t SOA, VCs for PLB. VCs throughout for widening RAMANA and upright posturing. Further mobility limited by fatigue.  -KE               User Key  (r) = Recorded By, (t) = Taken By, (c) = Cosigned By      Initials Name Provider Type    Nhung Barnes PT Physical Therapist                   Obj/Interventions       Row Name 08/16/24 1058          Range of Motion Comprehensive    General Range of Motion bilateral lower extremity ROM WFL  -KE       Row Name 08/16/24 1058          Strength Comprehensive (MMT)    General Manual Muscle Testing (MMT) Assessment lower extremity strength deficits  identified  -KE     Comment, General Manual Muscle Testing (MMT) Assessment B LEs grossly 4/5  -KE       Row Name 08/16/24 1058          Balance    Balance Assessment sitting static balance;sitting dynamic balance;sit to stand dynamic balance;standing static balance;standing dynamic balance  -KE     Static Sitting Balance standby assist  -KE     Dynamic Sitting Balance contact guard  -KE     Position, Sitting Balance sitting in chair  -KE     Sit to Stand Dynamic Balance contact guard  -KE     Static Standing Balance contact guard  -KE     Dynamic Standing Balance contact guard  -KE     Position/Device Used, Standing Balance supported;walker, front-wheeled  -KE     Balance Interventions sitting;sit to stand;standing;supported;static;dynamic  -KE     Comment, Balance no overt LOB  -KE       Row Name 08/16/24 1058          Sensory Assessment (Somatosensory)    Sensory Assessment (Somatosensory) LE sensation intact  -KE               User Key  (r) = Recorded By, (t) = Taken By, (c) = Cosigned By      Initials Name Provider Type    Nhung Barnes, PT Physical Therapist                   Goals/Plan       Row Name 08/16/24 1102          Bed Mobility Goal 1 (PT)    Activity/Assistive Device (Bed Mobility Goal 1, PT) sit to supine/supine to sit  -KE     [place] Level/Cues Needed (Bed Mobility Goal 1, PT) independent  -KE     Time Frame (Bed Mobility Goal 1, PT) short term goal (STG);5 days  -KE     Progress/Outcomes (Bed Mobility Goal 1, PT) new goal  -       Row Name 08/16/24 1102          Transfer Goal 1 (PT)    Activity/Assistive Device (Transfer Goal 1, PT) sit-to-stand/stand-to-sit;bed-to-chair/chair-to-bed  -KE     [place] Level/Cues Needed (Transfer Goal 1, PT) independent  -KE     Time Frame (Transfer Goal 1, PT) long term goal (LTG);10 days  -KE     Progress/Outcome (Transfer Goal 1, PT) new goal  -       Row Name 08/16/24 1102          Gait Training Goal 1 (PT)    Activity/Assistive Device (Gait  Training Goal 1, PT) gait (walking locomotion);improve balance and speed;increase endurance/gait distance;decrease fall risk;other (see comments)  LRAD  -KE     [place] Level (Gait Training Goal 1, PT) independent  -KE     Time Frame (Gait Training Goal 1, PT) long term goal (LTG);10 days  -KE     Progress/Outcome (Gait Training Goal 1, PT) new goal  -KE       Row Name 08/16/24 1102          Stairs Goal 1 (PT)    Activity/Assistive Device (Stairs Goal 1, PT) stairs, all skills;using handrail, left;using handrail, right  -KE     [place] Level/Cues Needed (Stairs Goal 1, PT) standby assist  -KE     Number of Stairs (Stairs Goal 1, PT) 2  -KE     Time Frame (Stairs Goal 1, PT) long term goal (LTG);10 days  -KE     Progress/Outcome (Stairs Goal 1, PT) new goal  -KE       Row Name 08/16/24 1102          Therapy Assessment/Plan (PT)    Planned Therapy Interventions (PT) balance training;bed mobility training;home exercise program;gait training;patient/family education;postural re-education;transfer training;stretching;strengthening;stair training;ROM (range of motion)  -KE               User Key  (r) = Recorded By, (t) = Taken By, (c) = Cosigned By      Initials Name Provider Type    Nhung Barnes, PT Physical Therapist                   Clinical Impression       Row Name 08/16/24 1059          Pain    Pretreatment Pain Rating 0/10 - no pain  -KE     Posttreatment Pain Rating 0/10 - no pain  -KE     Pain Intervention(s) Ambulation/increased activity;Repositioned  -       Row Name 08/16/24 105          Plan of Care Review    Plan of Care Reviewed With patient;daughter  -     Outcome Evaluation PT eval complete. Pt presents with minor balance deficits, generalized weakness, MAYORGA, and decreased activity tolerance warranting skilled IP PT to facilitate return to PLOF. Pt amb 40'+40' w/ FWW, CGA +chair follow for safety. SpO2 dropping to 87% on 4L O2, recovering quickly w/ rest and VCs for PLB. PT rec home  w/ assist and OP PT when medically appropriate.  -KE       Row Name 08/16/24 1059          Therapy Assessment/Plan (PT)    Rehab Potential (PT) good, to achieve stated therapy goals  -KE     Criteria for Skilled Interventions Met (PT) yes;meets criteria;skilled treatment is necessary  -KE     Therapy Frequency (PT) daily  -KE     Predicted Duration of Therapy Intervention (PT) 10 days  -KE       Row Name 08/16/24 1059          Vital Signs    Pre Systolic BP Rehab 137  -KE     Pre Treatment Diastolic BP 71  -KE     Pre SpO2 (%) 93  -KE     O2 Delivery Pre Treatment supplemental O2  -KE     Intra SpO2 (%) 87  -KE     O2 Delivery Intra Treatment supplemental O2  -KE     Post SpO2 (%) 92  -KE     O2 Delivery Post Treatment supplemental O2  -KE     Pre Patient Position Standing  -KE     Intra Patient Position Standing  -KE     Post Patient Position Sitting  -KE       Row Name 08/16/24 1059          Positioning and Restraints    Pre-Treatment Position other (comment)  standing in room w/ OT  -KE     Post Treatment Position chair  -KE     In Chair notified nsg;reclined;waffle cushion;call light within reach;encouraged to call for assist;exit alarm on;with family/caregiver;legs elevated  -KE               User Key  (r) = Recorded By, (t) = Taken By, (c) = Cosigned By      Initials Name Provider Type    Nhung Barnes, PT Physical Therapist                   Outcome Measures       Row Name 08/16/24 1104 08/16/24 0138       How much help from another person do you currently need...    Turning from your back to your side while in flat bed without using bedrails? 4  -KE 3  -GH    Moving from lying on back to sitting on the side of a flat bed without bedrails? 3  -KE 3  -GH    Moving to and from a bed to a chair (including a wheelchair)? 3  -KE 3  -GH    Standing up from a chair using your arms (e.g., wheelchair, bedside chair)? 3  -KE 3  -GH    Climbing 3-5 steps with a railing? 2  -KE 2  -GH    To walk in hospital room? 3   -KE 3  -GH    AM-PAC 6 Clicks Score (PT) 18  -KE 17  -GH    Highest Level of Mobility Goal 6 --> Walk 10 steps or more  -KE 5 --> Static standing  -      Row Name 08/16/24 1104 08/16/24 1012       Functional Assessment    Outcome Measure Options AM-PAC 6 Clicks Basic Mobility (PT)  - AM-PAC 6 Clicks Daily Activity (OT)  -              User Key  (r) = Recorded By, (t) = Taken By, (c) = Cosigned By      Initials Name Provider Type    [facility] Bryanna Purcell, OT Occupational Therapist    Nhung Barnes, PT Physical Therapist    Mingo Hurley, RN Registered Nurse                                 Physical Therapy Education       Title: PT OT SLP Therapies (In Progress)       Topic: Physical Therapy (In Progress)       Point: Mobility training (In Progress)       Learning Progress Summary             Patient Acceptance, E, NR by  at 8/16/2024 0940                         Point: Home exercise program (Not Started)       Learner Progress:  Not documented in this visit.              Point: Body mechanics (In Progress)       Learning Progress Summary             Patient Acceptance, E, NR by  at 8/16/2024 0940                         Point: Precautions (In Progress)       Learning Progress Summary             Patient Acceptance, E, NR by  at 8/16/2024 0940                                         User Key       Initials Effective Dates Name Provider Type Discipline     11/16/23 -  Nhung Bowman, PT Physical Therapist PT                  PT Recommendation and Plan  Planned Therapy Interventions (PT): balance training, bed mobility training, home exercise program, gait training, patient/family education, postural re-education, transfer training, stretching, strengthening, stair training, ROM (range of motion)  Plan of Care Reviewed With: patient, daughter  Outcome Evaluation: PT eval complete. Pt presents with minor balance deficits, generalized weakness, [place]OR[place], and decreased activity tolerance warranting  skilled IP PT to facilitate return to PLOF. Pt amb 40'+40' w/ FWW, CGA +chair follow for safety. SpO2 dropping to 87% on 4L O2, recovering quickly w/ rest and VCs for PLB. PT rec home w/ assist and OP PT when medically appropriate.     Time Calculation:   PT Evaluation Complexity  History, PT Evaluation Complexity: 1-2 personal factors and/or comorbidities  Examination of Body Systems (PT Eval Complexity): total of 3 or more elements  Clinical Presentation (PT Evaluation Complexity): stable  Clinical Decision Making (PT Evaluation Complexity): low complexity  Overall Complexity (PT Evaluation Complexity): low complexity     PT Charges       Row Name 08/16/24 1105             Time Calculation    Start Time 0940  -KE      PT Received On 08/16/24  -KE      PT Goal Re-Cert Due Date 08/26/24  -KE         Untimed Charges    PT Eval/Re-eval Minutes 48  -KE         Total Minutes    Untimed Charges Total Minutes 48  -KE       Total Minutes 48  -KE                User Key  (r) = Recorded By, (t) = Taken By, (c) = Cosigned By      Initials Name Provider Type    Nhung Barnes PT Physical Therapist                  Therapy Charges for Today       Code Description Service Date Service Provider Modifiers Qty    [id number] HC PT EVAL LOW COMPLEXITY 4 8/16/2024 Nhung Bowman, PT GP 1            PT G-Codes  Outcome Measure Options: AM-PAC 6 Clicks Basic Mobility (PT)  AM-PAC 6 Clicks Score (PT): 18  AM-PAC 6 Clicks Score (OT): 17  PT Discharge Summary  Anticipated Discharge Disposition (PT): home with assist, home with outpatient therapy services    Nhung Bowman PT  8/16/2024

## 2024-08-16 NOTE — H&P
[facility]  HISTORY AND PHYSICAL    Patient Name: Judi Patel  : 1940  MRN: [id number]  Primary Care Physician: Jone Solorio MD  Date of admission: 8/15/2024    Subjective   Subjective     Chief Complaint:  Shortness of air    HPI:  Judi Patel is a 83 y.o. female with a history of Asthma, HTN, HLD, seizure disorder, breast cancer s/p lumpectomy with radiation 4 months ago, presented to the ED with complaints of shortness of air and cough.  Patient was diagnosed with pneumonia in the left lower lobe and was treated with 10 days of doxycycline.  Over the last 4 days she has been experiencing worsening dyspnea, generalized weakness, and fatigue.  She endorses fevers at home of 99.9.  She also reports chills, fatigue, wheezing, lower extremity edema, abdominal pain, nausea, vomiting, dizziness, lightheadedness, and generalized weakness.  Family states that 2 weeks ago patient was walking independently.  Over the last several days she is requiring assistance to get up and uses a walker for ambulation.  No chest pain, diarrhea, dysuria, headaches, focal weakness, or any other complaints at this time.    She was seen by her PCP today and due to worsening symptoms she was referred to the ED. upon arrival to the ED patient was found to be hypoxic with an O2 sat of 82%.  CT chest shows multifocal pneumonia.  Patient was was started on vancomycin and zosyn prior to transfer.  Patient was given 20 mg of IV Lasix at [facility] and has diuresed 1900 mL.   Patient is being admitted to the Hospitalist for further evaluation and management.        Review of Systems   Constitutional:  Positive for chills and fatigue. Negative for appetite change and fever.   HENT: Negative.     Eyes: Negative.    Respiratory:  Positive for cough (clear sputum), shortness of breath and wheezing.    Cardiovascular:  Positive for leg swelling. Negative for chest pain and palpitations.    Gastrointestinal:  Positive for abdominal pain, nausea and vomiting. Negative for constipation and diarrhea.   Endocrine: Negative.    Genitourinary: Negative.    Musculoskeletal:  Positive for back pain (chronic). Negative for myalgias.   Skin: Negative.    Allergic/Immunologic: Negative.    Neurological:  Positive for dizziness, weakness and light-headedness. Negative for headaches.   Hematological: Negative.    Psychiatric/Behavioral: Negative.                  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,   estrogen receptor negative (01/25/2024; Status: Active)    Oncology/Hematology History   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 breast in female, estrogen receptor negative   1/25/2024 Initial Diagnosis    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 breast in female, estrogen receptor negative     1/25/2024 Cancer Staged    Staging form: Breast, AJCC 8th Edition  - Clinical: Stage IB (cT1b, cN0, cM0, G2, ER-, NJ-, HER2-) - Signed by Melany Victoria MD on 1/25/2024     3/20/2024 - 4/9/2024 Radiation    Radiation OncologyTreatment Course:  Judi Patel received 4005 cGy in 15 fractions to left breast via External Beam Radiation - EBRT.         Past Surgical History:   Procedure Laterality Date    ABDOMINAL SURGERY      APPENDECTOMY      BASAL CELL CARCINOMA EXCISION Right 01/12/2010    Nose    CHOLECYSTECTOMY  01/01/1991    CYST REMOVAL  01/01/1990    Right Foot    DILATATION AND CURETTAGE      FUNCTIONAL  ENDOSCOPIC SINUS SURGERY  11/16/2010    Dr. Mathews    KNEE ARTHROSCOPY Left 01/01/2006    KNEE ARTHROSCOPY W/ MENISCAL REPAIR Right 11/01/2010    REPLACEMENT TOTAL KNEE      SINUS SURGERY      TOTAL ABDOMINAL HYSTERECTOMY WITH SALPINGO OOPHORECTOMY Bilateral     WRIST SURGERY Left 01/01/2004       Family History:  family history includes Alzheimer's disease in her mother; Breast cancer (age of onset: 53) in her sister; Fibromyalgia in her daughter; Hypothyroidism in her daughter; Parkinsonism in her father; Thyroid cancer in her daughter.     Social History:  reports that she has never smoked. She has never used smokeless tobacco. She reports that she does not drink alcohol and does not use drugs.  Social History     Social History Narrative    Not on file       Medications:  Calcium Citrate-Vitamin D, Fiber, Probiotic Product, albuterol sulfate HFA, amLODIPine, azelastine, cephalexin, denosumab, ezetimibe, fluticasone-salmeterol, guaiFENesin, lamoTRIgine, losartan, metoprolol tartrate, omeprazole, and sertraline    Allergies   Allergen Reactions    Oxycodone-Acetaminophen Nausea And Vomiting    Sulfa Antibiotics Hives    Sulfamethoxazole-Trimethoprim Other (See Comments)       Objective   Objective     Vital Signs:   Temp:  [98.3 °F (36.8 °C)-99.5 °F (37.5 °C)] 98.3 °F (36.8 °C)  Heart Rate:  [] 73  Resp:  [16-28] 16  BP: (113-167)/() 117/62  Flow (L/min):  [3-5] 4.5      Constitutional: Awake, alert, resting in bed, family at bedside  Eyes: PERRLA, sclerae anicteric, no conjunctival injection  HENT: NCAT, mucous membranes moist  Neck: Supple, no thyromegaly, no lymphadenopathy, trachea midline  Respiratory: Coarse throughout with crackles on the left, diminished in the bases, labored respirations, weak nonproductive cough during exam  Cardiovascular: RRR, no murmurs, rubs, or gallops, palpable pedal pulses bilaterally  Gastrointestinal: Positive bowel sounds, soft, nontender,  nondistended  Musculoskeletal:  1+ BLE edema, no clubbing or cyanosis to extremities  Psychiatric: Appropriate affect, cooperative  Neurologic: Oriented x 3, strength symmetric in all extremities, Cranial Nerves grossly intact to confrontation, speech clear  Skin: No rashes       Result Review:  I have personally reviewed the results from the time of this admission to 8/16/2024 03:09 EDT and agree with these findings:  [x]  Laboratory list / accordion  [x]  Microbiology  [x]  Radiology  [x]  EKG/Telemetry   []  Cardiology/Vascular   []  Pathology  [x]  Old records  []  Other:  Most notable findings include:     LAB RESULTS:      Lab 08/15/24  1755   WBC 10.21   HEMOGLOBIN 11.0*   HEMATOCRIT 33.3*   PLATELETS 347   NEUTROS ABS 7.89*   IMMATURE GRANS (ABS) 0.02   LYMPHS ABS 0.85   MONOS ABS 1.00*   EOS ABS 0.42*   MCV 86.3   PROCALCITONIN 0.08   LACTATE 0.9         Lab 08/15/24  1755   SODIUM 136   POTASSIUM 3.3*   CHLORIDE 97*   CO2 25.4   ANION GAP 13.6   BUN 14   CREATININE 0.52*   EGFR 92.9   GLUCOSE 115*   CALCIUM 9.4         Lab 08/15/24  1755   TOTAL PROTEIN 6.9   ALBUMIN 3.4*   GLOBULIN 3.5   ALT (SGPT) 36*   AST (SGOT) 39*   BILIRUBIN 0.5   ALK PHOS 62         Lab 08/15/24  1755   PROBNP 2,187.0*   HSTROP T 12                 Lab 08/15/24  1813   PH, ARTERIAL 7.498*   PCO2, ARTERIAL 34.2*   PO2 ART 51.5*   FIO2 21   HCO3 ART 26.5*   BASE EXCESS ART 3.4*   CARBOXYHEMOGLOBIN 0.8     Brief Urine Lab Results  (Last result in the past 365 days)        Color   Clarity   Blood   Leuk Est   Nitrite   Protein   CREAT   Urine HCG        08/15/24 1923 Yellow   Clear   Negative   Negative   Negative   Negative                 Microbiology Results (last 10 days)       Procedure Component Value - Date/Time    COVID-19, FLU A/B, RSV PCR 1 HR TAT - Swab, Nasopharynx [[id number]]  (Normal) Collected: 08/15/24 1923    Lab Status: Final result Specimen: Swab from Nasopharynx Updated: 08/15/24 2013     COVID19 Not Detected      Influenza A PCR Not Detected     Influenza B PCR Not Detected     RSV, PCR Not Detected    Narrative:      Fact sheet for providers: https://www.fda.gov/media/291394/download    Fact sheet for patients: https://www.fda.gov/media/744253/download    Test performed by PCR.            CT Angiogram Chest Pulmonary Embolism    Result Date: 8/15/2024  CT ANGIOGRAM CHEST PULMONARY EMBOLISM Date of Exam: 8/15/2024 7:33 PM EDT Indication: Dyspnea, hypoxia, fever, hx of breast cancer. Comparison: Same day chest radiograph. Contrast-enhanced CT of the chest performed on June 30, 2024 Technique: Axial CT images were obtained of the chest after the uneventful intravenous administration of Isovue-370, 75 mL utilizing pulmonary embolism protocol.  Reconstructed coronal and sagittal images were also obtained. Automated exposure control and iterative construction methods were used. Findings: Pulmonary arteries: Adequate opacification of the pulmonary arteries. No evidence of acute pulmonary embolism. Thoracic aorta: The thoracic aorta is not opacified with contrast. Thoracic aorta is normal in caliber and contour. Cardiovascular: The heart is mildly enlarged. No evidence of pericardial effusion. Coronary calcifications are not visualized. Thyroid: The visualized portion of the thyroid is unremarkable. Lungs and Pleura: Extensive patchy bilateral airspace opacities are visualized, left greater than right. There is a moderate left-sided pleural effusion. No pneumothorax. Central airways are patent. Mediastinum/Bee: No mediastinal or hilar lymphadenopathy. Lymph nodes: No axillary or supraclavicular lymphadenopathy. Bones and Soft Tissue: No acute fracture or aggressive lesions. There is levoscoliosis of the upper lumbar spine. There is evidence of diffuse skin thickening of the left breast. Surgical clips are visualized in the left breast. Upper Abdomen: No acute process in the upper abdomen. Partial visualization of multiple hepatic  cysts which are grossly stable in size when compared to prior CT. Left parapelvic renal cyst is visualized.     Impression: Impression: No evidence of pulmonary embolism. Findings compatible with extensive multifocal pneumonia, left greater than right. Moderate left pleural effusion. Diffuse skin thickening of the left breast, of uncertain etiology. Clinically correlate. Electronically Signed: Herbert Batres MD  8/15/2024 8:39 PM EDT  Workstation ID: [id number]    XR Chest 1 View    Result Date: 8/15/2024  XR CHEST 1 VW Date of Exam: 8/15/2024 5:41 PM EDT Indication: SOA triage protocol Comparison: Chest radiograph 7/16/2024 Findings: Multifocal airspace consolidation most significant in the left upper and lower lobes new from prior comparison. Blunted costophrenic angle on the left suggesting small effusion. Mild cardiomegaly. Mild increase interstitial opacities relative to prior exam which could reflect component of superimposed edema. No pneumothorax. Degenerative related osseous changes including probable full-thickness right rotator cuff tear. Chronic L1 compression fracture with associated curvature.     Impression: Impression: Multifocal pneumonia new from prior comparison. Small left pleural effusion as well as suspected mild superimposed interstitial edema noted. Recommend radiographic follow-up to resolution typically performed in 6 to 8 weeks. Electronically Signed: Dio Bauer MD  8/15/2024 6:11 PM EDT  Workstation ID: [id number]     Results for orders placed during the hospital encounter of 07/15/24    Adult Transthoracic Echo Complete w/ Color, Spectral and Contrast if necessary per protocol    Interpretation Summary    Left ventricular systolic function is normal. Calculated left ventricular EF = 62% Left ventricular ejection fraction appears to be 61 - 65%.    Left ventricular wall thickness is consistent with mild concentric hypertrophy.    The right atrial cavity is borderline  dilated.      Assessment & Plan   Assessment & Plan       Acute respiratory failure with hypoxia    Essential hypertension    Asthma    Seizure disorder    Mixed hyperlipidemia    Pneumonia    Judi Patel is a 83 y.o. female with a history of Asthma, HTN, HLD, seizure disorder, breast cancer s/p lumpectomy with radiation 4 months ago, presented to the ED with complaints of shortness of air and cough.  CT chest consistent with multifocal pneumonia.    Assessment and Plan:    Acute respiratory failure with hypoxia  Pneumonia  Left pleural effusion  COPD  -- ABG:  pH 7.498, pCO2 34.2, pO2 51.5, HCO3 26.5  -- CTA chest extensive multifocal pneumonia, left greater than right.  Moderate left pleural effusion  -- was given Lasix 20 mg IV x 1 dose at [facility]  -- Covid 19 and Flu A/B negative  -- follows with Dr. Farrell with pulmonary  -- Vancomycin and Zosyn  -- BC x 2  -- Sputum culture  -- Urine Legionella and S.  Pneumo Ag  -- Continuous pulse ox with supplemental oxygen as needed  -- DuoNebs scheduled and as needed  -- AM labs    Elevated LFT's   -- ast 39, alt 36    Hypokalemia  -- K+ 3.3  -- replace per protocol  -- bmp in the am    HTN  HLD  -- continue norvasc, losartan, propranolol    Seizure  -- lamictal    Generalized weakness  -- fall precautions  -- pt/ot consult  -- consult case management    DVT prophylaxis:  Heparin    CODE STATUS:    Level Of Support Discussed With: Patient  Code Status (Patient has no pulse and is not breathing): CPR (Attempt to Resuscitate)  Medical Interventions (Patient has pulse or is breathing): Full Support      Expected Discharge  TBD  Expected discharge date/ time has not been documented.      This note has been completed as part of a split-shared workflow.     Signature: Electronically signed by BRITTANY Hough, 08/16/24, 2:59 AM EDT.          Attending   Admission Attestation       I have performed an independent face-to-face diagnostic evaluation including performing  an independent physical examination.  I approve of the documented plan of care above that was reviewed and developed with the advanced practice clinician (APC) and take responsibility for that plan along with its associated risks.  I have updated the HPI as appropriate.    Brief HPI    83-year-old female presents with worsening shortness of breath and malaise after being treated outpatient for pneumonia.  Requiring 5 L of oxygen nasal cannula on arrival to our facility.  Prior to transfer send vancomycin and Zosyn and received 1 dose of Lasix.  States that she is feeling improved since being brought to the emergency room but still far from baseline.    Attending Physical Exam:  Temp:  [98.3 °F (36.8 °C)-99.5 °F (37.5 °C)] 98.3 °F (36.8 °C)  Heart Rate:  [] 73  Resp:  [16-28] 16  BP: (113-167)/() 117/62  Flow (L/min):  [3-5] 4.5    Age-appropriate, no acute distress  Respirations diminished  Coarse breath sounds bilaterally  Abdomen soft nontender nontender (document medically appropriate physical exam performed)    Result Review:  I have personally reviewed the results from the time of this admission to 8/16/2024 03:25 EDT and agree with these findings:  [x]  Laboratory list / accordion  [x]  Microbiology  [x]  Radiology  []  EKG/Telemetry   []  Cardiology/Vascular   []  Pathology  []  Old records  []  Other:  Most notable findings include: CT chest with multifocal pneumonia    Assessment and Plan:    See assessment and plan documented by APC above and updated/edited by me as appropriate.    Dio Pinto Jr, MD  08/16/24

## 2024-08-16 NOTE — FSED PROVIDER NOTE
Subjective  History of Present Illness:    This is a 83-year-old female with past medical history of breast cancer.  She is status postlumpectomy with radiation 4 months ago.  Surgeon was Dr. iVctoria.  Oncologist is Dr. Campo.  Patient states that she was seen by her primary care provider 10 days ago for cough and shortness of breath.  She was found to have pneumonia in the left lower lobe and was placed on 10 days of doxycycline.  Per her daughter over the past 4 days she has had worsening shortness of breath, generalized weakness and fatigue along with exertional dyspnea.  Tmax at home has been 99.9.  She has completed her doxycycline course.  She was seen by her primary care provider today who recommended she come here for evaluation due to worsening symptoms.  Patient has a history of asthma and was recently diagnosed with COPD but has never smoked.  This was per pulmonary function test with pulmonology.  She denies any diarrhea.  No nausea or vomiting.  She does not use oxygen at home.    Nurses Notes reviewed and agree, including vitals, allergies, social history and prior medical history.     REVIEW OF SYSTEMS: All systems reviewed and not pertinent unless noted.  Review of Systems   Constitutional:  Positive for chills and fever.   Respiratory:  Positive for cough and shortness of breath.    All other systems reviewed and are negative.      Past Medical History:   Diagnosis Date    Anxiety     Asthma     Back problem     Breast cancer     Cancer Breast    Compression fracture of T12 vertebra     Cystocele     Epilepsy     Family history of hip fracture     mother, father    Hyperlipidemia     Hypertension     IBS (irritable bowel syndrome)     Menopause     Osteoporosis     Pinched nerve in neck     Seizure disorder     Vaginal atrophy     Vitamin D deficiency        Allergies:    Oxycodone-acetaminophen, Sulfa antibiotics, and Sulfamethoxazole-trimethoprim      Past Surgical History:   Procedure Laterality  "Date    ABDOMINAL SURGERY      APPENDECTOMY      BASAL CELL CARCINOMA EXCISION Right 01/12/2010    Nose    CHOLECYSTECTOMY  01/01/1991    CYST REMOVAL  01/01/1990    Right Foot    DILATATION AND CURETTAGE      FUNCTIONAL ENDOSCOPIC SINUS SURGERY  11/16/2010    Dr. Mathews    KNEE ARTHROSCOPY Left 01/01/2006    KNEE ARTHROSCOPY W/ MENISCAL REPAIR Right 11/01/2010    REPLACEMENT TOTAL KNEE      SINUS SURGERY      TOTAL ABDOMINAL HYSTERECTOMY WITH SALPINGO OOPHORECTOMY Bilateral     WRIST SURGERY Left 01/01/2004         Social History     Socioeconomic History    Marital status:    Tobacco Use    Smoking status: Never    Smokeless tobacco: Never   Vaping Use    Vaping status: Never Used   Substance and Sexual Activity    Alcohol use: No    Drug use: No    Sexual activity: Not Currently     Partners: Female, Male     Birth control/protection: Post-menopausal, Surgical         Family History   Problem Relation Age of Onset    Alzheimer's disease Mother     Parkinsonism Father     Breast cancer Sister 53    Fibromyalgia Daughter     Thyroid cancer Daughter     Hypothyroidism Daughter     Ovarian cancer Neg Hx        Objective  Physical Exam:  /97   Pulse 84   Temp 98.8 °F (37.1 °C) (Oral)   Resp 28   Ht 170.2 cm (67\")   Wt 81.2 kg (179 lb 0.2 oz)   LMP  (LMP Unknown)   SpO2 92%   BMI 28.04 kg/m²      Physical Exam  Vitals and nursing note reviewed.   Constitutional:       Comments: Ill appearing   HENT:      Head: Normocephalic and atraumatic.   Cardiovascular:      Rate and Rhythm: Regular rhythm. Tachycardia present.   Neurological:      Mental Status: She is alert.         Critical Care    Performed by: Angie Yao PA-C  Authorized by: Rc Moreno MD    Critical care provider statement:     Critical care time (minutes):  30    Critical care time was exclusive of:  Separately billable procedures and treating other patients    Critical care was necessary to treat or prevent imminent or " life-threatening deterioration of the following conditions:  Respiratory failure    Critical care was time spent personally by me on the following activities:  Development of treatment plan with patient or surrogate, ordering and performing treatments and interventions, ordering and review of laboratory studies, ordering and review of radiographic studies, pulse oximetry, re-evaluation of patient's condition and evaluation of patient's response to treatment    Care discussed with: accepting provider at another facility    Comments:      Dr. Todd, [facility]      ED Course:    ED Course as of 08/15/24 2141   Thu Aug 15, 2024   1813 pCO2, Arterial(!): 34.2 [EM]   1813 pO2, Arterial(!): 51.5 [EM]      ED Course User Index  [EM] Angie Yao PA-C       Lab Results (last 24 hours)       Procedure Component Value Units Date/Time    Blood Culture - Blood, Arm, Right [[id number]] Collected: 08/15/24 1750    Specimen: Blood from Arm, Right Updated: 08/15/24 1930    CBC & Differential [[id number]]  (Abnormal) Collected: 08/15/24 1755    Specimen: Blood Updated: 08/15/24 1812    Narrative:      The following orders were created for panel order CBC & Differential.  Procedure                               Abnormality         Status                     ---------                               -----------         ------                     CBC Auto Differential[[id number]]        Abnormal            Final result                 Please view results for these tests on the individual orders.    Comprehensive Metabolic Panel [[id number]]  (Abnormal) Collected: 08/15/24 1755    Specimen: Blood Updated: 08/15/24 1844     Glucose 115 mg/dL      BUN 14 mg/dL      Creatinine 0.52 mg/dL      Sodium 136 mmol/L      Potassium 3.3 mmol/L      Chloride 97 mmol/L      CO2 25.4 mmol/L      Calcium 9.4 mg/dL      Total Protein 6.9 g/dL      Albumin 3.4 g/dL      ALT (SGPT) 36 U/L      AST (SGOT) 39 U/L      Alkaline Phosphatase 62 U/L       Total Bilirubin 0.5 mg/dL      Globulin 3.5 gm/dL      A/G Ratio 1.0 g/dL      BUN/Creatinine Ratio 26.9     Anion Gap 13.6 mmol/L      eGFR 92.9 mL/min/1.73     Narrative:      GFR Normal >60  Chronic Kidney Disease <60  Kidney Failure <15    The GFR formula is only valid for adults with stable renal function between ages 18 and 70.    BNP [[id number]]  (Abnormal) Collected: 08/15/24 1755    Specimen: Blood Updated: 08/15/24 1823     proBNP 2,187.0 pg/mL     Narrative:      This assay is used as an aid in the diagnosis of individuals suspected of having heart failure. It can be used as an aid in the diagnosis of acute decompensated heart failure (ADHF) in patients presenting with signs and symptoms of ADHF to the emergency department (ED). In addition, NT-proBNP of <300 pg/mL indicates ADHF is not likely.    Age Range Result Interpretation  NT-proBNP Concentration (pg/mL:      <50             Positive            >450                   Gray                 300-450                    Negative             <300    50-75           Positive            >900                  Gray                300-900                  Negative            <300      >75             Positive            >1800                  Gray                300-1800                  Negative            <300    Single High Sensitivity Troponin T [[id number]]  (Normal) Collected: 08/15/24 1755    Specimen: Blood Updated: 08/15/24 1821     HS Troponin T 12 ng/L     CBC Auto Differential [[id number]]  (Abnormal) Collected: 08/15/24 1755    Specimen: Blood Updated: 08/15/24 1812     WBC 10.21 10*3/mm3      RBC 3.86 10*6/mm3      Hemoglobin 11.0 g/dL      Hematocrit 33.3 %      MCV 86.3 fL      MCH 28.5 pg      MCHC 33.0 g/dL      RDW 13.7 %      RDW-SD 44.1 fl      MPV 9.9 fL      Platelets 347 10*3/mm3      Neutrophil % 77.3 %      Lymphocyte % 8.3 %      Monocyte % 9.8 %      Eosinophil % 4.1 %      Basophil % 0.3 %      Immature Grans % 0.2 %       Neutrophils, Absolute 7.89 10*3/mm3      Lymphocytes, Absolute 0.85 10*3/mm3      Monocytes, Absolute 1.00 10*3/mm3      Eosinophils, Absolute 0.42 10*3/mm3      Basophils, Absolute 0.03 10*3/mm3      Immature Grans, Absolute 0.02 10*3/mm3     Procalcitonin [[id number]]  (Normal) Collected: 08/15/24 1755    Specimen: Blood Updated: 08/15/24 1830     Procalcitonin 0.08 ng/mL     Lactic Acid, Plasma [[id number]]  (Normal) Collected: 08/15/24 1755    Specimen: Blood Updated: 08/15/24 1823     Lactate 0.9 mmol/L     Blood Gas, Arterial With Co-Ox [[id number]]  (Abnormal) Collected: 08/15/24 1813    Specimen: Arterial Blood Updated: 08/15/24 1813     Site Right Brachial     Isaiah's Test N/A     pH, Arterial 7.498 pH units      Comment: 83 Value above reference range        pCO2, Arterial 34.2 mm Hg      Comment: 84 Value below reference range        pO2, Arterial 51.5 mm Hg      Comment: 84 Value below reference range        HCO3, Arterial 26.5 mmol/L      Base Excess, Arterial 3.4 mmol/L      Hemoglobin, Blood Gas 11.2 g/dL      Comment: 84 Value below reference range        Hematocrit, Blood Gas 34.2 %      Oxyhemoglobin 86.6 %      Comment: 84 Value below reference range        Methemoglobin 0.70 %      Carboxyhemoglobin 0.8 %      CO2 Content 27.5 mmol/L      Temperature 37.0     Barometric Pressure for Blood Gas --     Comment: N/A        Modality Room Air     FIO2 21 %      Rate 0 Breaths/minute      PIP 0 cmH2O      Comment: Meter: [id number]     :  [id number]        IPAP 0     EPAP 0     pH, Temp Corrected 7.498 pH Units      pCO2, Temperature Corrected 34.2 mm Hg      pO2, Temperature Corrected 51.5 mm Hg     Urinalysis With Microscopic If Indicated (No Culture) - Urine, Clean Catch [[id number]]  (Normal) Collected: 08/15/24 1923    Specimen: Urine, Clean Catch Updated: 08/15/24 1939     Color, UA Yellow     Appearance, UA Clear     pH, UA 7.0     Specific Gravity, UA 1.015     Glucose, UA Negative      Ketones, UA Negative     Bilirubin, UA Negative     Blood, UA Negative     Protein, UA Negative     Leuk Esterase, UA Negative     Nitrite, UA Negative     Urobilinogen, UA 0.2 E.U./dL    Narrative:      Urine microscopic not indicated.    COVID-19, FLU A/B, RSV PCR 1 HR TAT - Swab, Nasopharynx [[id number]]  (Normal) Collected: 08/15/24 1923    Specimen: Swab from Nasopharynx Updated: 08/15/24 2013     COVID19 Not Detected     Influenza A PCR Not Detected     Influenza B PCR Not Detected     RSV, PCR Not Detected    Narrative:      Fact sheet for providers: https://www.fda.gov/media/398678/download    Fact sheet for patients: https://www.????.gov/media/217419/download    Test performed by PCR.             CT Angiogram Chest Pulmonary Embolism    Result Date: 8/15/2024  CT ANGIOGRAM CHEST PULMONARY EMBOLISM Date of Exam: 8/15/2024 7:33 PM EDT Indication: Dyspnea, hypoxia, fever, hx of breast cancer. Comparison: Same day chest radiograph. Contrast-enhanced CT of the chest performed on June 30, 2024 Technique: Axial CT images were obtained of the chest after the uneventful intravenous administration of Isovue-370, 75 mL utilizing pulmonary embolism protocol.  Reconstructed coronal and sagittal images were also obtained. Automated exposure control and iterative construction methods were used. Findings: Pulmonary arteries: Adequate opacification of the pulmonary arteries. No evidence of acute pulmonary embolism. Thoracic aorta: The thoracic aorta is not opacified with contrast. Thoracic aorta is normal in caliber and contour. Cardiovascular: The heart is mildly enlarged. No evidence of pericardial effusion. Coronary calcifications are not visualized. Thyroid: The visualized portion of the thyroid is unremarkable. Lungs and Pleura: Extensive patchy bilateral airspace opacities are visualized, left greater than right. There is a moderate left-sided pleural effusion. No pneumothorax. Central airways are patent.  Mediastinum/Bee: No mediastinal or hilar lymphadenopathy. Lymph nodes: No axillary or supraclavicular lymphadenopathy. Bones and Soft Tissue: No acute fracture or aggressive lesions. There is levoscoliosis of the upper lumbar spine. There is evidence of diffuse skin thickening of the left breast. Surgical clips are visualized in the left breast. Upper Abdomen: No acute process in the upper abdomen. Partial visualization of multiple hepatic cysts which are grossly stable in size when compared to prior CT. Left parapelvic renal cyst is visualized.     Impression: Impression: No evidence of pulmonary embolism. Findings compatible with extensive multifocal pneumonia, left greater than right. Moderate left pleural effusion. Diffuse skin thickening of the left breast, of uncertain etiology. Clinically correlate. Electronically Signed: Herbert Batres MD  8/15/2024 8:39 PM EDT  Workstation ID: [id number]    XR Chest 1 View    Result Date: 8/15/2024  XR CHEST 1 VW Date of Exam: 8/15/2024 5:41 PM EDT Indication: SOA triage protocol Comparison: Chest radiograph 7/16/2024 Findings: Multifocal airspace consolidation most significant in the left upper and lower lobes new from prior comparison. Blunted costophrenic angle on the left suggesting small effusion. Mild cardiomegaly. Mild increase interstitial opacities relative to prior exam which could reflect component of superimposed edema. No pneumothorax. Degenerative related osseous changes including probable full-thickness right rotator cuff tear. Chronic L1 compression fracture with associated curvature.     Impression: Impression: Multifocal pneumonia new from prior comparison. Small left pleural effusion as well as suspected mild superimposed interstitial edema noted. Recommend radiographic follow-up to resolution typically performed in 6 to 8 weeks. Electronically Signed: Dio Bauer MD  8/15/2024 6:11 PM EDT  Workstation ID: [id number]        MDM     Amount and/or  Complexity of Data Reviewed  Clinical lab tests: reviewed  Tests in the radiology section of CPT®: reviewed  Tests in the medicine section of CPT®: reviewed        Initial impression of presenting illness: Presents with complaints of cough and shortness of breath.  Patient found to be hypoxic at 82%.  She was placed on oxygen with improvement.  She was started on sepsis fluids and vancomycin and Zosyn for concern for sepsis.  Patient's laboratories also revealed a normal procalcitonin and lactic acid.  White blood cell count was normal.  CT scan and chest x-ray showed multifocal pneumonia which is worse from previous.  Patient ultimately requiring 5 L of oxygen to maintain oxygen saturations greater than 90.  pO2 of 51 on ABG initially.  Due to her acute hypoxic respiratory failure and worsening multifocal pneumonia in the setting of failed outpatient treatment Case was discussed with Dr. Todd, hospitalist with [facility] in [place] who agreed to accept the patient for admission.    DDX: includes but is not limited to: Pneumonia, pulmonary embolism, postobstructive pneumonia, sepsis, COPD exacerbation      Medications   sodium chloride 0.9 % flush 10 mL (has no administration in time range)   sodium chloride 0.9 % bolus 2,436 mL (0 mL Intravenous Stopped 8/15/24 2118)   vancomycin IVPB 1500 mg in 0.9% NaCl (Premix) 500 mL (0 mg Intravenous Stopped 8/15/24 2118)   piperacillin-tazobactam (ZOSYN) 4.5 g IVPB in 100 mL NS MBP (CD) (0 g Intravenous Stopped 8/15/24 1907)   iopamidol (ISOVUE-370) 76 % injection 100 mL (75 mL Intravenous Given 8/15/24 1937)   furosemide (LASIX) injection 20 mg (20 mg Intravenous Given 8/15/24 2112)           Data interpreted: Nursing notes reviewed, vital signs reviewed.  Labs independently interpreted by me (CBC, CMP, lipase, UA, troponin, ABG, lactic acid, procalcitonin).  Imaging independently interpreted by me (x-ray, CT scan).  EKG independently interpreted by me.  O2  saturation:    Counseling: Discussed the results above with the patient regarding need for admission or discharge.  Patient understands and agrees plan of care.      -----  ED Disposition       ED Disposition   Decision to Admit    Condition   --    Comment   Level of Care: Telemetry [5]   Accepting Provider:: EARLE HENDRIX III [[id number]]               Final diagnoses:   Acute hypoxic respiratory failure   Multifocal pneumonia     Your Follow-Up Providers    Follow-up information has not been specified.       Contact information for after-discharge care    Follow-up information has not been specified.          Your medication list        CHANGE how you take these medications        Instructions Last Dose Given Next Dose Due   losartan 25 MG tablet  Commonly known as: Cozaar  What changed: how much to take      Take 1 tablet by mouth Daily.              CONTINUE taking these medications        Instructions Last Dose Given Next Dose Due   albuterol sulfate  (90 Base) MCG/ACT inhaler  Commonly known as: PROVENTIL HFA;VENTOLIN HFA;PROAIR HFA      As Needed for Shortness of Air or Wheezing.       ALIGN PO      Take 1 capsule by mouth Daily.       amLODIPine 5 MG tablet  Commonly known as: NORVASC      Take 1 tablet by mouth Daily.       azelastine 0.1 % nasal spray  Commonly known as: ASTELIN      1 spray into the nostril(s) as directed by provider.       Calcium Citrate-Vitamin D 315-5 MG-MCG per tablet  Commonly known as: CITRACAL+D      Citracal plus D 315 mg-5 mcg (200 unit) tablet       cephalexin 500 MG capsule  Commonly known as: KEFLEX      Take 1 capsule by mouth 2 (Two) Times a Day.       denosumab 60 MG/ML solution prefilled syringe syringe  Commonly known as: PROLIA      Inject 1 mL as directed Every 6 (Six) Months.       ezetimibe 10 MG tablet  Commonly known as: ZETIA      Take 1 tablet by mouth Daily.       FIBER COMPLETE PO      Take 1 capsule by mouth Daily.       fluticasone-salmeterol  100-50 MCG/DOSE DISKUS  Commonly known as: ADVAIR      Inhale 1 puff 2 (Two) Times a Day.       guaiFENesin 600 MG 12 hr tablet  Commonly known as: MUCINEX      Take 1 tablet by mouth Daily.       lamoTRIgine 100 MG tablet  Commonly known as: LaMICtal      Take 1 tablet by mouth 2 (two) times a day.       metoprolol tartrate 50 MG tablet  Commonly known as: LOPRESSOR      Take 1 tablet by mouth 2 (Two) Times a Day.       omeprazole 40 MG capsule  Commonly known as: priLOSEC      Take 1 capsule by mouth As Needed. Twice a day       sertraline 100 MG tablet  Commonly known as: ZOLOFT      Take 1 tablet by mouth Daily.

## 2024-08-16 NOTE — PLAN OF CARE
Goal Outcome Evaluation:  Plan of Care Reviewed With: patient, daughter           Outcome Evaluation: Pt presents w/ dyspnea on exertion, generalized weakness, decreased functional endurance, and balance deficits limiting her ADL independence. Pt would benefit from continued skilled IPOT services to address current functional deficits and facilitate return to PLOF. Rec home w/ A & OPPT at d/c.      Anticipated Discharge Disposition (OT): home with assist, home with outpatient therapy services

## 2024-08-16 NOTE — ED NOTES
Judi Patel    Nursing Report ED to Floor:  Mental status: a/0 x4  Ambulatory status: can ambulate with steadying assistance; however, keeping pt in bed at this time due to resp status.  Oxygen Therapy:  5L NC; breathing labored. RR 28 bpm  Cardiac Rhythm: NSR with some PVCs  Admitted from: home; [facility] ED  Safety Concerns:  fall risk r/t weakness  Social Issues: none; daugher at bedside.  ED Room #:  9    ED Nurse Phone [phone] [phone] or may call [phone].      HPI:   Chief Complaint   Patient presents with    Shortness of Breath       Past Medical History:  Past Medical History:   Diagnosis Date    Anxiety     Asthma     Back problem     Breast cancer     Cancer Breast    Compression fracture of T12 vertebra     Cystocele     Epilepsy     Family history of hip fracture     mother, father    Hyperlipidemia     Hypertension     IBS (irritable bowel syndrome)     Menopause     Osteoporosis     Pinched nerve in neck     Seizure disorder     Vaginal atrophy     Vitamin D deficiency         Past Surgical History:  Past Surgical History:   Procedure Laterality Date    ABDOMINAL SURGERY      APPENDECTOMY      BASAL CELL CARCINOMA EXCISION Right 01/12/2010    Nose    CHOLECYSTECTOMY  01/01/1991    CYST REMOVAL  01/01/1990    Right Foot    DILATATION AND CURETTAGE      FUNCTIONAL ENDOSCOPIC SINUS SURGERY  11/16/2010    Dr. Mathews    KNEE ARTHROSCOPY Left 01/01/2006    KNEE ARTHROSCOPY W/ MENISCAL REPAIR Right 11/01/2010    REPLACEMENT TOTAL KNEE      SINUS SURGERY      TOTAL ABDOMINAL HYSTERECTOMY WITH SALPINGO OOPHORECTOMY Bilateral     WRIST SURGERY Left 01/01/2004        Admitting Doctor:   No admitting provider for patient encounter.    Consulting Provider(s):  Consults       No orders found from 7/17/2024 to 8/16/2024.             Admitting Diagnosis:   The primary encounter diagnosis was Acute hypoxic respiratory failure. A diagnosis of Multifocal pneumonia was also pertinent to this  visit.    Most Recent Vitals:   Vitals:    08/15/24 2110 08/15/24 2113 08/15/24 2115 08/15/24 2122   BP: (!) 113/101 167/97     BP Location:       Patient Position:       Pulse: 86 86 84    Resp:    28   Temp: 98.8 °F (37.1 °C)      TempSrc: Oral      SpO2: 91% (!) 88% 92%    Weight:       Height:           Active LDAs/IV Access:   Lines, Drains & Airways       Active LDAs       Name Placement date Placement time Site Days    Peripheral IV 08/15/24 1833 Right Antecubital 08/15/24  1833  Antecubital  less than 1                    Labs (abnormal labs have a star):   Labs Reviewed   COMPREHENSIVE METABOLIC PANEL - Abnormal; Notable for the following components:       Result Value    Glucose 115 (*)     Creatinine 0.52 (*)     Potassium 3.3 (*)     Chloride 97 (*)     Albumin 3.4 (*)     ALT (SGPT) 36 (*)     AST (SGOT) 39 (*)     BUN/Creatinine Ratio 26.9 (*)     All other components within normal limits    Narrative:     GFR Normal >60  Chronic Kidney Disease <60  Kidney Failure <15    The GFR formula is only valid for adults with stable renal function between ages 18 and 70.   BNP (IN-HOUSE) - Abnormal; Notable for the following components:    proBNP 2,187.0 (*)     All other components within normal limits    Narrative:     This assay is used as an aid in the diagnosis of individuals suspected of having heart failure. It can be used as an aid in the diagnosis of acute decompensated heart failure (ADHF) in patients presenting with signs and symptoms of ADHF to the emergency department (ED). In addition, NT-proBNP of <300 pg/mL indicates ADHF is not likely.    Age Range Result Interpretation  NT-proBNP Concentration (pg/mL:      <50             Positive            >450                   Gray                 300-450                    Negative             <300    50-75           Positive            >900                  Gray                300-900                  Negative            <300      >75             Positive             >1800                  Gray                300-1800                  Negative            <300   CBC WITH AUTO DIFFERENTIAL - Abnormal; Notable for the following components:    Hemoglobin 11.0 (*)     Hematocrit 33.3 (*)     Neutrophil % 77.3 (*)     Lymphocyte % 8.3 (*)     Neutrophils, Absolute 7.89 (*)     Monocytes, Absolute 1.00 (*)     Eosinophils, Absolute 0.42 (*)     All other components within normal limits   BLOOD GAS, ARTERIAL W/CO-OXIMETRY - Abnormal; Notable for the following components:    pH, Arterial 7.498 (*)     pCO2, Arterial 34.2 (*)     pO2, Arterial 51.5 (*)     HCO3, Arterial 26.5 (*)     Base Excess, Arterial 3.4 (*)     Hemoglobin, Blood Gas 11.2 (*)     Oxyhemoglobin 86.6 (*)     pCO2, Temperature Corrected 34.2 (*)     pO2, Temperature Corrected 51.5 (*)     All other components within normal limits   COVID-19/FLUA&B/RSV, NP SWAB IN TRANSPORT MEDIA 1 HR TAT - Normal    Narrative:     Fact sheet for providers: https://www.fda.gov/media/089733/download    Fact sheet for patients: https://www.fda.gov/media/452556/download    Test performed by PCR.   SINGLE HS TROPONIN T - Normal   PROCALCITONIN - Normal   LACTIC ACID, PLASMA - Normal   URINALYSIS W/ MICROSCOPIC IF INDICATED (NO CULTURE) - Normal    Narrative:     Urine microscopic not indicated.   BLOOD CULTURE   MRSA SCREEN, PCR   RAINBOW DRAW    Narrative:     The following orders were created for panel order [place] Draw.  Procedure                               Abnormality         Status                     ---------                               -----------         ------                     Green Top (Gel)[[id number]]                                  Final result               Lavender Top[[id number]]                                     Final result               Gold Top - SST[[id number]]                                   Final result               Mar Top[[id number]]                                         Final result                Light Blue Top[[id number]]                                   Final result                 Please view results for these tests on the individual orders.   BLOOD GAS, ARTERIAL   CBC AND DIFFERENTIAL    Narrative:     The following orders were created for panel order CBC & Differential.  Procedure                               Abnormality         Status                     ---------                               -----------         ------                     CBC Auto Differential[[id number]]        Abnormal            Final result                 Please view results for these tests on the individual orders.   GREEN TOP   LAVENDER TOP   GOLD TOP - SST   GRAY TOP   LIGHT BLUE TOP       Meds Given in ED:   Medications   sodium chloride 0.9 % flush 10 mL (has no administration in time range)   sodium chloride 0.9 % bolus 2,436 mL (0 mL Intravenous Stopped 8/15/24 2118)   vancomycin IVPB 1500 mg in 0.9% NaCl (Premix) 500 mL (0 mg Intravenous Stopped 8/15/24 2118)   piperacillin-tazobactam (ZOSYN) 4.5 g IVPB in 100 mL NS MBP (CD) (0 g Intravenous Stopped 8/15/24 1907)   iopamidol (ISOVUE-370) 76 % injection 100 mL (75 mL Intravenous Given 8/15/24 1937)   furosemide (LASIX) injection 20 mg (20 mg Intravenous Given 8/15/24 2112)           Last NIH score:                                                          Dysphagia screening results:        [place] Coma Scale:  No data recorded     CIWA:        Restraint Type:            Isolation Status:  No active isolations

## 2024-08-16 NOTE — PROGRESS NOTES
Pharmacy Consult-Vancomycin Dosing  Judi Patel is a  83 y.o. female receiving vancomycin therapy.     Indication: PNA  Consulting Provider: hospitalist  ID Consult:     Goal AUC: 400 - 600 mg/L*hr    Current Antimicrobial Therapy  Anti-Infectives (From admission, onward)      Ordered     Dose/Rate Route Frequency Start Stop    08/16/24 0616  Vancomycin HCl 1,250 mg in sodium chloride 0.9 % 250 mL VTB        Ordering Provider: Jamil Church, PharmD    15 mg/kg × 80.1 kg  200 mL/hr over 75 Minutes Intravenous Every 18 Hours 08/16/24 1200 08/21/24 1759    08/16/24 0238  piperacillin-tazobactam (ZOSYN) 3.375 g IVPB in 100 mL NS MBP (CD)        Ordering Provider: Katie Garcia APRN    3.375 g  over 4 Hours Intravenous Every 8 Hours 08/16/24 0800 08/21/24 0759    08/16/24 0238  Pharmacy to dose vancomycin        Ordering Provider: Katie Garcia APRN     Does not apply Continuous PRN 08/16/24 0237 08/23/24 0236    08/15/24 2316  piperacillin-tazobactam (ZOSYN) 4.5 g IVPB in 100 mL NS MBP (CD)        Ordering Provider: Angie Yao PA-C    4.5 g Intravenous Once 08/16/24 0000 08/16/24 0022    08/15/24 1752  vancomycin IVPB 1500 mg in 0.9% NaCl (Premix) 500 mL        Ordering Provider: Angie Yao PA-C    20 mg/kg × 81.2 kg  333.3 mL/hr over 90 Minutes Intravenous Once 08/15/24 1815 08/15/24 2118    08/15/24 1752  piperacillin-tazobactam (ZOSYN) 4.5 g IVPB in 100 mL NS MBP (CD)        Ordering Provider: Angie Yao PA-C    4.5 g  over 30 Minutes Intravenous Once 08/15/24 1815 08/15/24 1907            Allergies  Allergies as of 08/15/2024 - Reviewed 08/15/2024   Allergen Reaction Noted    Oxycodone-acetaminophen Nausea And Vomiting 07/08/2016    Sulfa antibiotics Hives 07/08/2016    Sulfamethoxazole-trimethoprim Other (See Comments) 04/13/2023       Labs    Results from last 7 days   Lab Units 08/15/24  1755   BUN mg/dL 14   CREATININE mg/dL 0.52*       Results from last 7 days   Lab Units  "08/15/24  1755   WBC 10*3/mm3 10.21       Evaluation of Dosing     Last Dose Received in the ED/Outside Facility: 1500mg OSH  Is Patient on Dialysis or Renal Replacement: no    Ht - 170.2 cm (67\")  Wt - 80.1 kg (176 lb 9.4 oz)    Estimated Creatinine Clearance: 90.9 mL/min (A) (by C-G formula based on SCr of 0.52 mg/dL (L)).    Intake & Output (last 3 days)         08/13 0701 08/14 0700 08/14 0701  08/15 0700 08/15 0701 08/16 0700    IV Piggyback   3036    Total Intake(mL/kg)   3036 (37.9)    Urine (mL/kg/hr)  1000 1400    Total Output  1000 1400    Net  -1000 +1636                   Microbiology and Radiology  Microbiology Results (last 10 days)       Procedure Component Value - Date/Time    COVID-19, FLU A/B, RSV PCR 1 HR TAT - Swab, Nasopharynx [[id number]]  (Normal) Collected: 08/15/24 1923    Lab Status: Final result Specimen: Swab from Nasopharynx Updated: 08/15/24 2013     COVID19 Not Detected     Influenza A PCR Not Detected     Influenza B PCR Not Detected     RSV, PCR Not Detected    Narrative:      Fact sheet for providers: https://www.fda.gov/media/292965/download    Fact sheet for patients: https://www.fda.gov/media/315907/download    Test performed by PCR.            Reported Vancomycin Levels                         InsightRX AUC Calculation:    Current AUC:   0 mg/L*hr    Predicted Steady State AUC :   410 mg/L*hr    Assessment/Plan: The patient will be started on a bolus of 20mg/kg for a dose of 1500 mg. Will initiate maintenance dose at 1250 mg IV every 18 hours.  Plan for trough as patient approaches steady state, prior to the 3rd dose.  Due to infection severity, will target an AUC of 400-600.      Pharmacy will continue to follow the patient’s culture results and clinical progress daily.    Jamil Church, PharmD    "

## 2024-08-16 NOTE — PROGRESS NOTES
[facility] Medicine Services  PROGRESS NOTE    Patient Name: Judi Patel  : 1940  MRN: [id number]    Date of Admission: 8/15/2024  Primary Care Physician: Jone Solorio MD    Subjective   Subjective     CC:  progressive weakness and dyspnea     HPI:  doing okay at rest on 4L oxygen.  Family notes severe 10 day decline in strength such that she went from independent to bedbound.  Pt denies chest pain, other pain, fevers.  Has a dry cough.  Appetite and weight stable.  Some new leg swelling.        Objective   Objective     Vital Signs:   Temp:  [98.2 °F (36.8 °C)-99.5 °F (37.5 °C)] 98.2 °F (36.8 °C)  Heart Rate:  [] 77  Resp:  [16-28] 18  BP: (113-167)/() 137/71  Flow (L/min):  [3-5] 4     Physical Exam:  Constitutional: Awake, alert in bed.  Children at bedside   Eyes: PER  HENT: NCAT, mucous membranes moist  Neck: Supple  Respiratory: crackles L, no wheeze, not labored at rest   Cardiovascular: irreg irreg - some PACs seen, ? Brief Afib on tele but many sinus beats   Gastrointestinal: Positive bowel sounds, soft, nontender, nondistended  Musculoskeletal: trace ankle edema   Psychiatric: Appropriate affect, cooperative  Neurologic: Oriented x 3, strength symmetric in all extremities, Cranial Nerves grossly intact to confrontation, speech clear, grossly intact to lt touch, can lift legs against gravtiy and resistance.   Skin: No rashes      Results Reviewed:  LAB RESULTS:      Lab 24  0912 08/15/24  1755   WBC 9.08 10.21   HEMOGLOBIN 9.8* 11.0*   HEMATOCRIT 29.1* 33.3*   PLATELETS 308 347   NEUTROS ABS 7.22* 7.89*   IMMATURE GRANS (ABS) 0.05 0.02   LYMPHS ABS 0.47* 0.85   MONOS ABS 0.83 1.00*   EOS ABS 0.46* 0.42*   MCV 84.1 86.3   PROCALCITONIN  --  0.08   LACTATE  --  0.9         Lab 08/15/24  1755   SODIUM 136   POTASSIUM 3.3*   CHLORIDE 97*   CO2 25.4   ANION GAP 13.6   BUN 14   CREATININE 0.52*   EGFR 92.9   GLUCOSE 115*   CALCIUM 9.4         Lab  08/15/24  1755   TOTAL PROTEIN 6.9   ALBUMIN 3.4*   GLOBULIN 3.5   ALT (SGPT) 36*   AST (SGOT) 39*   BILIRUBIN 0.5   ALK PHOS 62         Lab 08/15/24  1755   PROBNP 2,187.0*   HSTROP T 12                 Lab 08/15/24  1813   PH, ARTERIAL 7.498*   PCO2, ARTERIAL 34.2*   PO2 ART 51.5*   FIO2 21   HCO3 ART 26.5*   BASE EXCESS ART 3.4*   CARBOXYHEMOGLOBIN 0.8     Brief Urine Lab Results  (Last result in the past 365 days)        Color   Clarity   Blood   Leuk Est   Nitrite   Protein   CREAT   Urine HCG        08/15/24 1923 Yellow   Clear   Negative   Negative   Negative   Negative                   Microbiology Results Abnormal       Procedure Component Value - Date/Time    MRSA Screen, PCR (Inpatient) - Swab, Nares [[id number]]  (Normal) Collected: 08/16/24 0635    Lab Status: Final result Specimen: Swab from Nares Updated: 08/16/24 0822     MRSA PCR Negative    Narrative:      The negative predictive value of this diagnostic test is high and should only be used to consider de-escalating anti-MRSA therapy. A positive result may indicate colonization with MRSA and must be correlated clinically.  MRSA Negative    COVID-19, FLU A/B, RSV PCR 1 HR TAT - Swab, Nasopharynx [[id number]]  (Normal) Collected: 08/15/24 1923    Lab Status: Final result Specimen: Swab from Nasopharynx Updated: 08/15/24 2013     COVID19 Not Detected     Influenza A PCR Not Detected     Influenza B PCR Not Detected     RSV, PCR Not Detected    Narrative:      Fact sheet for providers: https://www.fda.gov/media/832471/download    Fact sheet for patients: https://www.fda.gov/media/795179/download    Test performed by PCR.            CT Angiogram Chest Pulmonary Embolism    Result Date: 8/15/2024  CT ANGIOGRAM CHEST PULMONARY EMBOLISM Date of Exam: 8/15/2024 7:33 PM EDT Indication: Dyspnea, hypoxia, fever, hx of breast cancer. Comparison: Same day chest radiograph. Contrast-enhanced CT of the chest performed on June 30, 2024 Technique: Axial CT images  were obtained of the chest after the uneventful intravenous administration of Isovue-370, 75 mL utilizing pulmonary embolism protocol.  Reconstructed coronal and sagittal images were also obtained. Automated exposure control and iterative construction methods were used. Findings: Pulmonary arteries: Adequate opacification of the pulmonary arteries. No evidence of acute pulmonary embolism. Thoracic aorta: The thoracic aorta is not opacified with contrast. Thoracic aorta is normal in caliber and contour. Cardiovascular: The heart is mildly enlarged. No evidence of pericardial effusion. Coronary calcifications are not visualized. Thyroid: The visualized portion of the thyroid is unremarkable. Lungs and Pleura: Extensive patchy bilateral airspace opacities are visualized, left greater than right. There is a moderate left-sided pleural effusion. No pneumothorax. Central airways are patent. Mediastinum/Bee: No mediastinal or hilar lymphadenopathy. Lymph nodes: No axillary or supraclavicular lymphadenopathy. Bones and Soft Tissue: No acute fracture or aggressive lesions. There is levoscoliosis of the upper lumbar spine. There is evidence of diffuse skin thickening of the left breast. Surgical clips are visualized in the left breast. Upper Abdomen: No acute process in the upper abdomen. Partial visualization of multiple hepatic cysts which are grossly stable in size when compared to prior CT. Left parapelvic renal cyst is visualized.     Impression: Impression: No evidence of pulmonary embolism. Findings compatible with extensive multifocal pneumonia, left greater than right. Moderate left pleural effusion. Diffuse skin thickening of the left breast, of uncertain etiology. Clinically correlate. Electronically Signed: Herbert Batres MD  8/15/2024 8:39 PM EDT  Workstation ID: [id number]    XR Chest 1 View    Result Date: 8/15/2024  XR CHEST 1 VW Date of Exam: 8/15/2024 5:41 PM EDT Indication: SOA triage protocol Comparison:  Chest radiograph 7/16/2024 Findings: Multifocal airspace consolidation most significant in the left upper and lower lobes new from prior comparison. Blunted costophrenic angle on the left suggesting small effusion. Mild cardiomegaly. Mild increase interstitial opacities relative to prior exam which could reflect component of superimposed edema. No pneumothorax. Degenerative related osseous changes including probable full-thickness right rotator cuff tear. Chronic L1 compression fracture with associated curvature.     Impression: Impression: Multifocal pneumonia new from prior comparison. Small left pleural effusion as well as suspected mild superimposed interstitial edema noted. Recommend radiographic follow-up to resolution typically performed in 6 to 8 weeks. Electronically Signed: Dio Bauer MD  8/15/2024 6:11 PM EDT  Workstation ID: [id number]     Results for orders placed during the hospital encounter of 07/15/24    Adult Transthoracic Echo Complete w/ Color, Spectral and Contrast if necessary per protocol    Interpretation Summary    Left ventricular systolic function is normal. Calculated left ventricular EF = 62% Left ventricular ejection fraction appears to be 61 - 65%.    Left ventricular wall thickness is consistent with mild concentric hypertrophy.    The right atrial cavity is borderline dilated.      Current medications:  Scheduled Meds:amLODIPine, 5 mg, Oral, Daily  budesonide-formoterol, 1 puff, Inhalation, BID - RT  guaiFENesin, 600 mg, Oral, Daily  heparin (porcine), 5,000 Units, Subcutaneous, Q12H  ipratropium-albuterol, 3 mL, Nebulization, 4x Daily - RT  lamoTRIgine, 100 mg, Oral, Q12H  losartan, 25 mg, Oral, Daily  piperacillin-tazobactam, 3.375 g, Intravenous, Q8H  propranolol, 80 mg, Oral, Q12H  sertraline, 100 mg, Oral, Daily  sodium chloride, 10 mL, Intravenous, Q12H  vancomycin, 15 mg/kg, Intravenous, Q18H      Continuous Infusions:Pharmacy to dose vancomycin,       PRN Meds:.   acetaminophen **OR** acetaminophen **OR** acetaminophen    azelastine    senna-docusate sodium **AND** polyethylene glycol **AND** bisacodyl **AND** bisacodyl    Calcium Replacement - Follow Nurse / BPA Driven Protocol    ipratropium-albuterol    Magnesium Standard Dose Replacement - Follow Nurse / BPA Driven Protocol    Pharmacy to dose vancomycin    Phosphorus Replacement - Follow Nurse / BPA Driven Protocol    Potassium Replacement - Follow Nurse / BPA Driven Protocol    sodium chloride    sodium chloride    sodium chloride    Assessment & Plan   Assessment & Plan     Active Hospital Problems    Diagnosis  POA    **Acute respiratory failure with hypoxia [J96.01]  Yes    Pneumonia [J18.9]  Unknown    Mixed hyperlipidemia [E78.2]  Yes    Seizure disorder [G40.909]  Yes    Essential hypertension [I10]  Yes    Asthma [J45.909]  Yes      Resolved Hospital Problems   No resolved problems to display.        Brief Hospital Course to date:  Judi Patel is a 83 y.o. female with a history of Asthma, HTN, HLD, seizure disorder, breast cancer s/p lumpectomy with radiation 4 months ago.  , presented to the ED with complaints of shortness of air and cough.  Patient was diagnosed with pneumonia in the left lower lobe and was treated with 10 days of doxycycline.        Progressive weakness, marked   - nearly unable to walk; denies pain   - check TSH and CK , check AM cortisol.  Denies recent steroids , denies b/b incontinence.  Exam nonfocal   - sed rate, crp     Bilateral infiltrates, L>R  Mod pl effusion L  - failed 10 day course of doxy (empiric from pulm [facility])   - empiric vanc/zosyn  - consider CAP, opportunists, cancer spread (but Ca was small/contained), radiation pneumonitis (but seen in R lung too), inflammatory, other.    She has not received chemo, only surgery (curative intent) and XRT.     - follows w dr Farrell (pulm)  - consult pulm.  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     VTE Prophylaxis:  Pharmacologic VTE prophylaxis orders are present.         AM-PAC 6 Clicks Score (PT): 17 (08/16/24 0138)    CODE STATUS:   Code Status and Medical Interventions: CPR (Attempt to Resuscitate); Full Support   Ordered at: 08/16/24 0306     Level Of Support Discussed With:    Patient     Code Status (Patient has no pulse and is not breathing):    CPR (Attempt to Resuscitate)     Medical Interventions (Patient has pulse or is breathing):    Full Support       Latesha Duran MD  08/16/24

## 2024-08-16 NOTE — THERAPY EVALUATION
Patient Name: Judi Patel  : 1940    MRN: [id number]                              Today's Date: 2024       Admit Date: 8/15/2024    Visit Dx:     ICD-10-CM ICD-9-CM   1. Acute hypoxic respiratory failure  J96.01 518.81   2. Multifocal pneumonia  J18.9 486     Patient Active Problem List   Diagnosis    Degenerative disc disease, lumbar    Scoliosis of lumbar spine    Menopause    Vaginal atrophy    Osteopenia    Midline cystocele    Essential hypertension    Asthma    Anxiety    Seizure disorder    Pinched nerve in neck    Abnormal chest x-ray    Acquired cystic kidney disease    Allergic rhinitis    Acute bronchitis    Acute sinusitis    Chronic infection of sinus    Eustachian tube disorder    Fatigue    Gastro-esophageal reflux disease without esophagitis    Hypertrophy of nasal turbinates    Muscle cramps    Moderate persistent asthma    Mixed hyperlipidemia    Low back pain    Incontinence    Tremor    Statin intolerance    Snoring    Renal colic    Otalgia    Neurological finding    Neck pain    Muscle weakness    Spasm of back muscles    Partial epilepsy with impairment of consciousness    Chest pain    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 breast in female, estrogen receptor negative    Dyslipidemia    Dyspnea on exertion    Acute respiratory failure with hypoxia    Pneumonia     Past Medical History:   Diagnosis Date    Anxiety     Asthma     Back problem     Breast cancer     Cancer Breast    Compression fracture of T12 vertebra     Cystocele     Epilepsy     Family history of hip fracture     mother, father    Hyperlipidemia     Hypertension     IBS (irritable bowel syndrome)     Menopause     Osteoporosis     Pinched nerve in neck     Seizure disorder     Vaginal atrophy     Vitamin D deficiency      Past Surgical History:   Procedure Laterality Date    ABDOMINAL SURGERY      APPENDECTOMY      BASAL CELL CARCINOMA EXCISION Right 2010    Nose    CHOLECYSTECTOMY  1991     CYST REMOVAL  01/01/1990    Right Foot    DILATATION AND CURETTAGE      FUNCTIONAL ENDOSCOPIC SINUS SURGERY  11/16/2010    Dr. Mathews    KNEE ARTHROSCOPY Left 01/01/2006    KNEE ARTHROSCOPY W/ MENISCAL REPAIR Right 11/01/2010    REPLACEMENT TOTAL KNEE      SINUS SURGERY      TOTAL ABDOMINAL HYSTERECTOMY WITH SALPINGO OOPHORECTOMY Bilateral     WRIST SURGERY Left 01/01/2004      General Information       Row Name 08/16/24 1005          OT Time and Intention    Document Type evaluation  -     Mode of Treatment occupational therapy  -       Row Name 08/16/24 1005          General Information    Patient Profile Reviewed yes  -     Prior Level of Function independent:;bed mobility;transfer;all household mobility;community mobility;ADL's;driving  Pt typically does not use AD at baseline, but within past week she has been using a rollator. Pt admits to one recent fall at home  -     Existing Precautions/Restrictions fall;oxygen therapy device and L/min;other (see comments)  monitor O2 sats  -     Barriers to Rehab medically complex;previous functional deficit  -       Row Name 08/16/24 1005          Living Environment    People in Home alone;other (see comments)  Children live next door/in neighborhood and can assist PRN  -       Row Name 08/16/24 1005          Home Main Entrance    Number of Stairs, Main Entrance two  -     Stair Railings, Main Entrance railings on both sides of stairs  -       Row Name 08/16/24 1005          Stairs Within Home, Primary    Number of Stairs, Within Home, Primary none  -       Row Name 08/16/24 1005          Cognition    Orientation Status (Cognition) oriented x 3  -       Row Name 08/16/24 1005          Safety Issues, Functional Mobility    Safety Issues Affecting Function (Mobility) awareness of need for assistance;insight into deficits/self-awareness;safety precaution awareness;safety precautions follow-through/compliance;sequencing abilities  -     Impairments  Affecting Function (Mobility) balance;endurance/activity tolerance;shortness of breath;strength  -               User Key  (r) = Recorded By, (t) = Taken By, (c) = Cosigned By      Initials Name Provider Type     Bryanna Purcell OT Occupational Therapist                     Mobility/ADL's       [facility] 08/16/24 1007          Bed Mobility    Bed Mobility supine-sit  -     Supine-Sit [place] (Bed Mobility) standby assist;verbal cues  -     Assistive Device (Bed Mobility) bed rails;head of bed elevated  -MC       Row Name 08/16/24 1007          Transfers    Transfers sit-stand transfer;toilet transfer  -[facility] 08/16/24 1007          Sit-Stand Transfer    Sit-Stand [place] (Transfers) contact guard  -     Assistive Device (Sit-Stand Transfers) walker, front-wheeled  -[facility] 08/16/24 1007          Toilet Transfer    Type (Toilet Transfer) sit-stand;stand-sit  -     [place] Level (Toilet Transfer) contact guard  -     Assistive Device (Toilet Transfer) commode;grab bars/safety frame;walker, front-wheeled  -[facility] 08/16/24 1007          Functional Mobility    Functional Mobility- Ind. Level minimum assist (75% patient effort);verbal cues required  -     Functional Mobility- Device walker, front-wheeled  [facility]     Functional Mobility-Distance (Feet) 15  to bathroom  -     Functional Mobility- Safety Issues balance decreased during turns;sequencing ability decreased;step length decreased;supplemental O2  -     Functional Mobility- Comment Pts O2 sats maintained >90% w/ functional mobility to bathroom  -[facility] 08/16/24 1007          Activities of Daily Living    BADL Assessment/Intervention lower body dressing;toileting;upper body dressing  -[facility] 08/16/24 1007          Lower Body Dressing Assessment/Training    [place] Level (Lower Body Dressing) don;socks;dependent (less than 25% patient effort);verbal cues  -     Position  (Lower Body Dressing) supine  -[facility] Name 08/16/24 1007          Toileting Assessment/Training    [place] Level (Toileting) adjust/manage clothing;perform perineal hygiene;standby assist  -     Assistive Devices (Toileting) commode;grab bar/safety frame  -     Position (Toileting) supported sitting  -MC       Row Name 08/16/24 1007          Upper Body Dressing Assessment/Training    [place] Level (Upper Body Dressing) don;other (see comments);minimum assist (75% patient effort);verbal cues  -     Position (Upper Body Dressing) supported standing  -     Comment, (Upper Body Dressing) hosp gown  -               User Key  (r) = Recorded By, (t) = Taken By, (c) = Cosigned By      Initials Name Provider Type    Bryanna Ramachandran OT Occupational Therapist                   Obj/Interventions       [facility] Name 08/16/24 1009          Sensory Assessment (Somatosensory)    Sensory Assessment (Somatosensory) UE sensation intact  -[facility] 08/16/24 1009          Vision Assessment/Intervention    Visual Impairment/Limitations WFL  -[facility] 08/16/24 1009          Range of Motion Comprehensive    General Range of Motion bilateral upper extremity ROM WFL  -[facility] 08/16/24 1009          Strength Comprehensive (MMT)    General Manual Muscle Testing (MMT) Assessment upper extremity strength deficits identified  -     Comment, General Manual Muscle Testing (MMT) Assessment BUE grossly 4/5  -MC       Row Name 08/16/24 1009          Balance    Balance Assessment sitting static balance;sitting dynamic balance;sit to stand dynamic balance;standing static balance;standing dynamic balance  -     Static Sitting Balance standby assist  -     Dynamic Sitting Balance contact guard  -     Position, Sitting Balance unsupported;sitting edge of bed;other (see comments)  commode  -     Sit to Stand Dynamic Balance contact guard  -     Static Standing Balance contact guard  -      Dynamic Standing Balance minimal assist  -     Position/Device Used, Standing Balance supported;walker, front-wheeled  -     Balance Interventions sitting;sit to stand;occupation based/functional task  -               User Key  (r) = Recorded By, (t) = Taken By, (c) = Cosigned By      Initials Name Provider Type    Bryanna Ramachandran OT Occupational Therapist                   Goals/Plan       Row Name 08/16/24 1011          Transfer Goal 1 (OT)    Activity/Assistive Device (Transfer Goal 1, OT) bed-to-chair/chair-to-bed;toilet;walker, rolling  -     [place] Level/Cues Needed (Transfer Goal 1, OT) standby assist  -     Time Frame (Transfer Goal 1, OT) short term goal (STG);5 days  -     Progress/Outcome (Transfer Goal 1, OT) goal ongoing  -       Row Name 08/16/24 1011          Dressing Goal 1 (OT)    Activity/Device (Dressing Goal 1, OT) lower body dressing  don/doff socks, undergarment w/ AAD  -     [place]/Cues Needed (Dressing Goal 1, OT) minimum assist (75% or more patient effort);verbal cues required  -     Time Frame (Dressing Goal 1, OT) long term goal (LTG);10 days  -     Progress/Outcome (Dressing Goal 1, OT) goal ongoing  -       Row Name 08/16/24 1011          Grooming Goal 1 (OT)    Activity/Device (Grooming Goal 1, OT) hair care;oral care;wash face, hands  -     [place] (Grooming Goal 1, OT) standby assist  -     Time Frame (Grooming Goal 1, OT) long term goal (LTG);10 days  -     Strategies/Barriers (Grooming Goal 1, OT) standing sinkside maintaining O2 sats >90%  -     Progress/Outcome (Grooming Goal 1, OT) goal ongoing  -       Row Name 08/16/24 1011          Therapy Assessment/Plan (OT)    Planned Therapy Interventions (OT) activity tolerance training;adaptive equipment training;BADL retraining;functional balance retraining;IADL retraining;occupation/activity based interventions;patient/caregiver education/training;ROM/therapeutic  exercise;strengthening exercise;transfer/mobility retraining  -               User Key  (r) = Recorded By, (t) = Taken By, (c) = Cosigned By      Initials Name Provider Type     Bryanna Purcell, ADI Occupational Therapist                   Clinical Impression       [facility] Name 08/16/24 1009          Pain Assessment    Pretreatment Pain Rating 0/10 - no pain  -     Posttreatment Pain Rating 0/10 - no pain  -MC       Row Name 08/16/24 1009          Plan of Care Review    Plan of Care Reviewed With patient;daughter  -     Outcome Evaluation Pt presents w/ dyspnea on exertion, generalized weakness, decreased functional endurance, and balance deficits limiting her ADL independence. Pt would benefit from continued skilled IPOT services to address current functional deficits and facilitate return to PLOF. Rec home w/ A & OPPT at d/c.  -[facility] Name 08/16/24 1009          Therapy Assessment/Plan (OT)    Patient/Family Therapy Goal Statement (OT) Return to PLOF  -     Rehab Potential (OT) good, to achieve stated therapy goals  -     Criteria for Skilled Therapeutic Interventions Met (OT) yes;skilled treatment is necessary  -     Therapy Frequency (OT) daily  -     Predicted Duration of Therapy Intervention (OT) 7 days  -[facility] 08/16/24 1009          Therapy Plan Review/Discharge Plan (OT)    Anticipated Discharge Disposition (OT) home with assist;home with outpatient therapy services  -[facility] Name 08/16/24 1009          Vital Signs    Pre SpO2 (%) 93  -     O2 Delivery Pre Treatment nasal cannula  -     O2 Delivery Intra Treatment nasal cannula  -     Post SpO2 (%) 92  -     O2 Delivery Post Treatment nasal cannula  -     Pre Patient Position Supine  -     Intra Patient Position Sitting  -     Post Patient Position Standing  -[facility] 08/16/24 1009          Positioning and Restraints    Pre-Treatment Position in bed  -     Post Treatment Position other  -      Other Position with PT  standing in room  -               User Key  (r) = Recorded By, (t) = Taken By, (c) = Cosigned By      Initials Name Provider Type    Bryanna Ramachandran OT Occupational Therapist                   Outcome Measures       Row Name 08/16/24 1012          How much help from another is currently needed...    Putting on and taking off regular lower body clothing? 2  -MC     Bathing (including washing, rinsing, and drying) 2  -MC     Toileting (which includes using toilet bed pan or urinal) 3  -MC     Putting on and taking off regular upper body clothing 3  -MC     Taking care of personal grooming (such as brushing teeth) 3  -MC     Eating meals 4  -     AM-PAC 6 Clicks Score (OT) 17  -       Row Name 08/16/24 0138          How much help from another person do you currently need...    Turning from your back to your side while in flat bed without using bedrails? 3  -[facility]     Moving from lying on back to sitting on the side of a flat bed without bedrails? 3  -GH     Moving to and from a bed to a chair (including a wheelchair)? 3  -GH     Standing up from a chair using your arms (e.g., wheelchair, bedside chair)? 3  -GH     Climbing 3-5 steps with a railing? 2  -GH     To walk in hospital room? 3  -GH     AM-PAC 6 Clicks Score (PT) 17  -GH     Highest Level of Mobility Goal 5 --> Static standing  -       Row Name 08/16/24 1012          Functional Assessment    Outcome Measure Options AM-PAC 6 Clicks Daily Activity (OT)  -               User Key  (r) = Recorded By, (t) = Taken By, (c) = Cosigned By      Initials Name Provider Type    Bryanna Ramachandran OT Occupational Therapist    Mingo Hurley RN Registered Nurse                    Occupational Therapy Education       Title: PT OT SLP Therapies (In Progress)       Topic: Occupational Therapy (In Progress)       Point: ADL training (Done)       Description:   Instruct learner(s) on proper safety adaptation and remediation techniques  during self care or transfers.   Instruct in proper use of assistive devices.                  Learning Progress Summary             Patient Acceptance, E, VU by  at 8/16/2024 1013                         Point: Home exercise program (Not Started)       Description:   Instruct learner(s) on appropriate technique for monitoring, assisting and/or progressing therapeutic exercises/activities.                  Learner Progress:  Not documented in this visit.              Point: Precautions (Done)       Description:   Instruct learner(s) on prescribed precautions during self-care and functional transfers.                  Learning Progress Summary             Patient Acceptance, E, VU by  at 8/16/2024 1013                         Point: Body mechanics (Done)       Description:   Instruct learner(s) on proper positioning and spine alignment during self-care, functional mobility activities and/or exercises.                  Learning Progress Summary             Patient Acceptance, E, VU by  at 8/16/2024 1013                                         User Key       Initials Effective Dates Name Provider Type Discipline     10/14/22 -  Bryanna Purcell OT Occupational Therapist OT                  OT Recommendation and Plan  Planned Therapy Interventions (OT): activity tolerance training, adaptive equipment training, BADL retraining, functional balance retraining, IADL retraining, occupation/activity based interventions, patient/caregiver education/training, ROM/therapeutic exercise, strengthening exercise, transfer/mobility retraining  Therapy Frequency (OT): daily  Plan of Care Review  Plan of Care Reviewed With: patient, daughter  Outcome Evaluation: Pt presents w/ dyspnea on exertion, generalized weakness, decreased functional endurance, and balance deficits limiting her ADL independence. Pt would benefit from continued skilled IPOT services to address current functional deficits and facilitate return to PLOF.  Rec home w/ A & OPPT at d/c.     Time Calculation:   Evaluation Complexity (OT)  Review Occupational Profile/Medical/Therapy History Complexity: expanded/moderate complexity  Assessment, Occupational Performance/Identification of Deficit Complexity: 3-5 performance deficits  Clinical Decision Making Complexity (OT): detailed assessment/moderate complexity  Overall Complexity of Evaluation (OT): moderate complexity     Time Calculation- OT       Row Name 08/16/24 1014             Time Calculation- OT    OT Start Time 0938  -      OT Received On 08/16/24  -      OT Goal Re-Cert Due Date 08/26/24  -         Timed Charges    78397 - OT Therapeutic Activity Minutes 4  -MC      55336 - OT Self Care/Mgmt Minutes 5  -MC         Untimed Charges    OT Eval/Re-eval Minutes 31  -MC         Total Minutes    Timed Charges Total Minutes 9  -MC      Untimed Charges Total Minutes 31  -MC       Total Minutes 40  -MC                User Key  (r) = Recorded By, (t) = Taken By, (c) = Cosigned By      Initials Name Provider Type     Bryanna Purcell, OT Occupational Therapist                  Therapy Charges for Today       Code Description Service Date Service Provider Modifiers Qty    [id number]  OT SELF CARE/MGMT/TRAIN EA 15 MIN 8/16/2024 Bryanna Purcell OT GO 1    [id number]  OT EVAL MOD COMPLEXITY 3 8/16/2024 Bryanna Purcell OT GO 1                 Bryanna Purcell OT  8/16/2024

## 2024-08-16 NOTE — CASE MANAGEMENT/SOCIAL WORK
Continued Stay Note  [facility]     Patient Name: Judi Patel  MRN: [id number]  Today's Date: 8/16/2024    Admit Date: 8/15/2024        Discharge Plan       Row Name 08/16/24 1625       Plan    Plan Comments Attempted to see pt x3 today. Case management will follow up with pt on Monday.                   Discharge Codes    No documentation.                       MIRIAM Cortez

## 2024-08-17 ENCOUNTER — APPOINTMENT (OUTPATIENT)
Dept: GENERAL RADIOLOGY | Facility: HOSPITAL | Age: 84
DRG: 196 | End: 2024-08-17
Payer: MEDICARE

## 2024-08-17 LAB
ALBUMIN SERPL-MCNC: 2.9 G/DL (ref 3.5–5.2)
ALBUMIN/GLOB SERPL: 1 G/DL
ALP SERPL-CCNC: 52 U/L (ref 39–117)
ALT SERPL W P-5'-P-CCNC: 36 U/L (ref 1–33)
ANION GAP SERPL CALCULATED.3IONS-SCNC: 8 MMOL/L (ref 5–15)
AST SERPL-CCNC: 36 U/L (ref 1–32)
BILIRUB SERPL-MCNC: 0.3 MG/DL (ref 0–1.2)
BUN SERPL-MCNC: 11 MG/DL (ref 8–23)
BUN/CREAT SERPL: 21.2 (ref 7–25)
CALCIUM SPEC-SCNC: 8.2 MG/DL (ref 8.6–10.5)
CHLORIDE SERPL-SCNC: 104 MMOL/L (ref 98–107)
CO2 SERPL-SCNC: 26 MMOL/L (ref 22–29)
CORTIS SERPL-MCNC: 14.93 MCG/DL
CREAT SERPL-MCNC: 0.52 MG/DL (ref 0.57–1)
DEPRECATED RDW RBC AUTO: 43.8 FL (ref 37–54)
EGFRCR SERPLBLD CKD-EPI 2021: 92.9 ML/MIN/1.73
ERYTHROCYTE [DISTWIDTH] IN BLOOD BY AUTOMATED COUNT: 13.9 % (ref 12.3–15.4)
ERYTHROCYTE [SEDIMENTATION RATE] IN BLOOD: 58 MM/HR (ref 0–30)
GLOBULIN UR ELPH-MCNC: 3 GM/DL
GLUCOSE SERPL-MCNC: 106 MG/DL (ref 65–99)
HCT VFR BLD AUTO: 29.9 % (ref 34–46.6)
HGB BLD-MCNC: 9.9 G/DL (ref 12–15.9)
MAGNESIUM SERPL-MCNC: 1.8 MG/DL (ref 1.6–2.4)
MCH RBC QN AUTO: 28.6 PG (ref 26.6–33)
MCHC RBC AUTO-ENTMCNC: 33.1 G/DL (ref 31.5–35.7)
MCV RBC AUTO: 86.4 FL (ref 79–97)
PLATELET # BLD AUTO: 307 10*3/MM3 (ref 140–450)
PMV BLD AUTO: 9.7 FL (ref 6–12)
POTASSIUM SERPL-SCNC: 3.6 MMOL/L (ref 3.5–5.2)
POTASSIUM SERPL-SCNC: 3.9 MMOL/L (ref 3.5–5.2)
POTASSIUM SERPL-SCNC: 4.5 MMOL/L (ref 3.5–5.2)
PROT SERPL-MCNC: 5.9 G/DL (ref 6–8.5)
RBC # BLD AUTO: 3.46 10*6/MM3 (ref 3.77–5.28)
SODIUM SERPL-SCNC: 138 MMOL/L (ref 136–145)
VANCOMYCIN TROUGH SERPL-MCNC: 6.6 MCG/ML (ref 5–20)
WBC NRBC COR # BLD AUTO: 7.81 10*3/MM3 (ref 3.4–10.8)

## 2024-08-17 PROCEDURE — 86606 ASPERGILLUS ANTIBODY: CPT | Performed by: INTERNAL MEDICINE

## 2024-08-17 PROCEDURE — 94799 UNLISTED PULMONARY SVC/PX: CPT

## 2024-08-17 PROCEDURE — 86037 ANCA TITER EACH ANTIBODY: CPT | Performed by: INTERNAL MEDICINE

## 2024-08-17 PROCEDURE — 25010000002 HEPARIN (PORCINE) PER 1000 UNITS: Performed by: NURSE PRACTITIONER

## 2024-08-17 PROCEDURE — 71045 X-RAY EXAM CHEST 1 VIEW: CPT

## 2024-08-17 PROCEDURE — 83735 ASSAY OF MAGNESIUM: CPT | Performed by: INTERNAL MEDICINE

## 2024-08-17 PROCEDURE — 82533 TOTAL CORTISOL: CPT | Performed by: INTERNAL MEDICINE

## 2024-08-17 PROCEDURE — 99232 SBSQ HOSP IP/OBS MODERATE 35: CPT | Performed by: INTERNAL MEDICINE

## 2024-08-17 PROCEDURE — 86612 BLASTOMYCES ANTIBODY: CPT | Performed by: INTERNAL MEDICINE

## 2024-08-17 PROCEDURE — 83516 IMMUNOASSAY NONANTIBODY: CPT | Performed by: INTERNAL MEDICINE

## 2024-08-17 PROCEDURE — 94761 N-INVAS EAR/PLS OXIMETRY MLT: CPT

## 2024-08-17 PROCEDURE — 80202 ASSAY OF VANCOMYCIN: CPT

## 2024-08-17 PROCEDURE — 25010000002 VANCOMYCIN HCL 1.25 G RECONSTITUTED SOLUTION 1 EACH VIAL

## 2024-08-17 PROCEDURE — 85652 RBC SED RATE AUTOMATED: CPT | Performed by: INTERNAL MEDICINE

## 2024-08-17 PROCEDURE — 25810000003 SODIUM CHLORIDE 0.9 % SOLUTION 250 ML FLEX CONT

## 2024-08-17 PROCEDURE — 94664 DEMO&/EVAL PT USE INHALER: CPT

## 2024-08-17 PROCEDURE — 85027 COMPLETE CBC AUTOMATED: CPT | Performed by: INTERNAL MEDICINE

## 2024-08-17 PROCEDURE — 25010000002 PIPERACILLIN SOD-TAZOBACTAM PER 1 G: Performed by: NURSE PRACTITIONER

## 2024-08-17 PROCEDURE — 82785 ASSAY OF IGE: CPT | Performed by: INTERNAL MEDICINE

## 2024-08-17 PROCEDURE — 25010000002 FUROSEMIDE PER 20 MG: Performed by: INTERNAL MEDICINE

## 2024-08-17 PROCEDURE — 80053 COMPREHEN METABOLIC PANEL: CPT | Performed by: INTERNAL MEDICINE

## 2024-08-17 PROCEDURE — 84132 ASSAY OF SERUM POTASSIUM: CPT | Performed by: INTERNAL MEDICINE

## 2024-08-17 PROCEDURE — 87305 ASPERGILLUS AG IA: CPT | Performed by: INTERNAL MEDICINE

## 2024-08-17 RX ORDER — POTASSIUM CHLORIDE 1500 MG/1
40 TABLET, EXTENDED RELEASE ORAL EVERY 4 HOURS
Status: COMPLETED | OUTPATIENT
Start: 2024-08-17 | End: 2024-08-17

## 2024-08-17 RX ORDER — FUROSEMIDE 10 MG/ML
40 INJECTION INTRAMUSCULAR; INTRAVENOUS ONCE
Status: COMPLETED | OUTPATIENT
Start: 2024-08-17 | End: 2024-08-17

## 2024-08-17 RX ADMIN — LOSARTAN POTASSIUM 25 MG: 25 TABLET, FILM COATED ORAL at 08:40

## 2024-08-17 RX ADMIN — PROPRANOLOL HYDROCHLORIDE 80 MG: 20 TABLET ORAL at 19:52

## 2024-08-17 RX ADMIN — IPRATROPIUM BROMIDE AND ALBUTEROL SULFATE 3 ML: 2.5; .5 SOLUTION RESPIRATORY (INHALATION) at 16:31

## 2024-08-17 RX ADMIN — BUDESONIDE AND FORMOTEROL FUMARATE DIHYDRATE 1 PUFF: 160; 4.5 AEROSOL RESPIRATORY (INHALATION) at 10:18

## 2024-08-17 RX ADMIN — POTASSIUM CHLORIDE 40 MEQ: 1500 TABLET, EXTENDED RELEASE ORAL at 08:40

## 2024-08-17 RX ADMIN — HEPARIN SODIUM 5000 UNITS: 5000 INJECTION INTRAVENOUS; SUBCUTANEOUS at 08:40

## 2024-08-17 RX ADMIN — IPRATROPIUM BROMIDE AND ALBUTEROL SULFATE 3 ML: 2.5; .5 SOLUTION RESPIRATORY (INHALATION) at 10:18

## 2024-08-17 RX ADMIN — LAMOTRIGINE 100 MG: 100 TABLET ORAL at 08:40

## 2024-08-17 RX ADMIN — PIPERACILLIN AND TAZOBACTAM 3.38 G: 3; .375 INJECTION, POWDER, LYOPHILIZED, FOR SOLUTION INTRAVENOUS at 08:40

## 2024-08-17 RX ADMIN — PIPERACILLIN AND TAZOBACTAM 3.38 G: 3; .375 INJECTION, POWDER, LYOPHILIZED, FOR SOLUTION INTRAVENOUS at 17:04

## 2024-08-17 RX ADMIN — IPRATROPIUM BROMIDE AND ALBUTEROL SULFATE 3 ML: 2.5; .5 SOLUTION RESPIRATORY (INHALATION) at 13:30

## 2024-08-17 RX ADMIN — PIPERACILLIN AND TAZOBACTAM 3.38 G: 3; .375 INJECTION, POWDER, LYOPHILIZED, FOR SOLUTION INTRAVENOUS at 00:38

## 2024-08-17 RX ADMIN — SERTRALINE 25 MG: 25 TABLET, FILM COATED ORAL at 08:40

## 2024-08-17 RX ADMIN — VANCOMYCIN HYDROCHLORIDE 1250 MG: 1.25 INJECTION, POWDER, LYOPHILIZED, FOR SOLUTION INTRAVENOUS at 12:49

## 2024-08-17 RX ADMIN — HEPARIN SODIUM 5000 UNITS: 5000 INJECTION INTRAVENOUS; SUBCUTANEOUS at 19:53

## 2024-08-17 RX ADMIN — Medication 10 ML: at 19:54

## 2024-08-17 RX ADMIN — IPRATROPIUM BROMIDE AND ALBUTEROL SULFATE 3 ML: 2.5; .5 SOLUTION RESPIRATORY (INHALATION) at 20:31

## 2024-08-17 RX ADMIN — GUAIFENESIN 600 MG: 600 TABLET, EXTENDED RELEASE ORAL at 08:40

## 2024-08-17 RX ADMIN — FUROSEMIDE 40 MG: 10 INJECTION, SOLUTION INTRAMUSCULAR; INTRAVENOUS at 11:05

## 2024-08-17 RX ADMIN — DORNASE ALFA 2.5 MG: 1 SOLUTION RESPIRATORY (INHALATION) at 10:19

## 2024-08-17 RX ADMIN — LAMOTRIGINE 100 MG: 100 TABLET ORAL at 19:53

## 2024-08-17 RX ADMIN — SERTRALINE 50 MG: 50 TABLET, FILM COATED ORAL at 19:53

## 2024-08-17 RX ADMIN — BUDESONIDE AND FORMOTEROL FUMARATE DIHYDRATE 1 PUFF: 160; 4.5 AEROSOL RESPIRATORY (INHALATION) at 20:31

## 2024-08-17 RX ADMIN — AMLODIPINE BESYLATE 5 MG: 5 TABLET ORAL at 08:40

## 2024-08-17 RX ADMIN — PROPRANOLOL HYDROCHLORIDE 80 MG: 20 TABLET ORAL at 08:40

## 2024-08-17 RX ADMIN — POTASSIUM CHLORIDE 40 MEQ: 1500 TABLET, EXTENDED RELEASE ORAL at 12:49

## 2024-08-17 RX ADMIN — VANCOMYCIN HYDROCHLORIDE 1250 MG: 1.25 INJECTION, POWDER, LYOPHILIZED, FOR SOLUTION INTRAVENOUS at 19:54

## 2024-08-17 NOTE — PROGRESS NOTES
Pharmacy Consult-Vancomycin Dosing  Judi Patel is a  83 y.o. female receiving vancomycin therapy.     Indication: PNA  Consulting Provider: hospitalist  ID Consult:     Goal AUC: 400 - 600 mg/L*hr    Current Antimicrobial Therapy  Anti-Infectives (From admission, onward)      Ordered     Dose/Rate Route Frequency Start Stop    08/16/24 0616  Vancomycin HCl 1,250 mg in sodium chloride 0.9 % 250 mL VTB        Ordering Provider: Jamil Church, PharmD    15 mg/kg × 80.1 kg  200 mL/hr over 75 Minutes Intravenous Every 18 Hours 08/16/24 1200 08/21/24 1759    08/16/24 0238  piperacillin-tazobactam (ZOSYN) 3.375 g IVPB in 100 mL NS MBP (CD)        Ordering Provider: Katie Garcia APRN    3.375 g  over 4 Hours Intravenous Every 8 Hours 08/16/24 0800 08/21/24 0759    08/16/24 0238  Pharmacy to dose vancomycin        Ordering Provider: Katie Garcia APRN     Does not apply Continuous PRN 08/16/24 0237 08/23/24 0236    08/15/24 2316  piperacillin-tazobactam (ZOSYN) 4.5 g IVPB in 100 mL NS MBP (CD)        Ordering Provider: Angie Yao PA-C    4.5 g Intravenous Once 08/16/24 0000 08/16/24 0022    08/15/24 1752  vancomycin IVPB 1500 mg in 0.9% NaCl (Premix) 500 mL        Ordering Provider: Angie Yao PA-C    20 mg/kg × 81.2 kg  333.3 mL/hr over 90 Minutes Intravenous Once 08/15/24 1815 08/15/24 2118    08/15/24 1752  piperacillin-tazobactam (ZOSYN) 4.5 g IVPB in 100 mL NS MBP (CD)        Ordering Provider: Angie Yao PA-C    4.5 g  over 30 Minutes Intravenous Once 08/15/24 1815 08/15/24 1907            Allergies  Allergies as of 08/15/2024 - Reviewed 08/15/2024   Allergen Reaction Noted    Oxycodone-acetaminophen Nausea And Vomiting 07/08/2016    Sulfa antibiotics Hives 07/08/2016    Sulfamethoxazole-trimethoprim Other (See Comments) 04/13/2023       Labs    Results from last 7 days   Lab Units 08/17/24  0644 08/16/24  0912 08/15/24  1755   BUN mg/dL 11 9 14   CREATININE mg/dL 0.52* 0.48* 0.52*  "      Results from last 7 days   Lab Units 08/17/24  0644 08/16/24  0912 08/15/24  1755   WBC 10*3/mm3 7.81 9.08 10.21       Evaluation of Dosing     Last Dose Received in the ED/Outside Facility: 1500mg OSH  Is Patient on Dialysis or Renal Replacement: no    Ht - 170.2 cm (67\")  Wt - 80.1 kg (176 lb 9.4 oz)    Estimated Creatinine Clearance: 90.9 mL/min (A) (by C-G formula based on SCr of 0.52 mg/dL (L)).    Intake & Output (last 3 days)         08/13 0701 08/14 0700 08/14 0701  08/15 0700 08/15 0701 08/16 0700    IV Piggyback   3036    Total Intake(mL/kg)   3036 (37.9)    Urine (mL/kg/hr)  1000 1400    Total Output  1000 1400    Net  -1000 +1636                   Microbiology and Radiology  Microbiology Results (last 10 days)       Procedure Component Value - Date/Time    S. Pneumo Ag Urine or CSF - Urine, Urine, Clean Catch [[id number]]  (Normal) Collected: 08/16/24 1019    Lab Status: Final result Specimen: Urine, Clean Catch Updated: 08/16/24 1454     Strep Pneumo Ag Negative    Legionella Antigen, Urine - Urine, Urine, Clean Catch [[id number]]  (Normal) Collected: 08/16/24 1019    Lab Status: Final result Specimen: Urine, Clean Catch Updated: 08/16/24 1454     LEGIONELLA ANTIGEN, URINE Negative    MRSA Screen, PCR (Inpatient) - Swab, Nares [[id number]]  (Normal) Collected: 08/16/24 0635    Lab Status: Final result Specimen: Swab from Nares Updated: 08/16/24 0822     MRSA PCR Negative    Narrative:      The negative predictive value of this diagnostic test is high and should only be used to consider de-escalating anti-MRSA therapy. A positive result may indicate colonization with MRSA and must be correlated clinically.  MRSA Negative    COVID-19, FLU A/B, RSV PCR 1 HR TAT - Swab, Nasopharynx [[id number]]  (Normal) Collected: 08/15/24 1923    Lab Status: Final result Specimen: Swab from Nasopharynx Updated: 08/15/24 2013     COVID19 Not Detected     Influenza A PCR Not Detected     Influenza B PCR Not Detected     " RSV, PCR Not Detected    Narrative:      Fact sheet for providers: https://www.fda.gov/media/806323/download    Fact sheet for patients: https://www.fda.gov/media/999069/download    Test performed by PCR.    Blood Culture - Blood, Arm, Right [[id number]]  (Normal) Collected: 08/15/24 1750    Lab Status: Preliminary result Specimen: Blood from Arm, Right Updated: 08/16/24 1931     Blood Culture No growth at 24 hours            Reported Vancomycin Levels                Results from last 7 days   Lab Units 08/17/24  0644   VANCOMYCIN TR mcg/mL 6.60          [employer] AUC Calculation:    Current AUC:   276 mg/L*hr    Predicted Current Steady State AUC : 328    mg/L*hr    Predicted New Steady State AUC: 491    Assessment/Plan:     Patient initiated on vancomycin for PNA.  Goal range -600  Patient loaded on bolus of 1500 mg and was started on a maintenance dose of 1250 mg q18h.  Vancomycin random came back 8/17 at 6.60 which correlates to an projected AUC of 328 currently which is subtherapeutic.  Will increase dose frequency from q18h to q12h which projects an AUC of 491.  Will recheck random on 8/20 at 0500 for reassessment.  Continue to monitor renal function, culture and sensitivities, and clinical status.  Pharmacy will continue to follow.        Alex Tidwell RPH  8/17/2024  08:08 EDT

## 2024-08-17 NOTE — PROGRESS NOTES
[facility] Medicine Services  PROGRESS NOTE    Patient Name: Judi Patel  : 1940  MRN: [id number]    Date of Admission: 8/15/2024  Primary Care Physician: Jone Solorio MD    Subjective   Subjective     CC:  progressive weakness and dyspnea     HPI:  She has remained on 4L oxygen with no fevers.  Has been getting up to BR and says her strength is better than it had gotten at home.  Understands that bronchoscopy is likely.       Objective   Objective     Vital Signs:   Temp:  [98.3 °F (36.8 °C)-98.6 °F (37 °C)] 98.4 °F (36.9 °C)  Heart Rate:  [71-91] 91  Resp:  [17-19] 19  BP: (104-150)/(61-85) 150/79  Flow (L/min):  [4-4.5] 4     Physical Exam:  Constitutional: Awake, alert in bed.  NAD but still speaking short sentences   Eyes: PER  HENT: NCAT, mucous membranes moist  Neck: Supple  Respiratory: diminshed on left with coarse sounds, no wheeze.  Speaks short sentences, hoarse voice.    Cardiovascular:  freq PACs (every other beat).    Gastrointestinal: Positive bowel sounds, soft, nontender, nondistended  Musculoskeletal: trace ankle edema   Psychiatric: Appropriate affect, cooperative  Neurologic: Oriented x 3, strength symmetric in all extremities, Cranial Nerves grossly intact to confrontation, speech clear, grossly intact to lt touch, can lift legs against gravtiy and resistance.   Skin: No rashes      Results Reviewed:  LAB RESULTS:      Lab 24  0644 24  0912 08/15/24  1755   WBC 7.81 9.08 10.21   HEMOGLOBIN 9.9* 9.8* 11.0*   HEMATOCRIT 29.9* 29.1* 33.3*   PLATELETS 307 308 347   NEUTROS ABS  --  7.22* 7.89*   IMMATURE GRANS (ABS)  --  0.05 0.02   LYMPHS ABS  --  0.47* 0.85   MONOS ABS  --  0.83 1.00*   EOS ABS  --  0.46* 0.42*   MCV 86.4 84.1 86.3   SED RATE 58*  --   --    CRP  --  13.68*  --    PROCALCITONIN  --   --  0.08   LACTATE  --   --  0.9         Lab 24  0644 24  4278 24  0912 08/15/24  1755   SODIUM 138  --  135* 136   POTASSIUM  3.6 3.9 2.9* 3.3*   CHLORIDE 104  --  97* 97*   CO2 26.0  --  29.0 25.4   ANION GAP 8.0  --  9.0 13.6   BUN 11  --  9 14   CREATININE 0.52*  --  0.48* 0.52*   EGFR 92.9  --  94.7 92.9   GLUCOSE 106*  --  194* 115*   CALCIUM 8.2*  --  8.3* 9.4   MAGNESIUM 1.8  --   --   --    TSH  --   --  1.220  --          Lab 08/17/24  0644 08/16/24  0912 08/15/24  1755   TOTAL PROTEIN 5.9* 5.3* 6.9   ALBUMIN 2.9* 3.0* 3.4*   GLOBULIN 3.0 2.3 3.5   ALT (SGPT) 36* 36* 36*   AST (SGOT) 36* 37* 39*   BILIRUBIN 0.3 0.3 0.5   ALK PHOS 52 58 62         Lab 08/15/24  1755   PROBNP 2,187.0*   HSTROP T 12                 Lab 08/15/24  1813   PH, ARTERIAL 7.498*   PCO2, ARTERIAL 34.2*   PO2 ART 51.5*   FIO2 21   HCO3 ART 26.5*   BASE EXCESS ART 3.4*   CARBOXYHEMOGLOBIN 0.8     Brief Urine Lab Results  (Last result in the past 365 days)        Color   Clarity   Blood   Leuk Est   Nitrite   Protein   CREAT   Urine HCG        08/15/24 1923 Yellow   Clear   Negative   Negative   Negative   Negative                   Microbiology Results Abnormal       Procedure Component Value - Date/Time    Blood Culture - Blood, Arm, Right [[id number]]  (Normal) Collected: 08/15/24 1750    Lab Status: Preliminary result Specimen: Blood from Arm, Right Updated: 08/16/24 1931     Blood Culture No growth at 24 hours    S. Pneumo Ag Urine or CSF - Urine, Urine, Clean Catch [[id number]]  (Normal) Collected: 08/16/24 1019    Lab Status: Final result Specimen: Urine, Clean Catch Updated: 08/16/24 1454     Strep Pneumo Ag Negative    Legionella Antigen, Urine - Urine, Urine, Clean Catch [[id number]]  (Normal) Collected: 08/16/24 1019    Lab Status: Final result Specimen: Urine, Clean Catch Updated: 08/16/24 1454     LEGIONELLA ANTIGEN, URINE Negative    MRSA Screen, PCR (Inpatient) - Swab, Nares [[id number]]  (Normal) Collected: 08/16/24 0635    Lab Status: Final result Specimen: Swab from Nares Updated: 08/16/24 0822     MRSA PCR Negative    Narrative:      The negative  predictive value of this diagnostic test is high and should only be used to consider de-escalating anti-MRSA therapy. A positive result may indicate colonization with MRSA and must be correlated clinically.  MRSA Negative    COVID-19, FLU A/B, RSV PCR 1 HR TAT - Swab, Nasopharynx [[id number]]  (Normal) Collected: 08/15/24 1923    Lab Status: Final result Specimen: Swab from Nasopharynx Updated: 08/15/24 2013     COVID19 Not Detected     Influenza A PCR Not Detected     Influenza B PCR Not Detected     RSV, PCR Not Detected    Narrative:      Fact sheet for providers: https://www.fda.gov/media/115133/download    Fact sheet for patients: https://www.fda.gov/media/571669/download    Test performed by PCR.            XR Chest 1 View    Result Date: 8/17/2024  XR CHEST 1 VW Date of Exam: 8/17/2024 8:00 AM EDT Indication: FU Infiltrate Comparison: 8/15/2024 Findings: Heart shadow is normal in size. Pulmonary vasculature is largely obscured by the patient's extensive bilateral pulmonary interstitial and airspace disease, again with very dense disease in the left lateral midlung, and focal airspace opacity along the right minor fissure. Interstitial changes elsewhere are relatively mild and stable. No new or increasing pulmonary parenchymal disease is seen. Small left pleural effusion appears stable. No pneumothorax is identified.     Impression: Impression: Stable bilateral pneumonia, extensive on the left, with milder multifocal disease on the right. Stable small left pleural effusion. No new chest pathology is seen. Electronically Signed: Ed Luu MD  8/17/2024 8:57 AM EDT  Workstation ID: [id number]    CT Angiogram Chest Pulmonary Embolism    Result Date: 8/15/2024  CT ANGIOGRAM CHEST PULMONARY EMBOLISM Date of Exam: 8/15/2024 7:33 PM EDT Indication: Dyspnea, hypoxia, fever, hx of breast cancer. Comparison: Same day chest radiograph. Contrast-enhanced CT of the chest performed on June 30, 2024 Technique: Axial CT images  were obtained of the chest after the uneventful intravenous administration of Isovue-370, 75 mL utilizing pulmonary embolism protocol.  Reconstructed coronal and sagittal images were also obtained. Automated exposure control and iterative construction methods were used. Findings: Pulmonary arteries: Adequate opacification of the pulmonary arteries. No evidence of acute pulmonary embolism. Thoracic aorta: The thoracic aorta is not opacified with contrast. Thoracic aorta is normal in caliber and contour. Cardiovascular: The heart is mildly enlarged. No evidence of pericardial effusion. Coronary calcifications are not visualized. Thyroid: The visualized portion of the thyroid is unremarkable. Lungs and Pleura: Extensive patchy bilateral airspace opacities are visualized, left greater than right. There is a moderate left-sided pleural effusion. No pneumothorax. Central airways are patent. Mediastinum/Bee: No mediastinal or hilar lymphadenopathy. Lymph nodes: No axillary or supraclavicular lymphadenopathy. Bones and Soft Tissue: No acute fracture or aggressive lesions. There is levoscoliosis of the upper lumbar spine. There is evidence of diffuse skin thickening of the left breast. Surgical clips are visualized in the left breast. Upper Abdomen: No acute process in the upper abdomen. Partial visualization of multiple hepatic cysts which are grossly stable in size when compared to prior CT. Left parapelvic renal cyst is visualized.     Impression: Impression: No evidence of pulmonary embolism. Findings compatible with extensive multifocal pneumonia, left greater than right. Moderate left pleural effusion. Diffuse skin thickening of the left breast, of uncertain etiology. Clinically correlate. Electronically Signed: Herbert Batres MD  8/15/2024 8:39 PM EDT  Workstation ID: [id number]    XR Chest 1 View    Result Date: 8/15/2024  XR CHEST 1 VW Date of Exam: 8/15/2024 5:41 PM EDT Indication: SOA triage protocol Comparison:  Chest radiograph 7/16/2024 Findings: Multifocal airspace consolidation most significant in the left upper and lower lobes new from prior comparison. Blunted costophrenic angle on the left suggesting small effusion. Mild cardiomegaly. Mild increase interstitial opacities relative to prior exam which could reflect component of superimposed edema. No pneumothorax. Degenerative related osseous changes including probable full-thickness right rotator cuff tear. Chronic L1 compression fracture with associated curvature.     Impression: Impression: Multifocal pneumonia new from prior comparison. Small left pleural effusion as well as suspected mild superimposed interstitial edema noted. Recommend radiographic follow-up to resolution typically performed in 6 to 8 weeks. Electronically Signed: Dio Bauer MD  8/15/2024 6:11 PM EDT  Workstation ID: [id number]     Results for orders placed during the hospital encounter of 07/15/24    Adult Transthoracic Echo Complete w/ Color, Spectral and Contrast if necessary per protocol    Interpretation Summary    Left ventricular systolic function is normal. Calculated left ventricular EF = 62% Left ventricular ejection fraction appears to be 61 - 65%.    Left ventricular wall thickness is consistent with mild concentric hypertrophy.    The right atrial cavity is borderline dilated.      Current medications:  Scheduled Meds:amLODIPine, 5 mg, Oral, Daily  budesonide-formoterol, 1 puff, Inhalation, BID - RT  dornase alpha, 2.5 mg, Inhalation, BID - RT  guaiFENesin, 600 mg, Oral, Daily  heparin (porcine), 5,000 Units, Subcutaneous, Q12H  ipratropium-albuterol, 3 mL, Nebulization, 4x Daily - RT  lamoTRIgine, 100 mg, Oral, Q12H  losartan, 25 mg, Oral, Daily  piperacillin-tazobactam, 3.375 g, Intravenous, Q8H  potassium chloride ER, 40 mEq, Oral, Q4H  propranolol, 80 mg, Oral, Q12H  sertraline, 25 mg, Oral, Daily  sertraline, 50 mg, Oral, Nightly  sodium chloride, 10 mL, Intravenous,  Q12H  vancomycin, 15 mg/kg, Intravenous, Q12H      Continuous Infusions:Pharmacy to dose vancomycin,       PRN Meds:.  acetaminophen **OR** acetaminophen **OR** acetaminophen    azelastine    senna-docusate sodium **AND** polyethylene glycol **AND** bisacodyl **AND** bisacodyl    Calcium Replacement - Follow Nurse / BPA Driven Protocol    ipratropium-albuterol    Magnesium Standard Dose Replacement - Follow Nurse / BPA Driven Protocol    Pharmacy to dose vancomycin    Phosphorus Replacement - Follow Nurse / BPA Driven Protocol    Potassium Replacement - Follow Nurse / BPA Driven Protocol    sodium chloride    sodium chloride    sodium chloride    Assessment & Plan   Assessment & Plan     Active Hospital Problems    Diagnosis  POA    **Acute respiratory failure with hypoxia [J96.01]  Yes    Pneumonia [J18.9]  Unknown    Mixed hyperlipidemia [E78.2]  Yes    Seizure disorder [G40.909]  Yes    Essential hypertension [I10]  Yes    Asthma [J45.909]  Yes      Resolved Hospital Problems   No resolved problems to display.        Brief Hospital Course to date:  Judi Patel is a 83 y.o. female with a history of Asthma, HTN, HLD, seizure disorder, breast cancer s/p lumpectomy with radiation 4 months ago.  , presented to the ED with complaints of shortness of air and cough.  Patient was diagnosed with pneumonia in the left lower lobe and was treated with 10 days of doxycycline.        Progressive weakness, marked   - nearly unable to walk; denies pain   - nl TSH and CK , nl AM cortisol.  Denies recent steroids , denies b/b incontinence.  Exam nonfocal   - sed rate, crp elevated   - PT OT  - likely need for rehab at dC     Bilateral infiltrates, L>R  Mod pl effusion L  - failed 10 day course of doxy (empiric treatment from PCP)   - empiric vanc/zosyn, azithro added   - consider CAP, opportunists, cancer spread (but Ca was small/contained), radiation pneumonitis (but seen in R lung too), inflammatory, other.    She has not  received chemo, only surgery (curative intent) and XRT.  No obvious reason to be immunosuppressed.     - has seen dr Farrell (pulm) of [facility] x 1.   - pulm consulted; evaluating for eosinophilic pna, vasculitis, histo/blasto.    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     VTE Prophylaxis:  Pharmacologic VTE prophylaxis orders are present.         AM-PAC 6 Clicks Score (PT): 19 (08/16/24 1944)    CODE STATUS:   Code Status and Medical Interventions: CPR (Attempt to Resuscitate); Full Support   Ordered at: 08/16/24 0306     Level Of Support Discussed With:    Patient     Code Status (Patient has no pulse and is not breathing):    CPR (Attempt to Resuscitate)     Medical Interventions (Patient has pulse or is breathing):    Full Support       Latesha Duran MD  08/17/24

## 2024-08-17 NOTE — PROGRESS NOTES
Oxygenation and CXR appear about the same.  May be getting ahead on volume; will give a dose of lasix.  At this point no indication for bronchoscopy.

## 2024-08-17 NOTE — PLAN OF CARE
Goal Outcome Evaluation:  Plan of Care Reviewed With: patient           Outcome Evaluation: Ox4, diuresed apprx 3L after Lasix, tolerating abx well, BM today. Remains on 3-3.5L. ambulated numerous times to BR. VSS, cont to monitor

## 2024-08-17 NOTE — PLAN OF CARE
VSS on 4L (humidified). SR in 80s with frequent PACs on cardiac mx. SOA / labored breathing occur on exertion. Resting in bed with no sx of acute distress at this time. Son at bedside.

## 2024-08-18 PROBLEM — Z85.3 HISTORY OF BREAST CANCER: Status: ACTIVE | Noted: 2024-08-18

## 2024-08-18 PROBLEM — R91.8 PULMONARY INFILTRATE: Status: ACTIVE | Noted: 2024-08-18

## 2024-08-18 PROCEDURE — 25010000002 HEPARIN (PORCINE) PER 1000 UNITS: Performed by: NURSE PRACTITIONER

## 2024-08-18 PROCEDURE — 86235 NUCLEAR ANTIGEN ANTIBODY: CPT | Performed by: INTERNAL MEDICINE

## 2024-08-18 PROCEDURE — 86602 ANTINOMYCES ANTIBODY: CPT | Performed by: INTERNAL MEDICINE

## 2024-08-18 PROCEDURE — 99232 SBSQ HOSP IP/OBS MODERATE 35: CPT | Performed by: INTERNAL MEDICINE

## 2024-08-18 PROCEDURE — 86331 IMMUNODIFFUSION OUCHTERLONY: CPT | Performed by: INTERNAL MEDICINE

## 2024-08-18 PROCEDURE — 94664 DEMO&/EVAL PT USE INHALER: CPT

## 2024-08-18 PROCEDURE — 86606 ASPERGILLUS ANTIBODY: CPT | Performed by: INTERNAL MEDICINE

## 2024-08-18 PROCEDURE — 86225 DNA ANTIBODY NATIVE: CPT | Performed by: INTERNAL MEDICINE

## 2024-08-18 PROCEDURE — 94761 N-INVAS EAR/PLS OXIMETRY MLT: CPT

## 2024-08-18 PROCEDURE — 25810000003 SODIUM CHLORIDE 0.9 % SOLUTION 250 ML FLEX CONT

## 2024-08-18 PROCEDURE — 86671 FUNGUS NES ANTIBODY: CPT | Performed by: INTERNAL MEDICINE

## 2024-08-18 PROCEDURE — 25010000002 VANCOMYCIN HCL 1.25 G RECONSTITUTED SOLUTION 1 EACH VIAL

## 2024-08-18 PROCEDURE — 25010000002 PIPERACILLIN SOD-TAZOBACTAM PER 1 G: Performed by: NURSE PRACTITIONER

## 2024-08-18 PROCEDURE — 94799 UNLISTED PULMONARY SVC/PX: CPT

## 2024-08-18 PROCEDURE — 86609 BACTERIUM ANTIBODY: CPT | Performed by: INTERNAL MEDICINE

## 2024-08-18 RX ADMIN — GUAIFENESIN 600 MG: 600 TABLET, EXTENDED RELEASE ORAL at 08:10

## 2024-08-18 RX ADMIN — VANCOMYCIN HYDROCHLORIDE 1250 MG: 1.25 INJECTION, POWDER, LYOPHILIZED, FOR SOLUTION INTRAVENOUS at 10:34

## 2024-08-18 RX ADMIN — SERTRALINE 50 MG: 50 TABLET, FILM COATED ORAL at 20:45

## 2024-08-18 RX ADMIN — IPRATROPIUM BROMIDE AND ALBUTEROL SULFATE 3 ML: 2.5; .5 SOLUTION RESPIRATORY (INHALATION) at 19:31

## 2024-08-18 RX ADMIN — DORNASE ALFA 2.5 MG: 1 SOLUTION RESPIRATORY (INHALATION) at 08:00

## 2024-08-18 RX ADMIN — IPRATROPIUM BROMIDE AND ALBUTEROL SULFATE 3 ML: 2.5; .5 SOLUTION RESPIRATORY (INHALATION) at 08:00

## 2024-08-18 RX ADMIN — PIPERACILLIN AND TAZOBACTAM 3.38 G: 3; .375 INJECTION, POWDER, LYOPHILIZED, FOR SOLUTION INTRAVENOUS at 08:10

## 2024-08-18 RX ADMIN — HEPARIN SODIUM 5000 UNITS: 5000 INJECTION INTRAVENOUS; SUBCUTANEOUS at 20:44

## 2024-08-18 RX ADMIN — PROPRANOLOL HYDROCHLORIDE 80 MG: 20 TABLET ORAL at 08:10

## 2024-08-18 RX ADMIN — HEPARIN SODIUM 5000 UNITS: 5000 INJECTION INTRAVENOUS; SUBCUTANEOUS at 08:10

## 2024-08-18 RX ADMIN — PROPRANOLOL HYDROCHLORIDE 80 MG: 20 TABLET ORAL at 20:45

## 2024-08-18 RX ADMIN — Medication 10 ML: at 10:41

## 2024-08-18 RX ADMIN — PIPERACILLIN AND TAZOBACTAM 3.38 G: 3; .375 INJECTION, POWDER, LYOPHILIZED, FOR SOLUTION INTRAVENOUS at 16:29

## 2024-08-18 RX ADMIN — BUDESONIDE AND FORMOTEROL FUMARATE DIHYDRATE 1 PUFF: 160; 4.5 AEROSOL RESPIRATORY (INHALATION) at 19:31

## 2024-08-18 RX ADMIN — LAMOTRIGINE 100 MG: 100 TABLET ORAL at 20:45

## 2024-08-18 RX ADMIN — SERTRALINE 25 MG: 25 TABLET, FILM COATED ORAL at 08:09

## 2024-08-18 RX ADMIN — PIPERACILLIN AND TAZOBACTAM 3.38 G: 3; .375 INJECTION, POWDER, LYOPHILIZED, FOR SOLUTION INTRAVENOUS at 00:18

## 2024-08-18 RX ADMIN — VANCOMYCIN HYDROCHLORIDE 1250 MG: 1.25 INJECTION, POWDER, LYOPHILIZED, FOR SOLUTION INTRAVENOUS at 20:43

## 2024-08-18 RX ADMIN — LAMOTRIGINE 100 MG: 100 TABLET ORAL at 08:09

## 2024-08-18 RX ADMIN — AMLODIPINE BESYLATE 5 MG: 5 TABLET ORAL at 08:09

## 2024-08-18 RX ADMIN — Medication 10 ML: at 20:51

## 2024-08-18 RX ADMIN — BUDESONIDE AND FORMOTEROL FUMARATE DIHYDRATE 1 PUFF: 160; 4.5 AEROSOL RESPIRATORY (INHALATION) at 08:00

## 2024-08-18 RX ADMIN — IPRATROPIUM BROMIDE AND ALBUTEROL SULFATE 3 ML: 2.5; .5 SOLUTION RESPIRATORY (INHALATION) at 13:14

## 2024-08-18 RX ADMIN — DORNASE ALFA 2.5 MG: 1 SOLUTION RESPIRATORY (INHALATION) at 19:30

## 2024-08-18 RX ADMIN — IPRATROPIUM BROMIDE AND ALBUTEROL SULFATE 3 ML: 2.5; .5 SOLUTION RESPIRATORY (INHALATION) at 15:37

## 2024-08-18 NOTE — PROGRESS NOTES
Pharmacy Consult-Vancomycin Dosing  Judi Patel is a  83 y.o. female receiving vancomycin therapy.     Indication: PNA  Consulting Provider: hospitalist  ID Consult:     Goal AUC: 400 - 600 mg/L*hr    Current Antimicrobial Therapy  Anti-Infectives (From admission, onward)      Ordered     Dose/Rate Route Frequency Start Stop    08/17/24 0821  Vancomycin HCl 1,250 mg in sodium chloride 0.9 % 250 mL VTB        Ordering Provider: Alex Tidwell RPH    15 mg/kg × 80.1 kg  200 mL/hr over 75 Minutes Intravenous Every 12 Hours 08/17/24 0930 08/21/24 0929    08/16/24 0238  piperacillin-tazobactam (ZOSYN) 3.375 g IVPB in 100 mL NS MBP (CD)        Ordering Provider: Katie Garcia APRN    3.375 g  over 4 Hours Intravenous Every 8 Hours 08/16/24 0800 08/21/24 0759    08/16/24 0238  Pharmacy to dose vancomycin        Ordering Provider: Katie Garcia APRN     Does not apply Continuous PRN 08/16/24 0237 08/23/24 0236    08/15/24 2316  piperacillin-tazobactam (ZOSYN) 4.5 g IVPB in 100 mL NS MBP (CD)        Ordering Provider: Angie Yao PA-C    4.5 g Intravenous Once 08/16/24 0000 08/16/24 0022    08/15/24 1752  vancomycin IVPB 1500 mg in 0.9% NaCl (Premix) 500 mL        Ordering Provider: Angie Yao PA-C    20 mg/kg × 81.2 kg  333.3 mL/hr over 90 Minutes Intravenous Once 08/15/24 1815 08/15/24 2118    08/15/24 1752  piperacillin-tazobactam (ZOSYN) 4.5 g IVPB in 100 mL NS MBP (CD)        Ordering Provider: Angie Yao PA-C    4.5 g  over 30 Minutes Intravenous Once 08/15/24 1815 08/15/24 1907            Allergies  Allergies as of 08/15/2024 - Reviewed 08/15/2024   Allergen Reaction Noted    Oxycodone-acetaminophen Nausea And Vomiting 07/08/2016    Sulfa antibiotics Hives 07/08/2016    Sulfamethoxazole-trimethoprim Other (See Comments) 04/13/2023       Labs    Results from last 7 days   Lab Units 08/17/24  0644 08/16/24  0912 08/15/24  1755   BUN mg/dL 11 9 14   CREATININE mg/dL 0.52* 0.48* 0.52*  "      Results from last 7 days   Lab Units 08/17/24  0644 08/16/24  0912 08/15/24  1755   WBC 10*3/mm3 7.81 9.08 10.21       Evaluation of Dosing     Last Dose Received in the ED/Outside Facility: 1500mg OSH  Is Patient on Dialysis or Renal Replacement: no    Ht - 170.2 cm (67\")  Wt - 80.1 kg (176 lb 9.4 oz)    Estimated Creatinine Clearance: 90.9 mL/min (A) (by C-G formula based on SCr of 0.52 mg/dL (L)).    Intake & Output (last 3 days)         08/13 0701 08/14 0700 08/14 0701  08/15 0700 08/15 0701 08/16 0700    IV Piggyback   3036    Total Intake(mL/kg)   3036 (37.9)    Urine (mL/kg/hr)  1000 1400    Total Output  1000 1400    Net  -1000 +1636                   Microbiology and Radiology  Microbiology Results (last 10 days)       Procedure Component Value - Date/Time    S. Pneumo Ag Urine or CSF - Urine, Urine, Clean Catch [[id number]]  (Normal) Collected: 08/16/24 1019    Lab Status: Final result Specimen: Urine, Clean Catch Updated: 08/16/24 1454     Strep Pneumo Ag Negative    Legionella Antigen, Urine - Urine, Urine, Clean Catch [[id number]]  (Normal) Collected: 08/16/24 1019    Lab Status: Final result Specimen: Urine, Clean Catch Updated: 08/16/24 1454     LEGIONELLA ANTIGEN, URINE Negative    Blood Culture - Blood, Arm, Right [[id number]]  (Normal) Collected: 08/16/24 0912    Lab Status: Preliminary result Specimen: Blood from Arm, Right Updated: 08/17/24 0945     Blood Culture No growth at 24 hours    Narrative:      Less than seven (7) mL's of blood was collected.  Insufficient quantity may yield false negative results.    Blood Culture - Blood, Hand, Right [[id number]]  (Normal) Collected: 08/16/24 0902    Lab Status: Preliminary result Specimen: Blood from Hand, Right Updated: 08/17/24 0945     Blood Culture No growth at 24 hours    Narrative:      Less than seven (7) mL's of blood was collected.  Insufficient quantity may yield false negative results.    MRSA Screen, PCR (Inpatient) - Swab, Nares " [[id number]]  (Normal) Collected: 08/16/24 0635    Lab Status: Final result Specimen: Swab from Nares Updated: 08/16/24 0822     MRSA PCR Negative    Narrative:      The negative predictive value of this diagnostic test is high and should only be used to consider de-escalating anti-MRSA therapy. A positive result may indicate colonization with MRSA and must be correlated clinically.  MRSA Negative    COVID-19, FLU A/B, RSV PCR 1 HR TAT - Swab, Nasopharynx [[id number]]  (Normal) Collected: 08/15/24 1923    Lab Status: Final result Specimen: Swab from Nasopharynx Updated: 08/15/24 2013     COVID19 Not Detected     Influenza A PCR Not Detected     Influenza B PCR Not Detected     RSV, PCR Not Detected    Narrative:      Fact sheet for providers: https://www.fda.gov/media/125057/download    Fact sheet for patients: https://www.fda.gov/media/879680/download    Test performed by PCR.    Blood Culture - Blood, Arm, Right [[id number]]  (Normal) Collected: 08/15/24 1750    Lab Status: Preliminary result Specimen: Blood from Arm, Right Updated: 08/17/24 1931     Blood Culture No growth at 2 days            Reported Vancomycin Levels                Results from last 7 days   Lab Units 08/17/24  0644   VANCOMYCIN TR mcg/mL 6.60          [employer] AUC Calculation:    Current AUC:   512 mg/L*hr    Predicted New Steady State AUC: 493    Assessment/Plan:     Patient initiated on vancomycin for PNA.  Goal range -600  Patient loaded on bolus of 1500 mg and was started on a maintenance dose of 1250 mg q18h.  Vancomycin random came back 8/17 at 6.60 which correlates to an projected AUC of 328 currently which was subtherapeutic.  Dose was increased to 1250 mg q12h, but after review of times today AM dose given 4 hours late and PM dose given 2 hours early. Will continue on current dose of 1250 mg at this time.  Will recheck random on 8/20 at 0500 for reassessment.  Continue to monitor renal function, culture and sensitivities, and  clinical status.  Pharmacy will continue to follow.        Alex Tidwell, [facility]  8/18/2024  08:11 EDT

## 2024-08-18 NOTE — PROGRESS NOTES
[facility] Medicine Services  PROGRESS NOTE    Patient Name: Judi Patel  : 1940  MRN: [id number]    Date of Admission: 8/15/2024  Primary Care Physician: Jone Solorio MD    Subjective   Subjective     CC:  progressive weakness and dyspnea     HPI:  remains afebrile and on 4L.  Staff notes desats with exertion.  She feels neither worse nor better.      Dtr asking about the isolated glucose of 190       Objective   Objective     Vital Signs:   Temp:  [98 °F (36.7 °C)-99.2 °F (37.3 °C)] 98 °F (36.7 °C)  Heart Rate:  [71-87] 74  Resp:  [16-20] 16  BP: (113-133)/(64-85) 113/64  Flow (L/min):  [2-4] 4     Physical Exam:  Constitutional: Awake, alert in bed.  NAD, dtr present.    Eyes: PER  HENT: NCAT, mucous membranes moist  Neck: Supple  Respiratory: diminished same as yest w some coarse sounds, no wheeze.    Cardiovascular:  freq PACs (every other beat) - no change from yest   Gastrointestinal: Positive bowel sounds, soft, nontender, nondistended  Musculoskeletal: no c c e   Psychiatric: Appropriate affect, cooperative  Neurologic: Oriented x 3, strength symmetric in all extremities, Cranial Nerves grossly intact to confrontation, speech clear, JACOBS   Skin: No rashes      Results Reviewed:  LAB RESULTS:      Lab 24  0644 24  0912 08/15/24  1755   WBC 7.81 9.08 10.21   HEMOGLOBIN 9.9* 9.8* 11.0*   HEMATOCRIT 29.9* 29.1* 33.3*   PLATELETS 307 308 347   NEUTROS ABS  --  7.22* 7.89*   IMMATURE GRANS (ABS)  --  0.05 0.02   LYMPHS ABS  --  0.47* 0.85   MONOS ABS  --  0.83 1.00*   EOS ABS  --  0.46* 0.42*   MCV 86.4 84.1 86.3   SED RATE 58*  --   --    CRP  --  13.68*  --    PROCALCITONIN  --   --  0.08   LACTATE  --   --  0.9         Lab 24  1638 24  0644 24  2358 24  0912 08/15/24  1065   SODIUM  --  138  --  135* 136   POTASSIUM 4.5 3.6 3.9 2.9* 3.3*   CHLORIDE  --  104  --  97* 97*   CO2  --  26.0  --  29.0 25.4   ANION GAP  --  8.0  --  9.0  13.6   BUN  --  11  --  9 14   CREATININE  --  0.52*  --  0.48* 0.52*   EGFR  --  92.9  --  94.7 92.9   GLUCOSE  --  106*  --  194* 115*   CALCIUM  --  8.2*  --  8.3* 9.4   MAGNESIUM  --  1.8  --   --   --    TSH  --   --   --  1.220  --          Lab 08/17/24  0644 08/16/24  0912 08/15/24  1755   TOTAL PROTEIN 5.9* 5.3* 6.9   ALBUMIN 2.9* 3.0* 3.4*   GLOBULIN 3.0 2.3 3.5   ALT (SGPT) 36* 36* 36*   AST (SGOT) 36* 37* 39*   BILIRUBIN 0.3 0.3 0.5   ALK PHOS 52 58 62         Lab 08/15/24  1755   PROBNP 2,187.0*   HSTROP T 12                 Lab 08/15/24  1813   PH, ARTERIAL 7.498*   PCO2, ARTERIAL 34.2*   PO2 ART 51.5*   FIO2 21   HCO3 ART 26.5*   BASE EXCESS ART 3.4*   CARBOXYHEMOGLOBIN 0.8     Brief Urine Lab Results  (Last result in the past 365 days)        Color   Clarity   Blood   Leuk Est   Nitrite   Protein   CREAT   Urine HCG        08/15/24 1923 Yellow   Clear   Negative   Negative   Negative   Negative                   Microbiology Results Abnormal       Procedure Component Value - Date/Time    Blood Culture - Blood, Hand, Right [[id number]]  (Normal) Collected: 08/16/24 0902    Lab Status: Preliminary result Specimen: Blood from Hand, Right Updated: 08/18/24 0945     Blood Culture No growth at 2 days    Narrative:      Less than seven (7) mL's of blood was collected.  Insufficient quantity may yield false negative results.    Blood Culture - Blood, Arm, Right [[id number]]  (Normal) Collected: 08/16/24 0912    Lab Status: Preliminary result Specimen: Blood from Arm, Right Updated: 08/18/24 0945     Blood Culture No growth at 2 days    Narrative:      Less than seven (7) mL's of blood was collected.  Insufficient quantity may yield false negative results.    Blood Culture - Blood, Arm, Right [[id number]]  (Normal) Collected: 08/15/24 1750    Lab Status: Preliminary result Specimen: Blood from Arm, Right Updated: 08/17/24 1931     Blood Culture No growth at 2 days    S. Pneumo Ag Urine or CSF - Urine, Urine,  Clean Catch [[id number]]  (Normal) Collected: 08/16/24 1019    Lab Status: Final result Specimen: Urine, Clean Catch Updated: 08/16/24 1454     Strep Pneumo Ag Negative    Legionella Antigen, Urine - Urine, Urine, Clean Catch [[id number]]  (Normal) Collected: 08/16/24 1019    Lab Status: Final result Specimen: Urine, Clean Catch Updated: 08/16/24 1454     LEGIONELLA ANTIGEN, URINE Negative    MRSA Screen, PCR (Inpatient) - Swab, Nares [[id number]]  (Normal) Collected: 08/16/24 0635    Lab Status: Final result Specimen: Swab from Nares Updated: 08/16/24 0822     MRSA PCR Negative    Narrative:      The negative predictive value of this diagnostic test is high and should only be used to consider de-escalating anti-MRSA therapy. A positive result may indicate colonization with MRSA and must be correlated clinically.  MRSA Negative    COVID-19, FLU A/B, RSV PCR 1 HR TAT - Swab, Nasopharynx [[id number]]  (Normal) Collected: 08/15/24 1923    Lab Status: Final result Specimen: Swab from Nasopharynx Updated: 08/15/24 2013     COVID19 Not Detected     Influenza A PCR Not Detected     Influenza B PCR Not Detected     RSV, PCR Not Detected    Narrative:      Fact sheet for providers: https://www.fda.gov/media/345751/download    Fact sheet for patients: https://www.fda.gov/media/642053/download    Test performed by PCR.            XR Chest 1 View    Result Date: 8/17/2024  XR CHEST 1 VW Date of Exam: 8/17/2024 8:00 AM EDT Indication: FU Infiltrate Comparison: 8/15/2024 Findings: Heart shadow is normal in size. Pulmonary vasculature is largely obscured by the patient's extensive bilateral pulmonary interstitial and airspace disease, again with very dense disease in the left lateral midlung, and focal airspace opacity along the right minor fissure. Interstitial changes elsewhere are relatively mild and stable. No new or increasing pulmonary parenchymal disease is seen. Small left pleural effusion appears stable. No pneumothorax is  identified.     Impression: Impression: Stable bilateral pneumonia, extensive on the left, with milder multifocal disease on the right. Stable small left pleural effusion. No new chest pathology is seen. Electronically Signed: Ed Luu MD  8/17/2024 8:57 AM EDT  Workstation ID: [id number]     Results for orders placed during the hospital encounter of 07/15/24    Adult Transthoracic Echo Complete w/ Color, Spectral and Contrast if necessary per protocol    Interpretation Summary    Left ventricular systolic function is normal. Calculated left ventricular EF = 62% Left ventricular ejection fraction appears to be 61 - 65%.    Left ventricular wall thickness is consistent with mild concentric hypertrophy.    The right atrial cavity is borderline dilated.      Current medications:  Scheduled Meds:amLODIPine, 5 mg, Oral, Daily  budesonide-formoterol, 1 puff, Inhalation, BID - RT  dornase alpha, 2.5 mg, Inhalation, BID - RT  guaiFENesin, 600 mg, Oral, Daily  heparin (porcine), 5,000 Units, Subcutaneous, Q12H  ipratropium-albuterol, 3 mL, Nebulization, 4x Daily - RT  lamoTRIgine, 100 mg, Oral, Q12H  losartan, 25 mg, Oral, Daily  piperacillin-tazobactam, 3.375 g, Intravenous, Q8H  propranolol, 80 mg, Oral, Q12H  sertraline, 25 mg, Oral, Daily  sertraline, 50 mg, Oral, Nightly  sodium chloride, 10 mL, Intravenous, Q12H  vancomycin, 15 mg/kg, Intravenous, Q12H      Continuous Infusions:Pharmacy to dose vancomycin,       PRN Meds:.  acetaminophen **OR** acetaminophen **OR** acetaminophen    azelastine    senna-docusate sodium **AND** polyethylene glycol **AND** bisacodyl **AND** bisacodyl    Calcium Replacement - Follow Nurse / BPA Driven Protocol    ipratropium-albuterol    Magnesium Standard Dose Replacement - Follow Nurse / BPA Driven Protocol    Pharmacy to dose vancomycin    Phosphorus Replacement - Follow Nurse / BPA Driven Protocol    Potassium Replacement - Follow Nurse / BPA Driven Protocol    sodium chloride    sodium  chloride    sodium chloride    Assessment & Plan   Assessment & Plan     Active Hospital Problems    Diagnosis  POA    **Acute respiratory failure with hypoxia [J96.01]  Yes    Bilateral pulmonary infiltrates [R91.8]  Yes    History of breast cancer [Z85.3]  Not Applicable    Mixed hyperlipidemia [E78.2]  Yes    Seizure disorder [G40.909]  Yes    Essential hypertension [I10]  Yes    Asthma [J45.909]  Yes      Resolved Hospital Problems   No resolved problems to display.        Brief Hospital Course to date:  Judi Patel is a 83 y.o. female with a history of Asthma, HTN, HLD, seizure disorder, breast cancer s/p lumpectomy with radiation 4 months ago.  , presented to the ED with complaints of shortness of air and cough.  Patient was diagnosed with pneumonia in the left lower lobe and was treated with 10 days of doxycycline.          Bilateral infiltrates, L>R  Mod pl effusion L  - failed 10 day course of doxy (empiric treatment from PCP)   - empiric vanc/zosyn, azithro added   - consider CAP, opportunists, BOOP, cancer spread (but Ca was small/contained), radiation pneumonitis (but seen in R lung too), inflammatory, other.    She has not received chemo, only surgery (curative intent) and XRT.  No obvious reason to be immunosuppressed.     - has seen dr Farrell (pulm) of [facility] x 1.   - pulm consulted; evaluating for eosinophilic pna, vasculitis, histo/blasto.    - likely Bronch on 8/19    Progressive weakness   - likely r/t acute pulm disease   - nl TSH and CK , nl AM cortisol.  Denies recent steroids , denies b/b incontinence.  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     VTE Prophylaxis:  Pharmacologic VTE prophylaxis orders are present.         AM-PAC 6 Clicks Score (PT): 19 (08/17/24 2026)    CODE STATUS:   Code Status and Medical Interventions: CPR (Attempt to Resuscitate); Full Support   Ordered at: 08/16/24 0306     Level Of Support Discussed With:    Patient     Code Status (Patient has no pulse and is not breathing):    CPR (Attempt to Resuscitate)     Medical Interventions (Patient has pulse or is breathing):    Full Support       Latesha Duran MD  08/18/24

## 2024-08-18 NOTE — PLAN OF CARE
Significant VS change ( sharp drop of O2 sat )/ severe SOA occur on exertion such as ambulation to the bathroom. On Diuretics, patient  was placed on external catheter. VSS on 4 L ( humidified ) SR with frequent PACs / occasional PVCs. Lung sound remains coarse, breathing shallow / grunting. Daughter at bedside.

## 2024-08-18 NOTE — PROGRESS NOTES
Pulmonary Service Follow-up      LOS: 2 days     Ms. Judi Patel, 83 y.o. female is followed for: Acute respiratory failure with hypoxia     Subjective - Interval History     Copied note from Dr. Arce:   83-year-old woman non-smoker, recently  January 2024, with a history of asthma, hypertension, dyslipidemia, SVT, seizure disorder, breast cancer postlumpectomy and radiation 4 months prior to admission hospitalized with dyspnea,cough, hypoxia last night.  2 weeks prior to admission she was walking independently.  She has extensive infiltrate in the left lung and patchy infiltrates in the right lung with a left effusion.  These were not present on the June CT scan.  Patient reports being diagnosed with asthma approximately 30 years ago.  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  Even after she retired she continued to have intermittent symptoms and was on Advair as a maintenance inhaler.  She saw a new pulmonary physician at the [facility] in July who told her that she had COPD and changed her to Trelegy.  She already had a home nebulizer that she used as needed.  Approximately 10 to 12 days ago she developed increased wheezing and shortness of air and was using her nebulizer more.  She saw physician at [facility] and was treated with steroid taper and doxycycline.  She finished antibiotics and steroids 2 days prior to admission.  She continued to be breathless and presented to the emergency room last night.  She currently has a nonproductive cough.  She does have chest tightness.  She denies noxious fume exposure.  She is a native of [place].  She has never been exposed to TB or had a positive test.  When she did have a productive cough it was yellow to green mucus.  She denies hemoptysis.       In the emergency room room air saturation was 82%.  Imaging revealed a multifocal pneumonia.  She did swab negative for COVID, RSV and  influenza.  She has gotten all of her outpatient care at the [facility] where she has been evaluated for moderate asthma, allergic rhinitis and recurrent sinusitis.  She does receive allergy shots for her allergic rhinitis.  She did undergo her lumpectomy and node dissection February14, 2024 and had 8 mm invasive cancer with negative margins and negative sentinel node.  Primary lesion was 4.5 cm in size.   She completed radiation April 9, 2024.  CT scan of the chest June 5 revealed no metastatic disease, a sliding hiatal hernia, anterior left upper lobe subpleural changes consistent with radiation.  She was seen for shortness of air July 25 and was wheezing.  She was taking propranolol for benign essential tremor and was transition to metoprolol.  Her tremor worsened and she switched back to propranolol.  Duplex of her left lower extremity was negative for DVT.  Recent CT scan of the chest in June revealed no PE, some pleural-parenchymal scarring and a left upper lobe peripheral scarring from radiation.  Follow-up August 7 noted a negative treadmill stress test and normal perfusion scan.  Echo revealed normal LV systolic function with an EF of 62% and some mild LVH.  She does not have fever.  White count is normal.  ABG revealed a pH of 7.49, CO2 34, O2 of 51 on room air.  proBNP was elevated at 2187 with normal renal function.  Procalcitonin is low at 0.08.    Interval history    Continues to have a nonproductive cough  Continues to require 3-4 L/min via nasal cannula     The patient's relevant past medical, surgical and social history were reviewed and updated in Epic as appropriate.     Objective     Medications:  amLODIPine, 5 mg, Oral, Daily  budesonide-formoterol, 1 puff, Inhalation, BID - RT  dornase alpha, 2.5 mg, Inhalation, BID - RT  guaiFENesin, 600 mg, Oral, Daily  heparin (porcine), 5,000 Units, Subcutaneous, Q12H  ipratropium-albuterol, 3 mL, Nebulization, 4x Daily - RT  lamoTRIgine, 100 mg,  Oral, Q12H  losartan, 25 mg, Oral, Daily  piperacillin-tazobactam, 3.375 g, Intravenous, Q8H  propranolol, 80 mg, Oral, Q12H  sertraline, 25 mg, Oral, Daily  sertraline, 50 mg, Oral, Nightly  sodium chloride, 10 mL, Intravenous, Q12H  vancomycin, 15 mg/kg, Intravenous, Q12H      Intake/Output         08/17/24 0700 - 08/18/24 0659 08/18/24 0700 - 08/19/24 0659    Intake (ml) 440 100    Output (ml) 4650 --    Net (ml) -4210 100          Vital Sign Min/Max for last 24 hours  Temp  Min: 98 °F (36.7 °C)  Max: 99.2 °F (37.3 °C)   BP  Min: 113/64  Max: 133/85   Pulse  Min: 71  Max: 87   Resp  Min: 14  Max: 20   SpO2  Min: 91 %  Max: 95 %   No data recorded        Physical Exam:   GENERAL: Awake, no distress   HEENT: No adenopathy or thyromegaly   LUNGS: Bilateral crackles, no wheezes   HEART: Regular rate and rhythm no murmurs   GI: Positive bowel sounds   EXTREMITIES: No edema or clubbing or cyanosis   NEURO/PSYCH: Awake and alert    Results from last 7 days   Lab Units 08/17/24  0644 08/16/24  0912 08/15/24  1755   WBC 10*3/mm3 7.81 9.08 10.21   HEMOGLOBIN g/dL 9.9* 9.8* 11.0*   PLATELETS 10*3/mm3 307 308 347     Results from last 7 days   Lab Units 08/17/24  1638 08/17/24  0644 08/16/24  2358 08/16/24  0912 08/15/24  1755   SODIUM mmol/L  --  138  --  135* 136   POTASSIUM mmol/L 4.5 3.6 3.9 2.9* 3.3*   CO2 mmol/L  --  26.0  --  29.0 25.4   BUN mg/dL  --  11  --  9 14   CREATININE mg/dL  --  0.52*  --  0.48* 0.52*   MAGNESIUM mg/dL  --  1.8  --   --   --    GLUCOSE mg/dL  --  106*  --  194* 115*     Estimated Creatinine Clearance: 90.9 mL/min (A) (by DILLAN-G formula based on SCr of 0.52 mg/dL (L)).    Results from last 7 days   Lab Units 08/15/24  1813   PH, ARTERIAL pH units 7.498*   PCO2, ARTERIAL mm Hg 34.2*   PO2 ART mm Hg 51.5*            Images: CT scan reviewed on PACS  Regional areas of opacification primarily in the upper lobes    I reviewed the patient's results and images.     Impression      Active Hospital  Problems    Diagnosis     **Acute respiratory failure with hypoxia     Bilateral pulmonary infiltrates     History of breast cancer     Mixed hyperlipidemia     Seizure disorder     Essential hypertension     Asthma             Plan        No clinical improvement  Differential diagnosis remains quite wide and includes drug reaction, radiation pneumonitis, bronchiolitis obliterans.  Less likely malignancy or typical bacterial infection    Labs ordered on Friday still pending such as ANCA, fungal serologies, hypersensitivity pneumonitis panel    Will consider bronchoscopy with BAL +/- TBBx tomorrow     I discussed the patient's findings and my recommendations with patient and family   .    PIA Morgan MD  Pulmonary and Critical Care Medicine

## 2024-08-19 ENCOUNTER — ANESTHESIA (OUTPATIENT)
Dept: GASTROENTEROLOGY | Facility: HOSPITAL | Age: 84
End: 2024-08-19
Payer: MEDICARE

## 2024-08-19 ENCOUNTER — APPOINTMENT (OUTPATIENT)
Dept: GENERAL RADIOLOGY | Facility: HOSPITAL | Age: 84
DRG: 196 | End: 2024-08-19
Payer: MEDICARE

## 2024-08-19 ENCOUNTER — ANESTHESIA EVENT (OUTPATIENT)
Dept: GASTROENTEROLOGY | Facility: HOSPITAL | Age: 84
End: 2024-08-19
Payer: MEDICARE

## 2024-08-19 LAB
EOSINOPHIL NFR FLD MANUAL: 5 %
HBA1C MFR BLD: 6 % (ref 4.8–5.6)
LYMPHOCYTES NFR FLD MANUAL: 2 %
MACROPHAGE FLUID: 33 %
MESOTHL CELL NFR FLD MANUAL: 17 %
MONOCYTES NFR FLD: 15 %
NEUTROPHILS NFR FLD MANUAL: 28 %

## 2024-08-19 PROCEDURE — 0B9G8ZX DRAINAGE OF LEFT UPPER LUNG LOBE, VIA NATURAL OR ARTIFICIAL OPENING ENDOSCOPIC, DIAGNOSTIC: ICD-10-PCS | Performed by: INTERNAL MEDICINE

## 2024-08-19 PROCEDURE — 71045 X-RAY EXAM CHEST 1 VIEW: CPT

## 2024-08-19 PROCEDURE — 89051 BODY FLUID CELL COUNT: CPT | Performed by: INTERNAL MEDICINE

## 2024-08-19 PROCEDURE — 25010000002 HEPARIN (PORCINE) PER 1000 UNITS: Performed by: INTERNAL MEDICINE

## 2024-08-19 PROCEDURE — 25010000002 PIPERACILLIN SOD-TAZOBACTAM PER 1 G: Performed by: NURSE PRACTITIONER

## 2024-08-19 PROCEDURE — 94761 N-INVAS EAR/PLS OXIMETRY MLT: CPT

## 2024-08-19 PROCEDURE — 94799 UNLISTED PULMONARY SVC/PX: CPT

## 2024-08-19 PROCEDURE — 87206 SMEAR FLUORESCENT/ACID STAI: CPT | Performed by: INTERNAL MEDICINE

## 2024-08-19 PROCEDURE — 25010000002 VANCOMYCIN HCL 1.25 G RECONSTITUTED SOLUTION 1 EACH VIAL

## 2024-08-19 PROCEDURE — 87205 SMEAR GRAM STAIN: CPT | Performed by: INTERNAL MEDICINE

## 2024-08-19 PROCEDURE — 25010000002 METHYLPREDNISOLONE PER 125 MG: Performed by: INTERNAL MEDICINE

## 2024-08-19 PROCEDURE — 99232 SBSQ HOSP IP/OBS MODERATE 35: CPT | Performed by: INTERNAL MEDICINE

## 2024-08-19 PROCEDURE — 87102 FUNGUS ISOLATION CULTURE: CPT | Performed by: INTERNAL MEDICINE

## 2024-08-19 PROCEDURE — 31624 DX BRONCHOSCOPE/LAVAGE: CPT | Performed by: INTERNAL MEDICINE

## 2024-08-19 PROCEDURE — 87116 MYCOBACTERIA CULTURE: CPT | Performed by: INTERNAL MEDICINE

## 2024-08-19 PROCEDURE — 25010000002 DROPERIDOL PER 5 MG: Performed by: NURSE ANESTHETIST, CERTIFIED REGISTERED

## 2024-08-19 PROCEDURE — 25010000002 PIPERACILLIN SOD-TAZOBACTAM PER 1 G: Performed by: INTERNAL MEDICINE

## 2024-08-19 PROCEDURE — 25810000003 SODIUM CHLORIDE 0.9 % SOLUTION 250 ML FLEX CONT

## 2024-08-19 PROCEDURE — 25010000002 PROPOFOL 10 MG/ML EMULSION: Performed by: NURSE ANESTHETIST, CERTIFIED REGISTERED

## 2024-08-19 PROCEDURE — 94664 DEMO&/EVAL PT USE INHALER: CPT

## 2024-08-19 PROCEDURE — 25810000003 LACTATED RINGERS PER 1000 ML: Performed by: NURSE ANESTHETIST, CERTIFIED REGISTERED

## 2024-08-19 PROCEDURE — 87070 CULTURE OTHR SPECIMN AEROBIC: CPT | Performed by: INTERNAL MEDICINE

## 2024-08-19 PROCEDURE — 83036 HEMOGLOBIN GLYCOSYLATED A1C: CPT | Performed by: INTERNAL MEDICINE

## 2024-08-19 RX ORDER — FENTANYL CITRATE 50 UG/ML
50 INJECTION, SOLUTION INTRAMUSCULAR; INTRAVENOUS
Status: DISCONTINUED | OUTPATIENT
Start: 2024-08-19 | End: 2024-08-19 | Stop reason: HOSPADM

## 2024-08-19 RX ORDER — METHYLPREDNISOLONE SODIUM SUCCINATE 125 MG/2ML
60 INJECTION, POWDER, LYOPHILIZED, FOR SOLUTION INTRAMUSCULAR; INTRAVENOUS EVERY 8 HOURS
Status: DISCONTINUED | OUTPATIENT
Start: 2024-08-20 | End: 2024-08-22

## 2024-08-19 RX ORDER — SODIUM CHLORIDE, SODIUM LACTATE, POTASSIUM CHLORIDE, CALCIUM CHLORIDE 600; 310; 30; 20 MG/100ML; MG/100ML; MG/100ML; MG/100ML
INJECTION, SOLUTION INTRAVENOUS CONTINUOUS PRN
Status: DISCONTINUED | OUTPATIENT
Start: 2024-08-19 | End: 2024-08-19 | Stop reason: SURG

## 2024-08-19 RX ORDER — LIDOCAINE HYDROCHLORIDE 10 MG/ML
INJECTION, SOLUTION EPIDURAL; INFILTRATION; INTRACAUDAL; PERINEURAL AS NEEDED
Status: DISCONTINUED | OUTPATIENT
Start: 2024-08-19 | End: 2024-08-19 | Stop reason: SURG

## 2024-08-19 RX ORDER — PROPOFOL 10 MG/ML
VIAL (ML) INTRAVENOUS AS NEEDED
Status: DISCONTINUED | OUTPATIENT
Start: 2024-08-19 | End: 2024-08-19 | Stop reason: SURG

## 2024-08-19 RX ORDER — METHYLPREDNISOLONE SODIUM SUCCINATE 125 MG/2ML
125 INJECTION, POWDER, LYOPHILIZED, FOR SOLUTION INTRAMUSCULAR; INTRAVENOUS ONCE
Status: COMPLETED | OUTPATIENT
Start: 2024-08-19 | End: 2024-08-19

## 2024-08-19 RX ORDER — HYDROMORPHONE HYDROCHLORIDE 1 MG/ML
0.5 INJECTION, SOLUTION INTRAMUSCULAR; INTRAVENOUS; SUBCUTANEOUS
Status: DISCONTINUED | OUTPATIENT
Start: 2024-08-19 | End: 2024-08-19 | Stop reason: HOSPADM

## 2024-08-19 RX ORDER — DROPERIDOL 2.5 MG/ML
0.62 INJECTION, SOLUTION INTRAMUSCULAR; INTRAVENOUS ONCE AS NEEDED
Status: COMPLETED | OUTPATIENT
Start: 2024-08-19 | End: 2024-08-19

## 2024-08-19 RX ADMIN — BUDESONIDE AND FORMOTEROL FUMARATE DIHYDRATE 1 PUFF: 160; 4.5 AEROSOL RESPIRATORY (INHALATION) at 19:26

## 2024-08-19 RX ADMIN — PIPERACILLIN AND TAZOBACTAM 3.38 G: 3; .375 INJECTION, POWDER, LYOPHILIZED, FOR SOLUTION INTRAVENOUS at 00:31

## 2024-08-19 RX ADMIN — LIDOCAINE HYDROCHLORIDE 50 MG: 10 INJECTION, SOLUTION EPIDURAL; INFILTRATION; INTRACAUDAL; PERINEURAL at 10:52

## 2024-08-19 RX ADMIN — IPRATROPIUM BROMIDE AND ALBUTEROL SULFATE 3 ML: 2.5; .5 SOLUTION RESPIRATORY (INHALATION) at 11:14

## 2024-08-19 RX ADMIN — LAMOTRIGINE 100 MG: 100 TABLET ORAL at 13:19

## 2024-08-19 RX ADMIN — SERTRALINE 25 MG: 25 TABLET, FILM COATED ORAL at 13:19

## 2024-08-19 RX ADMIN — LOSARTAN POTASSIUM 25 MG: 25 TABLET, FILM COATED ORAL at 13:19

## 2024-08-19 RX ADMIN — IPRATROPIUM BROMIDE AND ALBUTEROL SULFATE 3 ML: 2.5; .5 SOLUTION RESPIRATORY (INHALATION) at 19:25

## 2024-08-19 RX ADMIN — GUAIFENESIN 600 MG: 600 TABLET, EXTENDED RELEASE ORAL at 13:18

## 2024-08-19 RX ADMIN — HEPARIN SODIUM 5000 UNITS: 5000 INJECTION INTRAVENOUS; SUBCUTANEOUS at 20:27

## 2024-08-19 RX ADMIN — METHYLPREDNISOLONE SODIUM SUCCINATE 125 MG: 125 INJECTION, POWDER, FOR SOLUTION INTRAMUSCULAR; INTRAVENOUS at 13:18

## 2024-08-19 RX ADMIN — IPRATROPIUM BROMIDE AND ALBUTEROL SULFATE 3 ML: 2.5; .5 SOLUTION RESPIRATORY (INHALATION) at 12:39

## 2024-08-19 RX ADMIN — PIPERACILLIN AND TAZOBACTAM 3.38 G: 3; .375 INJECTION, POWDER, LYOPHILIZED, FOR SOLUTION INTRAVENOUS at 08:25

## 2024-08-19 RX ADMIN — LAMOTRIGINE 100 MG: 100 TABLET ORAL at 20:28

## 2024-08-19 RX ADMIN — SODIUM CHLORIDE, POTASSIUM CHLORIDE, SODIUM LACTATE AND CALCIUM CHLORIDE: 600; 310; 30; 20 INJECTION, SOLUTION INTRAVENOUS at 10:47

## 2024-08-19 RX ADMIN — DROPERIDOL 0.62 MG: 2.5 INJECTION, SOLUTION INTRAMUSCULAR; INTRAVENOUS at 11:18

## 2024-08-19 RX ADMIN — IPRATROPIUM BROMIDE AND ALBUTEROL SULFATE 3 ML: 2.5; .5 SOLUTION RESPIRATORY (INHALATION) at 07:55

## 2024-08-19 RX ADMIN — IPRATROPIUM BROMIDE AND ALBUTEROL SULFATE 3 ML: 2.5; .5 SOLUTION RESPIRATORY (INHALATION) at 15:43

## 2024-08-19 RX ADMIN — AMLODIPINE BESYLATE 5 MG: 5 TABLET ORAL at 13:19

## 2024-08-19 RX ADMIN — SERTRALINE 50 MG: 50 TABLET, FILM COATED ORAL at 20:27

## 2024-08-19 RX ADMIN — PIPERACILLIN AND TAZOBACTAM 3.38 G: 3; .375 INJECTION, POWDER, LYOPHILIZED, FOR SOLUTION INTRAVENOUS at 16:36

## 2024-08-19 RX ADMIN — PROPOFOL 120 MG: 10 INJECTION, EMULSION INTRAVENOUS at 10:52

## 2024-08-19 RX ADMIN — VANCOMYCIN HYDROCHLORIDE 1250 MG: 1.25 INJECTION, POWDER, LYOPHILIZED, FOR SOLUTION INTRAVENOUS at 09:50

## 2024-08-19 RX ADMIN — BUDESONIDE AND FORMOTEROL FUMARATE DIHYDRATE 1 PUFF: 160; 4.5 AEROSOL RESPIRATORY (INHALATION) at 07:55

## 2024-08-19 NOTE — PROGRESS NOTES
[facility] Medicine Services  PROGRESS NOTE    Patient Name: Judi Patel  : 1940  MRN: [id number]    Date of Admission: 8/15/2024  Primary Care Physician: Jone Solorio MD    Subjective   Subjective     CC:  F/U progressive weakness and dyspnea     HPI:  Doing a little better today after bronchoscopy.  Pulmonary has started steroids.      Objective   Objective     Vital Signs:   Temp:  [97.6 °F (36.4 °C)-98.6 °F (37 °C)] 98.1 °F (36.7 °C)  Heart Rate:  [64-83] 78  Resp:  [17-22] 20  BP: ()/(53-81) 115/64  Flow (L/min):  [4-8] 4     Physical Exam:  Constitutional: No acute distress, awake, alert, sitting up in bed  HENT: NCAT, mucous membranes moist  Respiratory: Scattered wheezes bilaterally, respiratory effort normal on nasal cannula  Cardiovascular: Frequent PAC on tele  Musculoskeletal: No bilateral ankle edema  Psychiatric: Appropriate affect, cooperative  Neurologic: Speech clear  Skin: No rashes       Results Reviewed:  LAB RESULTS:      Lab 24  0644 24  0912 08/15/24  1755   WBC 7.81 9.08 10.21   HEMOGLOBIN 9.9* 9.8* 11.0*   HEMATOCRIT 29.9* 29.1* 33.3*   PLATELETS 307 308 347   NEUTROS ABS  --  7.22* 7.89*   IMMATURE GRANS (ABS)  --  0.05 0.02   LYMPHS ABS  --  0.47* 0.85   MONOS ABS  --  0.83 1.00*   EOS ABS  --  0.46* 0.42*   MCV 86.4 84.1 86.3   SED RATE 58*  --   --    CRP  --  13.68*  --    PROCALCITONIN  --   --  0.08   LACTATE  --   --  0.9         Lab 24  0609 24  1638 24  0644 24  2358 24  0912 08/15/24  1755   SODIUM  --   --  138  --  135* 136   POTASSIUM  --  4.5 3.6 3.9 2.9* 3.3*   CHLORIDE  --   --  104  --  97* 97*   CO2  --   --  26.0  --  29.0 25.4   ANION GAP  --   --  8.0  --  9.0 13.6   BUN  --   --  11  --  9 14   CREATININE  --   --  0.52*  --  0.48* 0.52*   EGFR  --   --  92.9  --  94.7 92.9   GLUCOSE  --   --  106*  --  194* 115*   CALCIUM  --   --  8.2*  --  8.3* 9.4   MAGNESIUM  --   --  1.8   --   --   --    HEMOGLOBIN A1C 6.00*  --   --   --   --   --    TSH  --   --   --   --  1.220  --          Lab 08/17/24  0644 08/16/24  0912 08/15/24  1755   TOTAL PROTEIN 5.9* 5.3* 6.9   ALBUMIN 2.9* 3.0* 3.4*   GLOBULIN 3.0 2.3 3.5   ALT (SGPT) 36* 36* 36*   AST (SGOT) 36* 37* 39*   BILIRUBIN 0.3 0.3 0.5   ALK PHOS 52 58 62         Lab 08/15/24  1755   PROBNP 2,187.0*   HSTROP T 12                 Lab 08/15/24  1813   PH, ARTERIAL 7.498*   PCO2, ARTERIAL 34.2*   PO2 ART 51.5*   FIO2 21   HCO3 ART 26.5*   BASE EXCESS ART 3.4*   CARBOXYHEMOGLOBIN 0.8     Brief Urine Lab Results  (Last result in the past 365 days)        Color   Clarity   Blood   Leuk Est   Nitrite   Protein   CREAT   Urine HCG        08/15/24 1923 Yellow   Clear   Negative   Negative   Negative   Negative                   Microbiology Results Abnormal       Procedure Component Value - Date/Time    Respiratory Culture - Wash, Bronchus [[id number]] Collected: 08/19/24 1116    Lab Status: Preliminary result Specimen: Wash from Bronchus Updated: 08/19/24 1311     Gram Stain Few (2+) WBCs seen      No organisms seen    Blood Culture - Blood, Hand, Right [[id number]]  (Normal) Collected: 08/16/24 0902    Lab Status: Preliminary result Specimen: Blood from Hand, Right Updated: 08/19/24 0945     Blood Culture No growth at 3 days    Narrative:      Less than seven (7) mL's of blood was collected.  Insufficient quantity may yield false negative results.    Blood Culture - Blood, Arm, Right [[id number]]  (Normal) Collected: 08/16/24 0912    Lab Status: Preliminary result Specimen: Blood from Arm, Right Updated: 08/19/24 0945     Blood Culture No growth at 3 days    Narrative:      Less than seven (7) mL's of blood was collected.  Insufficient quantity may yield false negative results.    Blood Culture - Blood, Arm, Right [[id number]]  (Normal) Collected: 08/15/24 1750    Lab Status: Preliminary result Specimen: Blood from Arm, Right Updated: 08/18/24 1945      Blood Culture No growth at 3 days    S. Pneumo Ag Urine or CSF - Urine, Urine, Clean Catch [[id number]]  (Normal) Collected: 08/16/24 1019    Lab Status: Final result Specimen: Urine, Clean Catch Updated: 08/16/24 1454     Strep Pneumo Ag Negative    Legionella Antigen, Urine - Urine, Urine, Clean Catch [[id number]]  (Normal) Collected: 08/16/24 1019    Lab Status: Final result Specimen: Urine, Clean Catch Updated: 08/16/24 1454     LEGIONELLA ANTIGEN, URINE Negative    MRSA Screen, PCR (Inpatient) - Swab, Nares [[id number]]  (Normal) Collected: 08/16/24 0635    Lab Status: Final result Specimen: Swab from Nares Updated: 08/16/24 0822     MRSA PCR Negative    Narrative:      The negative predictive value of this diagnostic test is high and should only be used to consider de-escalating anti-MRSA therapy. A positive result may indicate colonization with MRSA and must be correlated clinically.  MRSA Negative    COVID-19, FLU A/B, RSV PCR 1 HR TAT - Swab, Nasopharynx [[id number]]  (Normal) Collected: 08/15/24 1923    Lab Status: Final result Specimen: Swab from Nasopharynx Updated: 08/15/24 2013     COVID19 Not Detected     Influenza A PCR Not Detected     Influenza B PCR Not Detected     RSV, PCR Not Detected    Narrative:      Fact sheet for providers: https://www.fda.gov/media/833744/download    Fact sheet for patients: https://www.fda.gov/media/523198/download    Test performed by PCR.            XR Chest 1 View    Result Date: 8/19/2024  XR CHEST 1 VW Date of Exam: 8/19/2024 2:57 AM EDT Indication: Resp Distress Comparison: Chest x-ray 8/17/2024 Findings: The heart is stable in size. Consolidated airspace disease is again noted in the peripheral left upper lung with patchy areas of consolidation in the peripheral right upper lung as well. There is obscuration of the left hemidiaphragm compatible with left  lower lobe airspace disease. Trace left pleural effusion not excluded. No evidence for pneumothorax. Interstitial  prominence is likely chronic. There is a high riding humeral head compatible with underlying rotator cuff pathology.     Impression: Impression: 1. Findings compatible with multifocal pneumonia with prominent consolidation in the peripheral left upper lung. Electronically Signed: Janina Hyman MD  8/19/2024 7:11 AM EDT  Workstation ID: [id number]     Results for orders placed during the hospital encounter of 07/15/24    Adult Transthoracic Echo Complete w/ Color, Spectral and Contrast if necessary per protocol    Interpretation Summary    Left ventricular systolic function is normal. Calculated left ventricular EF = 62% Left ventricular ejection fraction appears to be 61 - 65%.    Left ventricular wall thickness is consistent with mild concentric hypertrophy.    The right atrial cavity is borderline dilated.      Current medications:  Scheduled Meds:amLODIPine, 5 mg, Oral, Daily  budesonide-formoterol, 1 puff, Inhalation, BID - RT  guaiFENesin, 600 mg, Oral, Daily  heparin (porcine), 5,000 Units, Subcutaneous, Q12H  ipratropium-albuterol, 3 mL, Nebulization, 4x Daily - RT  lamoTRIgine, 100 mg, Oral, Q12H  losartan, 25 mg, Oral, Daily  [START ON 8/20/2024] methylPREDNISolone sodium succinate, 60 mg, Intravenous, Q8H  piperacillin-tazobactam, 3.375 g, Intravenous, Q8H  propranolol, 80 mg, Oral, Q12H  sertraline, 25 mg, Oral, Daily  sertraline, 50 mg, Oral, Nightly  sodium chloride, 10 mL, Intravenous, Q12H      Continuous Infusions:     PRN Meds:.  acetaminophen **OR** acetaminophen **OR** acetaminophen    azelastine    senna-docusate sodium **AND** polyethylene glycol **AND** bisacodyl **AND** bisacodyl    Calcium Replacement - Follow Nurse / BPA Driven Protocol    ipratropium-albuterol    Magnesium Standard Dose Replacement - Follow Nurse / BPA Driven Protocol    Phosphorus Replacement - Follow Nurse / BPA Driven Protocol    Potassium Replacement - Follow Nurse / BPA Driven Protocol    sodium chloride    sodium  chloride    sodium chloride    Assessment & Plan   Assessment & Plan     Active Hospital Problems    Diagnosis  POA    **Acute respiratory failure with hypoxia [J96.01]  Yes    Bilateral pulmonary infiltrates [R91.8]  Yes    History of breast cancer [Z85.3]  Not Applicable    Mixed hyperlipidemia [E78.2]  Yes    Seizure disorder [G40.909]  Yes    Essential hypertension [I10]  Yes    Asthma [J45.909]  Yes      Resolved Hospital Problems   No resolved problems to display.        Brief Hospital Course to date:  Judi Patel is a 83 y.o. female with a history of Asthma, HTN, HLD, seizure disorder, breast cancer s/p lumpectomy with radiation 4 months ago who presented to the ED with complaints of shortness of air and cough.  Patient was diagnosed with pneumonia in the left lower lobe and was treated with 10 days of doxycycline.      This patient's problems and plans were partially entered by my partner and updated as appropriate by me 08/19/24.     Bilateral infiltrates, L>R  Moderate left pleural effusion  - Failed 10 day course of doxycycline (empiric treatment from PCP)   - Continue Zosyn.  D/C vancomycin given negative MRSA PCR  - Consider CAP, opportunists, BOOP, cancer spread (but Ca was small/contained), radiation pneumonitis (but seen in R lung too), inflammatory, other.    She has not received chemo, only surgery (curative intent) and XRT.  No obvious reason to be immunosuppressed.     - Pulmonary consulted; evaluating for eosinophilic pna, vasculitis, histo/blasto.    - SP Bronchoscopy on 8/19 with studies pending  - Started solumedrol 8/19    Progressive weakness   - Likely related to acute pulmonary disease   - TSH and CK WNL, AM cortisol WNL.  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         AM-PAC 6 Clicks Score (PT): 18 (08/19/24 0825)    CODE STATUS:   Code Status and Medical Interventions: CPR (Attempt to Resuscitate); Full Support   Ordered at: 08/16/24 0306     Level Of Support Discussed With:    Patient     Code Status (Patient has no pulse and is not breathing):    CPR (Attempt to Resuscitate)     Medical Interventions (Patient has pulse or is breathing):    Full Support       Marce Gould MD  08/19/24

## 2024-08-19 NOTE — ANESTHESIA PROCEDURE NOTES
Airway  Urgency: elective    Date/Time: 8/19/2024 10:54 AM  Airway not difficult    General Information and Staff    Patient location during procedure: OR  CRNA/CAA: Junior ABI Olvera, CRNA    Indications and Patient Condition  Indications for airway management: airway protection    Preoxygenated: yes  Mask difficulty assessment: 1 - vent by mask    Final Airway Details  Final airway type: supraglottic airway      Successful airway: I-gel  Size 4     Number of attempts at approach: 1  Assessment: lips, teeth, and gum same as pre-op    Additional Comments  LMA placed without difficulty, ventilation with assist, equal breath sounds and symmetric chest rise and fall
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
## 2024-08-19 NOTE — ANESTHESIA POSTPROCEDURE EVALUATION
Patient: Judi Patel    Procedure Summary       Date: 08/19/24 Room / [facility]:  [facility]    Anesthesia Start: 1047 Anesthesia Stop: 1107    Procedure: BRONCHOSCOPY (Bronchus) Diagnosis:     Surgeons: Debbie Marie DO Provider: Rip Henson MD    Anesthesia Type: general ASA Status: 3            Anesthesia Type: general    Vitals  No vitals data found for the desired time range.          Post Anesthesia Care and Evaluation    Patient location during evaluation: PACU  Patient participation: complete - patient participated  Level of consciousness: awake and alert  Pain management: adequate    Airway patency: patent  Anesthetic complications: No anesthetic complications  PONV Status: none  Cardiovascular status: hemodynamically stable and acceptable  Respiratory status: nonlabored ventilation, acceptable, nasal cannula and oral airway  Hydration status: acceptable    Comments: 97.1 69 rr15 91% 96/51

## 2024-08-19 NOTE — CASE MANAGEMENT/SOCIAL WORK
Continued Stay Note  [facility]     Patient Name: Judi Patel  MRN: [id number]  Today's Date: 8/19/2024    Admit Date: 8/15/2024    Plan: TBD   Discharge Plan       Row Name 08/19/24 1629       Plan    Plan TBD    Patient/Family in Agreement with Plan yes    Plan Comments Met with Ms. Patel and her daughter, Aleksandra, at the bedside, for discharge planning.    Ms. Patel lives alone in [facility].  Prior to admission, the patient ambulated independently.  She began using a rolling walker, recently, before hospitalization.  The patient 's daughter, lives next door, and her other children live closeby to patient, and assist her at home as needed.    Ms. Patel had been evaluated by therapy last week, and recommendation was for home with assist and outpatient therapy.  She is pending updated therapy notes this week.  The patient is currently on O2 at 4L per NC.  She has never used home health or gone to IPR.  CM will follow for new PT/OT notes.    PCP is Jone Solorio.  Insurance is [facility] Medicare with no interruption in coverage.  No ACP documents.    DC plan is home vs. IPR.    CM will follow up.    Final Discharge Disposition Code 01 - home or self-care                    Samantha Rothman RN

## 2024-08-19 NOTE — PLAN OF CARE
Problem: Adult Inpatient Plan of Care  Goal: Plan of Care Review  Outcome: Ongoing, Progressing  Flowsheets  Taken 8/19/2024 1749 by Sarai Che RN  Progress: no change  Taken 8/19/2024 0439 by Sunita Peterson RN  Plan of Care Reviewed With: patient  Goal: Patient-Specific Goal (Individualized)  Outcome: Ongoing, Progressing  Goal: Absence of Hospital-Acquired Illness or Injury  Outcome: Ongoing, Progressing  Intervention: Identify and Manage Fall Risk  Recent Flowsheet Documentation  Taken 8/19/2024 1600 by Sarai Che RN  Safety Promotion/Fall Prevention:   activity supervised   assistive device/personal items within reach   clutter free environment maintained   room organization consistent   safety round/check completed   toileting scheduled  Taken 8/19/2024 1400 by Sarai Che RN  Safety Promotion/Fall Prevention:   activity supervised   assistive device/personal items within reach   clutter free environment maintained   room organization consistent   safety round/check completed   toileting scheduled  Taken 8/19/2024 1155 by Sarai Che RN  Safety Promotion/Fall Prevention:   activity supervised   assistive device/personal items within reach   clutter free environment maintained   room organization consistent   safety round/check completed   toileting scheduled  Taken 8/19/2024 1014 by Sarai Che RN  Safety Promotion/Fall Prevention: (bronchoscopy) patient off unit  Taken 8/19/2024 0825 by Sarai Che RN  Safety Promotion/Fall Prevention:   activity supervised   assistive device/personal items within reach   clutter free environment maintained   room organization consistent   safety round/check completed   toileting scheduled  Intervention: Prevent Skin Injury  Recent Flowsheet Documentation  Taken 8/19/2024 1600 by Sarai Che RN  Body Position: position changed independently  Taken 8/19/2024 1400 by Sarai Che RN  Body Position: position changed independently  Taken  8/19/2024 1155 by Sarai Che RN  Body Position: position changed independently  Taken 8/19/2024 0825 by Sarai Che RN  Body Position: sitting up in bed  Intervention: Prevent and Manage VTE (Venous Thromboembolism) Risk  Recent Flowsheet Documentation  Taken 8/19/2024 1600 by Sarai Che RN  Activity Management: activity encouraged  VTE Prevention/Management:   bilateral   sequential compression devices off   patient refused intervention  Taken 8/19/2024 1400 by Saari Che RN  Activity Management: activity encouraged  VTE Prevention/Management:   bilateral   sequential compression devices off   patient refused intervention  Taken 8/19/2024 1155 by Sarai Che RN  Activity Management: activity encouraged  VTE Prevention/Management:   bilateral   sequential compression devices off   patient refused intervention  Taken 8/19/2024 0825 by Sarai Che RN  Activity Management: activity encouraged  VTE Prevention/Management:   bilateral   sequential compression devices off   patient refused intervention  Intervention: Prevent Infection  Recent Flowsheet Documentation  Taken 8/19/2024 1600 by Sarai Che RN  Infection Prevention:   environmental surveillance performed   rest/sleep promoted  Taken 8/19/2024 1400 by Sarai Che RN  Infection Prevention:   environmental surveillance performed   rest/sleep promoted  Taken 8/19/2024 1155 by Sarai Che RN  Infection Prevention:   environmental surveillance performed   rest/sleep promoted  Taken 8/19/2024 0825 by Sarai Che RN  Infection Prevention:   environmental surveillance performed   rest/sleep promoted  Goal: Optimal Comfort and Wellbeing  Outcome: Ongoing, Progressing  Intervention: Provide Person-Centered Care  Recent Flowsheet Documentation  Taken 8/19/2024 1600 by Sarai Che RN  Trust Relationship/Rapport: care explained  Taken 8/19/2024 0825 by Sarai Che RN  Trust Relationship/Rapport: care explained  Goal:  Readiness for Transition of Care  Outcome: Ongoing, Progressing     Problem: Adjustment to Illness (Sepsis/Septic Shock)  Goal: Optimal Coping  Outcome: Ongoing, Progressing     Problem: Bleeding (Sepsis/Septic Shock)  Goal: Absence of Bleeding  Outcome: Ongoing, Progressing     Problem: Glycemic Control Impaired (Sepsis/Septic Shock)  Goal: Blood Glucose Level Within Desired Range  Outcome: Ongoing, Progressing     Problem: Infection Progression (Sepsis/Septic Shock)  Goal: Absence of Infection Signs and Symptoms  Outcome: Ongoing, Progressing  Intervention: Initiate Sepsis Management  Recent Flowsheet Documentation  Taken 8/19/2024 1600 by Sarai Che RN  Infection Prevention:   environmental surveillance performed   rest/sleep promoted  Taken 8/19/2024 1400 by Sarai Che RN  Infection Prevention:   environmental surveillance performed   rest/sleep promoted  Taken 8/19/2024 1155 by Sarai Che RN  Infection Prevention:   environmental surveillance performed   rest/sleep promoted  Taken 8/19/2024 0825 by Sarai Che RN  Infection Prevention:   environmental surveillance performed   rest/sleep promoted  Intervention: Promote Recovery  Recent Flowsheet Documentation  Taken 8/19/2024 1600 by Sarai Che RN  Activity Management: activity encouraged  Taken 8/19/2024 1400 by Sarai Che RN  Activity Management: activity encouraged  Taken 8/19/2024 1155 by Sarai Che RN  Activity Management: activity encouraged  Taken 8/19/2024 0825 by Sarai Che RN  Activity Management: activity encouraged     Problem: Nutrition Impaired (Sepsis/Septic Shock)  Goal: Optimal Nutrition Intake  Outcome: Ongoing, Progressing     Problem: Fall Injury Risk  Goal: Absence of Fall and Fall-Related Injury  Outcome: Ongoing, Progressing  Intervention: Identify and Manage Contributors  Recent Flowsheet Documentation  Taken 8/19/2024 1600 by Sarai Che RN  Medication Review/Management: medications  reviewed  Taken 8/19/2024 1400 by Sarai Che RN  Medication Review/Management: medications reviewed  Taken 8/19/2024 1155 by Sarai Che RN  Medication Review/Management: medications reviewed  Taken 8/19/2024 0825 by Sarai Che RN  Medication Review/Management: medications reviewed  Intervention: Promote Injury-Free Environment  Recent Flowsheet Documentation  Taken 8/19/2024 1600 by Sarai Che RN  Safety Promotion/Fall Prevention:   activity supervised   assistive device/personal items within reach   clutter free environment maintained   room organization consistent   safety round/check completed   toileting scheduled  Taken 8/19/2024 1400 by Sarai Che RN  Safety Promotion/Fall Prevention:   activity supervised   assistive device/personal items within reach   clutter free environment maintained   room organization consistent   safety round/check completed   toileting scheduled  Taken 8/19/2024 1155 by Sarai Che RN  Safety Promotion/Fall Prevention:   activity supervised   assistive device/personal items within reach   clutter free environment maintained   room organization consistent   safety round/check completed   toileting scheduled  Taken 8/19/2024 1014 by Sarai Che RN  Safety Promotion/Fall Prevention: (bronchoscopy) patient off unit  Taken 8/19/2024 0825 by Sarai Che RN  Safety Promotion/Fall Prevention:   activity supervised   assistive device/personal items within reach   clutter free environment maintained   room organization consistent   safety round/check completed   toileting scheduled   Goal Outcome Evaluation:           Progress: no change          Pt alert and oriented x4. VSS. O2 weaned to 4L per NC. Sinus arrhythmia on telemetry. Bronchoscopy completed today. Pt has been fatigued and drowsy since procedure but easily aroused.

## 2024-08-19 NOTE — PLAN OF CARE
Goal Outcome Evaluation:  Plan of Care Reviewed With: patient        Progress: no change       Patient is alert and oriented X 4. Slept throughout the night. 5L nasal canula. NPO at midnight for bronchoscopy later today. Voiding well.

## 2024-08-20 LAB
ALBUMIN SERPL-MCNC: 3 G/DL (ref 3.5–5.2)
ALBUMIN/GLOB SERPL: 1.1 G/DL
ALP SERPL-CCNC: 60 U/L (ref 39–117)
ALT SERPL W P-5'-P-CCNC: 50 U/L (ref 1–33)
ANION GAP SERPL CALCULATED.3IONS-SCNC: 15 MMOL/L (ref 5–15)
AST SERPL-CCNC: 40 U/L (ref 1–32)
BACTERIA SPEC AEROBE CULT: NORMAL
BASOPHILS # BLD AUTO: 0.01 10*3/MM3 (ref 0–0.2)
BASOPHILS NFR BLD AUTO: 0.1 % (ref 0–1.5)
BILIRUB SERPL-MCNC: 0.3 MG/DL (ref 0–1.2)
BUN SERPL-MCNC: 21 MG/DL (ref 8–23)
BUN/CREAT SERPL: 25 (ref 7–25)
C-ANCA TITR SER IF: NORMAL TITER
CALCIUM SPEC-SCNC: 8.8 MG/DL (ref 8.6–10.5)
CENTROMERE B AB SER-ACNC: <0.2 AI (ref 0–0.9)
CHLORIDE SERPL-SCNC: 103 MMOL/L (ref 98–107)
CHROMATIN AB SERPL-ACNC: <0.2 AI (ref 0–0.9)
CO2 SERPL-SCNC: 20 MMOL/L (ref 22–29)
CREAT SERPL-MCNC: 0.84 MG/DL (ref 0.57–1)
DEPRECATED RDW RBC AUTO: 46.2 FL (ref 37–54)
DSDNA AB SER-ACNC: <1 IU/ML (ref 0–9)
EGFRCR SERPLBLD CKD-EPI 2021: 69.5 ML/MIN/1.73
ENA JO1 AB SER-ACNC: <0.2 AI (ref 0–0.9)
ENA RNP AB SER-ACNC: <0.2 AI (ref 0–0.9)
ENA SCL70 AB SER-ACNC: <0.2 AI (ref 0–0.9)
ENA SM AB SER-ACNC: <0.2 AI (ref 0–0.9)
ENA SS-A AB SER-ACNC: <0.2 AI (ref 0–0.9)
ENA SS-B AB SER-ACNC: <0.2 AI (ref 0–0.9)
EOSINOPHIL # BLD AUTO: 0 10*3/MM3 (ref 0–0.4)
EOSINOPHIL NFR BLD AUTO: 0 % (ref 0.3–6.2)
ERYTHROCYTE [DISTWIDTH] IN BLOOD BY AUTOMATED COUNT: 13.8 % (ref 12.3–15.4)
GIE STN SPEC: NORMAL
GLOBULIN UR ELPH-MCNC: 2.8 GM/DL
GLUCOSE SERPL-MCNC: 172 MG/DL (ref 65–99)
HCT VFR BLD AUTO: 35.2 % (ref 34–46.6)
HGB BLD-MCNC: 11.1 G/DL (ref 12–15.9)
IMM GRANULOCYTES # BLD AUTO: 0.04 10*3/MM3 (ref 0–0.05)
IMM GRANULOCYTES NFR BLD AUTO: 0.4 % (ref 0–0.5)
LYMPHOCYTES # BLD AUTO: 0.36 10*3/MM3 (ref 0.7–3.1)
LYMPHOCYTES NFR BLD AUTO: 3.6 % (ref 19.6–45.3)
Lab: NORMAL
MCH RBC QN AUTO: 28.6 PG (ref 26.6–33)
MCHC RBC AUTO-ENTMCNC: 31.5 G/DL (ref 31.5–35.7)
MCV RBC AUTO: 90.7 FL (ref 79–97)
MONOCYTES # BLD AUTO: 0.15 10*3/MM3 (ref 0.1–0.9)
MONOCYTES NFR BLD AUTO: 1.5 % (ref 5–12)
MYELOPEROXIDASE AB SER IA-ACNC: <0.2 UNITS (ref 0–0.9)
NEUTROPHILS NFR BLD AUTO: 9.33 10*3/MM3 (ref 1.7–7)
NEUTROPHILS NFR BLD AUTO: 94.4 % (ref 42.7–76)
NRBC BLD AUTO-RTO: 0 /100 WBC (ref 0–0.2)
P-ANCA ATYPICAL TITR SER IF: NORMAL TITER
P-ANCA TITR SER IF: NORMAL TITER
PLATELET # BLD AUTO: 350 10*3/MM3 (ref 140–450)
PMV BLD AUTO: 9.8 FL (ref 6–12)
POTASSIUM SERPL-SCNC: 4.2 MMOL/L (ref 3.5–5.2)
PROT SERPL-MCNC: 5.8 G/DL (ref 6–8.5)
PROTEINASE3 AB SER IA-ACNC: <0.2 UNITS (ref 0–0.9)
RBC # BLD AUTO: 3.88 10*6/MM3 (ref 3.77–5.28)
REF LAB TEST METHOD: NORMAL
SODIUM SERPL-SCNC: 138 MMOL/L (ref 136–145)
WBC NRBC COR # BLD AUTO: 9.89 10*3/MM3 (ref 3.4–10.8)

## 2024-08-20 PROCEDURE — 94799 UNLISTED PULMONARY SVC/PX: CPT

## 2024-08-20 PROCEDURE — 25010000002 PIPERACILLIN SOD-TAZOBACTAM PER 1 G: Performed by: INTERNAL MEDICINE

## 2024-08-20 PROCEDURE — 97116 GAIT TRAINING THERAPY: CPT

## 2024-08-20 PROCEDURE — 99232 SBSQ HOSP IP/OBS MODERATE 35: CPT | Performed by: INTERNAL MEDICINE

## 2024-08-20 PROCEDURE — 80053 COMPREHEN METABOLIC PANEL: CPT | Performed by: INTERNAL MEDICINE

## 2024-08-20 PROCEDURE — 25010000002 HEPARIN (PORCINE) PER 1000 UNITS: Performed by: INTERNAL MEDICINE

## 2024-08-20 PROCEDURE — 85025 COMPLETE CBC W/AUTO DIFF WBC: CPT | Performed by: INTERNAL MEDICINE

## 2024-08-20 PROCEDURE — 25010000002 METHYLPREDNISOLONE PER 125 MG: Performed by: INTERNAL MEDICINE

## 2024-08-20 PROCEDURE — 97535 SELF CARE MNGMENT TRAINING: CPT

## 2024-08-20 PROCEDURE — 94664 DEMO&/EVAL PT USE INHALER: CPT

## 2024-08-20 PROCEDURE — 94761 N-INVAS EAR/PLS OXIMETRY MLT: CPT

## 2024-08-20 PROCEDURE — 97530 THERAPEUTIC ACTIVITIES: CPT

## 2024-08-20 RX ADMIN — LOSARTAN POTASSIUM 25 MG: 25 TABLET, FILM COATED ORAL at 08:47

## 2024-08-20 RX ADMIN — METHYLPREDNISOLONE SODIUM SUCCINATE 60 MG: 125 INJECTION, POWDER, FOR SOLUTION INTRAMUSCULAR; INTRAVENOUS at 15:43

## 2024-08-20 RX ADMIN — Medication 10 ML: at 22:23

## 2024-08-20 RX ADMIN — METHYLPREDNISOLONE SODIUM SUCCINATE 60 MG: 125 INJECTION, POWDER, FOR SOLUTION INTRAMUSCULAR; INTRAVENOUS at 08:43

## 2024-08-20 RX ADMIN — PROPRANOLOL HYDROCHLORIDE 80 MG: 20 TABLET ORAL at 08:47

## 2024-08-20 RX ADMIN — HEPARIN SODIUM 5000 UNITS: 5000 INJECTION INTRAVENOUS; SUBCUTANEOUS at 08:46

## 2024-08-20 RX ADMIN — BUDESONIDE AND FORMOTEROL FUMARATE DIHYDRATE 1 PUFF: 160; 4.5 AEROSOL RESPIRATORY (INHALATION) at 20:16

## 2024-08-20 RX ADMIN — SERTRALINE 50 MG: 50 TABLET, FILM COATED ORAL at 21:20

## 2024-08-20 RX ADMIN — LAMOTRIGINE 100 MG: 100 TABLET ORAL at 08:44

## 2024-08-20 RX ADMIN — PIPERACILLIN AND TAZOBACTAM 3.38 G: 3; .375 INJECTION, POWDER, LYOPHILIZED, FOR SOLUTION INTRAVENOUS at 08:46

## 2024-08-20 RX ADMIN — METHYLPREDNISOLONE SODIUM SUCCINATE 60 MG: 125 INJECTION, POWDER, FOR SOLUTION INTRAMUSCULAR; INTRAVENOUS at 00:50

## 2024-08-20 RX ADMIN — HEPARIN SODIUM 5000 UNITS: 5000 INJECTION INTRAVENOUS; SUBCUTANEOUS at 21:20

## 2024-08-20 RX ADMIN — PIPERACILLIN AND TAZOBACTAM 3.38 G: 3; .375 INJECTION, POWDER, LYOPHILIZED, FOR SOLUTION INTRAVENOUS at 00:53

## 2024-08-20 RX ADMIN — SERTRALINE 25 MG: 25 TABLET, FILM COATED ORAL at 08:43

## 2024-08-20 RX ADMIN — LAMOTRIGINE 100 MG: 100 TABLET ORAL at 21:20

## 2024-08-20 RX ADMIN — IPRATROPIUM BROMIDE AND ALBUTEROL SULFATE 3 ML: 2.5; .5 SOLUTION RESPIRATORY (INHALATION) at 16:09

## 2024-08-20 RX ADMIN — IPRATROPIUM BROMIDE AND ALBUTEROL SULFATE 3 ML: 2.5; .5 SOLUTION RESPIRATORY (INHALATION) at 20:15

## 2024-08-20 RX ADMIN — IPRATROPIUM BROMIDE AND ALBUTEROL SULFATE 3 ML: 2.5; .5 SOLUTION RESPIRATORY (INHALATION) at 12:53

## 2024-08-20 RX ADMIN — BUDESONIDE AND FORMOTEROL FUMARATE DIHYDRATE 1 PUFF: 160; 4.5 AEROSOL RESPIRATORY (INHALATION) at 09:16

## 2024-08-20 RX ADMIN — AMLODIPINE BESYLATE 5 MG: 5 TABLET ORAL at 08:45

## 2024-08-20 RX ADMIN — IPRATROPIUM BROMIDE AND ALBUTEROL SULFATE 3 ML: 2.5; .5 SOLUTION RESPIRATORY (INHALATION) at 09:15

## 2024-08-20 RX ADMIN — PROPRANOLOL HYDROCHLORIDE 80 MG: 20 TABLET ORAL at 21:20

## 2024-08-20 RX ADMIN — Medication 10 ML: at 08:48

## 2024-08-20 RX ADMIN — GUAIFENESIN 600 MG: 600 TABLET, EXTENDED RELEASE ORAL at 08:45

## 2024-08-20 RX ADMIN — PIPERACILLIN AND TAZOBACTAM 3.38 G: 3; .375 INJECTION, POWDER, LYOPHILIZED, FOR SOLUTION INTRAVENOUS at 15:44

## 2024-08-20 NOTE — PROGRESS NOTES
[facility] Medicine Services  PROGRESS NOTE    Patient Name: Judi Patel  : 1940  MRN: [id number]    Date of Admission: 8/15/2024  Primary Care Physician: Jone Solorio MD    Subjective   Subjective     CC:  F/U progressive weakness and dyspnea     HPI: Doing better today.  She and daughter stress the importance of getting her propranolol for quality of life with her essential tremor.      Objective   Objective     Vital Signs:   Temp:  [96.9 °F (36.1 °C)-98.5 °F (36.9 °C)] 97.8 °F (36.6 °C)  Heart Rate:  [64-87] 69  Resp:  [16-20] 18  BP: ()/(55-80) 104/60  Flow (L/min):  [3.5-4] 3.5     Physical Exam:  Constitutional: No acute distress, awake, alert, sitting up in chair, also seen ambulating in hallway  HENT: NCAT, mucous membranes moist  Respiratory: CTA bilaterally on anterior auscultation, respiratory effort normal on nasal cannula  Musculoskeletal: No bilateral ankle edema  Psychiatric: Appropriate affect, cooperative  Neurologic: Speech clear  Skin: No rashes on exposed surfaces      Results Reviewed:  LAB RESULTS:      Lab 24  0620 24  0644 24  0912 08/15/24  1755   WBC 9.89 7.81 9.08 10.21   HEMOGLOBIN 11.1* 9.9* 9.8* 11.0*   HEMATOCRIT 35.2 29.9* 29.1* 33.3*   PLATELETS 350 307 308 347   NEUTROS ABS 9.33*  --  7.22* 7.89*   IMMATURE GRANS (ABS) 0.04  --  0.05 0.02   LYMPHS ABS 0.36*  --  0.47* 0.85   MONOS ABS 0.15  --  0.83 1.00*   EOS ABS 0.00  --  0.46* 0.42*   MCV 90.7 86.4 84.1 86.3   SED RATE  --  58*  --   --    CRP  --   --  13.68*  --    PROCALCITONIN  --   --   --  0.08   LACTATE  --   --   --  0.9         Lab 24  0620 24  0609 24  1638 24  0644 24  2358 24  0912 08/15/24  8496   SODIUM 138  --   --  138  --  135* 136   POTASSIUM 4.2  --  4.5 3.6 3.9 2.9* 3.3*   CHLORIDE 103  --   --  104  --  97* 97*   CO2 20.0*  --   --  26.0  --  29.0 25.4   ANION GAP 15.0  --   --  8.0  --  9.0 13.6   BUN 21   --   --  11  --  9 14   CREATININE 0.84  --   --  0.52*  --  0.48* 0.52*   EGFR 69.5  --   --  92.9  --  94.7 92.9   GLUCOSE 172*  --   --  106*  --  194* 115*   CALCIUM 8.8  --   --  8.2*  --  8.3* 9.4   MAGNESIUM  --   --   --  1.8  --   --   --    HEMOGLOBIN A1C  --  6.00*  --   --   --   --   --    TSH  --   --   --   --   --  1.220  --          Lab 08/20/24  0620 08/17/24  0644 08/16/24  0912 08/15/24  1755   TOTAL PROTEIN 5.8* 5.9* 5.3* 6.9   ALBUMIN 3.0* 2.9* 3.0* 3.4*   GLOBULIN 2.8 3.0 2.3 3.5   ALT (SGPT) 50* 36* 36* 36*   AST (SGOT) 40* 36* 37* 39*   BILIRUBIN 0.3 0.3 0.3 0.5   ALK PHOS 60 52 58 62         Lab 08/15/24  1755   PROBNP 2,187.0*   HSTROP T 12                 Lab 08/15/24  1813   PH, ARTERIAL 7.498*   PCO2, ARTERIAL 34.2*   PO2 ART 51.5*   FIO2 21   HCO3 ART 26.5*   BASE EXCESS ART 3.4*   CARBOXYHEMOGLOBIN 0.8     Brief Urine Lab Results  (Last result in the past 365 days)        Color   Clarity   Blood   Leuk Est   Nitrite   Protein   CREAT   Urine HCG        08/15/24 1923 Yellow   Clear   Negative   Negative   Negative   Negative                   Microbiology Results Abnormal       Procedure Component Value - Date/Time    AFB Culture - Wash, Bronchus [[id number]] Collected: 08/19/24 1116    Lab Status: Preliminary result Specimen: Wash from Bronchus Updated: 08/20/24 1159     AFB Stain No acid fast bacilli seen on concentrated smear    Blood Culture - Blood, Hand, Right [[id number]]  (Normal) Collected: 08/16/24 0902    Lab Status: Preliminary result Specimen: Blood from Hand, Right Updated: 08/20/24 0945     Blood Culture No growth at 4 days    Narrative:      Less than seven (7) mL's of blood was collected.  Insufficient quantity may yield false negative results.    Blood Culture - Blood, Arm, Right [[id number]]  (Normal) Collected: 08/16/24 0912    Lab Status: Preliminary result Specimen: Blood from Arm, Right Updated: 08/20/24 0945     Blood Culture No growth at 4 days    Narrative:       Less than seven (7) mL's of blood was collected.  Insufficient quantity may yield false negative results.    Respiratory Culture - Wash, Bronchus [[id number]] Collected: 08/19/24 1116    Lab Status: Preliminary result Specimen: Wash from Bronchus Updated: 08/20/24 0801     Respiratory Culture No growth     Gram Stain Few (2+) WBCs seen      No organisms seen    Blood Culture - Blood, Arm, Right [[id number]]  (Normal) Collected: 08/15/24 1750    Lab Status: Preliminary result Specimen: Blood from Arm, Right Updated: 08/19/24 1945     Blood Culture No growth at 4 days    S. Pneumo Ag Urine or CSF - Urine, Urine, Clean Catch [[id number]]  (Normal) Collected: 08/16/24 1019    Lab Status: Final result Specimen: Urine, Clean Catch Updated: 08/16/24 1454     Strep Pneumo Ag Negative    Legionella Antigen, Urine - Urine, Urine, Clean Catch [[id number]]  (Normal) Collected: 08/16/24 1019    Lab Status: Final result Specimen: Urine, Clean Catch Updated: 08/16/24 1454     LEGIONELLA ANTIGEN, URINE Negative    MRSA Screen, PCR (Inpatient) - Swab, Nares [[id number]]  (Normal) Collected: 08/16/24 0635    Lab Status: Final result Specimen: Swab from Nares Updated: 08/16/24 0822     MRSA PCR Negative    Narrative:      The negative predictive value of this diagnostic test is high and should only be used to consider de-escalating anti-MRSA therapy. A positive result may indicate colonization with MRSA and must be correlated clinically.  MRSA Negative    COVID-19, FLU A/B, RSV PCR 1 HR TAT - Swab, Nasopharynx [[id number]]  (Normal) Collected: 08/15/24 1923    Lab Status: Final result Specimen: Swab from Nasopharynx Updated: 08/15/24 2013     COVID19 Not Detected     Influenza A PCR Not Detected     Influenza B PCR Not Detected     RSV, PCR Not Detected    Narrative:      Fact sheet for providers: https://www.fda.gov/media/882806/download    Fact sheet for patients: https://www.fda.gov/media/471689/download    Test performed by PCR.             XR Chest 1 View    Result Date: 8/19/2024  XR CHEST 1 VW Date of Exam: 8/19/2024 2:57 AM EDT Indication: Resp Distress Comparison: Chest x-ray 8/17/2024 Findings: The heart is stable in size. Consolidated airspace disease is again noted in the peripheral left upper lung with patchy areas of consolidation in the peripheral right upper lung as well. There is obscuration of the left hemidiaphragm compatible with left  lower lobe airspace disease. Trace left pleural effusion not excluded. No evidence for pneumothorax. Interstitial prominence is likely chronic. There is a high riding humeral head compatible with underlying rotator cuff pathology.     Impression: Impression: 1. Findings compatible with multifocal pneumonia with prominent consolidation in the peripheral left upper lung. Electronically Signed: Janina Hyman MD  8/19/2024 7:11 AM EDT  Workstation ID: [id number]     Results for orders placed during the hospital encounter of 07/15/24    Adult Transthoracic Echo Complete w/ Color, Spectral and Contrast if necessary per protocol    Interpretation Summary    Left ventricular systolic function is normal. Calculated left ventricular EF = 62% Left ventricular ejection fraction appears to be 61 - 65%.    Left ventricular wall thickness is consistent with mild concentric hypertrophy.    The right atrial cavity is borderline dilated.      Current medications:  Scheduled Meds:amLODIPine, 5 mg, Oral, Daily  budesonide-formoterol, 1 puff, Inhalation, BID - RT  guaiFENesin, 600 mg, Oral, Daily  heparin (porcine), 5,000 Units, Subcutaneous, Q12H  ipratropium-albuterol, 3 mL, Nebulization, 4x Daily - RT  lamoTRIgine, 100 mg, Oral, Q12H  losartan, 25 mg, Oral, Daily  methylPREDNISolone sodium succinate, 60 mg, Intravenous, Q8H  piperacillin-tazobactam, 3.375 g, Intravenous, Q8H  propranolol, 80 mg, Oral, Q12H  sertraline, 25 mg, Oral, Daily  sertraline, 50 mg, Oral, Nightly  sodium chloride, 10 mL, Intravenous,  Q12H      Continuous Infusions:     PRN Meds:.  acetaminophen **OR** acetaminophen **OR** acetaminophen    azelastine    senna-docusate sodium **AND** polyethylene glycol **AND** bisacodyl **AND** bisacodyl    Calcium Replacement - Follow Nurse / BPA Driven Protocol    ipratropium-albuterol    Magnesium Standard Dose Replacement - Follow Nurse / BPA Driven Protocol    Phosphorus Replacement - Follow Nurse / BPA Driven Protocol    Potassium Replacement - Follow Nurse / BPA Driven Protocol    sodium chloride    sodium chloride    sodium chloride    Assessment & Plan   Assessment & Plan     Active Hospital Problems    Diagnosis  POA    **Acute respiratory failure with hypoxia [J96.01]  Yes    Bilateral pulmonary infiltrates [R91.8]  Yes    History of breast cancer [Z85.3]  Not Applicable    Mixed hyperlipidemia [E78.2]  Yes    Seizure disorder [G40.909]  Yes    Essential hypertension [I10]  Yes    Asthma [J45.909]  Yes      Resolved Hospital Problems   No resolved problems to display.        Brief Hospital Course to date:  Judi Patel is a 83 y.o. female with a history of Asthma, HTN, HLD, seizure disorder, breast cancer s/p lumpectomy with radiation 4 months ago who presented to the ED with complaints of shortness of air and cough.  Patient was diagnosed with pneumonia in the left lower lobe and was treated with 10 days of doxycycline.      This patient's problems and plans were partially entered by my partner and updated as appropriate by me 08/20/24.     Bilateral infiltrates, L>R  Moderate left pleural effusion  - Failed 10 day course of doxycycline (empiric treatment from PCP)   - Complete course of Zosyn.  Expiration date set.     - Pulmonary consulted; evaluating for eosinophilic pna, vasculitis, histo/blasto.    - SP Bronchoscopy on 8/19 with studies pending  - Started solumedrol 8/19 with dramatic reduction in eosinophils.  Plan to transition to PO prednisone tomorrow if she is improving.  Per pulmonary, may  benefit from biologic therapy for asthma as an outpatient.    Progressive weakness   - Likely related to acute pulmonary disease   - TSH and CK WNL, AM cortisol WNL.  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         AM-PAC 6 Clicks Score (PT): 18 (08/20/24 0800)    CODE STATUS:   Code Status and Medical Interventions: CPR (Attempt to Resuscitate); Full Support   Ordered at: 08/16/24 0306     Level Of Support Discussed With:    Patient     Code Status (Patient has no pulse and is not breathing):    CPR (Attempt to Resuscitate)     Medical Interventions (Patient has pulse or is breathing):    Full Support       Marce Gould MD  08/20/24

## 2024-08-20 NOTE — PLAN OF CARE
Goal Outcome Evaluation:  Plan of Care Reviewed With: patient        Progress: no change  Outcome Evaluation: Pt demonstrating improvements w/ mobility, transfers and activity tolerance this date. Pt limited by fatigue. Pt's deficits warrant continuation of skilled IP OT services. Continue per current POC as able.      Anticipated Discharge Disposition (OT): home with assist, home with outpatient therapy services

## 2024-08-20 NOTE — PROGRESS NOTES
INPATIENT PULMONARY SERVICE  PROGRESS NOTE     Hospital LOS: 4 days    Ms. Judi Patel, is followed for a Chief Complaint of:  Chief Complaint   Patient presents with    Shortness of Breath       Acute respiratory failure with hypoxia    Essential hypertension    Asthma    Seizure disorder    Mixed hyperlipidemia    Bilateral pulmonary infiltrates    History of breast cancer      Subjective   S     Interval History:  Feels better this AM. She slept better and has less coughing.        The patient's relevant past medical, surgical and social history were reviewed and updated in Epic as appropriate.      ROS:   Constitutional: Negative for fever.   Respiratory: Negative for dyspnea.   Cardiovascular: Negative for chest pain.   Gastrointestinal: Negative for  nausea, vomiting and diarrhea.     Objective   O     Vitals  Temp  Min: 96.9 °F (36.1 °C)  Max: 98.5 °F (36.9 °C)  BP  Min: 88/66  Max: 141/73  Pulse  Min: 64  Max: 87  Resp  Min: 16  Max: 22  SpO2  Min: 84 %  Max: 100 % Flow (L/min)  Min: 3.5  Max: 8    I/O 24 HR (7:00 AM-6:59AM):  Intake/Output         08/19/24 0700 - 08/20/24 0659    Intake (ml) 120    Output (ml) 1400    Net (ml) -1280            Medications (Drips):       Physical Examination    Telemetry: Normal sinus rhythm.    Constitutional:  No acute distress.  Conversant. Sitting up in bed on nasal cannula.    Cardiovascular: Regular rate and rhythm.  No murmurs, rub or gallop.   Respiratory: Normal symmetric chest expansion.  Normal respiratory effort.   Abdominal:  Soft. No masses.   Non-tender. No distension.   No hepatosplenomegaly.   Extremities: No digital cyanosis or clubbing.  No peripheral edema.   Neurological:   Alert and Oriented to person, place, and time.   Moves all extremities.           Results from last 7 days   Lab Units 08/20/24  0620 08/17/24  0644 08/16/24  0912   WBC 10*3/mm3 9.89 7.81 9.08   HEMOGLOBIN g/dL 11.1* 9.9* 9.8*   MCV fL 90.7 86.4 84.1   PLATELETS 10*3/mm3 350 307  308     Results from last 7 days   Lab Units 08/20/24  0620 08/17/24  1638 08/17/24  0644 08/16/24  2358 08/16/24  0912   SODIUM mmol/L 138  --  138  --  135*   POTASSIUM mmol/L 4.2 4.5 3.6   < > 2.9*   CO2 mmol/L 20.0*  --  26.0  --  29.0   CREATININE mg/dL 0.84  --  0.52*  --  0.48*   MAGNESIUM mg/dL  --   --  1.8  --   --     < > = values in this interval not displayed.     Serum creatinine: 0.84 mg/dL 08/20/24 0620  Estimated creatinine clearance: 56.2 mL/min  Results from last 7 days   Lab Units 08/20/24  0620 08/17/24  0644 08/16/24  0912   ALK PHOS U/L 60 52 58   BILIRUBIN mg/dL 0.3 0.3 0.3   ALT (SGPT) U/L 50* 36* 36*   AST (SGOT) U/L 40* 36* 37*       Results from last 7 days   Lab Units 08/15/24  1813   PH, ARTERIAL pH units 7.498*   PCO2, ARTERIAL mm Hg 34.2*   PO2 ART mm Hg 51.5*   FIO2 % 21       Images:     Imaging Results (Last 24 Hours)       ** No results found for the last 24 hours. **            I reviewed the patient's new clinical results.  I reviewed the patient's new imaging results and agree with the interpretation.    Assessment & Plan   A / P     Ms. Patel is an 82yo F with a history of asthma, allergic rhinitis, recurrent sinusitis and left breast cancer s/p lumpectomy and XRT completed in April 2024 who presented to the [facility] ED on 8/15/24 with shortness of breath and cough. CT chest was consistent with multifocal pneumonia. She was admitted to Hospital Medicine and started on Vancomycin and Zosyn. Pulmonary was consulted due to persistent oxygen requirements.     She underwent a bronchoscopy on 8/19/24 and cultures are negative thus far. IV Solumedrol was started on 8/19 after the bronchoscopy. Her eosinophil count has improved from 420 to 0 after starting steroids. Cell count from the bronchoscopy showed 5% eosinophils.     Oxygen has been weaned from 5L to 3.5L this AM.     Diet: Cardiac; Healthy Heart (2-3 Na+); Fluid Consistency: Thin (IDDSI 0)  Code Status and Medical Interventions:  CPR (Attempt to Resuscitate); Full Support   Ordered at: 08/16/24 0306     Level Of Support Discussed With:    Patient     Code Status (Patient has no pulse and is not breathing):    CPR (Attempt to Resuscitate)     Medical Interventions (Patient has pulse or is breathing):    Full Support       Active Hospital Problems    Diagnosis     **Acute respiratory failure with hypoxia     Bilateral pulmonary infiltrates     History of breast cancer     Mixed hyperlipidemia     Seizure disorder     Essential hypertension     Asthma        Assessment / Plan:  Continue IV Steroids today. May be able to change to oral Prednisone tomorrow if she continues to show improvement.   Complete a course of empiric Zosyn. Vancomycin stopped with negative MRSA PCR. F/u cultures.   F/u pending labs.   Wean supplemental oxygen as tolerated.   Continue Symbicort. May benefit from biologic therapy for asthma as an outpatient.     I discussed the patient's findings and my recommendations with patient, family, and primary care team        Debbie Marie, DO  Pulmonary and Critical Care Medicine

## 2024-08-20 NOTE — PLAN OF CARE
Goal Outcome Evaluation:   Patient is alert and oriented.On 3.5 L of oxygen through NC.On Antibiotics.Vital signs within Normal limits.

## 2024-08-20 NOTE — PLAN OF CARE
Goal Outcome Evaluation:  Plan of Care Reviewed With: patient, daughter, grandchild(nikky)        Progress: improving  Outcome Evaluation: Pt amb 200' with FWW and CGA. Two standing rest breaks required. O2 sat remained > 93% on 4L NC. Activity limited by fatigue. Recommend d/c home with 24/7 assist and OPPT when medically appropriate. Pt and family verbalized understanding.      Anticipated Discharge Disposition (PT): home with assist, home with outpatient therapy services

## 2024-08-20 NOTE — THERAPY TREATMENT NOTE
Patient Name: Judi Patel  : 1940    MRN: [id number]                              Today's Date: 2024       Admit Date: 8/15/2024    Visit Dx:     ICD-10-CM ICD-9-CM   1. Acute hypoxic respiratory failure  J96.01 518.81   2. Multifocal pneumonia  J18.9 486   3. Pneumonia  J18.9 486     Patient Active Problem List   Diagnosis    Degenerative disc disease, lumbar    Scoliosis of lumbar spine    Menopause    Vaginal atrophy    Osteopenia    Midline cystocele    Essential hypertension    Asthma    Anxiety    Seizure disorder    Pinched nerve in neck    Abnormal chest x-ray    Acquired cystic kidney disease    Allergic rhinitis    Acute bronchitis    Acute sinusitis    Chronic infection of sinus    Eustachian tube disorder    Fatigue    Gastro-esophageal reflux disease without esophagitis    Hypertrophy of nasal turbinates    Muscle cramps    Moderate persistent asthma    Mixed hyperlipidemia    Low back pain    Incontinence    Tremor    Statin intolerance    Snoring    Renal colic    Otalgia    Neurological finding    Neck pain    Muscle weakness    Spasm of back muscles    Partial epilepsy with impairment of consciousness    Chest pain    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 breast in female, estrogen receptor negative    Dyslipidemia    Dyspnea on exertion    Acute respiratory failure with hypoxia    Bilateral pulmonary infiltrates    History of breast cancer     Past Medical History:   Diagnosis Date    Anxiety     Asthma     Back problem     Breast cancer     Cancer Breast    Compression fracture of T12 vertebra     Cystocele     Epilepsy     Family history of hip fracture     mother, father    Hyperlipidemia     Hypertension     IBS (irritable bowel syndrome)     Menopause     Osteoporosis     Pinched nerve in neck     Seizure disorder     Vaginal atrophy     Vitamin D deficiency      Past Surgical History:   Procedure Laterality Date    ABDOMINAL SURGERY      APPENDECTOMY      BASAL CELL  CARCINOMA EXCISION Right 01/12/2010    Nose    BRONCHOSCOPY N/A 8/19/2024    Procedure: BRONCHOSCOPY;  Surgeon: Debbie Marie DO;  Location: [facility] ENDOSCOPY;  Service: Pulmonary;  Laterality: N/A;    CHOLECYSTECTOMY  01/01/1991    CYST REMOVAL  01/01/1990    Right Foot    DILATATION AND CURETTAGE      FUNCTIONAL ENDOSCOPIC SINUS SURGERY  11/16/2010    Dr. Mathews    KNEE ARTHROSCOPY Left 01/01/2006    KNEE ARTHROSCOPY W/ MENISCAL REPAIR Right 11/01/2010    REPLACEMENT TOTAL KNEE      SINUS SURGERY      TOTAL ABDOMINAL HYSTERECTOMY WITH SALPINGO OOPHORECTOMY Bilateral     WRIST SURGERY Left 01/01/2004      General Information       [facility] Name 08/20/24 1457          Physical Therapy Time and Intention    Document Type therapy note (daily note)  -     Mode of Treatment physical therapy  -[facility] Name 08/20/24 1452          General Information    Patient Profile Reviewed yes  -     Existing Precautions/Restrictions fall;oxygen therapy device and L/min;other (see comments)  monitor O2  -[facility] Name 08/20/24 1456          Cognition    Orientation Status (Cognition) oriented x 3  -[facility] Name 08/20/24 1454          Safety Issues, Functional Mobility    Impairments Affecting Function (Mobility) balance;endurance/activity tolerance;shortness of breath;strength  -               User Key  (r) = Recorded By, (t) = Taken By, (c) = Cosigned By      Initials Name Provider Type     Gertrude Claros PT Physical Therapist                   Mobility       [facility] Name 08/20/24 1458          Bed Mobility    Bed Mobility supine-sit  -     Supine-Sit [place] (Bed Mobility) standby assist;verbal cues  -     Assistive Device (Bed Mobility) head of bed elevated;bed rails  -[facility] Name 08/20/24 1453          Transfers    Comment, (Transfers) VC for pushing up from bed when standing  -[facility] Name 08/20/24 1452          Sit-Stand Transfer    Sit-Stand [place] (Transfers) contact guard  -      Assistive Device (Sit-Stand Transfers) walker, front-wheeled  -       Row Name 08/20/24 1458          Gait/Stairs (Locomotion)    [place] Level (Gait) contact guard;1 person assist  -HW     Assistive Device (Gait) walker, front-wheeled  -HW     Distance in Feet (Gait) 200  -HW     Deviations/Abnormal Patterns (Gait) bilateral deviations;gait speed decreased;stride length decreased;base of support, narrow  -HW     Bilateral Gait Deviations heel strike decreased  -     Comment, (Gait/Stairs) Pt amb in [place]aine with step-through gait pattern. VC for upright posture, increased stride legnth, and encouraging PLB. Two standing rest breaks required. Chair follow for safety though pt did not need. O2 sat 99% and then 93% throughout walk. Minimal conversing noted throughout walk with SOA reported. Activity limited by fatigue. No LOB observed though amb primarily in straight, open [place].  -               User Key  (r) = Recorded By, (t) = Taken By, (c) = Cosigned By      Initials Name Provider Type     Gertrude Claros PT Physical Therapist                   Obj/Interventions       Row Name 08/20/24 1501          Balance    Balance Assessment sitting static balance;sitting dynamic balance;sit to stand dynamic balance;standing static balance;standing dynamic balance  -     Static Sitting Balance standby assist  -     Dynamic Sitting Balance standby assist  -HW     Position, Sitting Balance sitting edge of bed  -     Static Standing Balance contact guard  -HW     Dynamic Standing Balance contact guard  -HW     Position/Device Used, Standing Balance supported;walker, rolling  -HW     Balance Interventions sitting;standing;sit to stand;occupation based/functional task  -               User Key  (r) = Recorded By, (t) = Taken By, (c) = Cosigned By      Initials Name Provider Type     Gertrude Claros PT Physical Therapist                   Goals/Plan       Row Name 08/20/24 1503          Bed Mobility Goal 1 (PT)     Activity/Assistive Device (Bed Mobility Goal 1, PT) sit to supine/supine to sit  -HW     [place] Level/Cues Needed (Bed Mobility Goal 1, PT) independent  -HW     Time Frame (Bed Mobility Goal 1, PT) short term goal (STG);5 days  -HW     Progress/Outcomes (Bed Mobility Goal 1, PT) good progress toward goal  -HW       Row Name 08/20/24 1503          Transfer Goal 1 (PT)    Activity/Assistive Device (Transfer Goal 1, PT) sit-to-stand/stand-to-sit;bed-to-chair/chair-to-bed  -HW     [place] Level/Cues Needed (Transfer Goal 1, PT) independent  -HW     Time Frame (Transfer Goal 1, PT) long term goal (LTG);10 days  -HW     Progress/Outcome (Transfer Goal 1, PT) good progress toward goal  -HW       Row Name 08/20/24 1503          Gait Training Goal 1 (PT)    Activity/Assistive Device (Gait Training Goal 1, PT) gait (walking locomotion);improve balance and speed;increase endurance/gait distance;decrease fall risk;other (see comments)  -HW     [place] Level (Gait Training Goal 1, PT) independent  -HW     Distance (Gait Training Goal 1, PT) 500  -HW     Time Frame (Gait Training Goal 1, PT) long term goal (LTG);10 days  -HW     Progress/Outcome (Gait Training Goal 1, PT) goal revised this date  -HW       Row Name 08/20/24 1503          Stairs Goal 1 (PT)    Activity/Assistive Device (Stairs Goal 1, PT) stairs, all skills;using handrail, left;using handrail, right  -HW     [place] Level/Cues Needed (Stairs Goal 1, PT) standby assist  -HW     Number of Stairs (Stairs Goal 1, PT) 2  -HW     Time Frame (Stairs Goal 1, PT) long term goal (LTG);10 days  -HW     Progress/Outcome (Stairs Goal 1, PT) goal ongoing  -HW               User Key  (r) = Recorded By, (t) = Taken By, (c) = Cosigned By      Initials Name Provider Type    HW Gertrude Claros PT Physical Therapist                   Clinical Impression       Row Name 08/20/24 1501          Pain    Pretreatment Pain Rating 0/10 - no pain  -HW     Posttreatment Pain  Rating 0/10 - no pain  -       Row Name 08/20/24 1501          Plan of Care Review    Plan of Care Reviewed With patient;daughter;grandchild(nikky)  -     Progress improving  -     Outcome Evaluation Pt amb 200' with FWW and CGA. Two standing rest breaks required. O2 sat remained > 93% on 4L NC. Activity limited by fatigue. Recommend d/c home with 24/7 assist and OPPT when medically appropriate. Pt and family verbalized understanding.  -       Row Name 08/20/24 1501          Positioning and Restraints    Pre-Treatment Position in bed  -     Post Treatment Position chair  -HW     In Chair notified nsg;reclined;sitting;call light within reach;encouraged to call for assist;exit alarm on;with family/caregiver;legs elevated;waffle cushion  -               User Key  (r) = Recorded By, (t) = Taken By, (c) = Cosigned By      Initials Name Provider Type     Gertrude Claros, PT Physical Therapist                   Outcome Measures       Row Name 08/20/24 1504 08/20/24 0800       How much help from another person do you currently need...    Turning from your back to your side while in flat bed without using bedrails? 3  - 3  -PT    Moving from lying on back to sitting on the side of a flat bed without bedrails? 3  - 3  -PT    Moving to and from a bed to a chair (including a wheelchair)? 3  - 3  -PT    Standing up from a chair using your arms (e.g., wheelchair, bedside chair)? 3  - 3  -PT    Climbing 3-5 steps with a railing? 3  - 3  -PT    To walk in hospital room? 3  - 3  -PT    AM-PAC 6 Clicks Score (PT) 18  - 18  -PT    Highest Level of Mobility Goal 6 --> Walk 10 steps or more  - 6 --> Walk 10 steps or more  -PT      Row Name 08/20/24 1504 08/20/24 1413       Functional Assessment    Outcome Measure Options AM-PAC 6 Clicks Basic Mobility (PT)  - AM-PAC 6 Clicks Daily Activity (OT)  -              User Key  (r) = Recorded By, (t) = Taken By, (c) = Cosigned By      Initials Name Provider Type      Gertrude Claros, PT Physical Therapist    Jaleesa Carballo, OT Occupational Therapist    PT Claribel Brambila, RN Registered Nurse                                 Physical Therapy Education       Title: PT OT SLP Therapies (In Progress)       Topic: Physical Therapy (In Progress)       Point: Mobility training (Done)       Learning Progress Summary             Patient Acceptance, E,D, VU,NR by  at 8/20/2024 1505    Acceptance, E, NR by  at 8/16/2024 0940                         Point: Home exercise program (Not Started)       Learner Progress:  Not documented in this visit.              Point: Body mechanics (Done)       Learning Progress Summary             Patient Acceptance, E,D, VU,NR by  at 8/20/2024 1505    Acceptance, E, NR by  at 8/16/2024 0940                         Point: Precautions (Done)       Learning Progress Summary             Patient Acceptance, E,D, VU,NR by  at 8/20/2024 1505    Acceptance, E, NR by  at 8/16/2024 0940                                         User Key       Initials Effective Dates Name Provider Type Discipline     12/15/23 -  Gertrude Claros, PT Physical Therapist PT     11/16/23 -  Nhung Bowman PT Physical Therapist PT                  PT Recommendation and Plan     Plan of Care Reviewed With: patient, daughter, grandchild(nikky)  Progress: improving  Outcome Evaluation: Pt amb 200' with FWW and CGA. Two standing rest breaks required. O2 sat remained > 93% on 4L NC. Activity limited by fatigue. Recommend d/c home with 24/7 assist and OPPT when medically appropriate. Pt and family verbalized understanding.     Time Calculation:         PT Charges       Row Name 08/20/24 1505             Time Calculation    Start Time 1418  -HW      PT Received On 08/20/24  -         Timed Charges    79632 - Gait Training Minutes  31  -HW         Total Minutes    Timed Charges Total Minutes 31  -HW       Total Minutes 31  -HW                User Key  (r) = Recorded By, (t) = Taken  By, (c) = Cosigned By      Initials Name Provider Type    HW Gertrude Claros, PT Physical Therapist                  Therapy Charges for Today       Code Description Service Date Service Provider Modifiers Qty    [id number] HC GAIT TRAINING EA 15 MIN 8/20/2024 Gertrude Claros, PT GP 2            PT G-Codes  Outcome Measure Options: AM-PAC 6 Clicks Basic Mobility (PT)  AM-PAC 6 Clicks Score (PT): 18  AM-PAC 6 Clicks Score (OT): 17  PT Discharge Summary  Anticipated Discharge Disposition (PT): home with assist, home with outpatient therapy services    Gertrude Claros PT  8/20/2024

## 2024-08-20 NOTE — PLAN OF CARE
Goal Outcome Evaluation:  Plan of Care Reviewed With: patient        Progress: no change       Pt is alert and oriented X 4. VSS. 4L NC. NSR on tele. Slept throughout the night. Voiding well. No c/o of pain.

## 2024-08-20 NOTE — THERAPY TREATMENT NOTE
Patient Name: Judi Patel  : 1940    MRN: [id number]                              Today's Date: 2024       Admit Date: 8/15/2024    Visit Dx:     ICD-10-CM ICD-9-CM   1. Acute hypoxic respiratory failure  J96.01 518.81   2. Multifocal pneumonia  J18.9 486   3. Pneumonia  J18.9 486     Patient Active Problem List   Diagnosis    Degenerative disc disease, lumbar    Scoliosis of lumbar spine    Menopause    Vaginal atrophy    Osteopenia    Midline cystocele    Essential hypertension    Asthma    Anxiety    Seizure disorder    Pinched nerve in neck    Abnormal chest x-ray    Acquired cystic kidney disease    Allergic rhinitis    Acute bronchitis    Acute sinusitis    Chronic infection of sinus    Eustachian tube disorder    Fatigue    Gastro-esophageal reflux disease without esophagitis    Hypertrophy of nasal turbinates    Muscle cramps    Moderate persistent asthma    Mixed hyperlipidemia    Low back pain    Incontinence    Tremor    Statin intolerance    Snoring    Renal colic    Otalgia    Neurological finding    Neck pain    Muscle weakness    Spasm of back muscles    Partial epilepsy with impairment of consciousness    Chest pain    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 breast in female, estrogen receptor negative    Dyslipidemia    Dyspnea on exertion    Acute respiratory failure with hypoxia    Bilateral pulmonary infiltrates    History of breast cancer     Past Medical History:   Diagnosis Date    Anxiety     Asthma     Back problem     Breast cancer     Cancer Breast    Compression fracture of T12 vertebra     Cystocele     Epilepsy     Family history of hip fracture     mother, father    Hyperlipidemia     Hypertension     IBS (irritable bowel syndrome)     Menopause     Osteoporosis     Pinched nerve in neck     Seizure disorder     Vaginal atrophy     Vitamin D deficiency      Past Surgical History:   Procedure Laterality Date    ABDOMINAL SURGERY      APPENDECTOMY      BASAL CELL  CARCINOMA EXCISION Right 01/12/2010    Nose    BRONCHOSCOPY N/A 8/19/2024    Procedure: BRONCHOSCOPY;  Surgeon: Debbie Marie DO;  Location: [facility] ENDOSCOPY;  Service: Pulmonary;  Laterality: N/A;    CHOLECYSTECTOMY  01/01/1991    CYST REMOVAL  01/01/1990    Right Foot    DILATATION AND CURETTAGE      FUNCTIONAL ENDOSCOPIC SINUS SURGERY  11/16/2010    Dr. Mathews    KNEE ARTHROSCOPY Left 01/01/2006    KNEE ARTHROSCOPY W/ MENISCAL REPAIR Right 11/01/2010    REPLACEMENT TOTAL KNEE      SINUS SURGERY      TOTAL ABDOMINAL HYSTERECTOMY WITH SALPINGO OOPHORECTOMY Bilateral     WRIST SURGERY Left 01/01/2004      General Information       Row Name 08/20/24 1403          OT Time and Intention    Document Type therapy note (daily note)  -MR     Mode of Treatment occupational therapy  -MR       Row Name 08/20/24 1403          General Information    Patient Profile Reviewed yes  -MR     Existing Precautions/Restrictions fall;oxygen therapy device and L/min;other (see comments)  monitor O2  -MR     Barriers to Rehab medically complex;previous functional deficit  -MR       Row Name 08/20/24 1403          Cognition    Orientation Status (Cognition) oriented x 3  -MR       Row Name 08/20/24 1406          Safety Issues, Functional Mobility    Safety Issues Affecting Function (Mobility) awareness of need for assistance;safety precaution awareness;safety precautions follow-through/compliance;insight into deficits/self-awareness  -MR     Impairments Affecting Function (Mobility) balance;endurance/activity tolerance;shortness of breath;strength  -MR               User Key  (r) = Recorded By, (t) = Taken By, (c) = Cosigned By      Initials Name Provider Type    MR Jaleesa Martins, OT Occupational Therapist                     Mobility/ADL's       Row Name 08/20/24 1402          Bed Mobility    Bed Mobility supine-sit  -MR     Supine-Sit [place] (Bed Mobility) standby assist;verbal cues  -MR     Assistive Device (Bed Mobility)  head of bed elevated;bed rails  -MR       Row Name 08/20/24 1409          Transfers    Transfers sit-stand transfer  -MR       Row Name 08/20/24 1409          Sit-Stand Transfer    Sit-Stand [place] (Transfers) contact guard  -MR     Assistive Device (Sit-Stand Transfers) walker, front-wheeled  -MR       Row Name 08/20/24 1409          Functional Mobility    Functional Mobility- Ind. Level contact guard assist  -MR     Functional Mobility- Device walker, front-wheeled  -MR     Functional Mobility-Distance (Feet) --  HH distances  -MR       Row Name 08/20/24 1409          Activities of Daily Living    BADL Assessment/Intervention upper body dressing;lower body dressing  -MR       Row Name 08/20/24 1409          Lower Body Dressing Assessment/Training    [place] Level (Lower Body Dressing) don;socks;moderate assist (50% patient effort)  -MR     Position (Lower Body Dressing) sitting up in bed  -MR       Row Name 08/20/24 1409          Upper Body Dressing Assessment/Training    [place] Level (Upper Body Dressing) don;minimum assist (75% patient effort)  -MR     Position (Upper Body Dressing) edge of bed sitting  -MR               User Key  (r) = Recorded By, (t) = Taken By, (c) = Cosigned By      Initials Name Provider Type    MR Jaleesa Martins, OT Occupational Therapist                   Obj/Interventions       Row Name 08/20/24 1411          Balance    Balance Assessment sitting static balance;sitting dynamic balance;standing static balance;standing dynamic balance  -MR     Static Sitting Balance standby assist  -MR     Dynamic Sitting Balance contact guard  -MR     Position, Sitting Balance sitting in chair;unsupported;sitting edge of bed  -MR     Static Standing Balance contact guard  -MR     Dynamic Standing Balance contact guard  -MR     Position/Device Used, Standing Balance supported;walker, front-wheeled  -MR     Balance Interventions sitting;standing;sit to  stand;supported;static;dynamic;occupation based/functional task  -MR               User Key  (r) = Recorded By, (t) = Taken By, (c) = Cosigned By      Initials Name Provider Type     EliezerJaleesa, OT Occupational Therapist                   Goals/Plan    No documentation.                  Clinical Impression       Row Name 08/20/24 1411          Pain Assessment    Pretreatment Pain Rating 0/10 - no pain  -MR     Posttreatment Pain Rating 0/10 - no pain  -MR     Pain Intervention(s) Ambulation/increased activity;Repositioned  -MR Butcher Name 08/20/24 1411          Plan of Care Review    Plan of Care Reviewed With patient  -MR     Progress no change  -MR     Outcome Evaluation Pt demonstrating improvements w/ mobility, transfers and activity tolerance this date. Pt limited by fatigue. Pt's deficits warrant continuation of skilled IP OT services. Continue per current POC as able.  -MR Butcher Name 08/20/24 1411          Therapy Plan Review/Discharge Plan (OT)    Anticipated Discharge Disposition (OT) home with assist;home with outpatient therapy services  -MR Butcher Name 08/20/24 1411          Vital Signs    Pre Systolic BP Rehab 104  -MR     Pre Treatment Diastolic BP 60  -MR     Pretreatment Heart Rate (beats/min) 68  -MR     Posttreatment Heart Rate (beats/min) 71  -MR     Pre SpO2 (%) 95  -MR     O2 Delivery Pre Treatment nasal cannula  -MR     O2 Delivery Intra Treatment nasal cannula  -MR     Post SpO2 (%) 95  -MR     O2 Delivery Post Treatment nasal cannula  -MR     Pre Patient Position Supine  -MR     Intra Patient Position Standing  -MR     Post Patient Position Sitting  -MR Butcher Name 08/20/24 1411          Positioning and Restraints    Pre-Treatment Position in bed  -MR     Post Treatment Position chair  -MR     In Chair notified nsg;reclined;sitting;call light within reach;encouraged to call for assist;exit alarm on;with family/caregiver;waffle cushion;heels elevated;legs elevated  -                User Key  (r) = Recorded By, (t) = Taken By, (c) = Cosigned By      Initials Name Provider Type    MR Jaleesa Martins, OT Occupational Therapist                   Outcome Measures       Row Name 08/20/24 1413          How much help from another is currently needed...    Putting on and taking off regular lower body clothing? 2  -MR     Bathing (including washing, rinsing, and drying) 2  -MR     Toileting (which includes using toilet bed pan or urinal) 3  -MR     Putting on and taking off regular upper body clothing 3  -MR     Taking care of personal grooming (such as brushing teeth) 3  -MR     Eating meals 4  -MR     AM-PAC 6 Clicks Score (OT) 17  -MR       Row Name 08/20/24 1504 08/20/24 0800       How much help from another person do you currently need...    Turning from your back to your side while in flat bed without using bedrails? 3  -HW 3  -PT    Moving from lying on back to sitting on the side of a flat bed without bedrails? 3  -HW 3  -PT    Moving to and from a bed to a chair (including a wheelchair)? 3  -HW 3  -PT    Standing up from a chair using your arms (e.g., wheelchair, bedside chair)? 3  -HW 3  -PT    Climbing 3-5 steps with a railing? 3  -HW 3  -PT    To walk in hospital room? 3  -HW 3  -PT    AM-PAC 6 Clicks Score (PT) 18  -HW 18  -PT    Highest Level of Mobility Goal 6 --> Walk 10 steps or more  -HW 6 --> Walk 10 steps or more  -PT      Row Name 08/20/24 1504 08/20/24 1413       Functional Assessment    Outcome Measure Options AM-PAC 6 Clicks Basic Mobility (PT)  -HW AM-PAC 6 Clicks Daily Activity (OT)  -MR              User Key  (r) = Recorded By, (t) = Taken By, (c) = Cosigned By      Initials Name Provider Type    HW Gertrude Claros, PT Physical Therapist    MR Jaleesa Martins, OT Occupational Therapist    PT Claribel Brambila, RN Registered Nurse                    Occupational Therapy Education       Title: PT OT SLP Therapies (In Progress)       Topic: Occupational Therapy (In Progress)        Point: ADL training (Done)       Description:   Instruct learner(s) on proper safety adaptation and remediation techniques during self care or transfers.   Instruct in proper use of assistive devices.                  Learning Progress Summary             Patient Acceptance, E, VU by MR at 8/20/2024 1414    Acceptance, E, VU by  at 8/16/2024 1013                         Point: Home exercise program (Not Started)       Description:   Instruct learner(s) on appropriate technique for monitoring, assisting and/or progressing therapeutic exercises/activities.                  Learner Progress:  Not documented in this visit.              Point: Precautions (Done)       Description:   Instruct learner(s) on prescribed precautions during self-care and functional transfers.                  Learning Progress Summary             Patient Acceptance, E, VU by MR at 8/20/2024 1414    Acceptance, E, VU by  at 8/16/2024 1013                         Point: Body mechanics (Done)       Description:   Instruct learner(s) on proper positioning and spine alignment during self-care, functional mobility activities and/or exercises.                  Learning Progress Summary             Patient Acceptance, E, VU by MR at 8/20/2024 1414    Acceptance, E, VU by  at 8/16/2024 1013                                         User Key       Initials Effective Dates Name Provider Type Discipline     10/14/22 -  Bryanna Purcell, OT Occupational Therapist OT     09/22/22 -  Jaleesa Martins OT Occupational Therapist OT                  OT Recommendation and Plan     Plan of Care Review  Plan of Care Reviewed With: patient  Progress: no change  Outcome Evaluation: Pt demonstrating improvements w/ mobility, transfers and activity tolerance this date. Pt limited by fatigue. Pt's deficits warrant continuation of skilled IP OT services. Continue per current POC as able.     Time Calculation:         Time Calculation- OT       Row Name  08/20/24 1505 08/20/24 1414          Time Calculation- OT    OT Start Time -- 1124  -MR     OT Received On -- 08/20/24  -MR        Timed Charges    18901 - Gait Training Minutes  31  -HW --     87648 - OT Therapeutic Activity Minutes -- 18  -MR     47985 - OT Self Care/Mgmt Minutes -- 5  -MR        Total Minutes    Timed Charges Total Minutes 31  -HW 23  -MR      Total Minutes 31  -HW 23  -MR               User Key  (r) = Recorded By, (t) = Taken By, (c) = Cosigned By      Initials Name Provider Type     Gertrude Claros, PT Physical Therapist    MR Jaleesa Martins OT Occupational Therapist                  Therapy Charges for Today       Code Description Service Date Service Provider Modifiers Qty    68634495710 HC OT THERAPEUTIC ACT EA 15 MIN 8/20/2024 Jaleesa Martins OT GO 1    30048503792 HC OT SELF CARE/MGMT/TRAIN EA 15 MIN 8/20/2024 Jaleesa Martins OT GO 1    75902240584 HC OT THER SUPP EA 15 MIN 8/20/2024 Jaleesa Martins OT GO 1                 Jaleesa Martins OT  8/20/2024

## 2024-08-21 ENCOUNTER — TELEPHONE (OUTPATIENT)
Dept: ONCOLOGY | Facility: CLINIC | Age: 84
End: 2024-08-21

## 2024-08-21 ENCOUNTER — APPOINTMENT (OUTPATIENT)
Dept: GENERAL RADIOLOGY | Facility: HOSPITAL | Age: 84
DRG: 196 | End: 2024-08-21
Payer: MEDICARE

## 2024-08-21 ENCOUNTER — DOCUMENTATION (OUTPATIENT)
Dept: HOME HEALTH SERVICES | Facility: HOME HEALTHCARE | Age: 84
End: 2024-08-21
Payer: MEDICARE

## 2024-08-21 LAB
ALBUMIN SERPL-MCNC: 2.6 G/DL (ref 3.5–5.2)
ALBUMIN/GLOB SERPL: 0.7 G/DL
ALP SERPL-CCNC: 54 U/L (ref 39–117)
ALT SERPL W P-5'-P-CCNC: 61 U/L (ref 1–33)
ANION GAP SERPL CALCULATED.3IONS-SCNC: 12 MMOL/L (ref 5–15)
AST SERPL-CCNC: 52 U/L (ref 1–32)
BACTERIA SPEC AEROBE CULT: NORMAL
BACTERIA SPEC AEROBE CULT: NORMAL
BACTERIA SPEC RESP CULT: NORMAL
BASOPHILS # BLD AUTO: 0.01 10*3/MM3 (ref 0–0.2)
BASOPHILS NFR BLD AUTO: 0.1 % (ref 0–1.5)
BILIRUB SERPL-MCNC: 0.2 MG/DL (ref 0–1.2)
BUN SERPL-MCNC: 25 MG/DL (ref 8–23)
BUN/CREAT SERPL: 32.1 (ref 7–25)
CALCIUM SPEC-SCNC: 8.9 MG/DL (ref 8.6–10.5)
CHLORIDE SERPL-SCNC: 103 MMOL/L (ref 98–107)
CO2 SERPL-SCNC: 22 MMOL/L (ref 22–29)
CREAT SERPL-MCNC: 0.78 MG/DL (ref 0.57–1)
DEPRECATED RDW RBC AUTO: 43.2 FL (ref 37–54)
EGFRCR SERPLBLD CKD-EPI 2021: 75.9 ML/MIN/1.73
EOSINOPHIL # BLD AUTO: 0 10*3/MM3 (ref 0–0.4)
EOSINOPHIL NFR BLD AUTO: 0 % (ref 0.3–6.2)
ERYTHROCYTE [DISTWIDTH] IN BLOOD BY AUTOMATED COUNT: 13.9 % (ref 12.3–15.4)
GLOBULIN UR ELPH-MCNC: 3.5 GM/DL
GLUCOSE BLDC GLUCOMTR-MCNC: 210 MG/DL (ref 70–130)
GLUCOSE SERPL-MCNC: 171 MG/DL (ref 65–99)
GRAM STN SPEC: NORMAL
GRAM STN SPEC: NORMAL
HCT VFR BLD AUTO: 30 % (ref 34–46.6)
HGB BLD-MCNC: 9.8 G/DL (ref 12–15.9)
IMM GRANULOCYTES # BLD AUTO: 0.15 10*3/MM3 (ref 0–0.05)
IMM GRANULOCYTES NFR BLD AUTO: 1 % (ref 0–0.5)
LYMPHOCYTES # BLD AUTO: 0.45 10*3/MM3 (ref 0.7–3.1)
LYMPHOCYTES NFR BLD AUTO: 2.9 % (ref 19.6–45.3)
MCH RBC QN AUTO: 28.1 PG (ref 26.6–33)
MCHC RBC AUTO-ENTMCNC: 32.7 G/DL (ref 31.5–35.7)
MCV RBC AUTO: 86 FL (ref 79–97)
MONOCYTES # BLD AUTO: 0.57 10*3/MM3 (ref 0.1–0.9)
MONOCYTES NFR BLD AUTO: 3.6 % (ref 5–12)
NEUTROPHILS NFR BLD AUTO: 14.55 10*3/MM3 (ref 1.7–7)
NEUTROPHILS NFR BLD AUTO: 92.4 % (ref 42.7–76)
NRBC BLD AUTO-RTO: 0 /100 WBC (ref 0–0.2)
PLATELET # BLD AUTO: 372 10*3/MM3 (ref 140–450)
PMV BLD AUTO: 9.6 FL (ref 6–12)
POTASSIUM SERPL-SCNC: 3.8 MMOL/L (ref 3.5–5.2)
PROT SERPL-MCNC: 6.1 G/DL (ref 6–8.5)
RBC # BLD AUTO: 3.49 10*6/MM3 (ref 3.77–5.28)
SODIUM SERPL-SCNC: 137 MMOL/L (ref 136–145)
TROPONIN T SERPL HS-MCNC: 10 NG/L
WBC NRBC COR # BLD AUTO: 15.73 10*3/MM3 (ref 3.4–10.8)

## 2024-08-21 PROCEDURE — 25010000002 HEPARIN (PORCINE) PER 1000 UNITS: Performed by: INTERNAL MEDICINE

## 2024-08-21 PROCEDURE — 25010000002 PIPERACILLIN SOD-TAZOBACTAM PER 1 G: Performed by: INTERNAL MEDICINE

## 2024-08-21 PROCEDURE — 97535 SELF CARE MNGMENT TRAINING: CPT

## 2024-08-21 PROCEDURE — 94799 UNLISTED PULMONARY SVC/PX: CPT

## 2024-08-21 PROCEDURE — 97110 THERAPEUTIC EXERCISES: CPT

## 2024-08-21 PROCEDURE — 25010000002 METHYLPREDNISOLONE PER 125 MG: Performed by: INTERNAL MEDICINE

## 2024-08-21 PROCEDURE — 71045 X-RAY EXAM CHEST 1 VIEW: CPT

## 2024-08-21 PROCEDURE — 94761 N-INVAS EAR/PLS OXIMETRY MLT: CPT

## 2024-08-21 PROCEDURE — 99232 SBSQ HOSP IP/OBS MODERATE 35: CPT | Performed by: INTERNAL MEDICINE

## 2024-08-21 PROCEDURE — 84484 ASSAY OF TROPONIN QUANT: CPT | Performed by: NURSE PRACTITIONER

## 2024-08-21 PROCEDURE — 82948 REAGENT STRIP/BLOOD GLUCOSE: CPT

## 2024-08-21 PROCEDURE — 80053 COMPREHEN METABOLIC PANEL: CPT | Performed by: INTERNAL MEDICINE

## 2024-08-21 PROCEDURE — 94664 DEMO&/EVAL PT USE INHALER: CPT

## 2024-08-21 PROCEDURE — 85025 COMPLETE CBC W/AUTO DIFF WBC: CPT | Performed by: INTERNAL MEDICINE

## 2024-08-21 RX ORDER — LIDOCAINE 4 G/G
1 PATCH TOPICAL
Status: DISCONTINUED | OUTPATIENT
Start: 2024-08-21 | End: 2024-08-24 | Stop reason: HOSPADM

## 2024-08-21 RX ORDER — PANTOPRAZOLE SODIUM 40 MG/1
40 TABLET, DELAYED RELEASE ORAL
Status: DISCONTINUED | OUTPATIENT
Start: 2024-08-21 | End: 2024-08-24 | Stop reason: HOSPADM

## 2024-08-21 RX ADMIN — PIPERACILLIN AND TAZOBACTAM 3.38 G: 3; .375 INJECTION, POWDER, LYOPHILIZED, FOR SOLUTION INTRAVENOUS at 00:04

## 2024-08-21 RX ADMIN — METHYLPREDNISOLONE SODIUM SUCCINATE 60 MG: 125 INJECTION, POWDER, FOR SOLUTION INTRAMUSCULAR; INTRAVENOUS at 00:04

## 2024-08-21 RX ADMIN — LIDOCAINE 1 PATCH: 4 PATCH TOPICAL at 20:20

## 2024-08-21 RX ADMIN — METHYLPREDNISOLONE SODIUM SUCCINATE 60 MG: 125 INJECTION, POWDER, FOR SOLUTION INTRAMUSCULAR; INTRAVENOUS at 15:05

## 2024-08-21 RX ADMIN — POLYETHYLENE GLYCOL 3350 17 G: 17 POWDER, FOR SOLUTION ORAL at 10:53

## 2024-08-21 RX ADMIN — HEPARIN SODIUM 5000 UNITS: 5000 INJECTION INTRAVENOUS; SUBCUTANEOUS at 08:32

## 2024-08-21 RX ADMIN — BUDESONIDE AND FORMOTEROL FUMARATE DIHYDRATE 1 PUFF: 160; 4.5 AEROSOL RESPIRATORY (INHALATION) at 19:52

## 2024-08-21 RX ADMIN — PROPRANOLOL HYDROCHLORIDE 80 MG: 20 TABLET ORAL at 20:20

## 2024-08-21 RX ADMIN — Medication 10 MG: at 21:01

## 2024-08-21 RX ADMIN — BUDESONIDE AND FORMOTEROL FUMARATE DIHYDRATE 1 PUFF: 160; 4.5 AEROSOL RESPIRATORY (INHALATION) at 09:29

## 2024-08-21 RX ADMIN — IPRATROPIUM BROMIDE AND ALBUTEROL SULFATE 3 ML: 2.5; .5 SOLUTION RESPIRATORY (INHALATION) at 15:27

## 2024-08-21 RX ADMIN — PANTOPRAZOLE SODIUM 40 MG: 40 TABLET, DELAYED RELEASE ORAL at 18:15

## 2024-08-21 RX ADMIN — LAMOTRIGINE 100 MG: 100 TABLET ORAL at 20:20

## 2024-08-21 RX ADMIN — PROPRANOLOL HYDROCHLORIDE 80 MG: 20 TABLET ORAL at 08:34

## 2024-08-21 RX ADMIN — Medication 10 ML: at 08:35

## 2024-08-21 RX ADMIN — IPRATROPIUM BROMIDE AND ALBUTEROL SULFATE 3 ML: 2.5; .5 SOLUTION RESPIRATORY (INHALATION) at 09:29

## 2024-08-21 RX ADMIN — IPRATROPIUM BROMIDE AND ALBUTEROL SULFATE 3 ML: 2.5; .5 SOLUTION RESPIRATORY (INHALATION) at 13:36

## 2024-08-21 RX ADMIN — SENNOSIDES AND DOCUSATE SODIUM 2 TABLET: 50; 8.6 TABLET ORAL at 15:18

## 2024-08-21 RX ADMIN — LAMOTRIGINE 100 MG: 100 TABLET ORAL at 08:33

## 2024-08-21 RX ADMIN — GUAIFENESIN 600 MG: 600 TABLET, EXTENDED RELEASE ORAL at 08:34

## 2024-08-21 RX ADMIN — IPRATROPIUM BROMIDE AND ALBUTEROL SULFATE 3 ML: 2.5; .5 SOLUTION RESPIRATORY (INHALATION) at 19:52

## 2024-08-21 RX ADMIN — SERTRALINE 50 MG: 50 TABLET, FILM COATED ORAL at 20:20

## 2024-08-21 RX ADMIN — METHYLPREDNISOLONE SODIUM SUCCINATE 60 MG: 125 INJECTION, POWDER, FOR SOLUTION INTRAMUSCULAR; INTRAVENOUS at 08:34

## 2024-08-21 RX ADMIN — HEPARIN SODIUM 5000 UNITS: 5000 INJECTION INTRAVENOUS; SUBCUTANEOUS at 20:20

## 2024-08-21 RX ADMIN — SERTRALINE 25 MG: 25 TABLET, FILM COATED ORAL at 08:32

## 2024-08-21 RX ADMIN — Medication 10 ML: at 20:24

## 2024-08-21 NOTE — PROGRESS NOTES
Met with patient at bedside. She is agreeable to [facility] services. Verified address, contact number-[place] at [phone], and PCP Dr Solorio-spoke with Janice Haas RN, [facility]-Liaison.

## 2024-08-21 NOTE — PLAN OF CARE
Problem: Adult Inpatient Plan of Care  Goal: Plan of Care Review  Recent Flowsheet Documentation  Taken 8/21/2024 1451 by Rica Gant OT  Progress: no change  Plan of Care Reviewed With:   patient   daughter  Outcome Evaluation: Pt is A/Ox3 and motivated to work with therapy. Remains below her occupational baseline with strength and activity tolerance deficits limiting mobility and self-care. Pt transitions to EOB sitting with CGA. Able to stand and transter with RW support and Min Ax1 to stand, CGAx1 short distance ambulation with 2.5L O2 per NC. Pt completed ther ex to support self-care and O2 sats remained >92%. OT will continue to follow IP. Recommend home with 24/7 assist when pt is medically ready.

## 2024-08-21 NOTE — PLAN OF CARE
Goal Outcome Evaluation:   Patient is alert and oriented.On O2 2 L / mt.Vital signs within Normal limits. Mobilized inside the room.

## 2024-08-21 NOTE — PLAN OF CARE
Goal Outcome Evaluation:      Patient calm and cooperative this shift. Med compliant. Remains on 3.5 L O2. No c/o pain. Patient slept well. Continue with abt therapy.

## 2024-08-21 NOTE — THERAPY TREATMENT NOTE
Patient Name: Judi Patel  : 1940    MRN: [id number]                              Today's Date: 2024       Admit Date: 8/15/2024    Visit Dx:     ICD-10-CM ICD-9-CM   1. Acute hypoxic respiratory failure  J96.01 518.81   2. Multifocal pneumonia  J18.9 486   3. Pneumonia  J18.9 486   4. Asthma, unspecified asthma severity, unspecified whether complicated, unspecified whether persistent  J45.909 493.90   5. Bilateral pulmonary infiltrates  R91.8 793.19   6. Acute respiratory failure with hypoxia  J96.01 518.81   7. Dyspnea on exertion  R06.09 786.09     Patient Active Problem List   Diagnosis    Degenerative disc disease, lumbar    Scoliosis of lumbar spine    Menopause    Vaginal atrophy    Osteopenia    Midline cystocele    Essential hypertension    Asthma    Anxiety    Seizure disorder    Pinched nerve in neck    Abnormal chest x-ray    Acquired cystic kidney disease    Allergic rhinitis    Acute bronchitis    Acute sinusitis    Chronic infection of sinus    Eustachian tube disorder    Fatigue    Gastro-esophageal reflux disease without esophagitis    Hypertrophy of nasal turbinates    Muscle cramps    Moderate persistent asthma    Mixed hyperlipidemia    Low back pain    Incontinence    Tremor    Statin intolerance    Snoring    Renal colic    Otalgia    Neurological finding    Neck pain    Muscle weakness    Spasm of back muscles    Partial epilepsy with impairment of consciousness    Chest pain    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 breast in female, estrogen receptor negative    Dyslipidemia    Dyspnea on exertion    Acute respiratory failure with hypoxia    Bilateral pulmonary infiltrates    History of breast cancer     Past Medical History:   Diagnosis Date    Anxiety     Asthma     Back problem     Breast cancer     Cancer Breast    Compression fracture of T12 vertebra     Cystocele     Epilepsy     Family history of hip fracture     mother, father    Hyperlipidemia     Hypertension      IBS (irritable bowel syndrome)     Menopause     Osteoporosis     Pinched nerve in neck     Seizure disorder     Vaginal atrophy     Vitamin D deficiency      Past Surgical History:   Procedure Laterality Date    ABDOMINAL SURGERY      APPENDECTOMY      BASAL CELL CARCINOMA EXCISION Right 01/12/2010    Nose    BRONCHOSCOPY N/A 8/19/2024    Procedure: BRONCHOSCOPY;  Surgeon: Debbie Marie DO;  Location: [facility];  Service: Pulmonary;  Laterality: N/A;    CHOLECYSTECTOMY  01/01/1991    CYST REMOVAL  01/01/1990    Right Foot    DILATATION AND CURETTAGE      FUNCTIONAL ENDOSCOPIC SINUS SURGERY  11/16/2010    Dr. Mathews    KNEE ARTHROSCOPY Left 01/01/2006    KNEE ARTHROSCOPY W/ MENISCAL REPAIR Right 11/01/2010    REPLACEMENT TOTAL KNEE      SINUS SURGERY      TOTAL ABDOMINAL HYSTERECTOMY WITH SALPINGO OOPHORECTOMY Bilateral     WRIST SURGERY Left 01/01/2004      General Information       Row Name 08/21/24 1441          OT Time and Intention    Document Type therapy note (daily note)  -TB     Mode of Treatment occupational therapy;individual therapy  -TB       Row Name 08/21/24 1441          General Information    Patient Profile Reviewed yes  -TB     Existing Precautions/Restrictions fall;oxygen therapy device and L/min;other (see comments)  Monitor O2  -TB     Barriers to Rehab medically complex;previous functional deficit  -TB       Row Name 08/21/24 1441          Occupational Profile    Reason for Services/Referral (Occupational Profile) Occupational decline  -TB     Environmental Supports and Barriers (Occupational Profile) Pt lives alone, but all of her children live within 1-2 mins from her and plan to arrange for 24/7 assist at d/c.  -TB       Row Name 08/21/24 1441          Cognition    Orientation Status (Cognition) oriented x 3  -TB       Row Name 08/21/24 1441          Safety Issues, Functional Mobility    Safety Issues Affecting Function (Mobility) awareness of need for assistance;safety  precaution awareness;safety precautions follow-through/compliance;insight into deficits/self-awareness  -TB     Impairments Affecting Function (Mobility) balance;endurance/activity tolerance;strength;shortness of breath  -TB     Comment, Safety Issues/Impairments (Mobility) Pt is up in room with RW support and Min Ax1 to stand, CGAx1 short distance ambulation with 2.5L O2 per NC.  -TB               User Key  (r) = Recorded By, (t) = Taken By, (c) = Cosigned By      Initials Name Provider Type    TB Rica Gant, OT Occupational Therapist                     Mobility/ADL's       Row Name 08/21/24 1444          Bed Mobility    Bed Mobility supine-sit;scooting/bridging  -TB     Scooting/Bridging [place] (Bed Mobility) standby assist;verbal cues  -TB     Supine-Sit [place] (Bed Mobility) contact guard;verbal cues  -TB     Assistive Device (Bed Mobility) head of bed elevated;bed rails  -TB     Comment, (Bed Mobility) Pt transitions to EOB with good effort. Fatigues easily.  -TB       Row Name 08/21/24 1444          Transfers    Transfers sit-stand transfer;stand-sit transfer;bed-chair transfer  -TB     Comment, (Transfers) Education and cues for hand placement and safety  -TB       Row Name 08/21/24 1444          Bed-Chair Transfer    Bed-Chair [place] (Transfers) minimum assist (75% patient effort);1 person assist;verbal cues  -TB     Assistive Device (Bed-Chair Transfers) walker, front-wheeled  -TB       Row Name 08/21/24 1444          Sit-Stand Transfer    Sit-Stand [place] (Transfers) minimum assist (75% patient effort);1 person assist;verbal cues  -TB     Assistive Device (Sit-Stand Transfers) walker, front-wheeled  -TB     Comment, (Sit-Stand Transfer) Pt completes 5 reps STS with Min Ax1. Able to stand 1 min each rep while engaged in lateral wt shifting or marching in place dynamic tasks.  -TB       Row Name 08/21/24 1444          Stand-Sit Transfer    Stand-Sit [place]  (Transfers) minimum assist (75% patient effort);1 person assist;verbal cues  -TB     Assistive Device (Stand-Sit Transfers) walker, front-wheeled  -TB     Comment, (Stand-Sit Transfer) Assist to lower with control  -TB       Row Name 08/21/24 1444          Functional Mobility    Functional Mobility- Ind. Level contact guard assist;1 person;verbal cues required  -TB     Functional Mobility- Device walker, front-wheeled  -TB     Functional Mobility- Safety Issues step length decreased;supplemental O2;balance decreased during turns  -TB     Functional Mobility- Comment Pt is up in room with RW support and Min Ax1 to stand, CGAx1 short distance ambulation with 2.5L O2 per NC.  -TB     Patient was able to Ambulate yes  -TB       Row Name 08/21/24 1444          Activities of Daily Living    BADL Assessment/Intervention toileting;upper body dressing;bathing  -TB       Row Name 08/21/24 1444          Toileting Assessment/Training    [place] Level (Toileting) toileting skills;dependent (less than 25% patient effort)  -TB     Comment, (Toileting) Incontinent episode  -TB       Row Name 08/21/24 1444          Bathing Assessment/Intervention    [place] Level (Bathing) proximal lower extremities;perineal area;dependent (less than 25% patient effort)  -TB     Position (Bathing) supported standing  -TB     Comment, (Bathing) Assist following incontinent episode  -TB               User Key  (r) = Recorded By, (t) = Taken By, (c) = Cosigned By      Initials Name Provider Type    TB Rica Gant OT Occupational Therapist                   Obj/Interventions       Row Name 08/21/24 1449          Motor Skills    Therapeutic Exercise --  Pt completed 5 reps STS to support self-care/LB ADL performance and higher level transfer training. Min Ax1.  -TB       Row Name 08/21/24 1449          Balance    Balance Assessment sitting static balance;sitting dynamic balance;sit to stand dynamic balance;standing static  balance;standing dynamic balance  -TB     Static Sitting Balance supervision  -TB     Dynamic Sitting Balance standby assist  -TB     Position, Sitting Balance unsupported;sitting in chair;sitting edge of bed  -TB     Sit to Stand Dynamic Balance minimal assist;1-person assist;verbal cues  -TB     Static Standing Balance contact guard;verbal cues  -TB     Dynamic Standing Balance minimal assist;verbal cues  -TB     Position/Device Used, Standing Balance supported;walker, front-wheeled  -TB     Balance Interventions sitting;standing;sit to stand;static;dynamic;supported;dynamic reaching;occupation based/functional task;UE activity with balance activity  -TB     Comment, Balance No LOB  -TB               User Key  (r) = Recorded By, (t) = Taken By, (c) = Cosigned By      Initials Name Provider Type    TB Rica Gant, ADI Occupational Therapist                   Goals/Plan    No documentation.                  Clinical Impression       Row Name 08/21/24 145          Pain Assessment    Pretreatment Pain Rating 0/10 - no pain  -TB     Posttreatment Pain Rating 0/10 - no pain  -TB     Pre/Posttreatment Pain Comment Pt denies pain  -TB     Pain Intervention(s) Ambulation/increased activity;Repositioned;Elevated  -TB       Row Name 08/21/24 6741          Plan of Care Review    Plan of Care Reviewed With patient;daughter  -TB     Progress no change  -TB     Outcome Evaluation Pt is A/Ox4 and motivated to work with therapy. Remains below her occupational baseline with strength and activity tolerance deficits limiting mobility and self-care. Pt transitions to EOB sitting with CGA. Able to stand and transter with RW support and Min Ax1 to stand, CGAx1 short distance ambulation with 2.5L O2 per NC. Pt completed ther ex to support self-care and O2 sats remained >92%. OT will continue to follow IP. Recommend home with 24/7 assist when pt is medically ready.  -TB       Row Name 08/21/24 0177          Therapy Plan  Review/Discharge Plan (OT)    Anticipated Discharge Disposition (OT) home with 24/7 care;home with home health  -TB       Row Name 08/21/24 1451          Vital Signs    Pre Systolic BP Rehab --  RN cleared OT  -TB     Pre SpO2 (%) 94  -TB     O2 Delivery Pre Treatment nasal cannula  -TB     Intra SpO2 (%) 92  -TB     O2 Delivery Intra Treatment nasal cannula  2.5L  -TB     Post SpO2 (%) 93  -TB     O2 Delivery Post Treatment nasal cannula  -TB     Pre Patient Position Supine  -TB     Intra Patient Position Standing  -TB     Post Patient Position Sitting  -TB       Row Name 08/21/24 1451          Positioning and Restraints    Pre-Treatment Position in bed  -TB     Post Treatment Position chair  -TB     In Chair notified nsg;reclined;call light within reach;encouraged to call for assist;waffle cushion;legs elevated;with family/caregiver  [place] assistant to place exit alarm  -TB               User Key  (r) = Recorded By, (t) = Taken By, (c) = Cosigned By      Initials Name Provider Type    TB Rica Gant OT Occupational Therapist                   Outcome Measures       Row Name 08/21/24 0800          How much help from another person do you currently need...    Turning from your back to your side while in flat bed without using bedrails? 3  -PT     Moving from lying on back to sitting on the side of a flat bed without bedrails? 3  -PT     Moving to and from a bed to a chair (including a wheelchair)? 3  -PT     Standing up from a chair using your arms (e.g., wheelchair, bedside chair)? 3  -PT     Climbing 3-5 steps with a railing? 3  -PT     To walk in hospital room? 3  -PT     AM-PAC 6 Clicks Score (PT) 18  -PT     Highest Level of Mobility Goal 6 --> Walk 10 steps or more  -PT               User Key  (r) = Recorded By, (t) = Taken By, (c) = Cosigned By      Initials Name Provider Type    Claribel Ruelas RN Registered Nurse                    Occupational Therapy Education       Title: PT OT SLP  Therapies (In Progress)       Topic: Occupational Therapy (In Progress)       Point: ADL training (Done)       Description:   Instruct learner(s) on proper safety adaptation and remediation techniques during self care or transfers.   Instruct in proper use of assistive devices.                  Learning Progress Summary             Patient Acceptance, E,D,TB, VU,DU,NR by  at 8/21/2024 1506    Acceptance, E, VU by  at 8/20/2024 1414    Acceptance, E, VU by  at 8/16/2024 1013   Family Acceptance, E,D,TB, VU,DU,NR by  at 8/21/2024 1506                         Point: Home exercise program (Not Started)       Description:   Instruct learner(s) on appropriate technique for monitoring, assisting and/or progressing therapeutic exercises/activities.                  Learner Progress:  Not documented in this visit.              Point: Precautions (Done)       Description:   Instruct learner(s) on prescribed precautions during self-care and functional transfers.                  Learning Progress Summary             Patient Acceptance, E, VU by MR at 8/20/2024 1414    Acceptance, E, VU by  at 8/16/2024 1013                         Point: Body mechanics (Done)       Description:   Instruct learner(s) on proper positioning and spine alignment during self-care, functional mobility activities and/or exercises.                  Learning Progress Summary             Patient Acceptance, E, VU by  at 8/20/2024 1414    Acceptance, E, VU by  at 8/16/2024 1013                                         User Key       Initials Effective Dates Name Provider Type Discipline     07/11/23 -  Rica Gant, OT Occupational Therapist OT     10/14/22 -  Bryanna Purcell, OT Occupational Therapist OT     09/22/22 -  Jaleesa Martins OT Occupational Therapist OT                  OT Recommendation and Plan     Plan of Care Review  Plan of Care Reviewed With: patient, daughter  Progress: no change  Outcome Evaluation: Pt  is A/Ox4 and motivated to work with therapy. Remains below her occupational baseline with strength and activity tolerance deficits limiting mobility and self-care. Pt transitions to EOB sitting with CGA. Able to stand and transter with RW support and Min Ax1 to stand, CGAx1 short distance ambulation with 2.5L O2 per NC. Pt completed ther ex to support self-care and O2 sats remained >92%. OT will continue to follow IP. Recommend home with 24/7 assist when pt is medically ready.     Time Calculation:         Time Calculation- OT       Row Name 08/21/24 1405             Time Calculation- OT    OT Start Time 1405  -TB      OT Received On 08/21/24  -TB      OT Goal Re-Cert Due Date 08/26/24  -TB         Timed Charges    00900 - OT Therapeutic Exercise Minutes 20  -TB      72770 - OT Self Care/Mgmt Minutes 10  -TB         Total Minutes    Timed Charges Total Minutes 30  -TB       Total Minutes 30  -TB                User Key  (r) = Recorded By, (t) = Taken By, (c) = Cosigned By      Initials Name Provider Type    TB Rica Gant OT Occupational Therapist                  Therapy Charges for Today       Code Description Service Date Service Provider Modifiers Qty    [id number]  OT THER PROC EA 15 MIN 8/21/2024 Rica Gant OT GO 1    [id number]  OT SELF CARE/MGMT/TRAIN EA 15 MIN 8/21/2024 Rica Gant OT GO 1                 Rica Gant OT  8/21/2024

## 2024-08-21 NOTE — PROGRESS NOTES
[facility] Medicine Services  PROGRESS NOTE    Patient Name: Judi Patel  : 1940  MRN: [id number]    Date of Admission: 8/15/2024  Primary Care Physician: Jone Solorio MD    Subjective   Subjective     CC:  F/U progressive weakness and dyspnea     HPI: Continues to improve.      Objective   Objective     Vital Signs:   Temp:  [97.7 °F (36.5 °C)-98 °F (36.7 °C)] 97.9 °F (36.6 °C)  Heart Rate:  [68-87] 76  Resp:  [16-19] 18  BP: (104-139)/(60-80) 120/69  Flow (L/min):  [3-3.5] 3     Physical Exam:  Constitutional: No acute distress, awake, alert, sitting up in chair working with OT  HENT: NCAT, mucous membranes moist  Respiratory: Scattered wheeze bilaterally, respiratory effort normal on nasal cannula  Musculoskeletal: No bilateral ankle edema  Psychiatric: Appropriate affect, cooperative  Neurologic: Speech clear  Skin: No rashes on exposed surfaces      Results Reviewed:  LAB RESULTS:      Lab 24  0459 24  0620 24  0644 24  0912 08/15/24  1755   WBC 15.73* 9.89 7.81 9.08 10.21   HEMOGLOBIN 9.8* 11.1* 9.9* 9.8* 11.0*   HEMATOCRIT 30.0* 35.2 29.9* 29.1* 33.3*   PLATELETS 372 350 307 308 347   NEUTROS ABS 14.55* 9.33*  --  7.22* 7.89*   IMMATURE GRANS (ABS) 0.15* 0.04  --  0.05 0.02   LYMPHS ABS 0.45* 0.36*  --  0.47* 0.85   MONOS ABS 0.57 0.15  --  0.83 1.00*   EOS ABS 0.00 0.00  --  0.46* 0.42*   MCV 86.0 90.7 86.4 84.1 86.3   SED RATE  --   --  58*  --   --    CRP  --   --   --  13.68*  --    PROCALCITONIN  --   --   --   --  0.08   LACTATE  --   --   --   --  0.9         Lab 24  0459 24  0620 24  0609 24  1638 24  0644 24  2358 24  0912 08/15/24  1665   SODIUM 137 138  --   --  138  --  135* 136   POTASSIUM 3.8 4.2  --  4.5 3.6 3.9 2.9* 3.3*   CHLORIDE 103 103  --   --  104  --  97* 97*   CO2 22.0 20.0*  --   --  26.0  --  29.0 25.4   ANION GAP 12.0 15.0  --   --  8.0  --  9.0 13.6   BUN 25* 21  --   --  11   --  9 14   CREATININE 0.78 0.84  --   --  0.52*  --  0.48* 0.52*   EGFR 75.9 69.5  --   --  92.9  --  94.7 92.9   GLUCOSE 171* 172*  --   --  106*  --  194* 115*   CALCIUM 8.9 8.8  --   --  8.2*  --  8.3* 9.4   MAGNESIUM  --   --   --   --  1.8  --   --   --    HEMOGLOBIN A1C  --   --  6.00*  --   --   --   --   --    TSH  --   --   --   --   --   --  1.220  --          Lab 08/21/24  0459 08/20/24  0620 08/17/24  0644 08/16/24  0912 08/15/24  1755   TOTAL PROTEIN 6.1 5.8* 5.9* 5.3* 6.9   ALBUMIN 2.6* 3.0* 2.9* 3.0* 3.4*   GLOBULIN 3.5 2.8 3.0 2.3 3.5   ALT (SGPT) 61* 50* 36* 36* 36*   AST (SGOT) 52* 40* 36* 37* 39*   BILIRUBIN 0.2 0.3 0.3 0.3 0.5   ALK PHOS 54 60 52 58 62         Lab 08/15/24  1755   PROBNP 2,187.0*   HSTROP T 12                 Lab 08/15/24  1813   PH, ARTERIAL 7.498*   PCO2, ARTERIAL 34.2*   PO2 ART 51.5*   FIO2 21   HCO3 ART 26.5*   BASE EXCESS ART 3.4*   CARBOXYHEMOGLOBIN 0.8     Brief Urine Lab Results  (Last result in the past 365 days)        Color   Clarity   Blood   Leuk Est   Nitrite   Protein   CREAT   Urine HCG        08/15/24 1923 Yellow   Clear   Negative   Negative   Negative   Negative                   Microbiology Results Abnormal       Procedure Component Value - Date/Time    Blood Culture - Blood, Arm, Right [[id number]]  (Normal) Collected: 08/15/24 1750    Lab Status: Final result Specimen: Blood from Arm, Right Updated: 08/20/24 1931     Blood Culture No growth at 5 days    Fungus Smear - Wash, Bronchus [[id number]] Collected: 08/19/24 1116    Lab Status: Final result Specimen: Wash from Bronchus Updated: 08/20/24 1522     Fungal Stain No fungal elements seen    AFB Culture - Wash, Bronchus [[id number]] Collected: 08/19/24 1116    Lab Status: Preliminary result Specimen: Wash from Bronchus Updated: 08/20/24 1159     AFB Stain No acid fast bacilli seen on concentrated smear    Blood Culture - Blood, Hand, Right [[id number]]  (Normal) Collected: 08/16/24 0902    Lab Status:  Preliminary result Specimen: Blood from Hand, Right Updated: 08/20/24 0945     Blood Culture No growth at 4 days    Narrative:      Less than seven (7) mL's of blood was collected.  Insufficient quantity may yield false negative results.    Blood Culture - Blood, Arm, Right [[id number]]  (Normal) Collected: 08/16/24 0912    Lab Status: Preliminary result Specimen: Blood from Arm, Right Updated: 08/20/24 0945     Blood Culture No growth at 4 days    Narrative:      Less than seven (7) mL's of blood was collected.  Insufficient quantity may yield false negative results.    Respiratory Culture - Wash, Bronchus [[id number]] Collected: 08/19/24 1116    Lab Status: Preliminary result Specimen: Wash from Bronchus Updated: 08/20/24 0801     Respiratory Culture No growth     Gram Stain Few (2+) WBCs seen      No organisms seen    S. Pneumo Ag Urine or CSF - Urine, Urine, Clean Catch [[id number]]  (Normal) Collected: 08/16/24 1019    Lab Status: Final result Specimen: Urine, Clean Catch Updated: 08/16/24 1454     Strep Pneumo Ag Negative    Legionella Antigen, Urine - Urine, Urine, Clean Catch [[id number]]  (Normal) Collected: 08/16/24 1019    Lab Status: Final result Specimen: Urine, Clean Catch Updated: 08/16/24 1454     LEGIONELLA ANTIGEN, URINE Negative    MRSA Screen, PCR (Inpatient) - Swab, Nares [[id number]]  (Normal) Collected: 08/16/24 0635    Lab Status: Final result Specimen: Swab from Nares Updated: 08/16/24 0822     MRSA PCR Negative    Narrative:      The negative predictive value of this diagnostic test is high and should only be used to consider de-escalating anti-MRSA therapy. A positive result may indicate colonization with MRSA and must be correlated clinically.  MRSA Negative    COVID-19, FLU A/B, RSV PCR 1 HR TAT - Swab, Nasopharynx [[id number]]  (Normal) Collected: 08/15/24 1923    Lab Status: Final result Specimen: Swab from Nasopharynx Updated: 08/15/24 2013     COVID19 Not Detected     Influenza A PCR Not  Detected     Influenza B PCR Not Detected     RSV, PCR Not Detected    Narrative:      Fact sheet for providers: https://www.fda.gov/media/685982/download    Fact sheet for patients: https://www.fda.gov/media/048624/download    Test performed by PCR.            No radiology results from the last 24 hrs    Results for orders placed during the hospital encounter of 07/15/24    Adult Transthoracic Echo Complete w/ Color, Spectral and Contrast if necessary per protocol    Interpretation Summary    Left ventricular systolic function is normal. Calculated left ventricular EF = 62% Left ventricular ejection fraction appears to be 61 - 65%.    Left ventricular wall thickness is consistent with mild concentric hypertrophy.    The right atrial cavity is borderline dilated.      Current medications:  Scheduled Meds:[Held by provider] amLODIPine, 5 mg, Oral, Daily  budesonide-formoterol, 1 puff, Inhalation, BID - RT  guaiFENesin, 600 mg, Oral, Daily  heparin (porcine), 5,000 Units, Subcutaneous, Q12H  ipratropium-albuterol, 3 mL, Nebulization, 4x Daily - RT  lamoTRIgine, 100 mg, Oral, Q12H  [Held by provider] losartan, 25 mg, Oral, Daily  methylPREDNISolone sodium succinate, 60 mg, Intravenous, Q8H  propranolol, 80 mg, Oral, Q12H  sertraline, 25 mg, Oral, Daily  sertraline, 50 mg, Oral, Nightly  sodium chloride, 10 mL, Intravenous, Q12H      Continuous Infusions:     PRN Meds:.  acetaminophen **OR** acetaminophen **OR** acetaminophen    azelastine    senna-docusate sodium **AND** polyethylene glycol **AND** bisacodyl **AND** bisacodyl    Calcium Replacement - Follow Nurse / BPA Driven Protocol    ipratropium-albuterol    Magnesium Standard Dose Replacement - Follow Nurse / BPA Driven Protocol    Phosphorus Replacement - Follow Nurse / BPA Driven Protocol    Potassium Replacement - Follow Nurse / BPA Driven Protocol    sodium chloride    sodium chloride    sodium chloride    Assessment & Plan   Assessment & Plan     Active  Hospital Problems    Diagnosis  POA    **Acute respiratory failure with hypoxia [J96.01]  Yes    Bilateral pulmonary infiltrates [R91.8]  Yes    History of breast cancer [Z85.3]  Not Applicable    Mixed hyperlipidemia [E78.2]  Yes    Seizure disorder [G40.909]  Yes    Essential hypertension [I10]  Yes    Asthma [J45.909]  Yes      Resolved Hospital Problems   No resolved problems to display.        Brief Hospital Course to date:  Judi Patel is a 83 y.o. female with a history of Asthma, HTN, HLD, seizure disorder, breast cancer s/p lumpectomy with radiation 4 months ago who presented to the ED with complaints of shortness of air and cough.  Patient was diagnosed with pneumonia in the left lower lobe and was treated with 10 days of doxycycline.      This patient's problems and plans were partially entered by my partner and updated as appropriate by me 08/21/24.     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    - Pulmonary consulted; evaluating for eosinophilic pna, vasculitis, histo/blasto.    - SP Bronchoscopy on 8/19 with studies pending  - Started solumedrol 8/19 with dramatic reduction in eosinophils.  Discussed with pulmonary.  Continue IV steroids today and possibly plan to transition to PO prednisone tomorrow if she is improving.  Per pulmonary, may benefit from biologic therapy for asthma as an outpatient.  - Continue to wean O2 as tolerated  - Symbicort Rx at discharge    Progressive weakness   - Likely related to acute pulmonary disease   - TSH and CK WNL, AM cortisol WNL.  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         AM-PAC 6 Clicks Score (PT): 18 (08/20/24 2000)    CODE STATUS:   Code Status and Medical Interventions: CPR (Attempt to Resuscitate); Full Support   Ordered at: 08/16/24 0306     Level Of Support Discussed With:    Patient     Code Status (Patient has no pulse and is not breathing):    CPR (Attempt to Resuscitate)     Medical Interventions (Patient has pulse or is breathing):    Full Support       Marce Gould MD  08/21/24

## 2024-08-21 NOTE — CASE MANAGEMENT/SOCIAL WORK
Continued Stay Note  [facility]     Patient Name: Judi Patel  MRN: [id number]  Today's Date: 8/21/2024    Admit Date: 8/15/2024    Plan: Home with family assistance and [facility] for SN, PT/OT   Discharge Plan       Row Name 08/21/24 1209       Plan    Plan Home with family assistance and [facility] for SN, PT/OT    Patient/Family in Agreement with Plan yes    Plan Comments CM spoke with patient, son and daughter Carolyne via phone regarding DC planning. Patient states her DC plan is to return home with her family as her children reside next door and can assist with all home needs. Patient would like [facility] for SN, PT/OT services - CM spoke with denver Haas and she has accepted for services. Patient is currently on 3lpm of O2 - She was not on home O2. Currently weaning O2. Family is agreeable to [facility] if home oxygen services are needed at DC. CM following.    Final Discharge Disposition Code 06 - home with home health care                   Discharge Codes    No documentation.                 Expected Discharge Date and Time       Expected Discharge Date Expected Discharge Time    Aug 22, 2024               Dang Benitez RN

## 2024-08-21 NOTE — PROGRESS NOTES
INPATIENT PULMONARY SERVICE  PROGRESS NOTE     Hospital LOS: 5 days    Ms. Judi Patel, is followed for a Chief Complaint of:  Chief Complaint   Patient presents with    Shortness of Breath       Acute respiratory failure with hypoxia    Essential hypertension    Asthma    Seizure disorder    Mixed hyperlipidemia    Bilateral pulmonary infiltrates    History of breast cancer      Subjective   S     Interval History:  No acute events. Remains on 3L nasal cannula.        The patient's relevant past medical, surgical and social history were reviewed and updated in Epic as appropriate.      ROS:   Constitutional: Negative for fever.   Respiratory: Negative for dyspnea.   Cardiovascular: Negative for chest pain.   Gastrointestinal: Negative for  nausea, vomiting and diarrhea.     Objective   O     Vitals  Temp  Min: 97.7 °F (36.5 °C)  Max: 98 °F (36.7 °C)  BP  Min: 104/60  Max: 139/80  Pulse  Min: 68  Max: 78  Resp  Min: 16  Max: 18  SpO2  Min: 92 %  Max: 98 % Flow (L/min)  Min: 3  Max: 3.5    I/O 24 HR (7:00 AM-6:59AM):  Intake/Output         08/20/24 0700 - 08/21/24 0659    Intake (ml) 560    Output (ml) 2100    Net (ml) -1540            Medications (Drips):       Physical Examination    Telemetry: Normal sinus rhythm.    Constitutional:  No acute distress.  Conversant. Sitting up in bed on nasal cannula.    Cardiovascular: Regular rate and rhythm.  No murmurs, rub or gallop.   Respiratory: Normal symmetric chest expansion.  Normal respiratory effort.   Abdominal:  Soft. No masses.   Non-tender. No distension.   No hepatosplenomegaly.   Extremities: No digital cyanosis or clubbing.  No peripheral edema.   Neurological:   Alert and Oriented to person, place, and time.   Moves all extremities.           Results from last 7 days   Lab Units 08/21/24  0459 08/20/24  0620 08/17/24  0644   WBC 10*3/mm3 15.73* 9.89 7.81   HEMOGLOBIN g/dL 9.8* 11.1* 9.9*   MCV fL 86.0 90.7 86.4   PLATELETS 10*3/mm3 372 350 307     Results  from last 7 days   Lab Units 08/21/24  0459 08/20/24  0620 08/17/24  1638 08/17/24  0644   SODIUM mmol/L 137 138  --  138   POTASSIUM mmol/L 3.8 4.2 4.5 3.6   CO2 mmol/L 22.0 20.0*  --  26.0   CREATININE mg/dL 0.78 0.84  --  0.52*   MAGNESIUM mg/dL  --   --   --  1.8     Serum creatinine: 0.78 mg/dL 08/21/24 0459  Estimated creatinine clearance: 60.6 mL/min  Results from last 7 days   Lab Units 08/21/24  0459 08/20/24  0620 08/17/24  0644   ALK PHOS U/L 54 60 52   BILIRUBIN mg/dL 0.2 0.3 0.3   ALT (SGPT) U/L 61* 50* 36*   AST (SGOT) U/L 52* 40* 36*       Results from last 7 days   Lab Units 08/15/24  1813   PH, ARTERIAL pH units 7.498*   PCO2, ARTERIAL mm Hg 34.2*   PO2 ART mm Hg 51.5*   FIO2 % 21       Images:     Imaging Results (Last 24 Hours)       ** No results found for the last 24 hours. **            I reviewed the patient's new clinical results.  I reviewed the patient's new imaging results and agree with the interpretation.    Assessment & Plan   A / P     Ms. Patel is an 82yo F with a history of asthma, allergic rhinitis, recurrent sinusitis and left breast cancer s/p lumpectomy and XRT completed in April 2024 who presented to the [facility] ED on 8/15/24 with shortness of breath and cough. CT chest was consistent with multifocal pneumonia. She was admitted to Hospital Medicine and started on Vancomycin and Zosyn. Pulmonary was consulted due to persistent oxygen requirements.     She underwent a bronchoscopy on 8/19/24 and cultures are negative thus far. IV Solumedrol was started on 8/19 after the bronchoscopy. Her eosinophil count has improved from 420 to 0 after starting steroids. Cell count from the bronchoscopy showed 5% eosinophils.     Oxygen has been weaned from 5L to 3.5L.     Diet: Cardiac; Healthy Heart (2-3 Na+); Fluid Consistency: Thin (IDDSI 0)  Code Status and Medical Interventions: CPR (Attempt to Resuscitate); Full Support   Ordered at: 08/16/24 7114     Level Of Support Discussed With:     Patient     Code Status (Patient has no pulse and is not breathing):    CPR (Attempt to Resuscitate)     Medical Interventions (Patient has pulse or is breathing):    Full Support       Active Hospital Problems    Diagnosis     **Acute respiratory failure with hypoxia     Bilateral pulmonary infiltrates     History of breast cancer     Mixed hyperlipidemia     Seizure disorder     Essential hypertension     Asthma        Assessment / Plan:  Continue IV Steroids today. Chest xray in AM to assess readiness to change to PO Prednisone.    F/u pending labs. ANCA and SABINE panel negative.   Wean supplemental oxygen as tolerated.   Continue Symbicort. May benefit from biologic therapy for asthma as an outpatient.     I discussed the patient's findings and my recommendations with patient, family, and primary care team        Debbie PERRIN Case, DO  Pulmonary and Critical Care Medicine

## 2024-08-22 ENCOUNTER — APPOINTMENT (OUTPATIENT)
Dept: GENERAL RADIOLOGY | Facility: HOSPITAL | Age: 84
DRG: 196 | End: 2024-08-22
Payer: MEDICARE

## 2024-08-22 LAB
A FLAVUS AB SER QL ID: NEGATIVE
A FUMIGATUS AB SER QL ID: NEGATIVE
A NIGER AB SER QL ID: NEGATIVE
B DERMAT AB TITR SER: NEGATIVE {TITER}
GALACTOMANNAN AG SPEC IA-ACNC: 0.04 INDEX (ref 0–0.49)
GEN 5 2HR TROPONIN T REFLEX: 13 NG/L
TROPONIN T DELTA: 3 NG/L

## 2024-08-22 PROCEDURE — 97116 GAIT TRAINING THERAPY: CPT

## 2024-08-22 PROCEDURE — 99232 SBSQ HOSP IP/OBS MODERATE 35: CPT | Performed by: INTERNAL MEDICINE

## 2024-08-22 PROCEDURE — 94799 UNLISTED PULMONARY SVC/PX: CPT

## 2024-08-22 PROCEDURE — 25010000002 HEPARIN (PORCINE) PER 1000 UNITS: Performed by: INTERNAL MEDICINE

## 2024-08-22 PROCEDURE — 97110 THERAPEUTIC EXERCISES: CPT

## 2024-08-22 PROCEDURE — 94761 N-INVAS EAR/PLS OXIMETRY MLT: CPT

## 2024-08-22 PROCEDURE — 84484 ASSAY OF TROPONIN QUANT: CPT | Performed by: NURSE PRACTITIONER

## 2024-08-22 PROCEDURE — 94664 DEMO&/EVAL PT USE INHALER: CPT

## 2024-08-22 PROCEDURE — 63710000001 PREDNISONE PER 1 MG: Performed by: INTERNAL MEDICINE

## 2024-08-22 PROCEDURE — 25010000002 METHYLPREDNISOLONE PER 125 MG: Performed by: INTERNAL MEDICINE

## 2024-08-22 PROCEDURE — 71045 X-RAY EXAM CHEST 1 VIEW: CPT

## 2024-08-22 RX ORDER — DAPSONE 100 MG/1
50 TABLET ORAL EVERY 12 HOURS SCHEDULED
Status: DISCONTINUED | OUTPATIENT
Start: 2024-08-22 | End: 2024-08-24 | Stop reason: HOSPADM

## 2024-08-22 RX ORDER — PREDNISONE 20 MG/1
40 TABLET ORAL
Status: DISCONTINUED | OUTPATIENT
Start: 2024-08-22 | End: 2024-08-24 | Stop reason: HOSPADM

## 2024-08-22 RX ADMIN — IPRATROPIUM BROMIDE AND ALBUTEROL SULFATE 3 ML: 2.5; .5 SOLUTION RESPIRATORY (INHALATION) at 16:39

## 2024-08-22 RX ADMIN — PREDNISONE 40 MG: 20 TABLET ORAL at 09:05

## 2024-08-22 RX ADMIN — PROPRANOLOL HYDROCHLORIDE 80 MG: 20 TABLET ORAL at 09:02

## 2024-08-22 RX ADMIN — IPRATROPIUM BROMIDE AND ALBUTEROL SULFATE 3 ML: 2.5; .5 SOLUTION RESPIRATORY (INHALATION) at 13:02

## 2024-08-22 RX ADMIN — LAMOTRIGINE 100 MG: 100 TABLET ORAL at 20:49

## 2024-08-22 RX ADMIN — Medication 10 ML: at 09:06

## 2024-08-22 RX ADMIN — HEPARIN SODIUM 5000 UNITS: 5000 INJECTION INTRAVENOUS; SUBCUTANEOUS at 09:02

## 2024-08-22 RX ADMIN — BUDESONIDE AND FORMOTEROL FUMARATE DIHYDRATE 1 PUFF: 160; 4.5 AEROSOL RESPIRATORY (INHALATION) at 19:47

## 2024-08-22 RX ADMIN — LIDOCAINE 1 PATCH: 4 PATCH TOPICAL at 20:47

## 2024-08-22 RX ADMIN — PROPRANOLOL HYDROCHLORIDE 80 MG: 20 TABLET ORAL at 20:49

## 2024-08-22 RX ADMIN — BUDESONIDE AND FORMOTEROL FUMARATE DIHYDRATE 1 PUFF: 160; 4.5 AEROSOL RESPIRATORY (INHALATION) at 08:12

## 2024-08-22 RX ADMIN — SERTRALINE 50 MG: 50 TABLET, FILM COATED ORAL at 20:49

## 2024-08-22 RX ADMIN — METHYLPREDNISOLONE SODIUM SUCCINATE 60 MG: 125 INJECTION, POWDER, FOR SOLUTION INTRAMUSCULAR; INTRAVENOUS at 00:26

## 2024-08-22 RX ADMIN — DAPSONE 50 MG: 100 TABLET ORAL at 17:21

## 2024-08-22 RX ADMIN — GUAIFENESIN 600 MG: 600 TABLET, EXTENDED RELEASE ORAL at 09:05

## 2024-08-22 RX ADMIN — IPRATROPIUM BROMIDE AND ALBUTEROL SULFATE 3 ML: 2.5; .5 SOLUTION RESPIRATORY (INHALATION) at 19:47

## 2024-08-22 RX ADMIN — Medication 10 MG: at 20:49

## 2024-08-22 RX ADMIN — HEPARIN SODIUM 5000 UNITS: 5000 INJECTION INTRAVENOUS; SUBCUTANEOUS at 20:49

## 2024-08-22 RX ADMIN — SERTRALINE 25 MG: 25 TABLET, FILM COATED ORAL at 09:05

## 2024-08-22 RX ADMIN — PANTOPRAZOLE SODIUM 40 MG: 40 TABLET, DELAYED RELEASE ORAL at 06:04

## 2024-08-22 RX ADMIN — LAMOTRIGINE 100 MG: 100 TABLET ORAL at 09:04

## 2024-08-22 RX ADMIN — IPRATROPIUM BROMIDE AND ALBUTEROL SULFATE 3 ML: 2.5; .5 SOLUTION RESPIRATORY (INHALATION) at 08:11

## 2024-08-22 NOTE — PROGRESS NOTES
[facility] Medicine Services  PROGRESS NOTE    Patient Name: Judi Patel  : 1940  MRN: [id number]    Date of Admission: 8/15/2024  Primary Care Physician: Jone Solorio MD    Subjective   Subjective     CC:  F/U progressive weakness and dyspnea     HPI: Feels a little better.  States that had left sided CP last night but felt like her food stuck and she thinks d/t GERD and not getting her GERD meds.  No CP today.      Objective   Objective     Vital Signs:   Temp:  [97.7 °F (36.5 °C)-98.4 °F (36.9 °C)] 98.1 °F (36.7 °C)  Heart Rate:  [67-77] 68  Resp:  [16-18] 16  BP: (121-138)/(65-87) 131/73  Flow (L/min):  [1-2.5] 2     Physical Exam:  Constitutional: No acute distress, awake, alert, sitting up in bed, appears very weak, weak voice  HENT: NCAT, mucous membranes moist  Respiratory: decreased BS, respiratory effort normal on 2LNC  Cardiovascular: RRR, no murmurs, rubs, or gallops  Gastrointestinal: Positive bowel sounds, soft, nontender, nondistended  Musculoskeletal: No bilateral ankle edema  Psychiatric: Appropriate affect, cooperative  Neurologic: Oriented x 3, moves all extremities, speech clear but very weak  Skin: No rashes        Results Reviewed:  LAB RESULTS:      Lab 24  0459 24  0620 24  0644 24  0912 08/15/24  1755   WBC 15.73* 9.89 7.81 9.08 10.21   HEMOGLOBIN 9.8* 11.1* 9.9* 9.8* 11.0*   HEMATOCRIT 30.0* 35.2 29.9* 29.1* 33.3*   PLATELETS 372 350 307 308 347   NEUTROS ABS 14.55* 9.33*  --  7.22* 7.89*   IMMATURE GRANS (ABS) 0.15* 0.04  --  0.05 0.02   LYMPHS ABS 0.45* 0.36*  --  0.47* 0.85   MONOS ABS 0.57 0.15  --  0.83 1.00*   EOS ABS 0.00 0.00  --  0.46* 0.42*   MCV 86.0 90.7 86.4 84.1 86.3   SED RATE  --   --  58*  --   --    CRP  --   --   --  13.68*  --    PROCALCITONIN  --   --   --   --  0.08   LACTATE  --   --   --   --  0.9         Lab 24  0459 24  0620 24  0609 24  1638 24  0644 24  8966  08/16/24  0912 08/15/24  1755   SODIUM 137 138  --   --  138  --  135* 136   POTASSIUM 3.8 4.2  --  4.5 3.6 3.9 2.9* 3.3*   CHLORIDE 103 103  --   --  104  --  97* 97*   CO2 22.0 20.0*  --   --  26.0  --  29.0 25.4   ANION GAP 12.0 15.0  --   --  8.0  --  9.0 13.6   BUN 25* 21  --   --  11  --  9 14   CREATININE 0.78 0.84  --   --  0.52*  --  0.48* 0.52*   EGFR 75.9 69.5  --   --  92.9  --  94.7 92.9   GLUCOSE 171* 172*  --   --  106*  --  194* 115*   CALCIUM 8.9 8.8  --   --  8.2*  --  8.3* 9.4   MAGNESIUM  --   --   --   --  1.8  --   --   --    HEMOGLOBIN A1C  --   --  6.00*  --   --   --   --   --    TSH  --   --   --   --   --   --  1.220  --          Lab 08/21/24  0459 08/20/24  0620 08/17/24  0644 08/16/24  0912 08/15/24  1755   TOTAL PROTEIN 6.1 5.8* 5.9* 5.3* 6.9   ALBUMIN 2.6* 3.0* 2.9* 3.0* 3.4*   GLOBULIN 3.5 2.8 3.0 2.3 3.5   ALT (SGPT) 61* 50* 36* 36* 36*   AST (SGOT) 52* 40* 36* 37* 39*   BILIRUBIN 0.2 0.3 0.3 0.3 0.5   ALK PHOS 54 60 52 58 62         Lab 08/22/24  0032 08/21/24  2038 08/15/24  1755   PROBNP  --   --  2,187.0*   HSTROP T 13 10 12                 Lab 08/15/24  1813   PH, ARTERIAL 7.498*   PCO2, ARTERIAL 34.2*   PO2 ART 51.5*   FIO2 21   HCO3 ART 26.5*   BASE EXCESS ART 3.4*   CARBOXYHEMOGLOBIN 0.8     Brief Urine Lab Results  (Last result in the past 365 days)        Color   Clarity   Blood   Leuk Est   Nitrite   Protein   CREAT   Urine HCG        08/15/24 1923 Yellow   Clear   Negative   Negative   Negative   Negative                   Microbiology Results Abnormal       Procedure Component Value - Date/Time    Aspergillus Galactomannan Antigen - Blood, Arm, Left [[id number]] Collected: 08/17/24 0644    Lab Status: Final result Specimen: Blood from Arm, Left Updated: 08/22/24 0614     Aspergillus Ag, BAL/Serum 0.04 Index     Narrative:      Performed at:  [address] [place] [address]  [id number]  : Steve Roper MD, Phone:  [phone]  Performed  at:  02 - [facility]  [address] [facility], [facility], [place]  [id number]  : Glen Sandhu [facility], Phone:  [phone]    Respiratory Culture - Wash, Bronchus [[id number]] Collected: 08/19/24 1116    Lab Status: Final result Specimen: Wash from Bronchus Updated: 08/21/24 1043     Respiratory Culture No growth at 2 days     Gram Stain Few (2+) WBCs seen      No organisms seen    Blood Culture - Blood, Hand, Right [[id number]]  (Normal) Collected: 08/16/24 0902    Lab Status: Final result Specimen: Blood from Hand, Right Updated: 08/21/24 0945     Blood Culture No growth at 5 days    Narrative:      Less than seven (7) mL's of blood was collected.  Insufficient quantity may yield false negative results.    Blood Culture - Blood, Arm, Right [[id number]]  (Normal) Collected: 08/16/24 0912    Lab Status: Final result Specimen: Blood from Arm, Right Updated: 08/21/24 0945     Blood Culture No growth at 5 days    Narrative:      Less than seven (7) mL's of blood was collected.  Insufficient quantity may yield false negative results.    Blood Culture - Blood, Arm, Right [[id number]]  (Normal) Collected: 08/15/24 1750    Lab Status: Final result Specimen: Blood from Arm, Right Updated: 08/20/24 1931     Blood Culture No growth at 5 days    Fungus Smear - Wash, Bronchus [[id number]] Collected: 08/19/24 1116    Lab Status: Final result Specimen: Wash from Bronchus Updated: 08/20/24 1522     Fungal Stain No fungal elements seen    AFB Culture - Wash, Bronchus [[id number]] Collected: 08/19/24 1116    Lab Status: Preliminary result Specimen: Wash from Bronchus Updated: 08/20/24 1159     AFB Stain No acid fast bacilli seen on concentrated smear    S. Pneumo Ag Urine or CSF - Urine, Urine, Clean Catch [[id number]]  (Normal) Collected: 08/16/24 1019    Lab Status: Final result Specimen: Urine, Clean Catch Updated: 08/16/24 1454     Strep Pneumo Ag Negative    Legionella Antigen, Urine - Urine, Urine, Clean Catch [[id number]]  (Normal)  Collected: 08/16/24 1019    Lab Status: Final result Specimen: Urine, Clean Catch Updated: 08/16/24 1454     LEGIONELLA ANTIGEN, URINE Negative    MRSA Screen, PCR (Inpatient) - Swab, Nares [[id number]]  (Normal) Collected: 08/16/24 0635    Lab Status: Final result Specimen: Swab from Nares Updated: 08/16/24 0822     MRSA PCR Negative    Narrative:      The negative predictive value of this diagnostic test is high and should only be used to consider de-escalating anti-MRSA therapy. A positive result may indicate colonization with MRSA and must be correlated clinically.  MRSA Negative    COVID-19, FLU A/B, RSV PCR 1 HR TAT - Swab, Nasopharynx [[id number]]  (Normal) Collected: 08/15/24 1923    Lab Status: Final result Specimen: Swab from Nasopharynx Updated: 08/15/24 2013     COVID19 Not Detected     Influenza A PCR Not Detected     Influenza B PCR Not Detected     RSV, PCR Not Detected    Narrative:      Fact sheet for providers: https://www.fda.gov/media/085799/download    Fact sheet for patients: https://www.fda.gov/media/480667/download    Test performed by PCR.            XR Chest 1 View    Result Date: 8/22/2024  XR CHEST 1 VW Date of Exam: 8/22/2024 3:33 AM EDT Indication: F/u pneumonia Comparison:  8/21/2024 Findings: Cardiac size is similar to prior exam. Similar patchy opacity in the left greater than right upper lung. Small left pleural effusion. No pneumothorax. No evidence of acute osseous abnormalities. Visualized upper abdomen is unremarkable.     Impression: Impression: Similar patchy opacity in the left greater than right upper lung concerning for infection. Small left pleural effusion. Electronically Signed: Keagan Richardson MD  8/22/2024 8:16 AM EDT  Workstation ID: [id number]    XR Chest 1 View    Result Date: 8/21/2024  XR CHEST 1 VW Date of Exam: 8/21/2024 8:34 PM EDT Indication: chest pain Comparison: Chest radiograph 8/19/2024 Findings: Mediastinum: Cardiomediastinal silhouette appears unchanged.  Lungs: Improving consolidative opacities in the bilateral lungs with persistent consolidative opacity in the left mid to upper lung, right upper lobe, and right lower lobe. Pleura: Possible left pleural effusion. No right pleural effusion. No pneumothorax. Bones and soft tissues: No acute osseous abnormality.     Impression: Impression: Improving multifocal pulmonary opacities. Electronically Signed: Escobar Maciel  8/21/2024 8:48 PM EDT  Workstation ID: [id number]     Results for orders placed during the hospital encounter of 07/15/24    Adult Transthoracic Echo Complete w/ Color, Spectral and Contrast if necessary per protocol    Interpretation Summary    Left ventricular systolic function is normal. Calculated left ventricular EF = 62% Left ventricular ejection fraction appears to be 61 - 65%.    Left ventricular wall thickness is consistent with mild concentric hypertrophy.    The right atrial cavity is borderline dilated.      Current medications:  Scheduled Meds:[Held by provider] amLODIPine, 5 mg, Oral, Daily  budesonide-formoterol, 1 puff, Inhalation, BID - RT  guaiFENesin, 600 mg, Oral, Daily  heparin (porcine), 5,000 Units, Subcutaneous, Q12H  ipratropium-albuterol, 3 mL, Nebulization, 4x Daily - RT  lamoTRIgine, 100 mg, Oral, Q12H  Lidocaine, 1 patch, Transdermal, Q24H  [Held by provider] losartan, 25 mg, Oral, Daily  melatonin, 10 mg, Oral, Nightly  pantoprazole, 40 mg, Oral, Q AM  predniSONE, 40 mg, Oral, Daily With Breakfast  propranolol, 80 mg, Oral, Q12H  sertraline, 25 mg, Oral, Daily  sertraline, 50 mg, Oral, Nightly  sodium chloride, 10 mL, Intravenous, Q12H      Continuous Infusions:     PRN Meds:.  acetaminophen **OR** acetaminophen **OR** acetaminophen    azelastine    senna-docusate sodium **AND** polyethylene glycol **AND** bisacodyl **AND** bisacodyl    Calcium Replacement - Follow Nurse / BPA Driven Protocol    ipratropium-albuterol    Magnesium Standard Dose Replacement - Follow Nurse / BPA  Driven Protocol    Phosphorus Replacement - Follow Nurse / BPA Driven Protocol    Potassium Replacement - Follow Nurse / BPA Driven Protocol    sodium chloride    sodium chloride    sodium chloride    Assessment & Plan   Assessment & Plan     Active Hospital Problems    Diagnosis  POA    **Acute respiratory failure with hypoxia [J96.01]  Yes    Bilateral pulmonary infiltrates [R91.8]  Yes    History of breast cancer [Z85.3]  Not Applicable    Mixed hyperlipidemia [E78.2]  Yes    Seizure disorder [G40.909]  Yes    Essential hypertension [I10]  Yes    Asthma [J45.909]  Yes      Resolved Hospital Problems   No resolved problems to display.        Brief Hospital Course to date:  Judi Patel is a 83 y.o. female with a history of Asthma, HTN, HLD, seizure disorder, breast cancer s/p lumpectomy with radiation 4 months ago who presented to the ED with complaints of shortness of air and cough.  Patient was diagnosed with pneumonia in the left lower lobe and was treated with 10 days of doxycycline.      This patient's problems and plans were partially entered by my partner and updated as appropriate by me 08/22/24.     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    - Pulmonary consulted; evaluating for eosinophilic pna, vasculitis, histo/blasto.    - SP Bronchoscopy on 8/19 with studies pending  - CXR 8/22 shows similar patchy opacity left greater than RUL  - Started solumedrol 8/19 with dramatic reduction in eosinophils.  Following with pulmonary, been on  IV steroids transitioned to PO prednisone today.  Per pulmonary, may benefit from biologic therapy for asthma as an outpatient.  - Continue to wean O2 as tolerated  - Symbicort Rx at discharge    Progressive weakness   - Likely related to acute pulmonary disease   - TSH and CK WNL, AM cortisol WNL.  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         AM-PAC 6 Clicks Score (PT): 18 (08/21/24 2000)    CODE STATUS:   Code Status and Medical Interventions: CPR (Attempt to Resuscitate); Full Support   Ordered at: 08/16/24 0306     Level Of Support Discussed With:    Patient     Code Status (Patient has no pulse and is not breathing):    CPR (Attempt to Resuscitate)     Medical Interventions (Patient has pulse or is breathing):    Full Support       Ken Wright MD  08/22/24

## 2024-08-22 NOTE — PLAN OF CARE
Goal Outcome Evaluation:      Patient doing well after rapid response earlier this shift for c/o chest pain. All tests essentially negative. No further complaints. NO for melatonin for sleep. Patient seems to be sleeping much better this evening. Plan is for patient to go home with help from family once sohail stable on O2.

## 2024-08-22 NOTE — PROGRESS NOTES
INPATIENT PULMONARY SERVICE  PROGRESS NOTE     Hospital LOS: 6 days    Ms. Judi Patel, is followed for a Chief Complaint of:  Chief Complaint   Patient presents with    Shortness of Breath       Acute respiratory failure with hypoxia    Essential hypertension    Asthma    Seizure disorder    Mixed hyperlipidemia    Bilateral pulmonary infiltrates    History of breast cancer      Subjective   S     Interval History:  Chest pain overnight. On 2L nasal cannula.        The patient's relevant past medical, surgical and social history were reviewed and updated in Epic as appropriate.      ROS:   Constitutional: Negative for fever.   Respiratory: Negative for dyspnea.   Cardiovascular: Negative for chest pain.   Gastrointestinal: Negative for  nausea, vomiting and diarrhea.     Objective   O     Vitals  Temp  Min: 97.7 °F (36.5 °C)  Max: 98.4 °F (36.9 °C)  BP  Min: 121/74  Max: 138/79  Pulse  Min: 67  Max: 77  Resp  Min: 16  Max: 18  SpO2  Min: 91 %  Max: 97 % Flow (L/min)  Min: 1  Max: 2.5    I/O 24 HR (7:00 AM-6:59AM):  Intake/Output         08/21/24 0700 - 08/22/24 0659 08/22/24 0700 - 08/23/24 0659    Intake (ml) 580 120    Output (ml) 1250 550    Net (ml) -670 -430            Medications (Drips):       Physical Examination    Telemetry: Normal sinus rhythm.    Constitutional:  No acute distress.  Conversant. Sitting up in bed on nasal cannula.    Cardiovascular: Regular rate and rhythm.  No murmurs, rub or gallop.   Respiratory: Normal symmetric chest expansion.  Normal respiratory effort.   Abdominal:  Soft. No masses.   Non-tender. No distension.   No hepatosplenomegaly.   Extremities: No digital cyanosis or clubbing.  No peripheral edema.   Neurological:   Alert and Oriented to person, place, and time.   Moves all extremities.           Results from last 7 days   Lab Units 08/21/24  0459 08/20/24  0620 08/17/24  0644   WBC 10*3/mm3 15.73* 9.89 7.81   HEMOGLOBIN g/dL 9.8* 11.1* 9.9*   MCV fL 86.0 90.7 86.4    PLATELETS 10*3/mm3 372 350 307     Results from last 7 days   Lab Units 08/21/24  0459 08/20/24  0620 08/17/24  1638 08/17/24  0644   SODIUM mmol/L 137 138  --  138   POTASSIUM mmol/L 3.8 4.2 4.5 3.6   CO2 mmol/L 22.0 20.0*  --  26.0   CREATININE mg/dL 0.78 0.84  --  0.52*   MAGNESIUM mg/dL  --   --   --  1.8     Serum creatinine: 0.78 mg/dL 08/21/24 0459  Estimated creatinine clearance: 60.6 mL/min  Results from last 7 days   Lab Units 08/21/24  0459 08/20/24  0620 08/17/24  0644   ALK PHOS U/L 54 60 52   BILIRUBIN mg/dL 0.2 0.3 0.3   ALT (SGPT) U/L 61* 50* 36*   AST (SGOT) U/L 52* 40* 36*       Results from last 7 days   Lab Units 08/15/24  1813   PH, ARTERIAL pH units 7.498*   PCO2, ARTERIAL mm Hg 34.2*   PO2 ART mm Hg 51.5*   FIO2 % 21       Images:     Imaging Results (Last 24 Hours)       Procedure Component Value Units Date/Time    XR Chest 1 View [[id number]] Collected: 08/22/24 0815     Updated: 08/22/24 0819    Narrative:      XR CHEST 1 VW    Date of Exam: 8/22/2024 3:33 AM EDT    Indication: F/u pneumonia    Comparison:  8/21/2024    Findings:  Cardiac size is similar to prior exam. Similar patchy opacity in the left greater than right upper lung. Small left pleural effusion. No pneumothorax. No evidence of acute osseous abnormalities. Visualized upper abdomen is unremarkable.      Impression:      Impression:  Similar patchy opacity in the left greater than right upper lung concerning for infection.    Small left pleural effusion.      Electronically Signed: Keagan Richardson MD    8/22/2024 8:16 AM EDT    Workstation ID: [id number]    XR Chest 1 View [[id number]] Collected: 08/21/24 2047     Updated: 08/21/24 2051    Narrative:      XR CHEST 1 VW    Date of Exam: 8/21/2024 8:34 PM EDT    Indication: chest pain    Comparison: Chest radiograph 8/19/2024    Findings:      Mediastinum: Cardiomediastinal silhouette appears unchanged.    Lungs: Improving consolidative opacities in the bilateral lungs with  persistent consolidative opacity in the left mid to upper lung, right upper lobe, and right lower lobe.    Pleura: Possible left pleural effusion. No right pleural effusion. No pneumothorax.    Bones and soft tissues: No acute osseous abnormality.        Impression:      Impression:  Improving multifocal pulmonary opacities.      Electronically Signed: Escobar Maciel    8/21/2024 8:48 PM EDT    Workstation ID: [id number]            I reviewed the patient's new clinical results.  I reviewed the patient's new imaging results and agree with the interpretation.    Assessment & Plan   A / P     Ms. Patel is an 84yo F with a history of asthma, allergic rhinitis, recurrent sinusitis and left breast cancer s/p lumpectomy and XRT completed in April 2024 who presented to [facility] ED on 8/15/24 with shortness of breath and cough. CT chest was consistent with multifocal pneumonia. She was admitted to Hospital Medicine and started on Vancomycin and Zosyn. Pulmonary was consulted due to persistent oxygen requirements.     She underwent a bronchoscopy on 8/19/24 and cultures are negative thus far. IV Solumedrol was started on 8/19 after the bronchoscopy. Her eosinophil count has improved from 420 to 0 after starting steroids. Cell count from the bronchoscopy showed 5% eosinophils.     Oxygen has been weaned from 5L to 2L.    Diet: Cardiac; Healthy Heart (2-3 Na+); Fluid Consistency: Thin (IDDSI 0)  Code Status and Medical Interventions: CPR (Attempt to Resuscitate); Full Support   Ordered at: 08/16/24 0306     Level Of Support Discussed With:    Patient     Code Status (Patient has no pulse and is not breathing):    CPR (Attempt to Resuscitate)     Medical Interventions (Patient has pulse or is breathing):    Full Support       Active Hospital Problems    Diagnosis     **Acute respiratory failure with hypoxia     Bilateral pulmonary infiltrates     History of breast cancer     Mixed hyperlipidemia     Seizure disorder     Essential  hypertension     Asthma        Assessment / Plan:  Change to Prednisone 40mg daily. Will plan to decrease by 10mg/weekly. Dapsone for pcp prophylaxis until on Prednisone 20mg daily.   F/u pending labs. ANCA and SABINE panel negative.   Wean supplemental oxygen as tolerated.   Continue Symbicort. May benefit from biologic therapy for asthma as an outpatient.   She will need to follow up in Pulmonary Clinic in 2 weeks.     I discussed the patient's findings and my recommendations with patient and family        Debbie PERRIN Case, DO  Pulmonary and Critical Care Medicine

## 2024-08-22 NOTE — NURSING NOTE
Rapid response called for c/o chest pain. 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 Essential tests ran and negative. No further complaints.

## 2024-08-22 NOTE — PLAN OF CARE
Goal Outcome Evaluation:   Patient is alert and oriented.On 2 L of oxygen  through nasal cannula.On oral steroids.Had a BM today.Walked in the hallway with PT.No complaints of pain.   Vital signs within normal limits.

## 2024-08-22 NOTE — THERAPY TREATMENT NOTE
Patient Name: Judi Patel  : 1940    MRN: [id number]                              Today's Date: 2024       Admit Date: 8/15/2024    Visit Dx:     ICD-10-CM ICD-9-CM   1. Acute hypoxic respiratory failure  J96.01 518.81   2. Multifocal pneumonia  J18.9 486   3. Pneumonia  J18.9 486   4. Asthma, unspecified asthma severity, unspecified whether complicated, unspecified whether persistent  J45.909 493.90   5. Bilateral pulmonary infiltrates  R91.8 793.19   6. Acute respiratory failure with hypoxia  J96.01 518.81   7. Dyspnea on exertion  R06.09 786.09     Patient Active Problem List   Diagnosis    Degenerative disc disease, lumbar    Scoliosis of lumbar spine    Menopause    Vaginal atrophy    Osteopenia    Midline cystocele    Essential hypertension    Asthma    Anxiety    Seizure disorder    Pinched nerve in neck    Abnormal chest x-ray    Acquired cystic kidney disease    Allergic rhinitis    Acute bronchitis    Acute sinusitis    Chronic infection of sinus    Eustachian tube disorder    Fatigue    Gastro-esophageal reflux disease without esophagitis    Hypertrophy of nasal turbinates    Muscle cramps    Moderate persistent asthma    Mixed hyperlipidemia    Low back pain    Incontinence    Tremor    Statin intolerance    Snoring    Renal colic    Otalgia    Neurological finding    Neck pain    Muscle weakness    Spasm of back muscles    Partial epilepsy with impairment of consciousness    Chest pain    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 breast in female, estrogen receptor negative    Dyslipidemia    Dyspnea on exertion    Acute respiratory failure with hypoxia    Bilateral pulmonary infiltrates    History of breast cancer     Past Medical History:   Diagnosis Date    Anxiety     Asthma     Back problem     Breast cancer     Cancer Breast    Compression fracture of T12 vertebra     Cystocele     Epilepsy     Family history of hip fracture     mother, father    Hyperlipidemia     Hypertension      IBS (irritable bowel syndrome)     Menopause     Osteoporosis     Pinched nerve in neck     Seizure disorder     Vaginal atrophy     Vitamin D deficiency      Past Surgical History:   Procedure Laterality Date    ABDOMINAL SURGERY      APPENDECTOMY      BASAL CELL CARCINOMA EXCISION Right 01/12/2010    Nose    BRONCHOSCOPY N/A 8/19/2024    Procedure: BRONCHOSCOPY;  Surgeon: Debbie Marie DO;  Location: [facility] ENDOSCOPY;  Service: Pulmonary;  Laterality: N/A;    CHOLECYSTECTOMY  01/01/1991    CYST REMOVAL  01/01/1990    Right Foot    DILATATION AND CURETTAGE      FUNCTIONAL ENDOSCOPIC SINUS SURGERY  11/16/2010    Dr. Mathews    KNEE ARTHROSCOPY Left 01/01/2006    KNEE ARTHROSCOPY W/ MENISCAL REPAIR Right 11/01/2010    REPLACEMENT TOTAL KNEE      SINUS SURGERY      TOTAL ABDOMINAL HYSTERECTOMY WITH SALPINGO OOPHORECTOMY Bilateral     WRIST SURGERY Left 01/01/2004      General Information       Row Name 08/22/24 1549          Physical Therapy Time and Intention    Document Type therapy note (daily note)  -KE     Mode of Treatment physical therapy  -KE       Row Name 08/22/24 1549          General Information    Patient Profile Reviewed yes  -KE     Existing Precautions/Restrictions fall;oxygen therapy device and L/min;other (see comments)  Monitor O2  -KE     Barriers to Rehab medically complex;previous functional deficit  -KE       Row Name 08/22/24 1549          Cognition    Orientation Status (Cognition) oriented x 3  -KE       Row Name 08/22/24 1549          Safety Issues, Functional Mobility    Safety Issues Affecting Function (Mobility) awareness of need for assistance;insight into deficits/self-awareness;safety precaution awareness;safety precautions follow-through/compliance  -KE     Impairments Affecting Function (Mobility) balance;endurance/activity tolerance;strength;shortness of breath  -KE               User Key  (r) = Recorded By, (t) = Taken By, (c) = Cosigned By      Initials Name Provider Type     Nhung Barnes, KENJI Physical Therapist                   Mobility       Row Name 08/22/24 1551          Bed Mobility    Bed Mobility supine-sit  -KE     Supine-Sit [place] (Bed Mobility) standby assist;1 person assist  -KE     Assistive Device (Bed Mobility) head of bed elevated;bed rails  -KE     Comment, (Bed Mobility) increased time and effort to reach EOB, denies any lightheadedness/dizziness  -KE       Row Name 08/22/24 1551          Sit-Stand Transfer    Sit-Stand [place] (Transfers) 1 person assist;contact guard  -ISABELLA     Assistive Device (Sit-Stand Transfers) walker, front-wheeled  -KE     Comment, (Sit-Stand Transfer) VCs for HP  -KE       Row Name 08/22/24 1551          Gait/Stairs (Locomotion)    [place] Level (Gait) contact guard;1 person assist  -KE     Assistive Device (Gait) walker, front-wheeled  -KE     Patient was able to Ambulate yes  -KE     Distance in Feet (Gait) 200  -KE     Deviations/Abnormal Patterns (Gait) bilateral deviations;gait speed decreased;stride length decreased;base of support, narrow  -KE     Bilateral Gait Deviations heel strike decreased;forward flexed posture  -KE     Left Sided Gait Deviations leans left  -KE     Comment, (Gait/Stairs) Pt demo step through gait pattern with mildly forward flexed posture and slowed gait speed. Slight L sided lean noted w/ increased fatigue. Pt req 2 standing rest breaks. SpO2 dropping to 85% on 2L O2 NC, recovering to 91% w/ standing rest and VCs for PLB. 1 Mild LOB when pt was turning to enter room, pt able to self correct. Further mobility limited by SOA and fatigue.  -ISABELLA               User Key  (r) = Recorded By, (t) = Taken By, (c) = Cosigned By      Initials Name Provider Type    Nhung Barnes, PT Physical Therapist                   Obj/Interventions       Row Name 08/22/24 155          Motor Skills    Therapeutic Exercise hip;ankle;knee  -KE       Row Name 08/22/24 1559          Hip (Therapeutic Exercise)     Hip (Therapeutic Exercise) strengthening exercise  -     Hip Strengthening (Therapeutic Exercise) marching while seated;heel slides;10 repetitions;bilateral  -       Row Name 08/22/24 1555          Knee (Therapeutic Exercise)    Knee (Therapeutic Exercise) strengthening exercise  -     Knee Strengthening (Therapeutic Exercise) LAQ (long arc quad);SLR (straight leg raise);bilateral;10 repetitions  -       Row Name 08/22/24 1555          Ankle (Therapeutic Exercise)    Ankle (Therapeutic Exercise) strengthening exercise  -     Ankle Strengthening (Therapeutic Exercise) bilateral;dorsiflexion;plantarflexion;10 repetitions  -       Row Name 08/22/24 1555          Balance    Balance Assessment sitting static balance;sitting dynamic balance;sit to stand dynamic balance;standing static balance;standing dynamic balance  -     Static Sitting Balance standby assist  -KE     Dynamic Sitting Balance standby assist  -KE     Position, Sitting Balance unsupported;sitting edge of bed  -KE     Sit to Stand Dynamic Balance contact guard  -KE     Static Standing Balance contact guard  -KE     Dynamic Standing Balance contact guard  -KE     Position/Device Used, Standing Balance supported;walker, front-wheeled  -KE     Balance Interventions sitting;standing;sit to stand;supported;static;dynamic  -KE     Comment, Balance 1 mild LOB, pt able to self correct w/ stepping strategy  -               User Key  (r) = Recorded By, (t) = Taken By, (c) = Cosigned By      Initials Name Provider Type    Nhung Barnes, PT Physical Therapist                   Goals/Plan    No documentation.                  Clinical Impression       Row Name 08/22/24 1553          Pain    Pretreatment Pain Rating 0/10 - no pain  -     Posttreatment Pain Rating 0/10 - no pain  -[facility] Name 08/22/24 1559          Plan of Care Review    Plan of Care Reviewed With daughter  -     Outcome Evaluation Pt amb 200' w/ FWW, CGA. Pt req two  standing rest breaks, noted to desat to 85% on 2L NC however recovering quickly to 91% w/ cues for PLB. Pt continues to be limited by balance deficits, MAYORGA, weakness, and decreased activity tolerance warranting skilled IP PT services. Plan to continue progressing current PT POC as tolerated.  -ISABELLA       Row Name 08/22/24 1559          Vital Signs    Pre SpO2 (%) 94  -KE     O2 Delivery Pre Treatment supplemental O2  -KE     Intra SpO2 (%) 85   -KE     O2 Delivery Intra Treatment supplemental O2  -KE     Post SpO2 (%) 93  -KE     O2 Delivery Post Treatment supplemental O2  -KE     Pre Patient Position Supine  -KE     Intra Patient Position Standing  -KE     Post Patient Position Sitting  -KE       Row Name 08/22/24 1559          Positioning and Restraints    Pre-Treatment Position sitting in chair/recliner  -KE     Post Treatment Position chair  -KE     In Chair notified nsg;reclined;waffle cushion;call light within reach;encouraged to call for assist;exit alarm on;with family/caregiver;legs elevated;heels elevated  -KE               User Key  (r) = Recorded By, (t) = Taken By, (c) = Cosigned By      Initials Name Provider Type    Nhung Barnes, PT Physical Therapist                   Outcome Measures       Row Name 08/22/24 7438          How much help from another person do you currently need...    Turning from your back to your side while in flat bed without using bedrails? 4  -KE     Moving from lying on back to sitting on the side of a flat bed without bedrails? 3  -KE     Moving to and from a bed to a chair (including a wheelchair)? 3  -KE     Standing up from a chair using your arms (e.g., wheelchair, bedside chair)? 3  -KE     Climbing 3-5 steps with a railing? 3  -KE     To walk in hospital room? 3  -KE     AM-PAC 6 Clicks Score (PT) 19  -KE     Highest Level of Mobility Goal 6 --> Walk 10 steps or more  -ISABELLA       Row Name 08/22/24 7142          Functional Assessment    Outcome Measure Options AM-PAC 6  Clicks Basic Mobility (PT)  -               User Key  (r) = Recorded By, (t) = Taken By, (c) = Cosigned By      Initials Name Provider Type    Nhung Barnes, KENJI Physical Therapist                                 Physical Therapy Education       Title: PT OT SLP Therapies (In Progress)       Topic: Physical Therapy (In Progress)       Point: Mobility training (Done)       Learning Progress Summary             Patient Acceptance, E, VU by  at 8/22/2024 1518    Acceptance, E,D, VU,NR by  at 8/20/2024 1505    Acceptance, E, NR by  at 8/16/2024 0940   Family Acceptance, E, VU by  at 8/22/2024 1518                         Point: Home exercise program (Not Started)       Learner Progress:  Not documented in this visit.              Point: Body mechanics (Done)       Learning Progress Summary             Patient Acceptance, E, VU by  at 8/22/2024 1518    Acceptance, E,D, VU,NR by  at 8/20/2024 1505    Acceptance, E, NR by ISABELLA at 8/16/2024 0940   Family Acceptance, E, VU by  at 8/22/2024 1518                         Point: Precautions (Done)       Learning Progress Summary             Patient Acceptance, E, VU by  at 8/22/2024 1518    Acceptance, E,D, VU,NR by  at 8/20/2024 1505    Acceptance, E, NR by  at 8/16/2024 0940   Family Acceptance, E, VU by  at 8/22/2024 1518                                         User Key       Initials Effective Dates Name Provider Type Discipline     12/15/23 -  Gertrude Claros, PT Physical Therapist PT     11/16/23 -  Nhung Bowman PT Physical Therapist PT                  PT Recommendation and Plan  Planned Therapy Interventions (PT): balance training, bed mobility training, home exercise program, gait training, patient/family education, postural re-education, transfer training, stretching, strengthening, stair training, ROM (range of motion)  Plan of Care Reviewed With: daughter  Outcome Evaluation: Pt amb 200' w/ FWW, CGA. Pt req two standing rest breaks,  noted to desat to 85% on 2L NC however recovering quickly to 91% w/ cues for PLB. Pt continues to be limited by balance deficits, MAYORGA, weakness, and decreased activity tolerance warranting skilled IP PT services. Plan to continue progressing current PT POC as tolerated.     Time Calculation:   PT Evaluation Complexity  History, PT Evaluation Complexity: 1-2 personal factors and/or comorbidities  Examination of Body Systems (PT Eval Complexity): total of 3 or more elements  Clinical Presentation (PT Evaluation Complexity): stable  Clinical Decision Making (PT Evaluation Complexity): low complexity  Overall Complexity (PT Evaluation Complexity): low complexity     PT Charges       Row Name 08/22/24 1604             Time Calculation    Start Time 1518  -KE      PT Received On 08/22/24  -KE         Timed Charges    03777 - PT Therapeutic Exercise Minutes 12  -KE      78131 - Gait Training Minutes  15  -KE         Total Minutes    Timed Charges Total Minutes 27  -KE       Total Minutes 27  -KE                User Key  (r) = Recorded By, (t) = Taken By, (c) = Cosigned By      Initials Name Provider Type    Nhung Barnes, PT Physical Therapist                  Therapy Charges for Today       Code Description Service Date Service Provider Modifiers Qty    83748307009 HC PT THER PROC EA 15 MIN 8/22/2024 Nhung Bowman, PT GP 1    37961172379 HC GAIT TRAINING EA 15 MIN 8/22/2024 Nhung Bowman, PT GP 1            PT G-Codes  Outcome Measure Options: AM-PAC 6 Clicks Basic Mobility (PT)  AM-PAC 6 Clicks Score (PT): 19  AM-PAC 6 Clicks Score (OT): 17  PT Discharge Summary  Anticipated Discharge Disposition (PT): home with assist, home with outpatient therapy services    Nhung Bowman PT  8/22/2024

## 2024-08-22 NOTE — PLAN OF CARE
Goal Outcome Evaluation:  Plan of Care Reviewed With: daughter           Outcome Evaluation: Pt amb 200' w/ FWW, CGA. Pt req two standing rest breaks, noted to desat to 85% on 2L NC however recovering quickly to 91% w/ cues for PLB. Pt continues to be limited by balance deficits, MAYORGA, weakness, and decreased activity tolerance warranting skilled IP PT services. Plan to continue progressing current PT POC as tolerated.      Anticipated Discharge Disposition (PT): home with assist, home with outpatient therapy services

## 2024-08-23 LAB
A FLAVUS IGG SER QL: NEGATIVE
A FUMIGATUS IGG SER QL: NEGATIVE
A FUMIGATUS IGG SER QL: NEGATIVE
A GLAUCUS AB SER QL: NEGATIVE
A NIDULANS H AB SER QL: NEGATIVE
A NIGER IGG SER QL: NEGATIVE
A PULLULANS IGG SER QL: NEGATIVE
A TERREUS IGG QL: NEGATIVE
ALBUMIN SERPL-MCNC: 2.9 G/DL (ref 3.5–5.2)
ALBUMIN/GLOB SERPL: 1.3 G/DL
ALP SERPL-CCNC: 55 U/L (ref 39–117)
ALT SERPL W P-5'-P-CCNC: 72 U/L (ref 1–33)
ANION GAP SERPL CALCULATED.3IONS-SCNC: 10 MMOL/L (ref 5–15)
AST SERPL-CCNC: 42 U/L (ref 1–32)
BASOPHILS # BLD AUTO: 0.05 10*3/MM3 (ref 0–0.2)
BASOPHILS NFR BLD AUTO: 0.5 % (ref 0–1.5)
BILIRUB SERPL-MCNC: 0.2 MG/DL (ref 0–1.2)
BUN SERPL-MCNC: 24 MG/DL (ref 8–23)
BUN/CREAT SERPL: 27.9 (ref 7–25)
CALCIUM SPEC-SCNC: 8.5 MG/DL (ref 8.6–10.5)
CHLORIDE SERPL-SCNC: 105 MMOL/L (ref 98–107)
CO2 SERPL-SCNC: 24 MMOL/L (ref 22–29)
CREAT SERPL-MCNC: 0.86 MG/DL (ref 0.57–1)
DEPRECATED RDW RBC AUTO: 43.4 FL (ref 37–54)
EGFRCR SERPLBLD CKD-EPI 2021: 67.5 ML/MIN/1.73
EOSINOPHIL # BLD AUTO: 0.03 10*3/MM3 (ref 0–0.4)
EOSINOPHIL NFR BLD AUTO: 0.3 % (ref 0.3–6.2)
ERYTHROCYTE [DISTWIDTH] IN BLOOD BY AUTOMATED COUNT: 14 % (ref 12.3–15.4)
GLOBULIN UR ELPH-MCNC: 2.3 GM/DL
GLUCOSE SERPL-MCNC: 98 MG/DL (ref 65–99)
HCT VFR BLD AUTO: 32.7 % (ref 34–46.6)
HGB BLD-MCNC: 10.8 G/DL (ref 12–15.9)
IGE SERPL-ACNC: 8 IU/ML (ref 6–495)
IMM GRANULOCYTES # BLD AUTO: 0.37 10*3/MM3 (ref 0–0.05)
IMM GRANULOCYTES NFR BLD AUTO: 3.5 % (ref 0–0.5)
LACEYELLA SACCHARI AB SER QL ID: NEGATIVE
LYMPHOCYTES # BLD AUTO: 0.8 10*3/MM3 (ref 0.7–3.1)
LYMPHOCYTES NFR BLD AUTO: 7.6 % (ref 19.6–45.3)
MCH RBC QN AUTO: 28.3 PG (ref 26.6–33)
MCHC RBC AUTO-ENTMCNC: 33 G/DL (ref 31.5–35.7)
MCV RBC AUTO: 85.6 FL (ref 79–97)
MONOCYTES # BLD AUTO: 1.07 10*3/MM3 (ref 0.1–0.9)
MONOCYTES NFR BLD AUTO: 10.1 % (ref 5–12)
NEUTROPHILS NFR BLD AUTO: 78 % (ref 42.7–76)
NEUTROPHILS NFR BLD AUTO: 8.26 10*3/MM3 (ref 1.7–7)
NRBC BLD AUTO-RTO: 0 /100 WBC (ref 0–0.2)
PIGEON SERUM IGG QL: NEGATIVE
PLATELET # BLD AUTO: 417 10*3/MM3 (ref 140–450)
PMV BLD AUTO: 9.5 FL (ref 6–12)
POTASSIUM SERPL-SCNC: 3.9 MMOL/L (ref 3.5–5.2)
PROT SERPL-MCNC: 5.2 G/DL (ref 6–8.5)
RBC # BLD AUTO: 3.82 10*6/MM3 (ref 3.77–5.28)
S RECTIVIRGULA IGG SER QL ID: NEGATIVE
SODIUM SERPL-SCNC: 139 MMOL/L (ref 136–145)
T VULGARIS IGG SER QL: NEGATIVE
WBC NRBC COR # BLD AUTO: 10.58 10*3/MM3 (ref 3.4–10.8)

## 2024-08-23 PROCEDURE — 94761 N-INVAS EAR/PLS OXIMETRY MLT: CPT

## 2024-08-23 PROCEDURE — 94799 UNLISTED PULMONARY SVC/PX: CPT

## 2024-08-23 PROCEDURE — 85025 COMPLETE CBC W/AUTO DIFF WBC: CPT | Performed by: INTERNAL MEDICINE

## 2024-08-23 PROCEDURE — 80053 COMPREHEN METABOLIC PANEL: CPT | Performed by: INTERNAL MEDICINE

## 2024-08-23 PROCEDURE — 25010000002 HEPARIN (PORCINE) PER 1000 UNITS: Performed by: INTERNAL MEDICINE

## 2024-08-23 PROCEDURE — 63710000001 PREDNISONE PER 1 MG: Performed by: INTERNAL MEDICINE

## 2024-08-23 PROCEDURE — 99232 SBSQ HOSP IP/OBS MODERATE 35: CPT | Performed by: INTERNAL MEDICINE

## 2024-08-23 PROCEDURE — 94664 DEMO&/EVAL PT USE INHALER: CPT

## 2024-08-23 PROCEDURE — 94760 N-INVAS EAR/PLS OXIMETRY 1: CPT

## 2024-08-23 RX ADMIN — IPRATROPIUM BROMIDE AND ALBUTEROL SULFATE 3 ML: 2.5; .5 SOLUTION RESPIRATORY (INHALATION) at 08:46

## 2024-08-23 RX ADMIN — BUDESONIDE AND FORMOTEROL FUMARATE DIHYDRATE 1 PUFF: 160; 4.5 AEROSOL RESPIRATORY (INHALATION) at 08:46

## 2024-08-23 RX ADMIN — HEPARIN SODIUM 5000 UNITS: 5000 INJECTION INTRAVENOUS; SUBCUTANEOUS at 20:19

## 2024-08-23 RX ADMIN — SERTRALINE 50 MG: 50 TABLET, FILM COATED ORAL at 20:19

## 2024-08-23 RX ADMIN — Medication 10 ML: at 20:26

## 2024-08-23 RX ADMIN — PROPRANOLOL HYDROCHLORIDE 80 MG: 20 TABLET ORAL at 20:19

## 2024-08-23 RX ADMIN — BUDESONIDE AND FORMOTEROL FUMARATE DIHYDRATE 1 PUFF: 160; 4.5 AEROSOL RESPIRATORY (INHALATION) at 20:23

## 2024-08-23 RX ADMIN — Medication 10 MG: at 20:20

## 2024-08-23 RX ADMIN — PROPRANOLOL HYDROCHLORIDE 80 MG: 20 TABLET ORAL at 08:13

## 2024-08-23 RX ADMIN — LIDOCAINE 1 PATCH: 4 PATCH TOPICAL at 20:16

## 2024-08-23 RX ADMIN — GUAIFENESIN 600 MG: 600 TABLET, EXTENDED RELEASE ORAL at 08:13

## 2024-08-23 RX ADMIN — LAMOTRIGINE 100 MG: 100 TABLET ORAL at 20:19

## 2024-08-23 RX ADMIN — PREDNISONE 40 MG: 20 TABLET ORAL at 08:12

## 2024-08-23 RX ADMIN — DAPSONE 50 MG: 100 TABLET ORAL at 20:18

## 2024-08-23 RX ADMIN — PANTOPRAZOLE SODIUM 40 MG: 40 TABLET, DELAYED RELEASE ORAL at 05:23

## 2024-08-23 RX ADMIN — SERTRALINE 25 MG: 25 TABLET, FILM COATED ORAL at 08:13

## 2024-08-23 RX ADMIN — IPRATROPIUM BROMIDE AND ALBUTEROL SULFATE 3 ML: 2.5; .5 SOLUTION RESPIRATORY (INHALATION) at 20:23

## 2024-08-23 RX ADMIN — HEPARIN SODIUM 5000 UNITS: 5000 INJECTION INTRAVENOUS; SUBCUTANEOUS at 08:13

## 2024-08-23 RX ADMIN — LAMOTRIGINE 100 MG: 100 TABLET ORAL at 08:13

## 2024-08-23 RX ADMIN — IPRATROPIUM BROMIDE AND ALBUTEROL SULFATE 3 ML: 2.5; .5 SOLUTION RESPIRATORY (INHALATION) at 13:19

## 2024-08-23 RX ADMIN — Medication 10 ML: at 08:15

## 2024-08-23 RX ADMIN — DAPSONE 50 MG: 100 TABLET ORAL at 08:13

## 2024-08-23 NOTE — PLAN OF CARE
Goal Outcome Evaluation:                                              Problem: Adult Inpatient Plan of Care  Goal: Absence of Hospital-Acquired Illness or Injury  Intervention: Identify and Manage Fall Risk  Recent Flowsheet Documentation  Taken 8/23/2024 0400 by Cyrus Alcantar RN  Safety Promotion/Fall Prevention:   activity supervised   safety round/check completed   toileting scheduled  Taken 8/23/2024 0200 by Cyrus Alcantar RN  Safety Promotion/Fall Prevention:   activity supervised   safety round/check completed   toileting scheduled  Taken 8/23/2024 0000 by Cyrus Alcantar RN  Safety Promotion/Fall Prevention:   activity supervised   safety round/check completed   toileting scheduled  Taken 8/22/2024 2200 by Cyrus Alcantar RN  Safety Promotion/Fall Prevention:   activity supervised   safety round/check completed   toileting scheduled  Taken 8/22/2024 2000 by Cyrus Alcantar RN  Safety Promotion/Fall Prevention:   activity supervised   safety round/check completed   toileting scheduled  Intervention: Prevent Skin Injury  Recent Flowsheet Documentation  Taken 8/23/2024 0400 by Cyrus Alcantar RN  Body Position: position changed independently  Taken 8/23/2024 0200 by Cyrus Alcantar RN  Body Position: position changed independently  Taken 8/23/2024 0000 by Cyrus Alcantar RN  Body Position: supine  Taken 8/22/2024 2200 by Cyrus Alcantar RN  Body Position: supine  Taken 8/22/2024 2000 by Cyrus Alcantar RN  Body Position: supine  Intervention: Prevent and Manage VTE (Venous Thromboembolism) Risk  Recent Flowsheet Documentation  Taken 8/23/2024 0400 by Cyrus Alcantar RN  VTE Prevention/Management:   bilateral   sequential compression devices on  Taken 8/23/2024 0200 by Cyrus Alcantar RN  VTE Prevention/Management:   bilateral   sequential compression devices on  Taken 8/23/2024 0000 by Cyrus Alcantar RN  VTE Prevention/Management:   bilateral   sequential compression devices on  Taken 8/22/2024  2200 by Cyrus Alcantar RN  VTE Prevention/Management:   bilateral   sequential compression devices on  Taken 8/22/2024 2000 by Cyrus Alcantar RN  VTE Prevention/Management:   bilateral   sequential compression devices on  Intervention: Prevent Infection  Recent Flowsheet Documentation  Taken 8/23/2024 0400 by Cyrus Alcantar RN  Infection Prevention: environmental surveillance performed  Taken 8/23/2024 0200 by Cyrus Alcantar RN  Infection Prevention: environmental surveillance performed  Taken 8/23/2024 0000 by Cyrus Alcantar RN  Infection Prevention: environmental surveillance performed  Taken 8/22/2024 2200 by Cyrus Alcantar RN  Infection Prevention: environmental surveillance performed  Taken 8/22/2024 2000 by Cyrus Alcantar RN  Infection Prevention: environmental surveillance performed  Goal: Optimal Comfort and Wellbeing  Intervention: Monitor Pain and Promote Comfort  Recent Flowsheet Documentation  Taken 8/23/2024 0400 by Cyrus Alcantar RN  Pain Management Interventions: quiet environment facilitated  Taken 8/23/2024 0200 by Cyrus Alcantar RN  Pain Management Interventions: quiet environment facilitated  Taken 8/23/2024 0000 by Cyrus Alcantar RN  Pain Management Interventions: quiet environment facilitated  Taken 8/22/2024 2200 by Cyrus Alcantar RN  Pain Management Interventions: quiet environment facilitated  Taken 8/22/2024 2000 by Cyrus Alcantar RN  Pain Management Interventions: quiet environment facilitated  Intervention: Provide Person-Centered Care  Recent Flowsheet Documentation  Taken 8/23/2024 0400 by Cyrus Alcantar RN  Trust Relationship/Rapport: care explained  Taken 8/23/2024 0200 by Cyrus Alcantar RN  Trust Relationship/Rapport: care explained  Taken 8/23/2024 0000 by Cyrus Alcantar RN  Trust Relationship/Rapport: care explained  Taken 8/22/2024 2200 by Cyrus Alcantar RN  Trust Relationship/Rapport: care explained  Taken 8/22/2024 2000 by Cyrus Alcantar  RN  Trust Relationship/Rapport: care explained       VSS, voids well, rested throughout the night, pain managed with PRN medications, will continue to monitor for changes.

## 2024-08-23 NOTE — PROGRESS NOTES
INPATIENT PULMONARY SERVICE  PROGRESS NOTE     Hospital LOS: 7 days    Ms. Judi Patel, is followed for a Chief Complaint of:  Chief Complaint   Patient presents with    Shortness of Breath       Acute respiratory failure with hypoxia    Essential hypertension    Asthma    Seizure disorder    Mixed hyperlipidemia    Bilateral pulmonary infiltrates    History of breast cancer      Subjective   S     Interval History:  Sitting in bed on room air with a saturation of 88%.        The patient's relevant past medical, surgical and social history were reviewed and updated in Epic as appropriate.      ROS:   Constitutional: Negative for fever.   Respiratory: Negative for dyspnea.   Cardiovascular: Negative for chest pain.   Gastrointestinal: Negative for  nausea, vomiting and diarrhea.     Objective   O     Vitals  Temp  Min: 97.7 °F (36.5 °C)  Max: 98.5 °F (36.9 °C)  BP  Min: 120/73  Max: 150/85  Pulse  Min: 64  Max: 79  Resp  Min: 16  Max: 18  SpO2  Min: 87 %  Max: 98 % Flow (L/min)  Min: 2  Max: 2    I/O 24 HR (7:00 AM-6:59AM):  Intake/Output         08/22/24 0700 - 08/23/24 0659 08/23/24 0700 - 08/24/24 0659    Intake (ml) 240 360    Output (ml) 1250 --    Net (ml) -1010 360            Medications (Drips):       Physical Examination    Telemetry: Normal sinus rhythm.    Constitutional:  No acute distress.  Conversant. Sitting up in bed on room air.    Cardiovascular: Regular rate and rhythm.  No murmurs, rub or gallop.   Respiratory: Normal symmetric chest expansion.  Normal respiratory effort.   Abdominal:  Soft. No masses.   Non-tender. No distension.   No hepatosplenomegaly.   Extremities: No digital cyanosis or clubbing.  No peripheral edema.   Neurological:   Alert and Oriented to person, place, and time.   Moves all extremities.           Results from last 7 days   Lab Units 08/23/24  0651 08/21/24  0459 08/20/24  0620   WBC 10*3/mm3 10.58 15.73* 9.89   HEMOGLOBIN g/dL 10.8* 9.8* 11.1*   MCV fL 85.6 86.0 90.7    PLATELETS 10*3/mm3 417 372 350     Results from last 7 days   Lab Units 08/23/24  0651 08/21/24  0459 08/20/24  0620 08/17/24  1638 08/17/24  0644   SODIUM mmol/L 139 137 138  --  138   POTASSIUM mmol/L 3.9 3.8 4.2   < > 3.6   CO2 mmol/L 24.0 22.0 20.0*  --  26.0   CREATININE mg/dL 0.86 0.78 0.84  --  0.52*   MAGNESIUM mg/dL  --   --   --   --  1.8    < > = values in this interval not displayed.     Serum creatinine: 0.86 mg/dL 08/23/24 0651  Estimated creatinine clearance: 54.9 mL/min  Results from last 7 days   Lab Units 08/23/24  0651 08/21/24  0459 08/20/24  0620   ALK PHOS U/L 55 54 60   BILIRUBIN mg/dL 0.2 0.2 0.3   ALT (SGPT) U/L 72* 61* 50*   AST (SGOT) U/L 42* 52* 40*               Images:     Imaging Results (Last 24 Hours)       ** No results found for the last 24 hours. **            I reviewed the patient's new clinical results.  I reviewed the patient's new imaging results and agree with the interpretation.    Assessment & Plan   A / P     Ms. Patel is an 82yo F with a history of asthma, allergic rhinitis, recurrent sinusitis and left breast cancer s/p lumpectomy and XRT completed in April 2024 who presented to the [facility] ED on 8/15/24 with shortness of breath and cough. CT chest was consistent with multifocal pneumonia. She was admitted to Hospital Medicine and started on Vancomycin and Zosyn. Pulmonary was consulted due to persistent oxygen requirements.     She underwent a bronchoscopy on 8/19/24 and cultures are negative thus far. IV Solumedrol was started on 8/19 after the bronchoscopy. Her eosinophil count has improved from 420 to 0 after starting steroids. Cell count from the bronchoscopy showed 5% eosinophils.     Oxygen has been weaned from 5L to room air.     Diet: Cardiac; Healthy Heart (2-3 Na+); Fluid Consistency: Thin (IDDSI 0)  Code Status and Medical Interventions: CPR (Attempt to Resuscitate); Full Support   Ordered at: 08/16/24 0306     Level Of Support Discussed With:    Patient      Code Status (Patient has no pulse and is not breathing):    CPR (Attempt to Resuscitate)     Medical Interventions (Patient has pulse or is breathing):    Full Support       Active Hospital Problems    Diagnosis     **Acute respiratory failure with hypoxia     Bilateral pulmonary infiltrates     History of breast cancer     Mixed hyperlipidemia     Seizure disorder     Essential hypertension     Asthma        Assessment / Plan:  Prednisone 40mg daily. Will plan to decrease by 10mg/weekly. Dapsone for pcp prophylaxis until on Prednisone 20mg daily.   F/u pending labs as an outpatient. ANCA and SABINE panel negative.   Will likely need to be discharged with supplemental oxygen for exertion and sleep. Encouraged to continue use at least at night until clinic follow up.   Continue Symbicort. May benefit from biologic therapy for asthma as an outpatient.   She will need to follow up in Pulmonary Clinic in 2 weeks.     I discussed the patient's findings and my recommendations with patient and family        Debbie Marie, DO  Pulmonary and Critical Care Medicine

## 2024-08-23 NOTE — PLAN OF CARE
Goal Outcome Evaluation:  Plan of Care Reviewed With: patient, daughter        Progress: improving     Pt aox4, vss, room air, pt has no pain, no changes this shift, xp x1 assist, plan is home tomorrow..     Problem: Adult Inpatient Plan of Care  Goal: Absence of Hospital-Acquired Illness or Injury  Intervention: Identify and Manage Fall Risk  Recent Flowsheet Documentation  Taken 8/23/2024 1830 by Tayler Muniz, RN  Safety Promotion/Fall Prevention:   assistive device/personal items within reach   activity supervised   clutter free environment maintained   elopement precautions   fall prevention program maintained   gait belt   lighting adjusted   mobility aid in reach   muscle strengthening facilitated   nonskid shoes/slippers when out of bed   room organization consistent   safety round/check completed   toileting scheduled  Taken 8/23/2024 1634 by Tayler Muniz, RN  Safety Promotion/Fall Prevention:   activity supervised   assistive device/personal items within reach   clutter free environment maintained   elopement precautions   fall prevention program maintained   gait belt   lighting adjusted   mobility aid in reach   muscle strengthening facilitated   nonskid shoes/slippers when out of bed   room organization consistent   safety round/check completed   toileting scheduled  Taken 8/23/2024 1425 by Tayler Muniz, RN  Safety Promotion/Fall Prevention:   activity supervised   assistive device/personal items within reach   clutter free environment maintained   elopement precautions   lighting adjusted   gait belt   fall prevention program maintained   mobility aid in reach   muscle strengthening facilitated   nonskid shoes/slippers when out of bed   room organization consistent   safety round/check completed   toileting scheduled  Taken 8/23/2024 1250 by Tayler Muniz, RN  Safety Promotion/Fall Prevention:   activity supervised   assistive device/personal items within reach   clutter free environment  maintained   elopement precautions   fall prevention program maintained   gait belt   lighting adjusted   mobility aid in reach   muscle strengthening facilitated   nonskid shoes/slippers when out of bed   room organization consistent   safety round/check completed   toileting scheduled  Taken 8/23/2024 1015 by Tayler Muniz, RN  Safety Promotion/Fall Prevention:   activity supervised   assistive device/personal items within reach   clutter free environment maintained   elopement precautions   fall prevention program maintained   gait belt   mobility aid in reach   muscle strengthening facilitated   nonskid shoes/slippers when out of bed   lighting adjusted   room organization consistent   safety round/check completed   toileting scheduled  Taken 8/23/2024 0813 by Taylre Muniz, RN  Safety Promotion/Fall Prevention:   safety round/check completed   toileting scheduled   nonskid shoes/slippers when out of bed   muscle strengthening facilitated   mobility aid in reach   lighting adjusted   gait belt   fall prevention program maintained   elopement precautions   clutter free environment maintained   assistive device/personal items within reach   activity supervised     Problem: Adult Inpatient Plan of Care  Goal: Absence of Hospital-Acquired Illness or Injury  Intervention: Prevent Skin Injury  Recent Flowsheet Documentation  Taken 8/23/2024 1830 by Tayler Muniz, RN  Body Position: position changed independently  Skin Protection:   adhesive use limited   tubing/devices free from skin contact  Taken 8/23/2024 1634 by Tayler Muniz, RN  Body Position: position changed independently  Skin Protection:   adhesive use limited   tubing/devices free from skin contact  Taken 8/23/2024 1425 by Tayler Muniz, RN  Body Position: position changed independently  Skin Protection:   adhesive use limited   incontinence pads utilized   tubing/devices free from skin contact  Taken 8/23/2024 1250 by Talyer Muniz,  RN  Body Position: position changed independently  Skin Protection:   adhesive use limited   tubing/devices free from skin contact   incontinence pads utilized  Taken 8/23/2024 1015 by Tayler Muniz RN  Body Position: position changed independently  Skin Protection:   adhesive use limited   incontinence pads utilized   transparent dressing maintained   tubing/devices free from skin contact   skin-to-skin areas padded   skin-to-device areas padded   skin sealant/moisture barrier applied   silicone foam dressing in place  Taken 8/23/2024 0813 by Tayler Muniz, RN  Body Position: weight shifting  Skin Protection:   adhesive use limited   incontinence pads utilized   tubing/devices free from skin contact   transparent dressing maintained   skin-to-skin areas padded   skin-to-device areas padded   skin sealant/moisture barrier applied   silicone foam dressing in place

## 2024-08-23 NOTE — PROGRESS NOTES
[facility] Medicine Services  PROGRESS NOTE    Patient Name: Judi Patel  : 1940  MRN: [id number]    Date of Admission: 8/15/2024  Primary Care Physician: Jone Solorio MD    Subjective   Subjective     CC:  F/U progressive weakness and dyspnea     HPI: Breathing ok.  Says that she did ok breathing wise when got up and moved around room yesterday but states that feels too weak to go home today.  Declines rehab, says she has family to help her at home.    Objective   Objective     Vital Signs:   Temp:  [97.7 °F (36.5 °C)-98.5 °F (36.9 °C)] 98.2 °F (36.8 °C)  Heart Rate:  [64-79] 76  Resp:  [16-18] 18  BP: (120-150)/(73-86) 136/77  Flow (L/min):  [2] 2     Physical Exam:  Constitutional: No acute distress, awake, alert, sitting up in bed, appears very weak, weak voice  HENT: NCAT, mucous membranes moist  Respiratory: better air movement today, respiratory effort normal on 1LNC  Cardiovascular: RRR, no murmurs, rubs, or gallops  Gastrointestinal: Positive bowel sounds, soft, nontender, nondistended  Musculoskeletal: No bilateral ankle edema  Psychiatric: Appropriate affect, cooperative  Neurologic: Oriented x 3, moves all extremities, speech clear but very weak  Skin: No rashes        Results Reviewed:  LAB RESULTS:      Lab 24  0651 24  0459 24  0620 24  0644   WBC 10.58 15.73* 9.89 7.81   HEMOGLOBIN 10.8* 9.8* 11.1* 9.9*   HEMATOCRIT 32.7* 30.0* 35.2 29.9*   PLATELETS 417 372 350 307   NEUTROS ABS 8.26* 14.55* 9.33*  --    IMMATURE GRANS (ABS) 0.37* 0.15* 0.04  --    LYMPHS ABS 0.80 0.45* 0.36*  --    MONOS ABS 1.07* 0.57 0.15  --    EOS ABS 0.03 0.00 0.00  --    MCV 85.6 86.0 90.7 86.4   SED RATE  --   --   --  58*         Lab 24  0651 24  0459 24  0620 24  0609 24  1638 24  0644   SODIUM 139 137 138  --   --  138   POTASSIUM 3.9 3.8 4.2  --  4.5 3.6   CHLORIDE 105 103 103  --   --  104   CO2 24.0 22.0 20.0*  --    --  26.0   ANION GAP 10.0 12.0 15.0  --   --  8.0   BUN 24* 25* 21  --   --  11   CREATININE 0.86 0.78 0.84  --   --  0.52*   EGFR 67.5 75.9 69.5  --   --  92.9   GLUCOSE 98 171* 172*  --   --  106*   CALCIUM 8.5* 8.9 8.8  --   --  8.2*   MAGNESIUM  --   --   --   --   --  1.8   HEMOGLOBIN A1C  --   --   --  6.00*  --   --          Lab 08/23/24  0651 08/21/24  0459 08/20/24  0620 08/17/24  0644   TOTAL PROTEIN 5.2* 6.1 5.8* 5.9*   ALBUMIN 2.9* 2.6* 3.0* 2.9*   GLOBULIN 2.3 3.5 2.8 3.0   ALT (SGPT) 72* 61* 50* 36*   AST (SGOT) 42* 52* 40* 36*   BILIRUBIN 0.2 0.2 0.3 0.3   ALK PHOS 55 54 60 52         Lab 08/22/24  0032 08/21/24  2038   HSTROP T 13 10                   Brief Urine Lab Results  (Last result in the past 365 days)        Color   Clarity   Blood   Leuk Est   Nitrite   Protein   CREAT   Urine HCG        08/15/24 1923 Yellow   Clear   Negative   Negative   Negative   Negative                   Microbiology Results Abnormal       Procedure Component Value - Date/Time    Aspergillus Galactomannan Antigen - Blood, Arm, Left [[id number]] Collected: 08/17/24 0644    Lab Status: Final result Specimen: Blood from Arm, Left Updated: 08/22/24 0614     Aspergillus Ag, BAL/Serum 0.04 Index     Narrative:      Performed at:  [facility] - [facility]  [address], [place]  [id number]  : Steve Roper MD, Phone:  [phone]  Performed at:  02 - [facility]  [address], [place]  [id number]  : Glen Sandhu [facility], Phone:  [phone]    Respiratory Culture - Wash, Bronchus [[id number]] Collected: 08/19/24 1119    Lab Status: Final result Specimen: Wash from Bronchus Updated: 08/21/24 1043     Respiratory Culture No growth at 2 days     Gram Stain Few (2+) WBCs seen      No organisms seen    Blood Culture - Blood, Hand, Right [[id number]]  (Normal) Collected: 08/16/24 0902    Lab Status: Final result Specimen: Blood from Hand, Right Updated: 08/21/24 0945     Blood Culture No growth  at 5 days    Narrative:      Less than seven (7) mL's of blood was collected.  Insufficient quantity may yield false negative results.    Blood Culture - Blood, Arm, Right [[id number]]  (Normal) Collected: 08/16/24 0912    Lab Status: Final result Specimen: Blood from Arm, Right Updated: 08/21/24 0945     Blood Culture No growth at 5 days    Narrative:      Less than seven (7) mL's of blood was collected.  Insufficient quantity may yield false negative results.    Blood Culture - Blood, Arm, Right [[id number]]  (Normal) Collected: 08/15/24 1750    Lab Status: Final result Specimen: Blood from Arm, Right Updated: 08/20/24 1931     Blood Culture No growth at 5 days    Fungus Smear - Wash, Bronchus [[id number]] Collected: 08/19/24 1116    Lab Status: Final result Specimen: Wash from Bronchus Updated: 08/20/24 1522     Fungal Stain No fungal elements seen    AFB Culture - Wash, Bronchus [[id number]] Collected: 08/19/24 1116    Lab Status: Preliminary result Specimen: Wash from Bronchus Updated: 08/20/24 1159     AFB Stain No acid fast bacilli seen on concentrated smear    S. Pneumo Ag Urine or CSF - Urine, Urine, Clean Catch [[id number]]  (Normal) Collected: 08/16/24 1019    Lab Status: Final result Specimen: Urine, Clean Catch Updated: 08/16/24 1454     Strep Pneumo Ag Negative    Legionella Antigen, Urine - Urine, Urine, Clean Catch [[id number]]  (Normal) Collected: 08/16/24 1019    Lab Status: Final result Specimen: Urine, Clean Catch Updated: 08/16/24 1454     LEGIONELLA ANTIGEN, URINE Negative    MRSA Screen, PCR (Inpatient) - Swab, Nares [[id number]]  (Normal) Collected: 08/16/24 0635    Lab Status: Final result Specimen: Swab from Nares Updated: 08/16/24 0822     MRSA PCR Negative    Narrative:      The negative predictive value of this diagnostic test is high and should only be used to consider de-escalating anti-MRSA therapy. A positive result may indicate colonization with MRSA and must be correlated  clinically.  MRSA Negative    COVID-19, FLU A/B, RSV PCR 1 HR TAT - Swab, Nasopharynx [[id number]]  (Normal) Collected: 08/15/24 1923    Lab Status: Final result Specimen: Swab from Nasopharynx Updated: 08/15/24 2013     COVID19 Not Detected     Influenza A PCR Not Detected     Influenza B PCR Not Detected     RSV, PCR Not Detected    Narrative:      Fact sheet for providers: https://www.fda.gov/media/558861/download    Fact sheet for patients: https://www.fda.gov/media/125934/download    Test performed by PCR.            XR Chest 1 View    Result Date: 8/22/2024  XR CHEST 1 VW Date of Exam: 8/22/2024 3:33 AM EDT Indication: F/u pneumonia Comparison:  8/21/2024 Findings: Cardiac size is similar to prior exam. Similar patchy opacity in the left greater than right upper lung. Small left pleural effusion. No pneumothorax. No evidence of acute osseous abnormalities. Visualized upper abdomen is unremarkable.     Impression: Impression: Similar patchy opacity in the left greater than right upper lung concerning for infection. Small left pleural effusion. Electronically Signed: Keagan Richardson MD  8/22/2024 8:16 AM EDT  Workstation ID: [id number]    XR Chest 1 View    Result Date: 8/21/2024  XR CHEST 1 VW Date of Exam: 8/21/2024 8:34 PM EDT Indication: chest pain Comparison: Chest radiograph 8/19/2024 Findings: Mediastinum: Cardiomediastinal silhouette appears unchanged. Lungs: Improving consolidative opacities in the bilateral lungs with persistent consolidative opacity in the left mid to upper lung, right upper lobe, and right lower lobe. Pleura: Possible left pleural effusion. No right pleural effusion. No pneumothorax. Bones and soft tissues: No acute osseous abnormality.     Impression: Impression: Improving multifocal pulmonary opacities. Electronically Signed: Escobar Maciel  8/21/2024 8:48 PM EDT  Workstation ID: [id number]     Results for orders placed during the hospital encounter of 07/15/24    Adult Transthoracic  Echo Complete w/ Color, Spectral and Contrast if necessary per protocol    Interpretation Summary    Left ventricular systolic function is normal. Calculated left ventricular EF = 62% Left ventricular ejection fraction appears to be 61 - 65%.    Left ventricular wall thickness is consistent with mild concentric hypertrophy.    The right atrial cavity is borderline dilated.      Current medications:  Scheduled Meds:[Held by provider] amLODIPine, 5 mg, Oral, Daily  budesonide-formoterol, 1 puff, Inhalation, BID - RT  dapsone, 50 mg, Oral, Q12H  guaiFENesin, 600 mg, Oral, Daily  heparin (porcine), 5,000 Units, Subcutaneous, Q12H  ipratropium-albuterol, 3 mL, Nebulization, 4x Daily - RT  lamoTRIgine, 100 mg, Oral, Q12H  Lidocaine, 1 patch, Transdermal, Q24H  [Held by provider] losartan, 25 mg, Oral, Daily  melatonin, 10 mg, Oral, Nightly  pantoprazole, 40 mg, Oral, Q AM  predniSONE, 40 mg, Oral, Daily With Breakfast  propranolol, 80 mg, Oral, Q12H  sertraline, 25 mg, Oral, Daily  sertraline, 50 mg, Oral, Nightly  sodium chloride, 10 mL, Intravenous, Q12H      Continuous Infusions:     PRN Meds:.  acetaminophen **OR** acetaminophen **OR** acetaminophen    azelastine    senna-docusate sodium **AND** polyethylene glycol **AND** bisacodyl **AND** bisacodyl    Calcium Replacement - Follow Nurse / BPA Driven Protocol    ipratropium-albuterol    Magnesium Standard Dose Replacement - Follow Nurse / BPA Driven Protocol    Phosphorus Replacement - Follow Nurse / BPA Driven Protocol    Potassium Replacement - Follow Nurse / BPA Driven Protocol    sodium chloride    sodium chloride    sodium chloride    Assessment & Plan   Assessment & Plan     Active Hospital Problems    Diagnosis  POA    **Acute respiratory failure with hypoxia [J96.01]  Yes    Bilateral pulmonary infiltrates [R91.8]  Yes    History of breast cancer [Z85.3]  Not Applicable    Mixed hyperlipidemia [E78.2]  Yes    Seizure disorder [G40.909]  Yes    Essential  hypertension [I10]  Yes    Asthma [J45.909]  Yes      Resolved Hospital Problems   No resolved problems to display.        Brief Hospital Course to date:  Judi Patel is a 83 y.o. female with a history of Asthma, HTN, HLD, seizure disorder, breast cancer s/p lumpectomy with radiation 4 months ago who presented to the ED with complaints of shortness of air and cough.  Patient was diagnosed with pneumonia in the left lower lobe and was treated with 10 days of doxycycline.      This patient's problems and plans were partially entered by my partner and updated as appropriate by me 08/23/24.     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    - Pulmonary consulted; evaluating for eosinophilic pna, vasculitis, histo/blasto.    - SP Bronchoscopy on 8/19 with with negative cultures so far, cell count showed 5% eosinophils  - CXR 8/22 shows similar patchy opacity left greater than RUL  - Started solumedrol 8/19 with dramatic reduction in eosinophils.  Following with pulmonary, been on  IV steroids transitioned to PO prednisone 40mg daily on 8/22, and recs to decrease by 10mg weekly.  Recs dapsone for PCP proph until on Prednisone 20mg daily.  Per pulmonary continue symbicort,and may benefit from biologic therapy for asthma as an outpatient.  F/u with pulmonary clinic in 2 weeks  - Continue to wean O2 as tolerated  - Symbicort Rx at discharge    Progressive weakness   - Likely related to acute pulmonary disease   - TSH and CK WNL, AM cortisol WNL.  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    Expected Discharge Location and Transportation: Home with assist and home health and with oxygen 2L qhs in AM  Expected Discharge  Expected Discharge Date: 8/24/2024; Expected Discharge Time:      VTE Prophylaxis:  Pharmacologic VTE prophylaxis orders are present.         AM-PAC 6 Clicks Score (PT): 19 (08/22/24 2000)    CODE STATUS:   Code Status and Medical Interventions: CPR (Attempt to Resuscitate); Full Support   Ordered at: 08/16/24 0306     Level Of Support Discussed With:    Patient     Code Status (Patient has no pulse and is not breathing):    CPR (Attempt to Resuscitate)     Medical Interventions (Patient has pulse or is breathing):    Full Support       Ken Wright MD  08/23/24

## 2024-08-23 NOTE — CASE MANAGEMENT/SOCIAL WORK
Discharge Planning Assessment  [facility]     Patient Name: Judi Patel  MRN: [id number]  Today's Date: 8/23/2024    Admit Date: 8/15/2024    Plan: Home with family assistance, [facility] for SN, PT/OT and Home O2 from [employer]       Discharge Plan       Row Name 08/23/24 1603       Plan    Plan Home with family assistance, [facility] for SN, PT/OT and Home O2 from [facility]    Patient/Family in Agreement with Plan yes    Plan Comments Met with Ms. Patel, and her 2 daughters, at the bedside for discharge planning follow up.    Ms. Patel required home O2 at 2L per [place], continuous, and the family was agreeable to [employer] for the DME. Referral given to Khari with [facility].  A  transport concentrator has been delivered and taught, at the bedside, for the transport home.  Once the patient is at home, [facility] will arrange for the home concentrator to be set up.  No other DMEneeds.  Ms. Patel already has a RW at home.    [facility] has been arranged for home services, PT, OT and SN.  CM will contact [facility] when Ms. Patel is discharged, so they can schedule their first home appointment.    The patient will be transported home by her daughter, when discharged.    CM will continue to follow.    Final Discharge Disposition Code 06 - home with home health care                  Continued Care and Services - Admitted Since 8/15/2024       Durable Medical Equipment       Service Provider Request Status Selected Services Address Phone Fax Patient Preferred    [facility]  Selected Oxygen Equipment and Accessories [address] [facility] [address] [phone] --              Home Medical Care Coordination complete.      Service Provider Request Status Selected Services Address Phone Fax Patient Preferred     [facility]  Selected Home Nursing ,  Home Rehabilitation ,  Home Health Services [address] [facility] [address] [phone] --                   Expected Discharge Date and Time       Expected Discharge Date Expected Discharge Time    Aug 24, 2024             Samantha Rothman RN

## 2024-08-24 ENCOUNTER — HOME HEALTH ADMISSION (OUTPATIENT)
Dept: HOME HEALTH SERVICES | Facility: HOME HEALTHCARE | Age: 84
End: 2024-08-24
Payer: MEDICARE

## 2024-08-24 ENCOUNTER — READMISSION MANAGEMENT (OUTPATIENT)
Dept: CALL CENTER | Facility: HOSPITAL | Age: 84
End: 2024-08-24
Payer: MEDICARE

## 2024-08-24 VITALS
RESPIRATION RATE: 18 BRPM | HEART RATE: 63 BPM | DIASTOLIC BLOOD PRESSURE: 73 MMHG | HEIGHT: 67 IN | BODY MASS INDEX: 28.75 KG/M2 | OXYGEN SATURATION: 93 % | WEIGHT: 183.2 LBS | TEMPERATURE: 98.1 F | SYSTOLIC BLOOD PRESSURE: 127 MMHG

## 2024-08-24 PROBLEM — J96.01 ACUTE RESPIRATORY FAILURE WITH HYPOXIA: Status: RESOLVED | Noted: 2024-08-16 | Resolved: 2024-08-24

## 2024-08-24 LAB
ALBUMIN SERPL-MCNC: 3 G/DL (ref 3.5–5.2)
ALBUMIN/GLOB SERPL: 1.2 G/DL
ALP SERPL-CCNC: 59 U/L (ref 39–117)
ALT SERPL W P-5'-P-CCNC: 62 U/L (ref 1–33)
ANION GAP SERPL CALCULATED.3IONS-SCNC: 10 MMOL/L (ref 5–15)
AST SERPL-CCNC: 26 U/L (ref 1–32)
BILIRUB SERPL-MCNC: 0.2 MG/DL (ref 0–1.2)
BUN SERPL-MCNC: 18 MG/DL (ref 8–23)
BUN/CREAT SERPL: 20 (ref 7–25)
CALCIUM SPEC-SCNC: 8.7 MG/DL (ref 8.6–10.5)
CHLORIDE SERPL-SCNC: 101 MMOL/L (ref 98–107)
CO2 SERPL-SCNC: 30 MMOL/L (ref 22–29)
CREAT SERPL-MCNC: 0.9 MG/DL (ref 0.57–1)
EGFRCR SERPLBLD CKD-EPI 2021: 64 ML/MIN/1.73
GLOBULIN UR ELPH-MCNC: 2.5 GM/DL
GLUCOSE SERPL-MCNC: 122 MG/DL (ref 65–99)
POTASSIUM SERPL-SCNC: 3.2 MMOL/L (ref 3.5–5.2)
PROT SERPL-MCNC: 5.5 G/DL (ref 6–8.5)
SODIUM SERPL-SCNC: 141 MMOL/L (ref 136–145)

## 2024-08-24 PROCEDURE — 63710000001 PREDNISONE PER 1 MG: Performed by: INTERNAL MEDICINE

## 2024-08-24 PROCEDURE — 99239 HOSP IP/OBS DSCHRG MGMT >30: CPT | Performed by: NURSE PRACTITIONER

## 2024-08-24 PROCEDURE — 94799 UNLISTED PULMONARY SVC/PX: CPT

## 2024-08-24 PROCEDURE — 80053 COMPREHEN METABOLIC PANEL: CPT | Performed by: STUDENT IN AN ORGANIZED HEALTH CARE EDUCATION/TRAINING PROGRAM

## 2024-08-24 PROCEDURE — 25010000002 HEPARIN (PORCINE) PER 1000 UNITS: Performed by: INTERNAL MEDICINE

## 2024-08-24 RX ORDER — LIDOCAINE 4 G/G
1 PATCH TOPICAL
Qty: 30 PATCH | Refills: 0 | Status: SHIPPED | OUTPATIENT
Start: 2024-08-24

## 2024-08-24 RX ORDER — PREDNISONE 10 MG/1
TABLET ORAL
Qty: 25 TABLET | Refills: 0 | Status: SHIPPED | OUTPATIENT
Start: 2024-08-24

## 2024-08-24 RX ORDER — DAPSONE 25 MG/1
50 TABLET ORAL EVERY 12 HOURS SCHEDULED
Qty: 44 TABLET | Refills: 0 | Status: SHIPPED | OUTPATIENT
Start: 2024-08-24 | End: 2024-09-04

## 2024-08-24 RX ORDER — BUDESONIDE AND FORMOTEROL FUMARATE DIHYDRATE 160; 4.5 UG/1; UG/1
1 AEROSOL RESPIRATORY (INHALATION)
Qty: 10.2 G | Refills: 0 | Status: SHIPPED | OUTPATIENT
Start: 2024-08-24

## 2024-08-24 RX ORDER — ACETAMINOPHEN 325 MG/1
650 TABLET ORAL EVERY 6 HOURS PRN
Start: 2024-08-24

## 2024-08-24 RX ADMIN — Medication 10 ML: at 10:34

## 2024-08-24 RX ADMIN — PANTOPRAZOLE SODIUM 40 MG: 40 TABLET, DELAYED RELEASE ORAL at 05:32

## 2024-08-24 RX ADMIN — IPRATROPIUM BROMIDE AND ALBUTEROL SULFATE 3 ML: 2.5; .5 SOLUTION RESPIRATORY (INHALATION) at 09:07

## 2024-08-24 RX ADMIN — PREDNISONE 40 MG: 20 TABLET ORAL at 10:33

## 2024-08-24 RX ADMIN — BUDESONIDE AND FORMOTEROL FUMARATE DIHYDRATE 1 PUFF: 160; 4.5 AEROSOL RESPIRATORY (INHALATION) at 09:07

## 2024-08-24 RX ADMIN — PROPRANOLOL HYDROCHLORIDE 80 MG: 20 TABLET ORAL at 10:34

## 2024-08-24 RX ADMIN — SERTRALINE 25 MG: 25 TABLET, FILM COATED ORAL at 10:33

## 2024-08-24 RX ADMIN — HEPARIN SODIUM 5000 UNITS: 5000 INJECTION INTRAVENOUS; SUBCUTANEOUS at 10:33

## 2024-08-24 RX ADMIN — LAMOTRIGINE 100 MG: 100 TABLET ORAL at 10:33

## 2024-08-24 RX ADMIN — GUAIFENESIN 600 MG: 600 TABLET, EXTENDED RELEASE ORAL at 10:33

## 2024-08-24 RX ADMIN — DAPSONE 50 MG: 100 TABLET ORAL at 10:33

## 2024-08-24 NOTE — DISCHARGE SUMMARY
[facility] Medicine Services  DISCHARGE SUMMARY    Patient Name: Judi Patel  : 1940  MRN: [id number]    Date of Admission: 8/15/2024  Date of Discharge:  2024  Primary Care Physician: Jone Solorio MD    Consults       Date and Time Order Name Status Description    2024 12:02 PM Inpatient Pulmonology Consult Completed             Hospital Course     Presenting Problem:   Acute respiratory failure with hypoxia [J96.01]    Active Hospital Problems    Diagnosis  POA    Bilateral pulmonary infiltrates [R91.8]  Yes    History of breast cancer [Z85.3]  Not Applicable    Mixed hyperlipidemia [E78.2]  Yes    Seizure disorder [G40.909]  Yes    Essential hypertension [I10]  Yes    Asthma [J45.909]  Yes      Resolved Hospital Problems    Diagnosis Date Resolved POA    **Acute respiratory failure with hypoxia [J96.01] 2024 Yes        Hospital Course:  Judi Patel is a 83 y.o. female PMH Asthma, HTN, HLD, Seizure disorder, breast cancer s/p lumpectomy with radiation 4 months ago who presented with shortness of air and cough. Pt was dx with PNA in the [facility] and was tx with 10 days of doxycycline. 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 Pulmonology consulted evaluating for eosinophilic PNA, vasculitis, histo/blasto. Pt s/p bronchoscopy on  with negative cultures so far, cell count showed 5% eosinophils. Pt started on solumedrol  with dramatic reduction in eosinophils. IV steroids transitioned to oral prednisone 40 mg daily on . Pt will continue on prednisone taper, decreasing 10 mg every 7 days. Pt will remain on dapsone for PCP prophylaxis until prednisone is 20 mg daily. Pulmonology also recommends Symbicort and report patient may benefit from biologic therapy for asthma as outpatient.      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 Following with pulmonology, been on IV steroids transitioned to oral prednisone 40 mg daily starting 08/22. Pulm recommending decreasing prednisone by 10 mg every 7 days. Recs dapsone for PCP ppx until prednisone is 20 mg daily.   - symbicort at discharge.     Progressive weakness  - likely related to acute pulmonary disease   - TSH and CK WNL, am cortisol WNL  - PT/OT    Impaired glucose tolerance  - A1C 6.0    Breast cancer triple neg  - dx 02/2024. Followed by Dr Victoria, received curative sx (nodes neg) and XRT  - L breast mastitis recently, improved with keflex    HTN/HLD  - amlodipine, losartan, propranolol    Seizure hx  - lamictal    Discharge Follow Up Recommendations for labs/diagnostics:  Follow up with PCP in 1 week  Follow up with Dr Marie in 2 weeks  Prednisone 40 mg daily started 08/22,recommend decreasing by 10 mg daily every 7 days.   Dapsone for PCP ppx until prednisone has been decreased to 20 mg daily.     Day of Discharge     HPI:   Pt sitting up in bed, reporting shortness of air has improved since admission. She reports a dry cough. Denies fever, chills, sweats.         Vital Signs:   Temp:  [98 °F (36.7 °C)-98.3 °F (36.8 °C)] 98.1 °F (36.7 °C)  Heart Rate:  [63-76] 63  Resp:  [17-20] 18  BP: (127-148)/(70-83) 127/73     Physical Exam:  Constitutional: Awake, alert, NAD  HENT: NCAT, mucous membranes moist  Respiratory: Clear to auscultation bilaterally, nonlabored  Cardiovascular: RRR, no murmurs, rubs, or gallops  Gastrointestinal: Positive bowel sounds, soft, nontender,  "nondistended  Musculoskeletal: No bilateral ankle edema  Psychiatric: Appropriate affect, cooperative  Neurologic: Oriented x 3, JACOBS, speech clear  Skin: No rashes      Pertinent  and/or Most Recent Results     Results from last 7 days   Lab Units 08/23/24  0651 08/21/24  0459 08/20/24  0620 08/17/24  1638   WBC 10*3/mm3 10.58 15.73* 9.89  --    HEMOGLOBIN g/dL 10.8* 9.8* 11.1*  --    HEMATOCRIT % 32.7* 30.0* 35.2  --    PLATELETS 10*3/mm3 417 372 350  --    SODIUM mmol/L 139 137 138  --    POTASSIUM mmol/L 3.9 3.8 4.2 4.5   CHLORIDE mmol/L 105 103 103  --    CO2 mmol/L 24.0 22.0 20.0*  --    BUN mg/dL 24* 25* 21  --    CREATININE mg/dL 0.86 0.78 0.84  --    GLUCOSE mg/dL 98 171* 172*  --    CALCIUM mg/dL 8.5* 8.9 8.8  --      Results from last 7 days   Lab Units 08/23/24  0651 08/21/24  0459 08/20/24  0620   BILIRUBIN mg/dL 0.2 0.2 0.3   ALK PHOS U/L 55 54 60   ALT (SGPT) U/L 72* 61* 50*   AST (SGOT) U/L 42* 52* 40*           Invalid input(s): \"TG\", \"LDLCALC\", \"LDLREALC\"  Results from last 7 days   Lab Units 08/22/24  0032 08/21/24 2038 08/19/24  0609   HEMOGLOBIN A1C %  --   --  6.00*   HSTROP T ng/L 13 10  --        Brief Urine Lab Results  (Last result in the past 365 days)        Color   Clarity   Blood   Leuk Est   Nitrite   Protein   CREAT   Urine HCG        08/15/24 1923 Yellow   Clear   Negative   Negative   Negative   Negative                   Microbiology Results Abnormal       Procedure Component Value - Date/Time    Aspergillus Galactomannan Antigen - Blood, Arm, Left [[id number]] Collected: 08/17/24 0644    Lab Status: Final result Specimen: Blood from Arm, Left Updated: 08/22/24 0614     Aspergillus Ag, BAL/Serum 0.04 Index     Narrative:      Performed at:  01 - [id number] [place], [place]  [id number]  : Steve Roper MD, Phone:  [phone]  Performed at:  02 - [facility]  [address] [place], [place]  [id number]  : Glen Sandhu [facility], Phone:  " [phone]    Respiratory Culture - Wash, Bronchus [[id number]] Collected: 08/19/24 1116    Lab Status: Final result Specimen: Wash from Bronchus Updated: 08/21/24 1043     Respiratory Culture No growth at 2 days     Gram Stain Few (2+) WBCs seen      No organisms seen    Blood Culture - Blood, Hand, Right [[id number]]  (Normal) Collected: 08/16/24 0902    Lab Status: Final result Specimen: Blood from Hand, Right Updated: 08/21/24 0945     Blood Culture No growth at 5 days    Narrative:      Less than seven (7) mL's of blood was collected.  Insufficient quantity may yield false negative results.    Blood Culture - Blood, Arm, Right [[id number]]  (Normal) Collected: 08/16/24 0912    Lab Status: Final result Specimen: Blood from Arm, Right Updated: 08/21/24 0945     Blood Culture No growth at 5 days    Narrative:      Less than seven (7) mL's of blood was collected.  Insufficient quantity may yield false negative results.    Blood Culture - Blood, Arm, Right [[id number]]  (Normal) Collected: 08/15/24 1750    Lab Status: Final result Specimen: Blood from Arm, Right Updated: 08/20/24 1931     Blood Culture No growth at 5 days    Fungus Smear - Wash, Bronchus [[id number]] Collected: 08/19/24 1116    Lab Status: Final result Specimen: Wash from Bronchus Updated: 08/20/24 1522     Fungal Stain No fungal elements seen    AFB Culture - Wash, Bronchus [[id number]] Collected: 08/19/24 1116    Lab Status: Preliminary result Specimen: Wash from Bronchus Updated: 08/20/24 1159     AFB Stain No acid fast bacilli seen on concentrated smear    S. Pneumo Ag Urine or CSF - Urine, Urine, Clean Catch [[id number]]  (Normal) Collected: 08/16/24 1019    Lab Status: Final result Specimen: Urine, Clean Catch Updated: 08/16/24 1454     Strep Pneumo Ag Negative    Legionella Antigen, Urine - Urine, Urine, Clean Catch [[id number]]  (Normal) Collected: 08/16/24 1019    Lab Status: Final result Specimen: Urine, Clean Catch Updated: 08/16/24 1454      LEGIONELLA ANTIGEN, URINE Negative    MRSA Screen, PCR (Inpatient) - Swab, Nares [[id number]]  (Normal) Collected: 08/16/24 0635    Lab Status: Final result Specimen: Swab from Nares Updated: 08/16/24 0822     MRSA PCR Negative    Narrative:      The negative predictive value of this diagnostic test is high and should only be used to consider de-escalating anti-MRSA therapy. A positive result may indicate colonization with MRSA and must be correlated clinically.  MRSA Negative    COVID-19, FLU A/B, RSV PCR 1 HR TAT - Swab, Nasopharynx [[id number]]  (Normal) Collected: 08/15/24 1923    Lab Status: Final result Specimen: Swab from Nasopharynx Updated: 08/15/24 2013     COVID19 Not Detected     Influenza A PCR Not Detected     Influenza B PCR Not Detected     RSV, PCR Not Detected    Narrative:      Fact sheet for providers: https://www.fda.gov/media/030743/download    Fact sheet for patients: https://www.fda.gov/media/946799/download    Test performed by PCR.            Imaging Results (All)       Procedure Component Value Units Date/Time    XR Chest 1 View [[id number]] Collected: 08/22/24 0815     Updated: 08/22/24 0819    Narrative:      XR CHEST 1 VW    Date of Exam: 8/22/2024 3:33 AM EDT    Indication: F/u pneumonia    Comparison:  8/21/2024    Findings:  Cardiac size is similar to prior exam. Similar patchy opacity in the left greater than right upper lung. Small left pleural effusion. No pneumothorax. No evidence of acute osseous abnormalities. Visualized upper abdomen is unremarkable.      Impression:      Impression:  Similar patchy opacity in the left greater than right upper lung concerning for infection.    Small left pleural effusion.      Electronically Signed: Keagan Richardson MD    8/22/2024 8:16 AM EDT    Workstation ID: [id number]    XR Chest 1 View [[id number]] Collected: 08/21/24 2047     Updated: 08/21/24 2051    Narrative:      XR CHEST 1 VW    Date of Exam: 8/21/2024 8:34 PM EDT    Indication: chest  pain    Comparison: Chest radiograph 8/19/2024    Findings:      Mediastinum: Cardiomediastinal silhouette appears unchanged.    Lungs: Improving consolidative opacities in the bilateral lungs with persistent consolidative opacity in the left mid to upper lung, right upper lobe, and right lower lobe.    Pleura: Possible left pleural effusion. No right pleural effusion. No pneumothorax.    Bones and soft tissues: No acute osseous abnormality.        Impression:      Impression:  Improving multifocal pulmonary opacities.      Electronically Signed: Escobar Crawleychris    8/21/2024 8:48 PM EDT    Workstation ID: [id number]    XR Chest 1 View [[id number]] Collected: 08/19/24 0710     Updated: 08/19/24 0715    Narrative:      XR CHEST 1 VW    Date of Exam: 8/19/2024 2:57 AM EDT    Indication: Resp Distress    Comparison: Chest x-ray 8/17/2024    Findings:  The heart is stable in size. Consolidated airspace disease is again noted in the peripheral left upper lung with patchy areas of consolidation in the peripheral right upper lung as well. There is obscuration of the left hemidiaphragm compatible with left   lower lobe airspace disease. Trace left pleural effusion not excluded. No evidence for pneumothorax. Interstitial prominence is likely chronic. There is a high riding humeral head compatible with underlying rotator cuff pathology.      Impression:      Impression:    1. Findings compatible with multifocal pneumonia with prominent consolidation in the peripheral left upper lung.      Electronically Signed: Janina Hyman MD    8/19/2024 7:11 AM EDT    Workstation ID: [id number]    XR Chest 1 View [[id number]] Collected: 08/17/24 0854     Updated: 08/17/24 0900    Narrative:      XR CHEST 1 VW    Date of Exam: 8/17/2024 8:00 AM EDT    Indication: FU Infiltrate    Comparison: 8/15/2024    Findings:  Heart shadow is normal in size. Pulmonary vasculature is largely obscured by the patient's extensive bilateral pulmonary  interstitial and airspace disease, again with very dense disease in the left lateral midlung, and focal airspace opacity along the   right minor fissure. Interstitial changes elsewhere are relatively mild and stable. No new or increasing pulmonary parenchymal disease is seen. Small left pleural effusion appears stable. No pneumothorax is identified.      Impression:      Impression:  Stable bilateral pneumonia, extensive on the left, with milder multifocal disease on the right. Stable small left pleural effusion. No new chest pathology is seen.      Electronically Signed: Ed Luu MD    8/17/2024 8:57 AM EDT    Workstation ID: [id number]    CT Angiogram Chest Pulmonary Embolism [[id number]] Collected: 08/15/24 2032     Updated: 08/15/24 2042    Narrative:      CT ANGIOGRAM CHEST PULMONARY EMBOLISM    Date of Exam: 8/15/2024 7:33 PM EDT    Indication: Dyspnea, hypoxia, fever, hx of breast cancer.    Comparison: Same day chest radiograph. Contrast-enhanced CT of the chest performed on June 30, 2024    Technique: Axial CT images were obtained of the chest after the uneventful intravenous administration of Isovue-370, 75 mL utilizing pulmonary embolism protocol.  Reconstructed coronal and sagittal images were also obtained. Automated exposure control   and iterative construction methods were used.      Findings:    Pulmonary arteries: Adequate opacification of the pulmonary arteries. No evidence of acute pulmonary embolism.    Thoracic aorta: The thoracic aorta is not opacified with contrast. Thoracic aorta is normal in caliber and contour.    Cardiovascular: The heart is mildly enlarged. No evidence of pericardial effusion. Coronary calcifications are not visualized.    Thyroid: The visualized portion of the thyroid is unremarkable.    Lungs and Pleura: Extensive patchy bilateral airspace opacities are visualized, left greater than right. There is a moderate left-sided pleural effusion. No pneumothorax. Central airways  are patent.    Mediastinum/Bee: No mediastinal or hilar lymphadenopathy.    Lymph nodes: No axillary or supraclavicular lymphadenopathy.    Bones and Soft Tissue: No acute fracture or aggressive lesions. There is levoscoliosis of the upper lumbar spine. There is evidence of diffuse skin thickening of the left breast. Surgical clips are visualized in the left breast.    Upper Abdomen: No acute process in the upper abdomen. Partial visualization of multiple hepatic cysts which are grossly stable in size when compared to prior CT. Left parapelvic renal cyst is visualized.        Impression:      Impression:    No evidence of pulmonary embolism.    Findings compatible with extensive multifocal pneumonia, left greater than right.    Moderate left pleural effusion.    Diffuse skin thickening of the left breast, of uncertain etiology. Clinically correlate.        Electronically Signed: Herbert Batres MD    8/15/2024 8:39 PM EDT    Workstation ID: [id number]    XR Chest 1 View [[id number]] Collected: 08/15/24 1809     Updated: 08/15/24 1815    Narrative:      XR CHEST 1 VW    Date of Exam: 8/15/2024 5:41 PM EDT    Indication: SOA triage protocol    Comparison: Chest radiograph 7/16/2024    Findings:  Multifocal airspace consolidation most significant in the left upper and lower lobes new from prior comparison. Blunted costophrenic angle on the left suggesting small effusion. Mild cardiomegaly. Mild increase interstitial opacities relative to prior   exam which could reflect component of superimposed edema. No pneumothorax. Degenerative related osseous changes including probable full-thickness right rotator cuff tear. Chronic L1 compression fracture with associated curvature.      Impression:      Impression:  Multifocal pneumonia new from prior comparison. Small left pleural effusion as well as suspected mild superimposed interstitial edema noted.     Recommend radiographic follow-up to resolution typically performed in 6 to  8 weeks.      Electronically Signed: Dio Bauer MD    8/15/2024 6:11 PM EDT    Workstation ID: [id number]            Results for orders placed during the hospital encounter of 07/16/24    Duplex Venous Lower Extremity - Left CAR    Interpretation Summary    Normal left lower extremity venous duplex scan.      Results for orders placed during the hospital encounter of 07/16/24    Duplex Venous Lower Extremity - Left CAR    Interpretation Summary    Normal left lower extremity venous duplex scan.      Results for orders placed during the hospital encounter of 07/15/24    Adult Transthoracic Echo Complete w/ Color, Spectral and Contrast if necessary per protocol    Interpretation Summary    Left ventricular systolic function is normal. Calculated left ventricular EF = 62% Left ventricular ejection fraction appears to be 61 - 65%.    Left ventricular wall thickness is consistent with mild concentric hypertrophy.    The right atrial cavity is borderline dilated.      Pending Labs       Order Current Status    Comprehensive Metabolic Panel Collected (08/24/24 1018)    Fungus Culture - Wash, Bronchus In process    AFB Culture - Wash, Bronchus Preliminary result          Discharge Details        Discharge Medications        New Medications        Instructions Start Date   acetaminophen 325 MG tablet  Commonly known as: TYLENOL   650 mg, Oral, Every 6 Hours PRN      budesonide-formoterol 160-4.5 MCG/ACT inhaler  Commonly known as: SYMBICORT  Replaces: fluticasone-salmeterol 100-50 MCG/DOSE DISKUS   1 puff, Inhalation, 2 Times Daily - RT      dapsone 25 MG tablet   50 mg, Oral, Every 12 Hours Scheduled      Lidocaine 4 %   1 patch, Transdermal, Every 24 Hours Scheduled, Remove & Discard patch within 12 hours or as directed by MD      predniSONE 10 MG tablet  Commonly known as: DELTASONE   4 tab qd x 4 days, 3 tab qd x 7 days, 2 tab qd x 7 days, 1 tab qd x 7 days             Changes to Medications        Instructions Start  Date   losartan 25 MG tablet  Commonly known as: Cozaar  What changed: how much to take   25 mg, Oral, Daily      sertraline 100 MG tablet  Commonly known as: ZOLOFT  What changed:   how much to take  when to take this   100 mg, Oral, Daily      sertraline 25 MG tablet  Commonly known as: ZOLOFT  What changed: Another medication with the same name was changed. Make sure you understand how and when to take each.   25 mg, Oral, Daily             Continue These Medications        Instructions Start Date   albuterol sulfate  (90 Base) MCG/ACT inhaler  Commonly known as: PROVENTIL HFA;VENTOLIN HFA;PROAIR HFA   As Needed      ALIGN PO   1 capsule, Oral, Daily      amLODIPine 5 MG tablet  Commonly known as: NORVASC   5 mg, Oral, Daily      azelastine 0.1 % nasal spray  Commonly known as: ASTELIN   1 spray, Nasal      Calcium Citrate-Vitamin D 315-5 MG-MCG per tablet  Commonly known as: CITRACAL+D   Citracal plus D 315 mg-5 mcg (200 unit) tablet      cephalexin 500 MG capsule  Commonly known as: KEFLEX   500 mg, Oral, 2 Times Daily      denosumab 60 MG/ML solution prefilled syringe syringe  Commonly known as: PROLIA   1 mL, Injection, Every 6 Months      ezetimibe 10 MG tablet  Commonly known as: ZETIA   1 tablet, Oral, Daily      FIBER COMPLETE PO   1 capsule, Oral, Daily      guaiFENesin 600 MG 12 hr tablet  Commonly known as: MUCINEX   600 mg, Oral, Daily      lamoTRIgine 100 MG tablet  Commonly known as: LaMICtal   1 tablet, Oral, 2 times daily      omeprazole 40 MG capsule  Commonly known as: priLOSEC   1 capsule, Oral, As Needed, Twice a day      propranolol LA 80 MG 24 hr capsule  Commonly known as: INDERAL LA   80 mg, Oral, 2 Times Daily             Stop These Medications      fluticasone-salmeterol 100-50 MCG/DOSE DISKUS  Commonly known as: ADVAIR  Replaced by: budesonide-formoterol 160-4.5 MCG/ACT inhaler     Trelegy Ellipta 100-62.5-25 MCG/ACT inhaler  Generic drug: Fluticasone-Umeclidin-Vilant               Allergies   Allergen Reactions    Oxycodone-Acetaminophen Nausea And Vomiting    Sulfa Antibiotics Hives    Sulfamethoxazole-Trimethoprim Other (See Comments)         Discharge Disposition:  Home or Self Care    Discharge Diet:  Diet Order   Procedures    Diet: Cardiac; Healthy Heart (2-3 Na+); Fluid Consistency: Thin (IDDSI 0)         Discharge Activity:   Activity Instructions       Activity as Tolerated      Up WIth Assist                CODE STATUS:    Code Status and Medical Interventions: CPR (Attempt to Resuscitate); Full Support   Ordered at: 08/16/24 0306     Level Of Support Discussed With:    Patient     Code Status (Patient has no pulse and is not breathing):    CPR (Attempt to Resuscitate)     Medical Interventions (Patient has pulse or is breathing):    Full Support         Future Appointments   Date Time Provider Department Center   10/11/2024 12:45 PM Matt Don MD [facility]   10/29/2024 10:00 AM [facility] ADDED VIEWS  [facility]   5/12/2025 11:30 AM April Fuentes APRN [facility] None       Additional Instructions for the Follow-ups that You Need to Schedule       Ambulatory Referral to Home Health   As directed      Face to Face Visit Date: 8/21/2024   Follow-up provider for Plan of Care?: I treated the patient in an acute care facility and will not continue treatment after discharge.   Follow-up provider: STEPHANIE YBARRA [[id number]]   Reason/Clinical Findings: Acute respiratory failure with hypoxia, sameera pulm infiltrates, HTN   Describe mobility limitations that make leaving home difficult: impaired functional mobility, balance, gait and endurance   Nursing/Therapeutic Services Requested: Skilled Nursing Physical Therapy Occupational Therapy   Skilled nursing orders: Medication education Cardiopulmonary assessments   PT orders: Gait Training Strengthening Home safety assessment   Weight Bearing Status: As Tolerated   Occupational orders: Activities of daily living  Strengthening Fine motor Home safety assessment Energy conservation   Frequency: 1 Week 1        Call MD With Problems / Concerns   As directed      Instructions: Call provider for temp >100.5, nausea, vomiting, chest pain, heart palpitations, shortness of breath.    Order Comments: Instructions: Call provider for temp >100.5, nausea, vomiting, chest pain, heart palpitations, shortness of breath.         Discharge Follow-up with PCP   As directed       Currently Documented PCP:    Jone Solorio MD    PCP Phone Number:    [phone]     Follow Up Details: Follow up in 1 week        Discharge Follow-up with Specified Provider: Follow up with Dr Debbie Marie; 2 Weeks   As directed      To: Follow up with Dr Debbie Marie   Follow Up: 2 Weeks                Time Spent on Discharge:  38 minutes    Electronically signed by BRITTANY Butt, 08/24/24, 10:38 AM EDT.

## 2024-08-24 NOTE — OUTREACH NOTE
Prep Survey      Flowsheet Row Responses   [facility]? [facility]   Is LACE score < 7 ? No   Eligibility Readm Mgmt   Discharge diagnosis Acute respiratory failure with hypoxia   Does the patient have one of the following disease processes/diagnoses(primary or secondary)? Other   Does the patient have Home health ordered? Yes   What is the Home health agency?  [facility]   Is there a DME ordered? Yes   What DME was ordered? Home O2 from [employer]   Prep survey completed? Yes            SAI STEEN - Registered Nurse

## 2024-08-24 NOTE — PLAN OF CARE
Goal Outcome Evaluation:         Patient ambulated to the bathroom multiple times throughout the night. VSS on 2 lt NC while sleeping. A&Ox4. Awaiting discharge home

## 2024-08-26 ENCOUNTER — HOME CARE VISIT (OUTPATIENT)
Dept: HOME HEALTH SERVICES | Facility: HOME HEALTHCARE | Age: 84
End: 2024-08-26
Payer: MEDICARE

## 2024-08-26 VITALS
SYSTOLIC BLOOD PRESSURE: 126 MMHG | HEIGHT: 67 IN | HEART RATE: 62 BPM | DIASTOLIC BLOOD PRESSURE: 67 MMHG | RESPIRATION RATE: 18 BRPM | WEIGHT: 175 LBS | OXYGEN SATURATION: 98 % | BODY MASS INDEX: 27.47 KG/M2

## 2024-08-26 LAB
QT INTERVAL: 396 MS
QTC INTERVAL: 462 MS

## 2024-08-26 PROCEDURE — G0299 HHS/HOSPICE OF RN EA 15 MIN: HCPCS

## 2024-08-26 NOTE — HOME HEALTH
"SOC Note:    Home Health ordered for: disciplines SN, PT, OT     Reason for Hosp/Primary Dx/Co-morbidities: ACUTE RESPIRATORY FAILURE AND ASTHMA   Focus of Care: SN FOR TEACHING AND ASSESSMENT OF RESPIRATORY FAILURE AND ASTHMA, MED ED    Patient's goal(s):\"TO FEEL BETTER\"    Current Functional status/mobility/DME: AMBULtes with walker     HB status/Living Arrangements: lives alone with children close     Skin Integrity/wound status: intact    Code Status: full code    Fall Risk/Safety concerns: high fall risk     Educated on Emergency Plan, steps to take prior to going to the ER and when to Call Home Health [facility]:  emergency on call number left in home and patient educated on oncall procedures     Medication issues/Concerns:3 rx to get picked up at pharmacy       SDOH Barriers (i.e. caregiver concerns, social isolation, transportation, food insecurity, environment, income etc.)    albuterol nebulizer treatments 4 times daily     dapsone, symbicort, prednisone to be picked up at [facility]     lives in own home , has all 3 children around [place] living around her, rollator walker for ambulation,     glasses"

## 2024-08-26 NOTE — Clinical Note
"SOC Note:    Home Health ordered for: disciplines SN, PT, OT     Reason for Hosp/Primary Dx/Co-morbidities: ACUTE RESPIRATORY FAILURE AND ASTHMA   Focus of Care: SN FOR TEACHING AND ASSESSMENT OF RESPIRATORY FAILURE AND ASTHMA, MED ED    Patient's goal(s):\"TO FEEL BETTER\"    Current Functional status/mobility/DME: AMBULtes with walker     HB status/Living Arrangements: lives alone with children close     Skin Integrity/wound status: intact    Code Status: full code    Fall Risk/Safety concerns: high fall risk     Educated on Emergency Plan, steps to take prior to going to the ER and when to Call [facility]:  emergency on call number left in home and patient educated on oncall procedures     Medication issues/Concerns:waiting to get 3 rx today         SDOH Barriers (i.e. caregiver concerns, social isolation, transportation, food insecurity, environment, income etc.)    albuterol nebulizer treatments 4 times daily     dapsone, symbicort, prednisone to be picked up at [facility]     lives in own home , has all 3 children around [place] living around her, rollator walker for ambulation,     glasses  "

## 2024-08-27 ENCOUNTER — HOME CARE VISIT (OUTPATIENT)
Dept: HOME HEALTH SERVICES | Facility: HOME HEALTHCARE | Age: 84
End: 2024-08-27
Payer: MEDICARE

## 2024-08-27 VITALS
OXYGEN SATURATION: 94 % | TEMPERATURE: 97.7 F | RESPIRATION RATE: 18 BRPM | HEART RATE: 68 BPM | DIASTOLIC BLOOD PRESSURE: 76 MMHG | SYSTOLIC BLOOD PRESSURE: 128 MMHG

## 2024-08-27 PROCEDURE — G0151 HHCP-SERV OF PT,EA 15 MIN: HCPCS

## 2024-08-28 ENCOUNTER — READMISSION MANAGEMENT (OUTPATIENT)
Dept: CALL CENTER | Facility: HOSPITAL | Age: 84
End: 2024-08-28
Payer: MEDICARE

## 2024-08-28 ENCOUNTER — HOME CARE VISIT (OUTPATIENT)
Dept: HOME HEALTH SERVICES | Facility: HOME HEALTHCARE | Age: 84
End: 2024-08-28
Payer: MEDICARE

## 2024-08-28 VITALS
HEART RATE: 72 BPM | DIASTOLIC BLOOD PRESSURE: 70 MMHG | TEMPERATURE: 98.3 F | OXYGEN SATURATION: 95 % | SYSTOLIC BLOOD PRESSURE: 123 MMHG

## 2024-08-28 LAB — FUNGUS WND CULT: ABNORMAL

## 2024-08-28 PROCEDURE — G0152 HHCP-SERV OF OT,EA 15 MIN: HCPCS

## 2024-08-28 NOTE — Clinical Note
Eval Note:  Judi Patel is a 83 y.o. female PMH Asthma, HTN, HLD, Seizure disorder, breast cancer s/p lumpectomy with radiation 4 months ago who presented with shortness of air and cough. Pt was dx with PNA in the [facility] and was tx with 10 days of doxycycline. 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 Pulmonology consulted evaluating for eosinophilic PNA, vasculitis, histo/blasto. Pt s/p bronchoscopy on 08/19 with negative cultures so far, cell count showed 5% eosinophils. Pt started on solumedrol 08/19 with dramatic reduction in eosinophils. IV steroids transitioned to oral prednisone 40 mg daily on 08/22. Pt will continue on prednisone taper, decreasing 10 mg every 7 days. Pt will remain on dapsone for PCP prophylaxis until prednisone is 20 mg daily. Pulmonology also recommends Symbicort and report patient may benefit from biologic therapy for asthma as outpatient.     Patient's goal(s):  Pt states desire to complete IADL activity independently.    Services required to achieve goals: Skilled OT services are needed to improve energy conservation, safety and participation with functional mobility and transfers, IADL retraining, therapeutic exercises, and therapeutic activities to improve dynamic standing balance.    Potential Issues for goal attainment: Pain and generalized weakness.    Problems identified: Decreased functional mobility, decreased transfer independence, decreased ADL participation, decreased energy.    Describe the Functional status and safety: Pt requiring assist for IADL activity secondary to poor balance    Describe any environmental issues:  None noted    Any equipment needs: None needed    POC confirmed with Pt and daughter 8/28    Pt is currently limited to home activity due to limited mobility/weakness/poor balance leading to taxing effort to leave home.    OT educated pt on fall prevention strategies and safety within the home.

## 2024-08-28 NOTE — OUTREACH NOTE
Medical Week 1 Survey      Flowsheet Row Responses   [facility] patient discharged from? [place]   Does the patient have one of the following disease processes/diagnoses(primary or secondary)? Other   Week 1 attempt successful? Yes   Call start time 0820   Call end time 0825   Discharge diagnosis Acute respiratory failure with hypoxia   Meds reviewed with patient/caregiver? Yes   Is the patient having any side effects they believe may be caused by any medication additions or changes? No   Does the patient have all medications ordered at discharge? Yes   Is the patient taking all medications as directed (includes completed medication regime)? Yes   Does the patient have a primary care provider?  Yes   Does the patient have an appointment with their PCP within 7 days of discharge? Yes   Has the patient kept scheduled appointments due by today? N/A   What is the Home health agency?  [facility]   Has home health visited the patient within 72 hours of discharge? Yes   What DME was ordered? Home O2 from [employer]   Has all DME been delivered? Yes   Psychosocial issues? No   Did the patient receive a copy of their discharge instructions? Yes   Nursing interventions Reviewed instructions with patient   What is the patient's perception of their health status since discharge? Improving   Is the patient/caregiver able to teach back signs and symptoms related to disease process for when to call PCP? Yes   Is the patient/caregiver able to teach back signs and symptoms related to disease process for when to call 911? Yes   Is the patient/caregiver able to teach back the hierarchy of who to call/visit for symptoms/problems? PCP, Specialist, Home health nurse, Urgent Care, ED, 911 Yes   If the patient is a current smoker, are they able to teach back resources for cessation? Not a smoker   Additional teach back comments Sattes she is doing well.  Using her oxygen mostly at night time.  She monitors and makes sure her  oxygen levels are above 90%.   Week 1 call completed? Yes   Graduated Yes   Graduated/Revoked comments Denies questions or needs at this time.  Was appreciative of her care.   Call end time 0825            Melissa ORDAZ - Licensed Nurse

## 2024-08-30 ENCOUNTER — HOME CARE VISIT (OUTPATIENT)
Dept: HOME HEALTH SERVICES | Facility: HOME HEALTHCARE | Age: 84
End: 2024-08-30
Payer: MEDICARE

## 2024-08-30 VITALS — TEMPERATURE: 97.6 F | OXYGEN SATURATION: 91 % | HEART RATE: 78 BPM

## 2024-08-30 PROCEDURE — G0157 HHC PT ASSISTANT EA 15: HCPCS

## 2024-09-03 ENCOUNTER — HOME CARE VISIT (OUTPATIENT)
Dept: HOME HEALTH SERVICES | Facility: HOME HEALTHCARE | Age: 84
End: 2024-09-03
Payer: MEDICARE

## 2024-09-03 VITALS
WEIGHT: 165 LBS | TEMPERATURE: 98.9 F | HEART RATE: 63 BPM | BODY MASS INDEX: 25.84 KG/M2 | SYSTOLIC BLOOD PRESSURE: 134 MMHG | OXYGEN SATURATION: 93 % | DIASTOLIC BLOOD PRESSURE: 68 MMHG

## 2024-09-03 LAB — FUNGUS WND CULT: ABNORMAL

## 2024-09-03 PROCEDURE — G0299 HHS/HOSPICE OF RN EA 15 MIN: HCPCS

## 2024-09-03 NOTE — PROGRESS NOTES
"Pulmonary Office Follow Up      Subjective   Chief Complaint: Hospital Follow up    Judi Patel is a 83 y.o. female is being seen in follow up for Asthma.     History of Present Illness    Ms. Patel is an 82yo F who was recently hospitalized at [facility] for an asthma exacerbation. She was discharged home on a Prednisone taper and supplemental oxygen.     She returns to clinic today for follow up. She is still using supplemental oxygen with exertion. Her saturation on room air at rest at home is running 88-89%. She has developed a cough. She is having trouble with reflux.     The following portions of the patient's history were reviewed and updated as appropriate: allergies, current medications, past family history, past medical history, past social history, past surgical history and problem list.    Review of Systems  All other systems were reviewed and are negative.  Exceptions are noted in the HPI or above.      Objective   Blood pressure 122/82, pulse 78, temperature 97.8 °F (36.6 °C), height 170.2 cm (67\"), weight 76.2 kg (168 lb), SpO2 95%, not currently breastfeeding.  Physical Exam  Vitals and nursing note reviewed.   Constitutional:       General: She is not in acute distress.     Appearance: She is well-developed.   HENT:      Head: Normocephalic and atraumatic.   Eyes:      General: No scleral icterus.     Conjunctiva/sclera: Conjunctivae normal.      Pupils: Pupils are equal, round, and reactive to light.   Neck:      Thyroid: No thyromegaly.      Trachea: No tracheal deviation.   Cardiovascular:      Rate and Rhythm: Normal rate and regular rhythm.      Heart sounds: Normal heart sounds.   Pulmonary:      Effort: Pulmonary effort is normal. No respiratory distress.      Breath sounds: Decreased breath sounds present. No wheezing.   Abdominal:      General: Bowel sounds are normal.      Palpations: Abdomen is soft.      Tenderness: There is no abdominal tenderness.   Musculoskeletal:         General: Normal " range of motion.      Cervical back: Normal range of motion and neck supple.   Lymphadenopathy:      Cervical: No cervical adenopathy.   Skin:     General: Skin is warm and dry.      Findings: No erythema or rash.   Neurological:      Mental Status: She is alert and oriented to person, place, and time.      Motor: No abnormal muscle tone.      Coordination: Coordination normal.   Psychiatric:         Speech: Speech normal.         Behavior: Behavior normal.         Judgment: Judgment normal.         PFTs:  No PFTs available.     Imaging:  No new imaging.     Assessment & Plan   Diagnoses and all orders for this visit:    1. Moderate persistent asthma without complication (Primary)  -     Ambulatory Referral to Pulmonary Rehab    2. Eosinophilic pneumonia  -     Ambulatory Referral to Pulmonary Rehab    Other orders  -     omeprazole (priLOSEC) 40 MG capsule; Take 1 capsule by mouth 2 (Two) Times a Day.  Dispense: 60 capsule; Refill: 1        Discussion:  Ms. Patel is an 84yo F who is followed for asthma.     1. 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   - Continue Symbicort 160/4.5.    - Noted to have significant eosinophilia on CBC and bronchoscopy. Lab workup was negative for vasculitis and ABPA.  - Complete a steroid taper. I have asked her to call and let us know if she worsens with tapering steroids.  - Stop Dapsone as she is now on Prednisone 20mg daily.   - Start Nucala for eosinophilic asthma. Absolute eosinophils were elevated at 420 prior to initiating steroids.   - We discussed starting Pulmonary Rehab and she is agreeable. I will place the referral. She will come back in between visits for Full PFTs.     2. Acute Hypoxic Respiratory Failure  - Continue to wean supplemental oxygen for a goal saturation > 88%.   - Continue to use at night. We discussed that she would need overnight oximetry prior to discontinuation at night.     3. GERD  - Continue Omeprazole while on  steroids as she is at risk for stress ulcer.     Follow up in 2 months.     Judi Patel  reports that she has never smoked. She has never used smokeless tobacco.        Debbie V Case, DO  Pulmonary and Critical Care Medicine  Note Electronically Signed

## 2024-09-04 ENCOUNTER — HOME CARE VISIT (OUTPATIENT)
Dept: HOME HEALTH SERVICES | Facility: HOME HEALTHCARE | Age: 84
End: 2024-09-04
Payer: MEDICARE

## 2024-09-04 VITALS
HEART RATE: 71 BPM | OXYGEN SATURATION: 91 % | TEMPERATURE: 97.6 F | RESPIRATION RATE: 17 BRPM | DIASTOLIC BLOOD PRESSURE: 71 MMHG | SYSTOLIC BLOOD PRESSURE: 123 MMHG

## 2024-09-04 VITALS
TEMPERATURE: 97.9 F | SYSTOLIC BLOOD PRESSURE: 132 MMHG | DIASTOLIC BLOOD PRESSURE: 71 MMHG | HEART RATE: 51 BPM | OXYGEN SATURATION: 96 %

## 2024-09-04 LAB
QT INTERVAL: 374 MS
QTC INTERVAL: 436 MS

## 2024-09-04 PROCEDURE — G0157 HHC PT ASSISTANT EA 15: HCPCS

## 2024-09-04 PROCEDURE — G0152 HHCP-SERV OF OT,EA 15 MIN: HCPCS

## 2024-09-04 NOTE — HOME HEALTH
Pt participated in sit to stand activity with SBA and rollator, ambulated household distance with SBA and rollater with minimal fatigue. Pt completed bean bag toss activity from standing with CGA for standing balance X15 reps X2 sets.  Pt retrieved items from ground X15 reps X2 sets with CGA for balance.  Pt then completed box tap activity X2 minute with CGA and rollator and X1 minute Zachary for balance and no rollator.  Pt completed 2 pound weight training this date and educated pt on horizontal shoulder abduction, biceps flexion and triceps extension to be completed outside of therapy session. Pt completed 15 reps of each exercise this date. Pt reports no difficulties secondary to tremors at this time.

## 2024-09-05 ENCOUNTER — OFFICE VISIT (OUTPATIENT)
Dept: PULMONOLOGY | Facility: CLINIC | Age: 84
End: 2024-09-05
Payer: MEDICARE

## 2024-09-05 VITALS
HEIGHT: 67 IN | SYSTOLIC BLOOD PRESSURE: 122 MMHG | WEIGHT: 168 LBS | OXYGEN SATURATION: 95 % | DIASTOLIC BLOOD PRESSURE: 82 MMHG | BODY MASS INDEX: 26.37 KG/M2 | TEMPERATURE: 97.8 F | HEART RATE: 78 BPM

## 2024-09-05 DIAGNOSIS — J45.40 MODERATE PERSISTENT ASTHMA WITHOUT COMPLICATION: Primary | ICD-10-CM

## 2024-09-05 DIAGNOSIS — J82.81 EOSINOPHILIC PNEUMONIA: ICD-10-CM

## 2024-09-05 PROCEDURE — 3079F DIAST BP 80-89 MM HG: CPT | Performed by: INTERNAL MEDICINE

## 2024-09-05 PROCEDURE — 3074F SYST BP LT 130 MM HG: CPT | Performed by: INTERNAL MEDICINE

## 2024-09-05 PROCEDURE — 99214 OFFICE O/P EST MOD 30 MIN: CPT | Performed by: INTERNAL MEDICINE

## 2024-09-05 RX ORDER — FAMOTIDINE 20 MG/1
20 TABLET, FILM COATED ORAL 2 TIMES DAILY
COMMUNITY
End: 2024-09-05

## 2024-09-05 RX ORDER — OMEPRAZOLE 40 MG/1
40 CAPSULE, DELAYED RELEASE ORAL 2 TIMES DAILY
Qty: 180 CAPSULE | OUTPATIENT
Start: 2024-09-05

## 2024-09-05 RX ORDER — OMEPRAZOLE 40 MG/1
40 CAPSULE, DELAYED RELEASE ORAL 2 TIMES DAILY
Qty: 60 CAPSULE | Refills: 1 | Status: SHIPPED | OUTPATIENT
Start: 2024-09-05

## 2024-09-06 ENCOUNTER — HOME CARE VISIT (OUTPATIENT)
Dept: HOME HEALTH SERVICES | Facility: HOME HEALTHCARE | Age: 84
End: 2024-09-06
Payer: MEDICARE

## 2024-09-06 VITALS
RESPIRATION RATE: 18 BRPM | SYSTOLIC BLOOD PRESSURE: 137 MMHG | TEMPERATURE: 97.4 F | OXYGEN SATURATION: 91 % | HEART RATE: 63 BPM | DIASTOLIC BLOOD PRESSURE: 70 MMHG

## 2024-09-06 PROCEDURE — G0157 HHC PT ASSISTANT EA 15: HCPCS

## 2024-09-10 ENCOUNTER — HOME CARE VISIT (OUTPATIENT)
Dept: HOME HEALTH SERVICES | Facility: HOME HEALTHCARE | Age: 84
End: 2024-09-10
Payer: MEDICARE

## 2024-09-10 VITALS
TEMPERATURE: 98.4 F | OXYGEN SATURATION: 96 % | SYSTOLIC BLOOD PRESSURE: 148 MMHG | RESPIRATION RATE: 18 BRPM | DIASTOLIC BLOOD PRESSURE: 80 MMHG | HEART RATE: 63 BPM

## 2024-09-10 VITALS
SYSTOLIC BLOOD PRESSURE: 128 MMHG | TEMPERATURE: 98.8 F | HEART RATE: 57 BPM | OXYGEN SATURATION: 95 % | DIASTOLIC BLOOD PRESSURE: 78 MMHG

## 2024-09-10 PROCEDURE — G0299 HHS/HOSPICE OF RN EA 15 MIN: HCPCS

## 2024-09-10 PROCEDURE — G0152 HHCP-SERV OF OT,EA 15 MIN: HCPCS

## 2024-09-10 NOTE — Clinical Note
Discharge Summary/Summary of Care Provided: Pt received skilled OT services to improve dynamic balance and functional endurance/strength to improve ADL/IADL participation. OT issued theraband and FM HEP with pt completing outside of therapy session.  Pt with improved dynamic standing balance to good (+) with skilled intervention.  Pt independent with meal preparation and housekeeping.   Patient received home health for diagnosis: PNA  Current level of functional ability: Independent with ADL/IADL activity  Living arrangements: 1-story home with family assist.  2 step to enter.  Progress towards goals and/or Were all goals met? All goals were met  If not all goals met, barriers that prevented patient from meeting goals: No barriers present  SDOH concerns (i.e. Caregiver availability, social isolation, environment, income, transportation access, food insecurity etc.)-  None noted during therapy  Follow-up appointment plans and community resources provided: continue with HHPT  Other imporant information- None noted

## 2024-09-11 ENCOUNTER — HOME CARE VISIT (OUTPATIENT)
Dept: HOME HEALTH SERVICES | Facility: HOME HEALTHCARE | Age: 84
End: 2024-09-11
Payer: MEDICARE

## 2024-09-11 VITALS
OXYGEN SATURATION: 94 % | HEART RATE: 68 BPM | DIASTOLIC BLOOD PRESSURE: 75 MMHG | TEMPERATURE: 97.5 F | RESPIRATION RATE: 16 BRPM | SYSTOLIC BLOOD PRESSURE: 136 MMHG

## 2024-09-11 PROCEDURE — G0157 HHC PT ASSISTANT EA 15: HCPCS

## 2024-09-12 ENCOUNTER — SPECIALTY PHARMACY (OUTPATIENT)
Dept: PULMONOLOGY | Facility: CLINIC | Age: 84
End: 2024-09-12
Payer: MEDICARE

## 2024-09-17 ENCOUNTER — TELEPHONE (OUTPATIENT)
Dept: PULMONOLOGY | Facility: CLINIC | Age: 84
End: 2024-09-17
Payer: MEDICARE

## 2024-09-17 ENCOUNTER — HOME CARE VISIT (OUTPATIENT)
Dept: HOME HEALTH SERVICES | Facility: HOME HEALTHCARE | Age: 84
End: 2024-09-17
Payer: MEDICARE

## 2024-09-17 VITALS
RESPIRATION RATE: 22 BRPM | TEMPERATURE: 96.2 F | HEART RATE: 58 BPM | DIASTOLIC BLOOD PRESSURE: 64 MMHG | OXYGEN SATURATION: 100 % | SYSTOLIC BLOOD PRESSURE: 110 MMHG

## 2024-09-17 LAB — FUNGUS WND CULT: ABNORMAL

## 2024-09-17 PROCEDURE — G0300 HHS/HOSPICE OF LPN EA 15 MIN: HCPCS

## 2024-09-18 ENCOUNTER — SPECIALTY PHARMACY (OUTPATIENT)
Dept: PULMONOLOGY | Facility: CLINIC | Age: 84
End: 2024-09-18
Payer: MEDICARE

## 2024-09-18 RX ORDER — MEPOLIZUMAB 100 MG/ML
100 INJECTION, SOLUTION SUBCUTANEOUS
Qty: 1 ML | Refills: 11 | Status: SHIPPED | OUTPATIENT
Start: 2024-09-18

## 2024-09-19 ENCOUNTER — HOME CARE VISIT (OUTPATIENT)
Dept: HOME HEALTH SERVICES | Facility: HOME HEALTHCARE | Age: 84
End: 2024-09-19
Payer: MEDICARE

## 2024-09-19 VITALS
SYSTOLIC BLOOD PRESSURE: 118 MMHG | OXYGEN SATURATION: 95 % | HEART RATE: 64 BPM | RESPIRATION RATE: 18 BRPM | TEMPERATURE: 98 F | DIASTOLIC BLOOD PRESSURE: 70 MMHG

## 2024-09-19 PROCEDURE — G0151 HHCP-SERV OF PT,EA 15 MIN: HCPCS

## 2024-09-27 ENCOUNTER — HOME CARE VISIT (OUTPATIENT)
Dept: HOME HEALTH SERVICES | Facility: HOME HEALTHCARE | Age: 84
End: 2024-09-27
Payer: MEDICARE

## 2024-09-30 LAB
MYCOBACTERIUM SPEC CULT: NORMAL
NIGHT BLUE STAIN TISS: NORMAL

## 2024-10-01 ENCOUNTER — TELEPHONE (OUTPATIENT)
Dept: PULMONOLOGY | Facility: CLINIC | Age: 84
End: 2024-10-01
Payer: MEDICARE

## 2024-10-03 ENCOUNTER — HOME CARE VISIT (OUTPATIENT)
Dept: HOME HEALTH SERVICES | Facility: HOME HEALTHCARE | Age: 84
End: 2024-10-03
Payer: MEDICARE

## 2024-10-03 VITALS
HEART RATE: 62 BPM | SYSTOLIC BLOOD PRESSURE: 100 MMHG | RESPIRATION RATE: 16 BRPM | OXYGEN SATURATION: 92 % | TEMPERATURE: 98.2 F | DIASTOLIC BLOOD PRESSURE: 70 MMHG

## 2024-10-03 VITALS
DIASTOLIC BLOOD PRESSURE: 62 MMHG | TEMPERATURE: 98.7 F | OXYGEN SATURATION: 95 % | HEART RATE: 63 BPM | RESPIRATION RATE: 21 BRPM | SYSTOLIC BLOOD PRESSURE: 120 MMHG

## 2024-10-03 PROCEDURE — G0151 HHCP-SERV OF PT,EA 15 MIN: HCPCS

## 2024-10-03 PROCEDURE — G0300 HHS/HOSPICE OF LPN EA 15 MIN: HCPCS

## 2024-10-04 ENCOUNTER — HOME CARE VISIT (OUTPATIENT)
Dept: HOME HEALTH SERVICES | Facility: HOME HEALTHCARE | Age: 84
End: 2024-10-04
Payer: MEDICARE

## 2024-10-04 NOTE — CASE COMMUNICATION
PT reassessed this week, pt with increased fatigue and SOA this week and sustained a fall; is scheduled to see pulmonary on 10/7; added 1wk3 to POC then plan to transition to outpatient Pulm rehab

## 2024-10-07 ENCOUNTER — HOME CARE VISIT (OUTPATIENT)
Dept: HOME HEALTH SERVICES | Facility: HOME HEALTHCARE | Age: 84
End: 2024-10-07
Payer: MEDICARE

## 2024-10-07 ENCOUNTER — CLINICAL SUPPORT (OUTPATIENT)
Dept: PULMONOLOGY | Facility: CLINIC | Age: 84
End: 2024-10-07
Payer: MEDICARE

## 2024-10-07 VITALS
HEART RATE: 83 BPM | TEMPERATURE: 97.3 F | SYSTOLIC BLOOD PRESSURE: 154 MMHG | RESPIRATION RATE: 17 BRPM | OXYGEN SATURATION: 93 % | DIASTOLIC BLOOD PRESSURE: 81 MMHG

## 2024-10-07 DIAGNOSIS — J45.40 MODERATE PERSISTENT ASTHMA WITHOUT COMPLICATION: Primary | ICD-10-CM

## 2024-10-07 PROCEDURE — 96372 THER/PROPH/DIAG INJ SC/IM: CPT | Performed by: INTERNAL MEDICINE

## 2024-10-07 PROCEDURE — G0157 HHC PT ASSISTANT EA 15: HCPCS

## 2024-10-08 ENCOUNTER — HOME CARE VISIT (OUTPATIENT)
Dept: HOME HEALTH SERVICES | Facility: HOME HEALTHCARE | Age: 84
End: 2024-10-08
Payer: MEDICARE

## 2024-10-08 VITALS
RESPIRATION RATE: 17 BRPM | SYSTOLIC BLOOD PRESSURE: 144 MMHG | HEART RATE: 74 BPM | OXYGEN SATURATION: 94 % | TEMPERATURE: 98.9 F | DIASTOLIC BLOOD PRESSURE: 79 MMHG

## 2024-10-08 PROCEDURE — G0299 HHS/HOSPICE OF RN EA 15 MIN: HCPCS

## 2024-10-11 ENCOUNTER — TELEPHONE (OUTPATIENT)
Dept: CARDIOLOGY | Facility: CLINIC | Age: 84
End: 2024-10-11

## 2024-10-11 NOTE — TELEPHONE ENCOUNTER
Caller: Aleksandra Melton    Relationship to patient: Emergency Contact    Best call back number: [phone]      Type of visit: F/U APPT    Requested date: NEXT AVAILABLE     If rescheduling, when is the original appointment: 10-11-24     Additional notes:PLEASE CONTACT PATIENT WITH A SUITABLE DATE.

## 2024-10-14 ENCOUNTER — HOME CARE VISIT (OUTPATIENT)
Dept: HOME HEALTH SERVICES | Facility: HOME HEALTHCARE | Age: 84
End: 2024-10-14
Payer: MEDICARE

## 2024-10-14 VITALS
HEART RATE: 77 BPM | TEMPERATURE: 99.3 F | RESPIRATION RATE: 18 BRPM | SYSTOLIC BLOOD PRESSURE: 129 MMHG | OXYGEN SATURATION: 92 % | DIASTOLIC BLOOD PRESSURE: 68 MMHG

## 2024-10-14 VITALS
TEMPERATURE: 97.6 F | DIASTOLIC BLOOD PRESSURE: 74 MMHG | HEART RATE: 79 BPM | RESPIRATION RATE: 18 BRPM | SYSTOLIC BLOOD PRESSURE: 137 MMHG | OXYGEN SATURATION: 93 %

## 2024-10-14 PROCEDURE — G0157 HHC PT ASSISTANT EA 15: HCPCS

## 2024-10-14 PROCEDURE — G0299 HHS/HOSPICE OF RN EA 15 MIN: HCPCS

## 2024-10-21 ENCOUNTER — CLINICAL SUPPORT (OUTPATIENT)
Dept: PULMONOLOGY | Facility: CLINIC | Age: 84
End: 2024-10-21
Payer: MEDICARE

## 2024-10-21 ENCOUNTER — HOME CARE VISIT (OUTPATIENT)
Dept: HOME HEALTH SERVICES | Facility: HOME HEALTHCARE | Age: 84
End: 2024-10-21
Payer: MEDICARE

## 2024-10-21 VITALS
TEMPERATURE: 98.9 F | HEART RATE: 66 BPM | RESPIRATION RATE: 18 BRPM | OXYGEN SATURATION: 95 % | DIASTOLIC BLOOD PRESSURE: 74 MMHG | SYSTOLIC BLOOD PRESSURE: 129 MMHG

## 2024-10-21 DIAGNOSIS — J82.81 EOSINOPHILIC PNEUMONIA: Primary | ICD-10-CM

## 2024-10-21 PROCEDURE — 94625 PHY/QHP OP PULM RHB W/O MNTR: CPT | Performed by: INTERNAL MEDICINE

## 2024-10-21 PROCEDURE — G0299 HHS/HOSPICE OF RN EA 15 MIN: HCPCS

## 2024-10-21 NOTE — Clinical Note
Discharge Summary/Summary of Care Provided:   Patient received home health for diagnosis: ACUTE RESPIRATORY FAILURE AND ASTHMA   Current level of functional ability: INDEPENDENT WITH WALKER   Living arrangements: LIVES ALONE WITH FAMILY NEXT DOOR   Progress towards goals and/or Were all goals met? NURSING GOALS MET FOR PATIENT TO [facility] START PULMONARY REHAB      THE FOLLOWING INSTRUCTIONS WERE REVIEWED UPON DISCHARGE:    Keep your appointmenTS AS SCHEDULED     Call your doctor if you develop a new or worsening symptomS    Continue to take the medications prescribed by your doctor. A medication list is attached. Be sure to keep them informed of all medications you take including over the counter herbal, vitamins/minerals and the ones you take only when needed.    Follow the diet as ordered by your doctor.     Continue with home program as instructed by therapist (handouts given).    Community resource referrals (list provided).     Instructions given to Patient    Instructions provided per Visit

## 2024-10-21 NOTE — PROGRESS NOTES
[facility]  Pulmonary Rehabilitation  Daily Treatment Log    Name: Judi Patel : 1940 Date: 10/21/2024     Session:1 / approved: 18     Time In/Out::30   Non-COPD Charges: J6130s0    Physician :Dr. Marie     Dx: J82.81   Non-COPD:     Primary Insurance: [facility]     Secondary Insurance:     /80 /HR 70/ RR 16 / SpO2% 95    Auscultation:  []Clear   [x]Clear and Decreased  []Insp. Wheeze  []Exp. Wheeze     []Rhonchi   []Rales    Target Heart Rate Zone:   Max HR: 84    Exercise Sp02% Blood Pressure Heart Rate SHERIN Scale/Fatigue   Treadmill: Speed:       Min:   % Grade:        Distance:            Bands:#:        Reps:    Min:   Color Band:            6MWT:Min:6  Distance: 650  MET:1.9   95  132/82  70  0   Step-ups:#:   Reps:     Min:             Cybex/Vision/Schwinn Bike:  Level:  Speed:  RPM:   Min:   Distance:        Seat:             Hand Weights: Pounds:5   Min: 0.5  Curls# 9        Sets: 1  Presses#    Sets:   Fly#            Sets:   Shoulder Shrugs#     Sets:   Wings#       Sets:   Wrist Curls#   Sets:                      92  126/80  79  0   NuStep: Level:  METS:  Min:       SPM:   Total Steps:   Seat:      Arms:            Arm Ergometer: Level:  Min:   RPM:       Fwd:      Back:   Total Turns:            PLB / DB Spinner: Min:   P-Flex: Level:         Min:   IS: Volume:             Min:   Warm-up Stretches: Min: 10              Education and Training: [x]SHERIN Scale  [x]PLB  [x]DB  []IMT  []IS  [x]Vital Signs   [x]Target HR Zone  [x]Exercise Vital Signs [x] Safe Vital Signs  []Pulmonary Anatomy  []Lung Function  []Lung Disease Process  []Oxygen Use  []Oxygen Safety  []Pulmonary Meds  []Med Instruction  []Cough Technique  []Pulmonary Hygeine  []Infection Prevention  []Signs of Infection  []When to Call MD  []Nutrition  []Weight Management  []Advance Directives  []Posture  []Body Mechanics [x]Energy Conservation  [x]Pacing ADL's  []Stress/Anxiety Management   [x]Stretching  [x]Home Exercise  []Smoking Cessation   []PA Progress  []Maintenance Exercise Program    []Pulmonary Knowledge Test/Education  []Review PFT  []Review Specific Disease  []Review Home Follow-through Compliance  [x]Safe Exercise [x]Exercise Progression Strategies    Progress Report: Patient was here for initial evaluation for pulmonary rehabilitation. 6 minute walk test was performed for exercise component. Patient has mild SOA.Patient was monitored 15 minutes for the 6MWT and 15 minutes for the exercise test assessments. Her gait and balance are abnormal and continued use of a rollator/walker is needed at this time. Patient goals are to improve her stamina and endurance to be able to participate in more activities. Clinical evaluation tests were performed along with questionnaires. Pulmonary rehab guidelines were explained and a welcome packet was given. An incentive spirometer and inspiratory muscle  were given along with instructions for use. Home exercise was discussed and a home exercise packet was given to the patient.                Treating Clinicians: [x] Pebbles Olmedo RRT MD in Office: []NABEEL Arce  []PIA Meyer  []MAGGIE Hinton  []JR Morgan    []JAXON Scherer []FRAN Banda  []PIA Claros  []PIA Robert  []CHAVA Marie []FLOYD Trejo   []JAXON Hilliard  [x]NABEEL Boles []MAGGIE Bliss []MAGGIE Sarabia []ANANDA Delgado []FLOYD Silva []JAXON Ortiz

## 2024-10-23 ENCOUNTER — HOME CARE VISIT (OUTPATIENT)
Dept: HOME HEALTH SERVICES | Facility: HOME HEALTHCARE | Age: 84
End: 2024-10-23
Payer: MEDICARE

## 2024-10-23 VITALS
TEMPERATURE: 98.3 F | HEART RATE: 64 BPM | SYSTOLIC BLOOD PRESSURE: 147 MMHG | RESPIRATION RATE: 16 BRPM | OXYGEN SATURATION: 98 % | DIASTOLIC BLOOD PRESSURE: 80 MMHG

## 2024-10-23 PROCEDURE — G0151 HHCP-SERV OF PT,EA 15 MIN: HCPCS

## 2024-10-23 NOTE — HOME HEALTH
Discharge Summary/Summary of Care Provided:   Patient received home health for diagnosis: ACUTE RESPIRATORY FAILURE AND ASTHMA   Current level of functional ability: INDEPENDENT WITH WALKER   Living arrangements: LIVES ALONE WITH FAMILY NEXT DOOR   Progress towards goals and/or Were all goals met? NURSING GOALS MET FOR PATIENT TO [facility] START PULMONARY REHAB      THE FOLLOWING INSTRUCTIONS WERE REVIEWED UPON DISCHARGE:    Keep your appointmenTS AS SCHEDULED     Call your doctor if you develop a new or worsening symptomS    Continue to take the medications prescribed by your doctor. A medication list is attached. Be sure to keep them informed of all medications you take including over the counter herbal, vitamins/minerals and the ones you take only when needed.    Follow the diet as ordered by your doctor.     Continue with home program as instructed by therapist (handouts given).    Community resource referrals (list provided).     Instructions given to Patient    Instructions provided per Visit

## 2024-10-28 RX ORDER — OMEPRAZOLE 40 MG/1
40 CAPSULE, DELAYED RELEASE ORAL 2 TIMES DAILY
Qty: 60 CAPSULE | Refills: 1 | Status: SHIPPED | OUTPATIENT
Start: 2024-10-28

## 2024-10-29 ENCOUNTER — HOSPITAL ENCOUNTER (OUTPATIENT)
Dept: ULTRASOUND IMAGING | Facility: HOSPITAL | Age: 84
Discharge: HOME | End: 2024-10-29
Payer: MEDICARE

## 2024-10-29 ENCOUNTER — TRANSCRIBE ORDERS (OUTPATIENT)
Dept: ADMINISTRATIVE | Facility: HOSPITAL | Age: 84
End: 2024-10-29
Payer: MEDICARE

## 2024-10-29 ENCOUNTER — HOSPITAL ENCOUNTER (OUTPATIENT)
Dept: MAMMOGRAPHY | Facility: HOSPITAL | Age: 84
Discharge: HOME | End: 2024-10-29
Payer: MEDICARE

## 2024-10-29 DIAGNOSIS — R92.8 ABNORMAL MAMMOGRAM: Primary | ICD-10-CM

## 2024-10-29 DIAGNOSIS — C50.412 MALIGNANT NEOPLASM OF UPPER-OUTER QUADRANT OF LEFT FEMALE BREAST, UNSPECIFIED ESTROGEN RECEPTOR STATUS: ICD-10-CM

## 2024-10-29 PROCEDURE — 76642 ULTRASOUND BREAST LIMITED: CPT

## 2024-10-29 PROCEDURE — 77066 DX MAMMO INCL CAD BI: CPT

## 2024-10-29 PROCEDURE — G0279 TOMOSYNTHESIS, MAMMO: HCPCS

## 2024-10-31 ENCOUNTER — CLINICAL SUPPORT (OUTPATIENT)
Dept: PULMONOLOGY | Facility: CLINIC | Age: 84
End: 2024-10-31
Payer: MEDICARE

## 2024-10-31 DIAGNOSIS — J82.81 EOSINOPHILIC PNEUMONIA: Primary | ICD-10-CM

## 2024-11-01 NOTE — PROGRESS NOTES
[facility]  Pulmonary Rehabilitation  Daily Treatment Log    Name: Judi Patel : 1940 Date: 10/31/2024     Session:2 / approved: 18     Time In/Out:9 / 10:30   Non-COPD Charges: [id number]    Physician :Dr. Marie     Dx:J82.81    Non-COPD:     Primary Insurance: [employer] [facility]     Secondary Insurance:     /80 / HR 63 / RR 18/ SpO2% 100    Auscultation:  []Clear   [x]Clear and Decreased  []Insp. Wheeze  []Exp. Wheeze     []Rhonchi   []Rales    Target Heart Rate Zone:   Max HR: 83    Exercise Sp02% Blood Pressure Heart Rate SHERIN Scale/Fatigue   Treadmill: Speed:       Min:   % Grade:        Distance:            Bands:#:        Reps:    Min:   Color Band:            6MWT:Min:  Distance:   MET:            Step-ups:#:   Reps:     Min:             Cybex/Vision/Schwinn Bike:  Level:  Speed:  RPM:   Min:   Distance:        Seat:             Hand Weights: Pounds: 3  Min: 10  Curls#  10       Sets: 3  Presses# 10   Sets: 3  Fly#            Sets:   Shoulder Shrugs# 10    Sets: 3  Wings#   10    Sets: 3  Wrist Curls#   Sets:                      95  120/80  83  0   NuStep: Level:1  METS:1.8  Min:15       SPM:71   Total Steps:1050   Seat:9      Arms: 6  93  122/80  65  0   Arm Ergometer: Level:  Min:   RPM:       Fwd:      Back:   Total Turns:            PLB / DB Spinner: Min:   P-Flex: Level:         Min:   IS: Volume:             Min:   Warm-up Stretches: Min: 10              Education and Training: [x]SHERIN Scale  [x]PLB  [x]DB  []IMT  []IS  [x]Vital Signs   [x]Target HR Zone  [x]Exercise Vital Signs [x] Safe Vital Signs  []Pulmonary Anatomy  []Lung Function  []Lung Disease Process  []Oxygen Use  []Oxygen Safety  []Pulmonary Meds  []Med Instruction  []Cough Technique  []Pulmonary Hygeine  []Infection Prevention  []Signs of Infection  []When to Call MD  []Nutrition  []Weight Management  []Advance Directives  []Posture  []Body Mechanics [x]Energy Conservation  [x]Pacing  "ADL's  []Stress/Anxiety Management  [x]Stretching  [x]Home Exercise  []Smoking Cessation   []W[place] Progress  []Maintenance Exercise Program    []Pulmonary Knowledge Test/Education  []Review PFT  []Review Specific Disease  []Review Home Follow-through Compliance  [x]Safe Exercise [x]Exercise Progression Strategies    Progress Report:  Patient was here for continuation of pulmonary rehabilitation maintenance. She was able to maintain an SpO2% greater than 90 throughout exercise. Pursed lip breathing, pacing, and energy conservation were practiced and encouraged throughout exercise. The education topic for the day was \"What Do the Lungs Do?\" and \"Coping with Shortness of Breath.\" Pulmonary anatomy, the role of alveoli, and gas exchange were discussed. Breathing techniques and relaxation techniques were discussed for breaking the dyspnea cycle. This session was completed in a group of two or more individuals.        Treating Clinicians: [x] Pebbles Olmedo RRT MD in Office: []NABEEL Arce  []PIA Meyer  []MAGGIE Hinton  []JR Morgan    []JAXON Scherer []FRAN Banda  []PIA Claros  []PIA Robert  []CHAVA Marie []FLOYD Trejo   []JAXON Hilliard  [x]NABEEL Boles []MAGGIE Bliss []MAGGIE Sarabia []ANANDA Delgado []FLOYD Silva []JAXON Ortiz  "

## 2024-11-05 ENCOUNTER — CLINICAL SUPPORT (OUTPATIENT)
Dept: PULMONOLOGY | Facility: CLINIC | Age: 84
End: 2024-11-05
Payer: MEDICARE

## 2024-11-05 DIAGNOSIS — J45.40 MODERATE PERSISTENT ASTHMA, UNSPECIFIED WHETHER COMPLICATED: Primary | ICD-10-CM

## 2024-11-05 DIAGNOSIS — J82.81 EOSINOPHILIC PNEUMONIA: Primary | ICD-10-CM

## 2024-11-05 RX ORDER — BUDESONIDE AND FORMOTEROL FUMARATE DIHYDRATE 160; 4.5 UG/1; UG/1
1 AEROSOL RESPIRATORY (INHALATION)
Qty: 10.2 G | Refills: 5 | Status: SHIPPED | OUTPATIENT
Start: 2024-11-05

## 2024-11-07 ENCOUNTER — CLINICAL SUPPORT (OUTPATIENT)
Dept: PULMONOLOGY | Facility: CLINIC | Age: 84
End: 2024-11-07
Payer: MEDICARE

## 2024-11-07 DIAGNOSIS — J82.81 EOSINOPHILIC PNEUMONIA: Primary | ICD-10-CM

## 2024-11-07 DIAGNOSIS — J45.40 MODERATE PERSISTENT ASTHMA, UNSPECIFIED WHETHER COMPLICATED: Primary | ICD-10-CM

## 2024-11-07 NOTE — PROGRESS NOTES
[facility]  Pulmonary Rehabilitation  Daily Treatment Log    Name: Judi Patel : 1940 Date: 2024     Session:3 / approved: 18     Time In/Out:9 / 10:30   Non-COPD Charges: F6718a3    Physician :Dr. Marie     Dx: J82.81   Non-COPD:     Primary Insurance: [facility]     Secondary Insurance:     /80 / HR 64 / RR 18 / SpO2% 100 (RA)    Auscultation:  []Clear   [x]Clear and Decreased  []Insp. Wheeze  []Exp. Wheeze     []Rhonchi   []Rales    Target Heart Rate Zone:   Max HR: 68    Exercise Sp02% Blood Pressure Heart Rate SHERIN Scale/Fatigue   Treadmill: Speed:       Min:   % Grade:        Distance:            Bands:#:        Reps:    Min:   Color Band:            6MWT:Min:  Distance:  MET:            Step-ups:#:   Reps:     Min:             Cybex/Vision/Schwinn Bike:  Level:  Speed:  RPM:   Min:   Distance:        Seat:             Hand Weights: Pounds:3   Min:10   Curls#  10       Sets:3   Presses# 10   Sets: 3  Fly#            Sets:   Shoulder Shrugs#  10   Sets:3   Wings#   10    Sets: 3  Wrist Curls#   Sets:                      96  118/72  64  0   NuStep: Level:2  METS:2.0  Min:       SPM:   Total Steps:   Seat:      Arms:   92  130/80  68  0   Arm Ergometer: Level:  Min:   RPM:       Fwd:      Back:   Total Turns:            PLB / DB Spinner: Min:   P-Flex: Level:         Min:   IS: Volume:             Min:   Warm-up Stretches: Min: 10              Education and Training: [x]SHERIN Scale  [x]PLB  [x]DB  []IMT  []IS  [x]Vital Signs   [x]Target HR Zone  [x]Exercise Vital Signs [x] Safe Vital Signs  []Pulmonary Anatomy  []Lung Function  [x]Lung Disease Process  []Oxygen Use  []Oxygen Safety  []Pulmonary Meds  []Med Instruction  []Cough Technique  []Pulmonary Hygeine  []Infection Prevention  []Signs of Infection  []When to Call MD  []Nutrition  []Weight Management  []Advance Directives  []Posture  []Body Mechanics [x]Energy Conservation  [x]Pacing ADL's  []Stress/Anxiety  Management  [x]Stretching  [x]Home Exercise  []Smoking Cessation   []MN Progress  []Maintenance Exercise Program    []Pulmonary Knowledge Test/Education  []Review PFT  []Review Specific Disease  []Review Home Follow-through Compliance  [x]Safe Exercise [x]Exercise Progression Strategies    Progress Report:  Patient was here for continuation of pulmonary rehabilitation. She was able to maintain SpO2% greater than or equal to 90 on RA. Pursed lip breathing, pacing, and energy conservation were practiced throughout exercise.She is enjoying the classes. The education topic for today was COPD, Restrictive Lung Disease and asthma, including eosinophilic pneumonia. A definition for each disease process was given and explained. The different COPD conditions were explained as well as the different restrictive lung disease conditions. We discussed the the different tests used to diagnose each disease process. Treatments for COPD and Restrictive Lung Disease were explained as well the treatments for long COVID. The types of medications used for these diseases were discussed and the importance of taking them as directed was emphasized. Slowing the progression and management of these diseases was also covered. Ways to prevent infection was included.        Treating Clinicians: [x] Pebbles Olmedo RRT MD in Office: []NABEEL Arce  []PIA Meyer  []MAGGIE Hinton  []JR Morgan    []JAXON Scherer []FRAN Banda  []PIA Claros  []PIA Robert  []CHAVA Marie []FLOYD Trejo   []JAXON Hilliard  []NABEEL Boles []MAGGIE Bliss []MAGGIE Sarabia [x]ANANDA Delgado [x]FLOYD Silva []JAXON Ortiz

## 2024-11-08 ENCOUNTER — OFFICE VISIT (OUTPATIENT)
Dept: CARDIOLOGY | Facility: CLINIC | Age: 84
End: 2024-11-08
Payer: MEDICARE

## 2024-11-08 VITALS
HEART RATE: 58 BPM | SYSTOLIC BLOOD PRESSURE: 146 MMHG | DIASTOLIC BLOOD PRESSURE: 78 MMHG | WEIGHT: 170 LBS | HEIGHT: 67 IN | BODY MASS INDEX: 26.68 KG/M2

## 2024-11-08 DIAGNOSIS — I49.1 PREMATURE ATRIAL CONTRACTIONS: ICD-10-CM

## 2024-11-08 DIAGNOSIS — E78.5 DYSLIPIDEMIA: ICD-10-CM

## 2024-11-08 DIAGNOSIS — I10 ESSENTIAL HYPERTENSION: ICD-10-CM

## 2024-11-08 DIAGNOSIS — R07.89 OTHER CHEST PAIN: Primary | ICD-10-CM

## 2024-11-08 PROCEDURE — 1160F RVW MEDS BY RX/DR IN RCRD: CPT | Performed by: INTERNAL MEDICINE

## 2024-11-08 PROCEDURE — 1159F MED LIST DOCD IN RCRD: CPT | Performed by: INTERNAL MEDICINE

## 2024-11-08 PROCEDURE — 3077F SYST BP >= 140 MM HG: CPT | Performed by: INTERNAL MEDICINE

## 2024-11-08 PROCEDURE — 3078F DIAST BP <80 MM HG: CPT | Performed by: INTERNAL MEDICINE

## 2024-11-08 PROCEDURE — 99214 OFFICE O/P EST MOD 30 MIN: CPT | Performed by: INTERNAL MEDICINE

## 2024-11-08 RX ORDER — LOSARTAN POTASSIUM 50 MG/1
50 TABLET ORAL DAILY
Qty: 90 TABLET | Refills: 3 | Status: SHIPPED | OUTPATIENT
Start: 2024-11-08

## 2024-11-08 NOTE — PROGRESS NOTES
[facility]  Pulmonary Rehabilitation  Daily Treatment Log    Name: Judi Patel : 1940 Date: 2024     Session:4 / approved: 18          Time In/Out: 9/10:30        Physician :Dr. Marie       Dx: J45.40       Primary Insurance: [employer]  Secondary Insurance:LMP  (LMP Unknown)      Auscultation:  []Clear   [x]Clear and Decreased   []Insp. Wheeze   []Exp. Wheeze     []Rhonchi   []Rales    Target Heart Rate Zone:   Max HR:74     Exercise Sp02% Blood Pressure Heart Rate SHERIN Scale/Fatigue   Treadmill: Speed:0.8    Min: 10  % stGstrstastdstest:st st1st Distance:0.11   97  120/76  70  0   Bands:#:    Reps:      Min:   Color Band:             6MWT:Min:   Distance:  MET:             Step-ups:#:    Reps:     Min:            Cybex/Vision/Schwinn Bike:  Level:   Speed:  RPM:   Min: Distance:           Seat:             Hand Weights: Pounds:3   Min:10   Curls#  10    Sets:3 Presses# 10   Sets:3   Fly#            Sets:   Shoulder Shrugs#10     Sets:3   Wings#  10     Sets: 3  Wrist Curls#   Sets:                   96  122/80  68  0   NuStep: Level:2   METS:1.9    Min: 20    SPM: 73    Total Steps:1476   Seat:7      Arms: 6  93  120/82  74  0   Arm Ergometer: Level:     Min:   RPM:       Fwd:     Back:   Total Turns:             PLB / DB Spinner: Min:   P-Flex: Level:        Min:   IS: Volume:             Min:   Warm-up Stretches: Min: 10               Education and Training: [x]SHERIN Scale  [x]PLB  [x]DB  []IMT  []IS  [x]Vital Signs   [x]Target HR Zone  [x]Exercise Vital Signs [x] Safe Vital Signs  []Pulmonary Anatomy  []Lung Function  []Lung Disease Process  []Oxygen Use  []Oxygen Safety  []Pulmonary Meds  []Med Instruction  []Cough Technique  []Pulmonary Hygeine  []Infection Prevention  []Signs of Infection  []When to Call MD  []Nutrition  []Weight Management  []Advance Directives  []Posture  []Body Mechanics [x]Energy Conservation  [x]Pacing ADL's  []Stress/Anxiety Management  [x]Stretching   [x]Home Exercise  []Smoking Cessation   []NV Progress  []Maintenance Exercise Program    []Pulmonary Knowledge Test/Education  []Review PFT  []Review Specific Disease  []Review Home Follow-through Compliance  [x]Safe Exercise [x]Exercise Progression Strategies    Progress Report:Patient was here today for continuation of pulmonary rehabilitation. She was able to maintain SpO2% greater than or equal to 90 throughout 40 minutes of exercise. Pursed lip breathing, pacing, and energy conservation were practiced throughout exercise. She has demonstrated excellent control of SOA through pursed lip breathing throughout exercise. The education topic for today was Activities of Daily Living. A video from the AACR was watched. Breathing techniques, pacing, and energy conservation were demonstrated. The carter cough and airway clearance were also demonstrated. Ways to manage every day activities was discussed and focusing on planning the day was encouraged. Planning a trip with a lung disease and oxygen needs were covered.           Treating Clinicians: [x] Pebbles Olmedo RRT MD in Office: []NABEEL Arce  []PIA Meyer  []MAGGIE Hinton  []JR Morgan    []JAXON Scherer []FRAN Banda  []PIA Robert  []CHAVA Marie   [x]JAXON Hilliard  []NABEEL Boles []MAGGIE Bliss []FLOYD Trejo []MAGGIE Sarabia []ANANDA Delgado []FLOYD Silva []JAXON Ortiz

## 2024-11-08 NOTE — PROGRESS NOTES
[facility] Cardiology    Encounter Date: 2024    Patient ID: Judi Patel is a 84 y.o. female.  : 1940     PCP: Ken French APRN       Chief Complaint: Other chest pain      PROBLEM LIST:  Chest pain  MPS 2015, Dr. Don: Negative treadmill stress for angina or diagnostic ST changes, normal perfusion scan with no evident of infarction or exercise-induced ischemia.  MPS 2022-Dr. Kirby: No significant ST or T wave changes.  EF greater than 70%.  Myocardial perfusion imaging no evidence of ischemia.  Low risk study.   CT chest 2024: no coronary artery calcification  Echo, 07/15/2024: EF 62%.   Palpitations  Holter, 2024: Sinus rhythm with very frequent PACs (43.8%) with episodes of atrial bigeminy. Rare PVCs. For short episodes of SVT lasting up to 8 beats. Patient's reported events correlated with frequent PACs/atrial bigeminy.  Hypertension  Hyperlipidemia  Breast cancer, diagnosed 2024  IBS  Epilepsy  Asthma   Anxiety    History of Present Illness  Patient presents today for a follow-up with a history of chest pain and cardiac risk factors. Since last visit, patient has been doing well overall from a cardiovascular standpoint. She reports she was hospitalized for 11 days in August due to inflammatory pneumonia. Patient states she has residual shortness of breath. She stays busy and active by attending pulmonary rehab regularly. Patient denies chest pain, orthopnea, palpitations, edema, dizziness, and syncope.     Allergies   Allergen Reactions    Oxycodone-Acetaminophen Nausea And Vomiting    Sulfa Antibiotics Hives    Sulfamethoxazole-Trimethoprim Other (See Comments)         Current Outpatient Medications:     acetaminophen (TYLENOL) 325 MG tablet, Take 2 tablets by mouth Every 6 (Six) Hours As Needed for Mild Pain., Disp: , Rfl:     albuterol sulfate  (90 Base) MCG/ACT inhaler, Inhale 1 puff As Needed for Shortness of Air or Wheezing.,  Disp: , Rfl:     amLODIPine (NORVASC) 5 MG tablet, Take 1 tablet by mouth Daily., Disp: , Rfl:     azelastine (ASTELIN) 0.1 % nasal spray, Administer 1 spray into the nostril(s) as directed by provider 2 (Two) Times a Day., Disp: , Rfl:     budesonide-formoterol (SYMBICORT) 160-4.5 MCG/ACT inhaler, Inhale 1 puff 2 (Two) Times a Day., Disp: 10.2 g, Rfl: 5    Calcium Citrate-Vitamin D (CITRACAL+D) 315-5 MG-MCG per tablet, Citracal plus D 315 mg-5 mcg (200 unit) tablet, Disp: , Rfl:     denosumab (PROLIA) 60 MG/ML solution prefilled syringe syringe, Inject 1 mL as directed Every 6 (Six) Months., Disp: , Rfl:     ezetimibe (ZETIA) 10 MG tablet, Take 1 tablet by mouth Daily., Disp: , Rfl:     FIBER COMPLETE PO, Take 1 capsule by mouth Daily., Disp: , Rfl:     guaiFENesin (MUCINEX) 600 MG 12 hr tablet, Take 1 tablet by mouth Daily., Disp: , Rfl:     lamoTRIgine (LaMICtal) 100 MG tablet, Take 1 tablet by mouth 2 (two) times a day., Disp: , Rfl:     losartan (Cozaar) 25 MG tablet, Take 1 tablet by mouth Daily. (Patient taking differently: Take 2 tablets by mouth Daily.), Disp: 90 tablet, Rfl: 1    Mepolizumab (Nucala) 100 MG/ML solution auto-injector, Inject 1 mL under the skin into the appropriate area as directed Every 28 (Twenty-Eight) Days., Disp: 1 mL, Rfl: 11    O2 (OXYGEN), Inhale 2 L/min Every Night., Disp: , Rfl:     omeprazole (priLOSEC) 40 MG capsule, TAKE 1 CAPSULE BY MOUTH TWICE DAILY, Disp: 60 capsule, Rfl: 1    predniSONE (DELTASONE) 10 MG tablet, 4 tab qd x 4 days, 3 tab qd x 7 days, 2 tab qd x 7 days, 1 tab qd x 7 days, Disp: 25 tablet, Rfl: 0    predniSONE (DELTASONE) 10 MG tablet, Take 1.5 tablets by mouth Daily. Indications: flare of inflammation, Disp: , Rfl:     Probiotic Product (ALIGN PO), Take 1 capsule by mouth Daily. Indications: Stomach Discomfort, Disp: , Rfl:     propranolol LA (INDERAL LA) 80 MG 24 hr capsule, Take 1 capsule by mouth 2 (Two) Times a Day. Indications: High Blood Pressure, Disp:  ", Rfl:     sertraline (ZOLOFT) 100 MG tablet, Take 1 tablet by mouth Daily., Disp: 30 tablet, Rfl: 1  No current facility-administered medications for this visit.    The following portions of the patient's history were reviewed and updated as appropriate: allergies, current medications, past family history, past medical history, past social history, past surgical history and problem list.    ROS  Review of Systems   14 point ROS negative except for that listed in the HPI.         Objective:     /78   Pulse 58   Ht 170.2 cm (67\")   Wt 77.1 kg (170 lb)   LMP  (LMP Unknown)   BMI 26.63 kg/m²      Physical Exam  Constitutional: Patient appears well-developed and well-nourished.   HENT: HEENT exam unremarkable.   Neck: Neck supple. No JVD present. No carotid bruits.   Cardiovascular: Normal rate, regular rhythm and normal heart sounds. No murmur heard.   2+ symmetric pulses.   Pulmonary/Chest: Breath sounds normal. Does not exhibit tenderness.   Abdominal: Abdomen benign.   Musculoskeletal: Does not exhibit edema.   Neurological: Neurological exam unremarkable.   Vitals reviewed.    Data Review:   Lab Results   Component Value Date    GLUCOSE 122 (H) 08/24/2024    BUN 18 08/24/2024    CREATININE 0.90 08/24/2024    EGFR 64.0 08/24/2024    BCR 20.0 08/24/2024     08/24/2024    K 3.2 (L) 08/24/2024    CO2 30.0 (H) 08/24/2024    CALCIUM 8.7 08/24/2024    ALBUMIN 3.0 (L) 08/24/2024    AST 26 08/24/2024    ALT 62 (H) 08/24/2024     Lab Results   Component Value Date    WBC 10.58 08/23/2024    RBC 3.82 08/23/2024    HGB 10.8 (L) 08/23/2024    HCT 32.7 (L) 08/23/2024    MCV 85.6 08/23/2024     08/23/2024     Lab Results   Component Value Date    TSH 1.220 08/16/2024     Lab Results   Component Value Date    HGBA1C 6.00 (H) 08/19/2024        Procedures       Advance Care Planning   ACP discussion was declined by the patient. Patient does not have an advance directive, declines further assistance.         "   Assessment:      Diagnosis Plan   1. Other chest pain with negative previous cardiac workup. No recent occurrences.  Continue risk factor management.      2. Essential hypertension  Elevated. Increase losartan to 50 mg daily for better hypertensive control. Continue on amlodipine 5 mg daily for hypertension. Continue on propranolol 80 mg BID for hypertension. Continue on losartan 50 mg daily for hypertension.       3. Dyslipidemia  No labs for review. Continue on Zetia 10 mg daily for hyperlipidemia.  Follow-up with PCP for monitoring.      4. Premature atrial contractions  Stable and asymptomatic.  Continue propranolol.        Plan:   Stable cardiac status.  No current angina or CHF symptoms.  Increase losartan to 50 mg daily for better hypertensive control.   Continue rest of the current medications.   FU in 6 MO, sooner as needed.  Thank you for allowing us to participate in the care of your patient.     Scribed for Matt Don MD by Ashlie Khan. 11/8/2024 09:46 EST    I, Matt Don MD, personally performed the services described in this documentation as scribed by the above named individual in my presence, and it is both accurate and complete.  11/8/2024  12:28 EST      Please note that portions of this note may have been completed with a voice recognition program. Efforts were made to edit the dictations, but occasionally words are mistranscribed.

## 2024-11-12 ENCOUNTER — CLINICAL SUPPORT (OUTPATIENT)
Dept: PULMONOLOGY | Facility: CLINIC | Age: 84
End: 2024-11-12
Payer: MEDICARE

## 2024-11-12 DIAGNOSIS — J82.81 EOSINOPHILIC PNEUMONIA: Primary | ICD-10-CM

## 2024-11-12 PROCEDURE — 94625 PHY/QHP OP PULM RHB W/O MNTR: CPT | Performed by: INTERNAL MEDICINE

## 2024-11-12 NOTE — PROGRESS NOTES
[facility]  Pulmonary Rehabilitation  Daily Treatment Log    Name: Judi Patel : 1940 Date: 2024     Session: / approved: 18     Time In/Out:9 / 10:30   Non-COPD Charges: [id number]    Physician :Dr. Marie     Dx: J82.81   Non-COPD:     Primary Insurance: [facility]     Secondary Insurance:     /78 / HR 60 / RR 16 / SpO2% 93    Auscultation:  []Clear   [x]Clear and Decreased  []Insp. Wheeze  []Exp. Wheeze     []Rhonchi   []Rales    Target Heart Rate Zone:   Max HR: 98  Exercise Sp02% Blood Pressure Heart Rate SHERIN Scale/Fatigue   Treadmill: Speed:1.0       Min:10   % stGstrstastdstest:st st1st Distance: 0.14  93  130/80  75  0   Bands:#:        Reps:    Min:   Color Band:            6MWT:Min: Distance:   MET:            Step-ups:#:   Reps:     Min:             Cybex/Vision/Schwinn Bike:  Level:  Speed:  RPM:  Min:   Distance:        Seat:             Hand Weights: Pounds:3   Min: 10  Curls#  10      Sets: 3  Presses# 10   Sets:3   Fly#            Sets:   Shoulder Shrugs# 10    Sets: 3  Wings#  10     Sets: 3  Wrist Curls#   Sets:                      96  120/80  98  0   NuStep: Level:3  METS:2.4 Min:20       SPM:82   Total Steps:1567   Seat:9      Arms: 8  95  120/78  71  0   Arm Ergometer: Level:  Min:   RPM:       Fwd:      Back:   Total Turns:            PLB / DB Spinner: Min:   P-Flex: Level:         Min:   IS: Volume:             Min: Warm-up Stretches: Min:10               Education and Training: [x]SHERIN Scale  [x]PLB  [x]DB  []IMT  []IS  [x]Vital Signs   [x]Target HR Zone  [x]Exercise Vital Signs [x] Safe Vital Signs  []Pulmonary Anatomy  []Lung Function  []Lung Disease Process  []Oxygen Use  []Oxygen Safety  [x]Pulmonary Meds  [x]Med Instruction  []Cough Technique  []Pulmonary Hygeine  []Infection Prevention  []Signs of Infection  []When to Call MD  []Nutrition  []Weight Management  []Advance Directives  []Posture  []Body Mechanics [x]Energy Conservation  [x]Pacing  ADL's  []Stress/Anxiety Management  [x]Stretching  [x]Home Exercise  []Smoking Cessation   []NV Progress  []Maintenance Exercise Program    []Pulmonary Knowledge Test/Education  []Review PFT  []Review Specific Disease  []Review Home Follow-through Compliance  [x]Safe Exercise [x]Exercise Progression Strategies    Progress Report: Patient was here for continuation of pulmonary rehabilitation. She was able to maintain SpO2% greater than or equal to 90 throughout exercise. Pursed lip breathing was practiced throughout exercise. Energy conservation and pacing were encouraged and practiced throughout exercise.The education topic for today was Medications.The three main types of respiratory medications and their uses was explained. MDIs, DPIs, and nebulizer machines were discussed and proper usage was demonstrated in the AACVPR video. The use of an MDI with and without a spacer was demonstrated in the AACVPR video. Instructions on cleaning a spacer were given. Using medications as directed was advised. Side effects and the importance of mouth rinsing after corticosteroid inhalation was emphasized.        Treating Clinicians: [x] Pebbles Olmedo RRT MD in Office: []NABEEL Arce  []PIA Meyer  []MAGGIE Hinton  []JR Morgan    []JAXON Scherer []FRAN Banda  []PIA Claros  []PIA Robert  [x]CHAVA Marie []FLOYD Trejo   []JAXON Hilliard  [x]NABEEL Boles []MAGGIE Bliss []MAGGIE Sarabia []ANANDA Delgado []FLOYD Silva []JAXON Ortiz

## 2024-11-13 ENCOUNTER — OFFICE VISIT (OUTPATIENT)
Dept: PULMONOLOGY | Facility: CLINIC | Age: 84
End: 2024-11-13
Payer: MEDICARE

## 2024-11-13 VITALS — HEIGHT: 67 IN | BODY MASS INDEX: 27 KG/M2 | WEIGHT: 172 LBS

## 2024-11-13 DIAGNOSIS — Z23 IMMUNIZATION DUE: Primary | ICD-10-CM

## 2024-11-13 DIAGNOSIS — J45.40 MODERATE PERSISTENT ASTHMA WITHOUT COMPLICATION: ICD-10-CM

## 2024-11-13 NOTE — PROGRESS NOTES
"Pulmonary Office Follow Up      Subjective   Chief Complaint: Hospital Follow up    Judi Patel is a 84 y.o. female is being seen in follow up for Asthma.     History of Present Illness    Ms. Patel is an 83yo F who was recently hospitalized at [facility] for an asthma exacerbation.     She was last seen in clinic on 9/5/24.     She returns to clinic today for follow up. Since her last visit, she has been started on Nucala and is participating in Pulmonary Rehab.     She has also been diagnosed with polymyalgia rheumatica and has been started on Prednisone. She was treated with 1 month of 15mg. She is now on 12mg daily for another month and will then step down to 10mg daily.     She has not needed to use her oxygen during the day even while at Pulmonary Rehab.     The following portions of the patient's history were reviewed and updated as appropriate: allergies, current medications, past family history, past medical history, past social history, past surgical history and problem list.    Review of Systems  All other systems were reviewed and are negative.  Exceptions are noted in the HPI or above.      Objective   Height 170.2 cm (67\"), weight 78 kg (172 lb), not currently breastfeeding.  Physical Exam  Vitals and nursing note reviewed.   Constitutional:       General: She is not in acute distress.     Appearance: She is well-developed.   HENT:      Head: Normocephalic and atraumatic.   Eyes:      General: No scleral icterus.     Conjunctiva/sclera: Conjunctivae normal.      Pupils: Pupils are equal, round, and reactive to light.   Neck:      Thyroid: No thyromegaly.      Trachea: No tracheal deviation.   Cardiovascular:      Rate and Rhythm: Normal rate and regular rhythm.      Heart sounds: Normal heart sounds.   Pulmonary:      Effort: Pulmonary effort is normal. No respiratory distress.      Breath sounds: Decreased breath sounds present. No wheezing.   Abdominal:      General: Bowel sounds are normal.      " Palpations: Abdomen is soft.      Tenderness: There is no abdominal tenderness.   Musculoskeletal:         General: Normal range of motion.      Cervical back: Normal range of motion and neck supple.   Lymphadenopathy:      Cervical: No cervical adenopathy.   Skin:     General: Skin is warm and dry.      Findings: No erythema or rash.   Neurological:      Mental Status: She is alert and oriented to person, place, and time.      Motor: No abnormal muscle tone.      Coordination: Coordination normal.   Psychiatric:         Speech: Speech normal.         Behavior: Behavior normal.         Judgment: Judgment normal.         PFTs:  No PFTs available.     Imaging:  No new imaging.     Assessment & Plan   Diagnoses and all orders for this visit:    1. Immunization due (Primary)  -     Fluzone High-Dose 65+yrs ([id number])    2. Moderate persistent asthma without complication          Discussion:  Ms. Patel is an 85yo F who is followed for asthma.     1. 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   - Continue Symbicort 160/4.5.    - Noted to have significant eosinophilia on CBC and bronchoscopy. Lab workup was negative for vasculitis and ABPA.  - Continue Nucala for eosinophilic asthma. Absolute eosinophils were elevated at 420 prior to initiating steroids.   - Continue Pulmonary Rehab. She is noting benefit.     2. Acute Hypoxic Respiratory Failure  - No longer needing supplemental oxygen during the day even with Pulmonary Rehab.   - She should continue to use oxygen at night for now. We will check overnight oximetry on room air to see if she needs to continue nocturnal O2.     3. GERD  - Continue Omeprazole while on steroids as she is at risk for stress ulcer.     4. Health Maintenance  - Flu shot given in clinic today.     5. Polymyalgia Rheumatica  - On a steroid taper. Currently on 12mg daily with plans to decrease to 10mg after 1 month.     Follow up in 4 months.      Judi Patel   reports that she has never smoked. She has never used smokeless tobacco.        Debbie V Case, DO  Pulmonary and Critical Care Medicine  Note Electronically Signed

## 2024-11-14 ENCOUNTER — CLINICAL SUPPORT (OUTPATIENT)
Dept: PULMONOLOGY | Facility: CLINIC | Age: 84
End: 2024-11-14
Payer: MEDICARE

## 2024-11-14 DIAGNOSIS — J82.81 EOSINOPHILIC PNEUMONIA: Primary | ICD-10-CM

## 2024-11-14 PROCEDURE — 94625 PHY/QHP OP PULM RHB W/O MNTR: CPT | Performed by: INTERNAL MEDICINE

## 2024-11-18 NOTE — PROGRESS NOTES
[facility]  Pulmonary Rehabilitation  Daily Treatment Log    Name: Judi Patel : 1940 Date: 2024     Session:7 / approved: 18     Time In/Out:9 /10:30    Non-COPD Charges: [id number]    Physician :Dr. Marie     Dx:J82.81    Non-COPD:     Primary Insurance: [facility] [place]     Secondary Insurance:     /80 / HR 59 / RR 16 / SpO2% 100    Auscultation:  []Clear   [x]Clear and Decreased  []Insp. Wheeze  []Exp. Wheeze     []Rhonchi   []Rales    Target Heart Rate Zone: 59-99  Max HR: 72    Exercise Sp02% Blood Pressure Heart Rate SHERIN Scale/Fatigue   Treadmill: Speed: 1.7      Min:10   % stGstrstastdstest:st1st Distance: 0.25  92  132/80  79  0.5   Bands:#:        Reps:    Min:   Color Band:            6MWT:Min:  Distance:   MET:            Step-ups:#:   Reps:     Min:             Cybex/Vision/Schwinn Bike:  Level:  Speed:  RPM:   Min:   Distance:        Seat:             Hand Weights: Pounds:3   Min:10   Curls#  10       Sets:3 Presses# 10   Sets: 3  Fly#            Sets:   Shoulder Shrugs# 10    Sets:3   Wings# 10      Sets: 3  Wrist Curls#   Sets:                      95  124/80  68  0   NuStep: Level:3  METS:2.0    Min: 20      SPM:77   Total Steps:1435   Seat:6      Arms: 6  95  130/80  72  0.5   Arm Ergometer: Level:  Min:   RPM:       Fwd:      Back:   Total Turns:            PLB / DB Spinner: Min:   P-Flex: Level:         Min:   IS: Volume:             Min:   Warm-up Stretches: Min:10               Education and Training: [x]SHERIN Scale  [x]PLB  [x]DB  []IMT  []IS  [x]Vital Signs   [x]Target HR Zone  [x]Exercise Vital Signs [x] Safe Vital Signs  []Pulmonary Anatomy  []Lung Function  []Lung Disease Process  []Oxygen Use  []Oxygen Safety  []Pulmonary Meds  []Med Instruction  []Cough Technique  []Pulmonary Hygeine  []Infection Prevention  []Signs of Infection  []When to Call MD  [x]Nutrition  [x]Weight Management  []Advance Directives  []Posture  []Body Mechanics [x]Energy Conservation   [x]Pacing ADL's  []Stress/Anxiety Management  [x]Stretching  [x]Home Exercise  []Smoking Cessation   []RI Progress  []Maintenance Exercise Program    []Pulmonary Knowledge Test/Education  []Review PFT  []Review Specific Disease  []Review Home Follow-through Compliance  [x]Safe Exercise [x]Exercise Progression Strategies    Progress Report: Patient was here for continuation of pulmonary rehabilitation.She was able to maintain an SpO2% greater than or equal to 90 throughout exercise. She practiced pursed lip breathing, pacing, and energy conservation throughout exercise.She is increasing her exercise time and she has demonstrated excellent control of SOA.  The education topic for today was Nutrition and Weight Management. A healthy diet was explained and the special dietary concerns for people with lung disease was discussed. Why weight matters and strategies for improvement were given. Healthy eating tips were reiterated. The Nutrition and Diet video from the [facility] was watched. Hand outs on what to eat with COPD and Pulmonary Fibrosis were given to the patient.                     Treating Clinicians: [x] Pebbles Olmedo RRT MD in Office: []NABEEL Arce  []PIA Meyer  []MAGGIE Hinton  []JR Morgan    []JAXON Scherer []FRAN Banda  []PIA Claros  []PIA Robert  [x]CHAVA Marie []FLOYD Trejo   []JAXON Hilliard  [x]NABEEL Boles []MAGGIE Bliss []MAGGIE Sarabia []ANANDA Delgado []FLOYD Silva []JAXON Ortiz

## 2024-11-19 ENCOUNTER — CLINICAL SUPPORT (OUTPATIENT)
Dept: PULMONOLOGY | Facility: CLINIC | Age: 84
End: 2024-11-19
Payer: MEDICARE

## 2024-11-19 DIAGNOSIS — J82.81 EOSINOPHILIC PNEUMONIA: Primary | ICD-10-CM

## 2024-11-21 ENCOUNTER — CLINICAL SUPPORT (OUTPATIENT)
Dept: PULMONOLOGY | Facility: CLINIC | Age: 84
End: 2024-11-21
Payer: MEDICARE

## 2024-11-21 DIAGNOSIS — J82.81 EOSINOPHILIC PNEUMONIA: Primary | ICD-10-CM

## 2024-11-21 NOTE — PROGRESS NOTES
[facility]  Pulmonary Rehabilitation  Daily Treatment Log    Name: Judi Patel : 1940 Date: 2024     Session:8 / approved: 18          Time In/Out:9 / 10:30    COPD Charges:  x1    Physician :Dr. Marie       Dx: J82.81         Primary Insurance: [employer]  Secondary Insurance:[unfilled]    /76/HR 62 / RR 16 / SpO2%99     Auscultation:  [x]Clear   Clear and Decreased   []Insp. Wheeze   []Exp. Wheeze     []Rhonchi   []Rales    Target Heart Rate Zone:   Max HR:86     Exercise Sp02% Blood Pressure Heart Rate SHERIN Scale/Fatigue   Treadmill: Speed:    Min:   % Grade:        Distance:            Bands:#:    Reps:     Min:   Color Band:             6MWT:Min: 6  Distance: 1250 MET: 2.8   92  120/76  86  0   Step-ups:#: 22   Reps:  1   Min: 1  92  122/78  83  0.5   Cybex/Vision/Schwinn Bike:  Level:   Speed:  RPM:   Min:   Distance:           Seat:             Hand Weights: Pounds:5   Min: 1  Curls#  21    Sets:1   Presses#    Sets:   Fly#            Sets:   Shoulder Shrugs#     Sets:   Wings#       Sets:   Wrist Curls#  Sets:                   96  120/72  73  0   NuStep: Level:   METS:  Min:     SPM:     Total Steps:   Seat:      Arms:            Arm Ergometer: Level:     Min:   RPM:       Fwd:     Back:   Total Turns:             PLB / DB Spinner: Min:   P-Flex: Level:        Min:   IS: Volume:             Min:   Warm-up Stretches: Min: 10               Education and Training: [x]SHERIN Scale  [x]PLB  [x]DB  []IMT  []IS  [x]Vital Signs   [x]Target HR Zone  [x]Exercise Vital Signs [x] Safe Vital Signs  []Pulmonary Anatomy  []Lung Function  []Lung Disease Process  []Oxygen Use  []Oxygen Safety  []Pulmonary Meds  []Med Instruction  []Cough Technique  []Pulmonary Hygeine  []Infection Prevention  []Signs of Infection  []When to Call MD  []Nutrition  []Weight Management  []Advance Directives  []Posture  []Body Mechanics [x]Energy Conservation  [x]Pacing  ADL's  [x]Stress/Anxiety Management  [x]Stretching  [x]Home Exercise  []Smoking Cessation   [][place] Progress  []Maintenance Exercise Program    []Pulmonary Knowledge Test/Education  []Review PFT  []Review Specific Disease  []Review Home Follow-through Compliance  [x]Safe Exercise [x]Exercise Progression Strategies    Progress Report: Patient was here for continuation of pulmonary rehabilitation.She  was able to maintain an SpO2% greater than or equal to 90 throughout exercise. She practiced pursed lip breathing, pacing, and energy conservation throughout testing and exercise.She is utilizing the skills that she has learned outside of class.Today was the mid assessment. 6MWT, arm curls, step test and sit to stands were performed and evaluated. All mid assessment surveys were completed.Patient was monitored 15 minutes for the 6MWT and 15 minutes for the exercise test assessments  The education topic for today was Mental Health. A video from the [facility] was watched. Topics covered were stress management, relaxation techniques, and managing depression. The importance of exercise with a lung condition was emphasized. The Pulmonary Rehab maintenance program was discussed and a hand out was given.                Treating Clinicians: [x] Pebbles Olmedo RRT    MD in Office: []NABEEL Arce  []PIA Meyer  []MAGGIE Hinton  []JR Morgan    []JAXON Scherer []FRAN Banda  []PIA Robert  []CHAVA Marie   []JAXON Hilliard  []NABEEL Boles []MAGGIE Bliss []FLOYD Trejo []MAGGIE Sarabia [x]ANANDA Delgado [x]FLOYD Silva []JAXON Ortiz

## 2024-11-22 NOTE — PROGRESS NOTES
[facility]  Pulmonary Rehabilitation  Daily Treatment Log    Name: Judi Patel : 1940 Date: 2024     Session:9 / approved: 18     Time In/Out:9 /10:30    Non-COPD Charges: [id number]    Physician :Dr. Marie     Dx:J82.81     Non-COPD:     Primary Insurance: [facility]     Secondary Insurance:     /80 / HR 64 / SpO2% 95    Auscultation:  []Clear   [x]Clear and Decreased  []Insp. Wheeze  []Exp. Wheeze     []Rhonchi   []Rales    Target Heart Rate Zone:   Max HR:74     Exercise Sp02% Blood Pressure Heart Rate SHERIN Scale/Fatigue   Treadmill: Speed:       Min:   % Grade:        Distance:            Bands:#:        Reps:    Min:   Color Band:            6MWT:Min:  Distance:   MET:            Step-ups:#:   Reps:     Min:             Cybex/Vision/Schwinn Bike:  Level:  Speed:  RPM:   Min:   Distance:        Seat:             Hand Weights: Pounds:3   Min:10   Curls# 10        Sets: 3  Presses# 10   Sets: 3  Fly#            Sets:   Shoulder Shrugs# 10    Sets: 3Wings#       Sets:   Wrist Curls#   Sets:                     95  120/80  68  0   NuStep: Level:3  METS:2.1  Min:20       SPM:76   Total Steps:1464   Seat:6      Arms: 6  95  122/80  74  0   Arm Ergometer: Level:  Min:   RPM:       Fwd:      Back:   Total Turns:            PLB / DB Spinner: Min:   P-Flex: Level:         Min:   IS: Volume:            Min:   Warm-up Stretches: Min:10               Education and Training: [x]SHERIN Scale  [x]PLB  [x]DB  []IMT  []IS  []Vital Signs   [x]Target HR Zone  [x]Exercise Vital Signs [x] Safe Vital Signs  []Pulmonary Anatomy  []Lung Function  []Lung Disease Process  [x]Oxygen Use  [x]Oxygen Safety  []Pulmonary Meds  []Med Instruction  []Cough Technique  []Pulmonary Hygeine  []Infection Prevention  []Signs of Infection  []When to Call MD  []Nutrition  []Weight Management  []Advance Directives  []Posture  []Body Mechanics [x]Energy Conservation  [x]Pacing ADL's  []Stress/Anxiety  Management  [x]Stretching  [x]Home Exercise  []Smoking Cessation   [][place] Progress  []Maintenance Exercise Program    []Pulmonary Knowledge Test/Education  []Review PFT  []Review Specific Disease  []Review Home Follow-through Compliance  [x]Safe Exercise [x]Exercise Progression Strategies    Progress Report:Patient was here for continuation of pulmonary rehabilitation.She was able to maintain an SpO2% greater than or equal to 90 throughout exercise.She practiced pursed lip breathing, pacing, and energy conservation throughout testing and exercise. She is showing progression of exercise tolerance and is enjoying the sessions.The education topic for today was Oxygen Use and Safety.Three videos from the [employer] were watched. Information in the video included types of oxygen concentrators, proper use of oxygen, and using oxygen safely. This session was completed in a group of two or more individuals.                  Treating Clinicians: [x] Pebbles Olmedo RRT     MD in Office: [x]NABEEL Arce  []PIA Meyer  []MAGGIE Hinton  []JR Morgan    []JAXON Scherer []FRAN Banda  []PIA Claros  []PIA Robert  []CHAVA Marie []FLOYD Trejo   []JAXON Hilliard  []NABEEL Boles []MAGGIE Bliss []MAGGIE Sarabia []ANANDA Delgado []FLOYD Silva []JAXON Ortiz

## 2024-11-26 ENCOUNTER — CLINICAL SUPPORT (OUTPATIENT)
Dept: PULMONOLOGY | Facility: CLINIC | Age: 84
End: 2024-11-26
Payer: MEDICARE

## 2024-11-26 DIAGNOSIS — J82.81 EOSINOPHILIC PNEUMONIA: Primary | ICD-10-CM

## 2024-11-26 NOTE — PROGRESS NOTES
[facility]  Pulmonary Rehabilitation  Daily Treatment Log    Name: Judi Patel : 1940 Date: 2024     Session:10 / approved: 18     Time In/Out: 9/ 10:30   Non-COPD Charges: [id number]    Physician : Dr. Marie    Dx: J82.81   Non-COPD:     Primary Insurance: [facility]     Secondary Insurance:     /80 / HR 63 / RR 16 / SpO2% 92    Auscultation:  []Clear   [x]Clear and Decreased  []Insp. Wheeze  []Exp. Wheeze     []Rhonchi   []Rales    Target Heart Rate Zone:   Max HR:73    Exercise Sp02% Blood Pressure Heart Rate SHERIN Scale/Fatigue   Treadmill: Speed:1.7       Min:10   % stGstrstastdstest:st st1st Distance: 0.26  95  132/80  73  0   Bands:#:        Reps:    Min:   Color Band:            6MWT:Min:  Distance:   MET:            Step-ups:#:   Reps:     Min:             Cybex/Vision/Schwinn Bike:  Level:  Speed:  RPM:   Min:   Distance:        Seat:             Hand Weights: Pounds:   Min:   Curls#         Sets:   Presses#    Sets:   Fly#            Sets:   Shoulder Shrugs#     Sets:   Wings#       Sets:   Wrist Curls#   Sets:                               NuStep: Level:4  METS:2.2  Min:20       SPM:67   Total Steps:1392   Seat:  6    Arms: 6  94  140/80  70  0   Arm Ergometer: Level:  Min:   RPM:       Fwd:      Back:   Total Turns:            PLB / DB Spinner: Min:   P-Flex: Level:         Min:   IS: Volume:             Min:   Warm-up Stretches: Min: 10              Education and Training: [x]SHERIN Scale  [x]PLB  [x]DB  []IMT  []IS  [x]Vital Signs   [x]Target HR Zone  [x]Exercise Vital Signs [x] Safe Vital Signs  []Pulmonary Anatomy  []Lung Function  []Lung Disease Process  []Oxygen Use  []Oxygen Safety  []Pulmonary Meds  []Med Instruction  []Cough Technique  [x]Pulmonary Hygeine  []Infection Prevention  []Signs of Infection  []When to Call MD  []Nutrition  []Weight Management  []Advance Directives  []Posture  []Body Mechanics [x]Energy Conservation  [x]Pacing ADL's  []Stress/Anxiety  Management  [x]Stretching  [x]Home Exercise  []Smoking Cessation   []NM Progress  []Maintenance Exercise Program    []Pulmonary Knowledge Test/Education  []Review PFT  []Review Specific Disease  []Review Home Follow-through Compliance  [x]Safe Exercise [x]Exercise Progression Strategies    Progress Report: Patient was here for continuation of pulmonary rehabilitation.She was able to maintain SpO2% greater than or equal to 90 throughout exercise. Pursed lip breathing, pacing, and energy conservation was practiced throughout exercise. She is using the breathing techniques learned in class at home.The education topic for today was Pulmonary Hygiene. Coughing techniques, postural drainage, PEP therapy, CPT, and medications were discussed.The carter cough was demonstrated in a video. Preventing infections, when to call the doctor, and avoiding irritants and allergens were also included.       Treating Clinicians: [x] Pebbles Olmedo RRT MD in Office: [x]NABEEL Arce  []PIA Meyer  []MAGGIE Hinton  []JR Morgan    []JAXON Scherer []FRAN Banda  []PIA Claros  []PIA Robert  []CHAVA Marie []FLOYD Trejo   []JAXON Hilliard  []NABEEL Boles []MAGGIE Bliss []MAGGIE Sarabia []ANANDA Delgado []FLOYD Silva []JAOXN Ortiz

## 2024-12-02 ENCOUNTER — HOSPITAL ENCOUNTER (OUTPATIENT)
Dept: MAMMOGRAPHY | Facility: HOSPITAL | Age: 84
Discharge: HOME | End: 2024-12-02
Payer: MEDICARE

## 2024-12-02 DIAGNOSIS — R92.8 ABNORMAL MAMMOGRAM: ICD-10-CM

## 2024-12-02 PROCEDURE — 19081 BX BREAST 1ST LESION STRTCTC: CPT

## 2024-12-03 ENCOUNTER — CLINICAL SUPPORT (OUTPATIENT)
Dept: PULMONOLOGY | Facility: CLINIC | Age: 84
End: 2024-12-03
Payer: MEDICARE

## 2024-12-03 DIAGNOSIS — J82.81 EOSINOPHILIC PNEUMONIA: Primary | ICD-10-CM

## 2024-12-03 PROCEDURE — G0239 OTH RESP PROC, GROUP: HCPCS | Performed by: INTERNAL MEDICINE

## 2024-12-03 NOTE — PROGRESS NOTES
[facility]  Pulmonary Rehabilitation  Daily Treatment Log    Name: Judi Patel : 1940 Date: 12/3/2024     Session:11 / approved: 18     Time In/Out: 9/ 10:30   Non-COPD Charges: [id number]    Physician :Dr. Marie     Dx: J82.81   Non-COPD:     Primary Insurance: [facility]     Secondary Insurance:     /82 / HR 52 / RR 18 / SpO2% 100    Auscultation:  []Clear   [x]Clear and Decreased  []Insp. Wheeze  []Exp. Wheeze     []Rhonchi   []Rales    Target Heart Rate Zone: 52-92  Max HR:69     Exercise Sp02% Blood Pressure Heart Rate SHERIN Scale/Fatigue   Treadmill: Speed:       Min:   % Grade:        Distance:            Bands:#:        Reps:    Min:   Color Band:            6MWT:Min:  Distance:   MET:            Step-ups:#:   Reps:     Min:             Cybex/Vision/Schwinn Bike:  Level:  Speed:  RPM:   Min:   Distance:        Seat:             Hand Weights: Pounds:3   Min:10   Curls# 10        Sets: 3  Presses# 10   Sets:3   Fly#            Sets:   Shoulder Shrugs#  10   Sets:3   Wings#  10     Sets:3   Wrist Curls#   Sets:                      95  126/78  69  0   NuStep: Level:4  METS:2.5    Min: 20      SPM:75   Total Steps:1413   Seat:6      Arms: 6  95  126/70  73  0   Arm Ergometer: Level:  Min:   RPM:       Fwd:      Back:   Total Turns:            PLB / DB Spinner: Min:   P-Flex: Level:         Min:   IS: Volume:             Min:   Warm-up Stretches: Min:10               Education and Training: [x]SHERIN Scale  [x]PLB  [x]DB  []IMT  []IS  [x]Vital Signs   [x]Target HR Zone  [x]Exercise Vital Signs [x] Safe Vital Signs  []Pulmonary Anatomy  []Lung Function  []Lung Disease Process  []Oxygen Use  []Oxygen Safety  []Pulmonary Meds  []Med Instruction  []Cough Technique  []Pulmonary Hygeine  []Infection Prevention  []Signs of Infection  []When to Call MD  []Nutrition  []Weight Management  []Advance Directives  []Posture  []Body Mechanics []Energy Conservation  []Pacing  ADL's  []Stress/Anxiety Management  [x]Stretching  [x]Home Exercise  []Smoking Cessation   [][place] Progress  []Maintenance Exercise Program    []Pulmonary Knowledge Test/Education  [x]Review PFT  []Review Specific Disease  []Review Home Follow-through Compliance  [x]Safe Exercise [x]Exercise Progression Strategies    Progress Report:  Patient was here for continuation of pulmonary rehabilitation. She was able to maintain SpO2% greater than 90 for 40 minutes of exercise. Pursed lip breathing, pacing, and energy conservation were practiced throughout exercise.She is showing excellent control of SOA breathing techniques and states that she is less anxious about managing her lung disease. The education topic for today was Pulmonary Function Test review. The last PFT on file for the patient was explained. A hand out explaining spirometry, lung volumes, and diffusion was given to the patient. PFT measures were explained also.              Treating Clinicians: [x] Pebbles Olmedo RRT MD in Office: []NABEEL Arce  []PIA Meyer  []MAGGIE Hinton  [x]JR Morgan    []JAXON Scherer []FRAN Banda  []PIA Claros  []PIA Robert  [x]CHAVA Marie []FLOYD Trejo   []JAXON Hilliard  []NABEEL Boles []MAGGIE Bliss []MAGGIE Sarabia []ANANDA Delgado []FLOYD Silva []JAXON Ortiz

## 2024-12-05 ENCOUNTER — CLINICAL SUPPORT (OUTPATIENT)
Dept: PULMONOLOGY | Facility: CLINIC | Age: 84
End: 2024-12-05
Payer: MEDICARE

## 2024-12-05 DIAGNOSIS — J82.81 EOSINOPHILIC PNEUMONIA: Primary | ICD-10-CM

## 2024-12-05 PROCEDURE — G0239 OTH RESP PROC, GROUP: HCPCS | Performed by: INTERNAL MEDICINE

## 2024-12-05 NOTE — PROGRESS NOTES
[facility]  Pulmonary Rehabilitation  Daily Treatment Log    Name: Judi Patel : 1940 Date: 2024     Session:12 / approved: 18     Time In/Out: 9/ 10:30   Non-COPD Charges: [id number]    Physician :Dr. Marie     Dx:J82.81    Non-COPD:     Primary Insurance: [facility]    Secondary Insurance:     /82 / HR 51 / RR 16 / SpO2% 99    Auscultation:  []Clear   [x]Clear and Decreased  []Insp. Wheeze  []Exp. Wheeze     []Rhonchi   []Rales    Target Heart Rate Zone:51-91   Max HR:79     Exercise Sp02% Blood Pressure Heart Rate SHERIN Scale/Fatigue   Treadmill: Speed:2.2       Min:10   % stGstrstastdstest:st1st Distance: 0.48  94  124/80  79  1   Bands:#:        Reps:    Min:   Color Band:            6MWT:Min:  Distance:   MET:            Step-ups:#:   Reps:     Min:             Cybex/Vision/Schwinn Bike:  Level:  Speed:  RPM:   Min:   Distance:        Seat:             Hand Weights: Pounds:   Min:   Curls#         Sets:   Presses#    Sets:   Fly#            Sets:   Shoulder Shrugs#     Sets:   Wings#       Sets:   Wrist Curls#   Sets:                               NuStep: Level:4  METS:2.5    Min: 20      SPM:77   Total Steps:1477   Seat: 9     Arms: 6  96  126/80  70  0   Arm Ergometer: Level:  Min:   RPM:       Fwd:      Back:   Total Turns:            PLB / DB Spinner: Min:   P-Flex: Level:         Min:   IS: Volume:             Min:   Warm-up Stretches: Min:10               Education and Training: [x]SHERIN Scale  [x]PLB  [x]DB  []IMT  []IS  [x]Vital Signs   [x]Target HR Zone  [x]Exercise Vital Signs [x] Safe Vital Signs  []Pulmonary Anatomy  []Lung Function  []Lung Disease Process  []Oxygen Use  []Oxygen Safety  []Pulmonary Meds  []Med Instruction  []Cough Technique  []Pulmonary Hygeine  []Infection Prevention  [x]Signs of Infection  [x]When to Call MD  []Nutrition  []Weight Management  []Advance Directives  []Posture  []Body Mechanics [x]Energy Conservation  [x]Pacing ADL's  []Stress/Anxiety  Management  [x]Stretching  [x]Home Exercise  []Smoking Cessation   []IA Progress  []Maintenance Exercise Program    []Pulmonary Knowledge Test/Education  []Review PFT  []Review Specific Disease  []Review Home Follow-through Compliance  [x]Safe Exercise [x]Exercise Progression Strategies    Progress Report: Patient was here for continuation of pulmonary rehabilitation.She was able to maintain SpO2% greater than or equal to 90 throughout exercise. Pursed lip breathing, pacing, and energy conservation were practiced throughout exercise.She is progressing in all exercises and has excellent control of SOA using breathing techniques. The education topic for today was Target Heart Rate and Normal Vital Signs. Target Heart Rate was discussed in detail. A definition, how to calculate, and how to use Target Heart Rate was explained. Normal vital signs for a resting adult was also discussed and a handout was given to the patient for a reference. We also discussed exacerbations for COPD and eosinophilic asthma. We discussed how to recognize an exacerbation and to seek early treatment.          Treating Clinicians: [x] Pebbles Olmedo RRT MD in Office: []NABEEL Arce  []PIA Meyer  []MAGGIE Hinton  [x]JR Morgan    []JAXON Scherer []FRAN Banda  []PIA Claros  []PIA Robert  []CHAVA Marie []FLOYD Trejo   []JAXON Hilliard  []NABEEL Boles []MAGGIE Bliss []MAGGIE Sarabia []ANANDA Delgado []FLOYD Silva []JAXON Ortiz

## 2024-12-10 ENCOUNTER — CLINICAL SUPPORT (OUTPATIENT)
Dept: PULMONOLOGY | Facility: CLINIC | Age: 84
End: 2024-12-10
Payer: MEDICARE

## 2024-12-10 DIAGNOSIS — J82.81 EOSINOPHILIC PNEUMONIA: Primary | ICD-10-CM

## 2024-12-10 PROCEDURE — G0239 OTH RESP PROC, GROUP: HCPCS | Performed by: INTERNAL MEDICINE

## 2024-12-11 NOTE — PROGRESS NOTES
[facility]  Pulmonary Rehabilitation  Daily Treatment Log    Name: Judi Patel : 1940 Date: 12/10/2024     Session:13 / approved: 18          Time In/Out:9 /10:30         Physician :Dr. Marie        Dx:J82.81           Primary Insurance:[employer]  Secondary Insurance:    Auscultation:  []Clear   [x]Clear and Decreased   []Insp. Wheeze   []Exp. Wheeze     []Rhonchi   []Rales    Target Heart Rate Zone:   Max HR:79     Exercise Sp02% Blood Pressure Heart Rate SHERIN Scale/Fatigue   Treadmill: Speed: 2.2   Min: 15  % stGstrstastdstest:st1st Distance: 0.50  94  130/80  79  1   Bands:#:    Reps:      Min:   Color Band:             6MWT:Min:   Distance:  MET:             Step-ups:#:    Reps:     Min:            Cybex/Vision/Schwinn Bike:  Level:   Speed:  RPM:   Min:   Distance:           Seat:             Hand Weights: Pounds:   Min:   Curls#      Sets:   Presses#    Sets:   Fly#            Sets:   Shoulder Shrugs#     Sets:   Wings#       Sets:   Wrist Curls#   Sets:                            NuStep: Level:4   METS:2.3  Min:25     SPM:     Total Steps:   Seat: 9     Arms: 7  95  130/82  78  0   Arm Ergometer: Level:     Min:   RPM:       Fwd:     Back:   Total Turns:             PLB / DB Spinner: Min:   P-Flex: Level:        Min:   IS: Volume:             Min:   Warm-up Stretches: Min:10                Education and Training: [x]SHERIN Scale  [x]PLB  [x]DB  []IMT  []IS  [x]Vital Signs   [x]Target HR Zone  [x]Exercise Vital Signs [x] Safe Vital Signs  []Pulmonary Anatomy  []Lung Function  []Lung Disease Process  []Oxygen Use  []Oxygen Safety  []Pulmonary Meds  []Med Instruction  []Cough Technique  []Pulmonary Hygeine  []Infection Prevention  []Signs of Infection  []When to Call MD  []Nutrition  []Weight Management  []Advance Directives  [x]Posture  [x]Body Mechanics [x]Energy Conservation  [x]Pacing ADL's  []Stress/Anxiety Management  [x]Stretching  [x]Home Exercise  []Smoking Cessation   [][place]  Progress  []Maintenance Exercise Program    []Pulmonary Knowledge Test/Education  []Review PFT  []Review Specific Disease  []Review Home Follow-through Compliance  [x]Safe Exercise [x]Exercise Progression Strategies    Progress Report:  Patient was here for continuation of pulmonary rehabilitation. She was able to maintain SpO2% greater than or equal to 90 for 40 minutes of exercise. Pursed lip breathing, pacing, and energy conservation were practiced throughout exercise. She is progressing in all areas of exercise and is pleased with her progress at this time. The education topic for today was Good Posture and Proper Body Mechanics. The correlation between good posture and the diaphragm was discussed. The diaphragm's function was also detailed. Proper body mechanics were explained and a video was given explaining proper body mechanics guidelines and how to practice proper body mechanics.      Treating Clinicians: [x] Pebbles Olmedo RRT MD in Office: []NABEEL Arce  []PIA Meyer  []MAGGIE Hinton  [x]JR Morgan    []JAXON Scherer []FRAN Banda  []PIA Robert  []CHAVA Marie   []JAXON Hilliard  [x]NABEEL Boles []MAGGIE Bliss []FLOYD Trejo []MAGGIE Sarabia []ANANDA Delgado []FLOYD Silva []JAXON Ortiz

## 2024-12-12 ENCOUNTER — CLINICAL SUPPORT (OUTPATIENT)
Dept: PULMONOLOGY | Facility: CLINIC | Age: 84
End: 2024-12-12
Payer: MEDICARE

## 2024-12-12 DIAGNOSIS — J82.81 EOSINOPHILIC PNEUMONIA: Primary | ICD-10-CM

## 2024-12-12 PROCEDURE — G0239 OTH RESP PROC, GROUP: HCPCS | Performed by: INTERNAL MEDICINE

## 2024-12-12 NOTE — PROGRESS NOTES
[facility]  Pulmonary Rehabilitation  Daily Treatment Log    Name: Judi Patel : 1940 Date: 2024     Session:14 / approved: 18     Time In/Out:9 / 10:30   Non-COPD Charges: [id number]    Physician :Dr. Marie     Dx: J82.81   Non-COPD:     Primary Insurance: [facility]     Secondary Insurance:     /80 / HR 68 / RR 16 / SpO2% 96    Auscultation:  []Clear   [x]Clear and Decreased  []Insp. Wheeze  []Exp. Wheeze     []Rhonchi   []Rales    Target Heart Rate Zone:   Max HR: 76    Exercise Sp02% Blood Pressure Heart Rate SHERIN Scale/Fatigue   Treadmill: Speed:       Min:   % Grade:        Distance:            Bands:#:        Reps:    Min:   Color Band:            6MWT:Min:  Distance:   MET:            Step-ups:#:   Reps:     Min:             Cybex/Vision/Schwinn Bike:  Level:  Speed:  RPM:   Min:   Distance:        Seat:             Hand Weights: Pounds:   Min:   Curls#         Sets:   Presses#    Sets:   Fly#            Sets:   Shoulder Shrugs#     Sets:   Wings#       Sets:   Wrist Curls#   Sets:                               NuStep: Level:4  METS:2.4    Min: 25      SPM:70   Total Steps:1747   Seat: 8     Arms: 10  94  122/80  76  0   Arm Ergometer: Level:  Min:   RPM:       Fwd:      Back:   Total Turns:            PLB / DB Spinner: Min:   P-Flex: Level:         Min:   IS: Volume:             Min:   Warm-up Stretches: Min:10               Education and Training: [x]SHERIN Scale  [x]PLB  [x]DB  []IMT  []IS  [x]Vital Signs   [x]Target HR Zone  []Exercise Vital Signs [x] Safe Vital Signs  []Pulmonary Anatomy  []Lung Function  []Lung Disease Process  []Oxygen Use  []Oxygen Safety  []Pulmonary Meds  []Med Instruction  []Cough Technique  []Pulmonary Hygeine  []Infection Prevention  []Signs of Infection  []When to Call MD  []Nutrition  []Weight Management  []Advance Directives  []Posture  []Body Mechanics [x]Energy Conservation  [x]Pacing ADL's  []Stress/Anxiety Management   [x]Stretching  [x]Home Exercise  []Smoking Cessation   [][place] Progress  []Maintenance Exercise Program    []Pulmonary Knowledge Test/Education  []Review PFT  []Review Specific Disease  []Review Home Follow-through Compliance  [x]Safe Exercise [x]Exercise Progression Strategies    Progress Report:Patient was here for continuation of pulmonary rehabilitation. She was able to maintain SpO2% greater than or equal to 90 throughout exercise. Pursed lip breathing, pacing, and energy conservation were practiced throughout exercise.  For education we reviewed the Pulmonary Knowledge Test and discussed the correct answers. This session was completed in a group of two or more individuals.                  Treating Clinicians: [x] Pebbles Olmedo RRT MD in Office: []NABEEL Arce  []PIA Meyer  []MAGGIE Hinton  []JR Morgan    []JAXON Scherer []FRAN Banda  []PIA Claros  []PIA Robert  []CHAVA Marie []FLOYD Trejo   []JAXON Hilliard  [x]NABEEL Boles []MAGGIE Bliss []MAGGIE Sarabia []ANANDA Delgado []FLOYD Silva []JAXON Ortiz

## 2024-12-16 DIAGNOSIS — G47.34 NOCTURNAL HYPOXIA: Primary | ICD-10-CM

## 2024-12-17 ENCOUNTER — CLINICAL SUPPORT (OUTPATIENT)
Dept: PULMONOLOGY | Facility: CLINIC | Age: 84
End: 2024-12-17
Payer: MEDICARE

## 2024-12-17 DIAGNOSIS — J82.81 EOSINOPHILIC PNEUMONIA: Primary | ICD-10-CM

## 2024-12-17 PROCEDURE — G0239 OTH RESP PROC, GROUP: HCPCS | Performed by: INTERNAL MEDICINE

## 2024-12-17 NOTE — PROGRESS NOTES
[facility]  Pulmonary Rehabilitation  Daily Treatment Log    Name: Judi Patel : 1940 Date: 2024     Session:15 / approved: 18     Time In/Out:9 / 10:30   Non-COPD Charges: [id number]    Physician :Dr. Marie     Dx: J45.50   Non-COPD:     Primary Insurance:[employer]      Secondary Insurance:     /80 / HR 69 / RR 16 / SpO2% 100    Auscultation:  []Clear   [x]Clear and Decreased  []Insp. Wheeze  []Exp. Wheeze     []Rhonchi   []Rales    Target Heart Rate Zone:   Max HR:76     Exercise Sp02% Blood Pressure Heart Rate SHERIN Scale/Fatigue   Treadmill: Speed:       Min:   % Grade:        Distance:            Bands:#:        Reps:    Min:   Color Band:            6MWT:Min:  Distance:   MET:            Step-ups:#:   Reps:     Min:             Cybex/Vision/Schwinn Bike:  Level:  Speed:  RPM:   Min:   Distance:        Seat:             Hand Weights: Pounds:3   Min:10   Curls# 10        Sets:3   Presses# 10   Sets:3   Fly#            Sets:   Shoulder Shrugs# 10    Sets: 3  Wings# 10      Sets: 3  Wrist Curls#   Sets:                      94  118/78  71  0   NuStep: Level:4  METS:2.3  Min: 25      SPM:68   Total Steps:1680   Seat:9      Arms:6   93  120/80  76  0   Arm Ergometer: Level:  Min:   RPM:       Fwd:      Back:   Total Turns:            PLB / DB Spinner: Min:   P-Flex: Level:         Min:   IS: Volume:             Min:   Warm-up Stretches: Min:10               Education and Training: [x]SHERIN Scale  [x]PLB  [x]DB  []IMT  []IS  [x]Vital Signs   [x]Target HR Zone  [x]Exercise Vital Signs [x] Safe Vital Signs  []Pulmonary Anatomy  []Lung Function  []Lung Disease Process  []Oxygen Use  []Oxygen Safety  []Pulmonary Meds  []Med Instruction  []Cough Technique  []Pulmonary Hygeine  []Infection Prevention  []Signs of Infection  []When to Call MD  []Nutrition  []Weight Management  [x]Advance Directives  []Posture  []Body Mechanics [x]Energy Conservation  [x]Pacing  ADL's  []Stress/Anxiety Management  [x]Stretching  [x]Home Exercise  []Smoking Cessation   [x][place] Progress  []Maintenance Exercise Program    []Pulmonary Knowledge Test/Education  []Review PFT  []Review Specific Disease  []Review Home Follow-through Compliance  [x]Safe Exercise [x]Exercise Progression Strategies    Progress Report:Patient was here for continuation of pulmonary rehabilitation.She was able to maintain SpO2% greater than or equal to 90 throughout 45 minutes of exercise. Pursed lip breathing, energy conservation, and pacing were used throughout exercise. She states that pulmonary rehab has made a huge difference in the way that she feels. The education topic for today was Advanced Directives. A definition for Advanced Directives was given, why to write an advance directive, and information on living sultana was discussed. A [place] Living Will packet was given to the patient along with a handout on Advanced Directives.           Treating Clinicians: [x] Pebbles Olmedo RRT MD in Office: []NABEEL Arce  []PIA Meyer  []MAGGIE Hinton  []JR Morgan    []JAXON Scherer []FRAN Banda  []PIA Claros  []PIA Robert  []CHAVA Marie []FLOYD Trejo   []JAXON Hilliard  [x]NABEEL Boles []MAGGIE Bliss []MAGGIE Sarabia []ANANDA Delgado []FLOYD Silva []JAXON Ortiz

## 2024-12-18 ENCOUNTER — TELEPHONE (OUTPATIENT)
Dept: MRI IMAGING | Facility: HOSPITAL | Age: 84
End: 2024-12-18
Payer: MEDICARE

## 2024-12-18 NOTE — TELEPHONE ENCOUNTER
Returned patient's call. If she is trying to scheduled an MRI I don't have an order. I encouraged her to reach out to her provider for that but if it is a [facility] provider I could help assist.

## 2024-12-19 ENCOUNTER — CLINICAL SUPPORT (OUTPATIENT)
Dept: PULMONOLOGY | Facility: CLINIC | Age: 84
End: 2024-12-19
Payer: MEDICARE

## 2024-12-19 DIAGNOSIS — J82.81 EOSINOPHILIC PNEUMONIA: Primary | ICD-10-CM

## 2024-12-19 PROCEDURE — G0239 OTH RESP PROC, GROUP: HCPCS | Performed by: INTERNAL MEDICINE

## 2024-12-20 NOTE — PROGRESS NOTES
[facility]  Pulmonary Rehabilitation  Daily Treatment Log    Name: Judi Patel : 1940 Date: 2024     Session:16 / approved: 18     Time In/Out: 9/10:30    Non-COPD Charges: [id number]    Physician :Dr. Marie    Dx:J82.81    Non-COPD:     Primary Insurance: [facility]     Secondary Insurance:     /72 / HR 67 / RR 16 / SpO2% 96     Auscultation:  []Clear   [x]Clear and Decreased  []Insp. Wheeze  []Exp. Wheeze     []Rhonchi   []Rales    Target Heart Rate Zone:   Max HR:101     Exercise Sp02% Blood Pressure Heart Rate SHERIN Scale/Fatigue   Treadmill: Speed:       Min:   % Grade:        Distance:            Bands:#:        Reps:    Min:   Color Band:            6MWT:Min:6  Distance: 1250  MET:2.8   90  122/76  101  1   Step-ups:#:23   Reps:1     Min:1   93   126/80  98  0.5   Cybex/Vision/Schwinn Bike:  Level:  Speed:  RPM:   Min:   Distance:        Seat:             Hand Weights: Pounds:5   Min:0.5   Curls#22         Sets:1   Presses#    Sets:   Fly#            Sets:   Shoulder Shrugs#    Sets:   Wings#       Sets:   Wrist Curls#   Sets:                      95  124/76  67  0   NuStep: Level:  METS: Min:       SPM:   Total Steps:   Seat:      Arms:            Arm Ergometer: Level:  Min:   RPM:       Fwd:      Back:   Total Turns:            PLB / DB Spinner: Min:   P-Flex: Level:         Min:   IS: Volume:             Min:   Warm-up Stretches: Min: 10              Education and Training: [x]SHERIN Scale  [x]PLB  [x]DB  []IMT  []IS  [x]Vital Signs   [x]Target HR Zone  [x]Exercise Vital Signs [x] Safe Vital Signs  []Pulmonary Anatomy  []Lung Function  []Lung Disease Process  []Oxygen Use  []Oxygen Safety  []Pulmonary Meds  []Med Instruction  []Cough Technique  []Pulmonary Hygeine  []Infection Prevention  []Signs of Infection  []When to Call MD  []Nutrition  []Weight Management  []Advance Directives  []Posture  []Body Mechanics [x]Energy Conservation  [x]Pacing  ADL's  []Stress/Anxiety Management  [x]Stretching  [x]Home Exercise  []Smoking Cessation   [x]SC Progress  []Maintenance Exercise Program    [x]Pulmonary Knowledge Test/Education  []Review PFT  []Review Specific Disease  []Review Home Follow-through Compliance  [x]Safe Exercise [x]Exercise Progression Strategies    Progress Report:  Patient was here for continuation of pulmonary rehabilitation.This is her last session and all education goals have been met. 6MWT, arm curls, and sit to stand test was performed.Patient was monitored 15 minutes for the 6MWT and 15 minutes for the exercise test assessments. Final pulmonary knowledge test was taken and she received her graduation certificate. All questionnaires and the final paper work were completed.         Treating Clinicians: [x] Pebbles Olmedo RRT     MD in Office: []NABEEL Arce  []PIA Meyer  []MAGGIE Hinton  [x]JR Morgan    []JAXON Scherer []FRAN Banda  []PIA Claros  []PIA Robert  []CHAVA Marie []FLOYD Trejo   []JAXON Hilliard  []NABEEL Boles []MAGGIE Bliss []MAGGIE Sarabia []ANANDA Delgado []FLOYD Silva []JAXON Ortiz

## 2024-12-27 ENCOUNTER — TELEPHONE (OUTPATIENT)
Dept: PULMONOLOGY | Facility: CLINIC | Age: 84
End: 2024-12-27
Payer: MEDICARE

## 2024-12-27 RX ORDER — PREDNISONE 10 MG/1
TABLET ORAL
Qty: 31 TABLET | Refills: 0 | Status: SHIPPED | OUTPATIENT
Start: 2024-12-27

## 2024-12-27 RX ORDER — AZITHROMYCIN 250 MG/1
TABLET, FILM COATED ORAL
Qty: 6 TABLET | Refills: 0 | Status: SHIPPED | OUTPATIENT
Start: 2024-12-27

## 2024-12-27 NOTE — TELEPHONE ENCOUNTER
Pt called today c/o cough w/ sputum/chest tightness/sob on exertion for 4-5 days. Pt denies fever/chest pain/nausea/swelling. Pt is currently taking Prednisone 5 mg bid but no abx. Pt is currently sitting at 93% room air at resting. Please advise.

## 2025-01-02 RX ORDER — OMEPRAZOLE 40 MG/1
40 CAPSULE, DELAYED RELEASE ORAL 2 TIMES DAILY
Qty: 180 CAPSULE | Refills: 0 | Status: SHIPPED | OUTPATIENT
Start: 2025-01-02

## 2025-01-23 ENCOUNTER — TELEPHONE (OUTPATIENT)
Dept: PULMONOLOGY | Facility: CLINIC | Age: 85
End: 2025-01-23
Payer: MEDICARE

## 2025-01-23 NOTE — TELEPHONE ENCOUNTER
Pt called today c/o cough w/ sputum/chest tightness/sore throat/wheezing for 2-3 days. Pt denies fever/chest pain/nausea/swelling/diarrhea. Pt is currently finishing up Prednisone taper that was given on 12/27/24 but is requesting for another round of abx needing to be sent to [facility]. Pt next f/u apt is scheduled for 4/2/25 w/ Dr Marie.

## 2025-04-02 ENCOUNTER — OFFICE VISIT (OUTPATIENT)
Dept: PULMONOLOGY | Facility: CLINIC | Age: 85
End: 2025-04-02
Payer: MEDICARE

## 2025-04-02 VITALS
WEIGHT: 172 LBS | HEIGHT: 67 IN | OXYGEN SATURATION: 93 % | SYSTOLIC BLOOD PRESSURE: 126 MMHG | TEMPERATURE: 97.3 F | HEART RATE: 84 BPM | BODY MASS INDEX: 27 KG/M2 | DIASTOLIC BLOOD PRESSURE: 78 MMHG

## 2025-04-02 DIAGNOSIS — J45.41 MODERATE PERSISTENT ASTHMA WITH ACUTE EXACERBATION: Primary | ICD-10-CM

## 2025-04-02 RX ORDER — PREDNISONE 10 MG/1
TABLET ORAL
Qty: 31 TABLET | Refills: 0 | Status: SHIPPED | OUTPATIENT
Start: 2025-04-02

## 2025-04-02 NOTE — PROGRESS NOTES
"Pulmonary Office Follow Up      Subjective   Chief Complaint: Hospital Follow up    Judi Patel is a 84 y.o. female is being seen in follow up for Asthma.     History of Present Illness    Ms. Patel is an 83yo F who is followed for asthma.      She was last seen in clinic on 11/13/24.    She returns to clinic today for follow up. She has been doing well until the past few days. She notes coughing and is using her home O2 and nebs more frequently.     The following portions of the patient's history were reviewed and updated as appropriate: allergies, current medications, past family history, past medical history, past social history, past surgical history and problem list.    Review of Systems  All other systems were reviewed and are negative.  Exceptions are noted in the HPI or above.      Objective   Blood pressure 126/78, pulse 84, temperature 97.3 °F (36.3 °C), height 170.2 cm (67\"), weight 78 kg (172 lb), SpO2 93%, not currently breastfeeding.  Physical Exam  Vitals and nursing note reviewed.   Constitutional:       General: She is not in acute distress.     Appearance: She is well-developed.   HENT:      Head: Normocephalic and atraumatic.   Eyes:      General: No scleral icterus.     Conjunctiva/sclera: Conjunctivae normal.      Pupils: Pupils are equal, round, and reactive to light.   Neck:      Thyroid: No thyromegaly.      Trachea: No tracheal deviation.   Cardiovascular:      Rate and Rhythm: Normal rate and regular rhythm.      Heart sounds: Normal heart sounds.   Pulmonary:      Effort: Pulmonary effort is normal. No respiratory distress.      Breath sounds: Decreased breath sounds present. No wheezing.   Abdominal:      General: Bowel sounds are normal.      Palpations: Abdomen is soft.      Tenderness: There is no abdominal tenderness.   Musculoskeletal:         General: Normal range of motion.      Cervical back: Normal range of motion and neck supple.   Lymphadenopathy:      Cervical: No cervical " adenopathy.   Skin:     General: Skin is warm and dry.      Findings: No erythema or rash.   Neurological:      Mental Status: She is alert and oriented to person, place, and time.      Motor: No abnormal muscle tone.      Coordination: Coordination normal.   Psychiatric:         Speech: Speech normal.         Behavior: Behavior normal.         Judgment: Judgment normal.         PFTs:  No PFTs available.     Imaging:  No new imaging.     Assessment & Plan   Diagnoses and all orders for this visit:    1. Moderate persistent asthma with acute exacerbation (Primary)    Other orders  -     amoxicillin-clavulanate (AUGMENTIN) 875-125 MG per tablet; Take 1 tablet by mouth 2 (Two) Times a Day.  Dispense: 14 tablet; Refill: 0  -     predniSONE (DELTASONE) 10 MG tablet; Take 4 tabs daily x 3 days, then take 3 tabs daily x 3 days, then take 2 tabs daily x 3 days, then take 1 tab daily x 3 days  Dispense: 31 tablet; Refill: 0            Discussion:  Ms. Patel is an 85yo F who is followed for asthma.     1. Asthma  - Continue Symbicort 160/4.5.    - Noted to have significant eosinophilia on CBC and bronchoscopy. Lab workup was negative for vasculitis and ABPA.  - Continue Nucala for eosinophilic asthma. Absolute eosinophils were elevated at 420 prior to initiating steroids. Good response to therapy with few exacerbations.   - Completed pulmonary rehab  - Treat for an exacerbation today with Prednisone and Z-pack.     2. Acute Hypoxic Respiratory Failure  - Occasionally needing supplemental oxygen during the day with a flare.    - Overnight oximetry in December 2024 showed continued desaturation. Continue nightly O2.     3. GERD  - Continue Omeprazole while on Steroids.     4. Polymyalgia Rheumatica  - On Prednisone 5mg daily. Resume after completing Prednisone taper.     Follow up in 4 months.      Judi CASTILLO Jorge  reports that she has never smoked. She has never used smokeless tobacco.        Debbie V Case, DO  Pulmonary and  Critical Care Medicine  Note Electronically Signed

## 2025-04-28 RX ORDER — OMEPRAZOLE 40 MG/1
40 CAPSULE, DELAYED RELEASE ORAL 2 TIMES DAILY
Qty: 180 CAPSULE | Refills: 0 | Status: SHIPPED | OUTPATIENT
Start: 2025-04-28

## 2025-05-12 ENCOUNTER — OFFICE VISIT (OUTPATIENT)
Dept: RADIATION ONCOLOGY | Facility: HOSPITAL | Age: 85
End: 2025-05-12
Payer: MEDICARE

## 2025-05-12 ENCOUNTER — HOSPITAL ENCOUNTER (OUTPATIENT)
Dept: RADIATION ONCOLOGY | Facility: HOSPITAL | Age: 85
Setting detail: RADIATION/ONCOLOGY SERIES
Discharge: HOME | End: 2025-05-12
Payer: MEDICARE

## 2025-05-12 VITALS
BODY MASS INDEX: 27.39 KG/M2 | RESPIRATION RATE: 16 BRPM | WEIGHT: 174.5 LBS | HEIGHT: 67 IN | DIASTOLIC BLOOD PRESSURE: 68 MMHG | OXYGEN SATURATION: 96 % | TEMPERATURE: 97.9 F | HEART RATE: 64 BPM | SYSTOLIC BLOOD PRESSURE: 138 MMHG

## 2025-05-12 DIAGNOSIS — C50.112 MALIGNANT NEOPLASM OF CENTRAL PORTION OF LEFT BREAST IN FEMALE, ESTROGEN RECEPTOR NEGATIVE: Primary | ICD-10-CM

## 2025-05-12 DIAGNOSIS — Z17.1 MALIGNANT NEOPLASM OF CENTRAL PORTION OF LEFT BREAST IN FEMALE, ESTROGEN RECEPTOR NEGATIVE: Primary | ICD-10-CM

## 2025-05-12 PROCEDURE — G0463 HOSPITAL OUTPT CLINIC VISIT: HCPCS

## 2025-05-12 NOTE — PROGRESS NOTES
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 Trey Campo on 2024.  Pathology revealed invasive cancer measured only 0.8 cm and margins were clear by at least 0.2 cm on the invasive component. 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 Initial surgical margins were less than 1 mm medial and posterior. Extended surgical margins showed additional small focus of DCIS but final margins were clear by at least 0.6 cm.  She was evaluated by Dr. Victoria for systemic treatment but at her age decided not to pursue chemotherapy. AI not indicated since she is triple negative.     She completed adjuvant radiation under the care of Dr. Feng Farah, receiving 40 GY/15 fractions to the left breast.  She completed on 2024 and she is here for annual follow up.    No new breast concerns today. Continues to follow with Dr. Campo. Last bilateral mammogram 10/2024 was a BI-RADS 4 due to right breast architectural distortion however when the patient went in for biopsy 2024 it was down graded to BI-RADS 3 and biopsy was canceled.  Radiologist did recommend MRI breast however patient cannot complete due to tremors. Dr. Campo has set her up for a contrasted mammography at the [facility] 2025.    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 Follows closely with pulmonary and rheumatology. No other new  "concerns at this time.     MEDICATIONS: Medication reconciliation for the patient was reviewed and confirmed in the electronic medical record.    Review of Systems:   Review of Systems   Respiratory:          Uses 2L NC supplemental O2 at night   Musculoskeletal:  Positive for arthralgias, back pain (chronic) and gait problem (uses rolator).   Neurological:  Positive for gait problem (uses rolator).   Psychiatric/Behavioral:  Positive for sleep disturbance (d/t back pain).        Physical Exam:   Physical Exam  Constitutional:       Appearance: Normal appearance. She is normal weight.   HENT:      Head: Normocephalic.      Mouth/Throat:      Mouth: Mucous membranes are moist.   Cardiovascular:      Rate and Rhythm: Normal rate.   Pulmonary:      Effort: Pulmonary effort is normal.   Abdominal:      General: Abdomen is flat.   Musculoskeletal:         General: Normal range of motion.      Cervical back: Normal range of motion.   Skin:     General: Skin is warm.   Neurological:      General: No focal deficit present.      Mental Status: She is alert and oriented to person, place, and time.   Psychiatric:         Mood and Affect: Mood normal.         Behavior: Behavior normal.         VITAL SIGNS:   Vitals:    05/12/25 1138   BP: 138/68   Pulse: 64   Resp: 16   Temp: 97.9 °F (36.6 °C)   TempSrc: Skin   SpO2: 96%   Weight: 79.2 kg (174 lb 8 oz)   Height: 170.2 cm (67\")   PainSc: 10-Worst pain ever                 KPS %:  80    The following portions of the patient's history were reviewed and updated as appropriate: allergies, current medications, past family history, past medical history, past social history, past surgical history and problem list.            IMPRESSION:  Carcinoma of the left breast upper outer quadrant, stage IB (T1b, N0, M0). This is triple negative.     Following left breast lumpectomy, she underwent adjuvant radiotherapy on the last as part of her breast conserving treatment, completing 1 year ago. "  She tolerated treatment well.  Recommend maintaining a healthy lifestyle with a well balanced diet, calcium and vitamin D for osteoporosis prevention, and exercise as well as continue with recommended health and cancer screening guidelines.  The patient is not on adjuvant endocrine therapy since her breast cancer was triple negative.  She continues to follow with her breast surgeon, Dr. Campo. She has an upcoming mammogram with contrast in lieu of breast MRI at the [facility].  We discussed follow-up intervals, including biannual diagnostic mammograms to alternate between the left and bilateral breasts, biannual clinical breast exams, and monthly self breast exams.     RECOMMENDATIONS:  We will see her back again next year, but would be happy to see her sooner, if needed.     Return in about 1 year (around 5/10/2026) for Office Visit       I spent a total of 30 minutes on todays visit, with more than 15 minutes in direct face to face communication, and the remainder of the time spent in reviewing the relevant history, records, available imaging, and for documentation.             BRITTANY Guillen    Errors in dictation may reflect use of voice recognition software and not all errors in transcription may have been detected prior to signing.

## 2025-06-24 ENCOUNTER — TELEPHONE (OUTPATIENT)
Dept: PULMONOLOGY | Facility: CLINIC | Age: 85
End: 2025-06-24
Payer: MEDICARE

## 2025-06-24 NOTE — TELEPHONE ENCOUNTER
Pt called today stating that she is having chest tightness/cough/wheezing/sob since last night and needing to schedule an apt to be evaluated. Pt denies fever/chest pain/nausea/swelling/sore throat. Okay to schedule an apt tomorrow if any openings w/ Chest Xray?

## 2025-06-27 ENCOUNTER — OFFICE VISIT (OUTPATIENT)
Dept: PULMONOLOGY | Facility: CLINIC | Age: 85
End: 2025-06-27
Payer: MEDICARE

## 2025-06-27 VITALS
WEIGHT: 173 LBS | OXYGEN SATURATION: 97 % | DIASTOLIC BLOOD PRESSURE: 84 MMHG | RESPIRATION RATE: 16 BRPM | SYSTOLIC BLOOD PRESSURE: 140 MMHG | TEMPERATURE: 97.7 F | HEIGHT: 67 IN | HEART RATE: 68 BPM | BODY MASS INDEX: 27.15 KG/M2

## 2025-06-27 DIAGNOSIS — R49.0 DYSPHONIA: ICD-10-CM

## 2025-06-27 DIAGNOSIS — J45.41 MODERATE PERSISTENT ASTHMA WITH ACUTE EXACERBATION: ICD-10-CM

## 2025-06-27 DIAGNOSIS — R06.02 SHORTNESS OF BREATH: Primary | ICD-10-CM

## 2025-06-27 DIAGNOSIS — K21.9 GASTRO-ESOPHAGEAL REFLUX DISEASE WITHOUT ESOPHAGITIS: ICD-10-CM

## 2025-06-27 DIAGNOSIS — R13.10 DYSPHAGIA, UNSPECIFIED TYPE: ICD-10-CM

## 2025-06-27 PROBLEM — R91.8 PULMONARY INFILTRATE: Status: RESOLVED | Noted: 2024-08-18 | Resolved: 2025-06-27

## 2025-06-27 PROBLEM — R93.89 ABNORMAL CHEST X-RAY: Status: RESOLVED | Noted: 2022-03-01 | Resolved: 2025-06-27

## 2025-06-27 PROCEDURE — 1159F MED LIST DOCD IN RCRD: CPT | Performed by: NURSE PRACTITIONER

## 2025-06-27 PROCEDURE — 99214 OFFICE O/P EST MOD 30 MIN: CPT | Performed by: NURSE PRACTITIONER

## 2025-06-27 PROCEDURE — 3077F SYST BP >= 140 MM HG: CPT | Performed by: NURSE PRACTITIONER

## 2025-06-27 PROCEDURE — 1160F RVW MEDS BY RX/DR IN RCRD: CPT | Performed by: NURSE PRACTITIONER

## 2025-06-27 PROCEDURE — 3079F DIAST BP 80-89 MM HG: CPT | Performed by: NURSE PRACTITIONER

## 2025-06-27 RX ORDER — DIAZEPAM 2 MG/1
2 TABLET ORAL EVERY 6 HOURS PRN
COMMUNITY

## 2025-06-27 NOTE — PROGRESS NOTES
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     Patient establish care with our clinic in 2015 with Dr. Claros.  Most recently she has been followed by Dr. Marie.    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     Due to her eosinophilia, she underwent bronchoscopy and lab workup was negative for vasculitis or ABPA.    History of hypoxemia on supplemental oxygen as needed and nocturnally.    Does have issues with gastric reflux.    There is a history of left breast cancer and she did receive radiation to the left chest wall.    Interval history:   Patient was last seen by Dr. Marie in early April.  At that time she was treated with a prednisone taper and Augmentin with complaints of worsening cough.    She is seen today at her request for ongoing evaluation of a cough and chest congestion.  Largely denies any constitutional symptoms or known sick contacts.  She states that when she has been on her antibiotics/steroids her symptoms transiently improved, but then do recur.    Informs me that she does have a history of an essential tremor with vocal changes.  Her vocal changes do appear to be chronic, however she does have difficulty swallowing on occasion and will have some delayed throat clearing.  Sometimes her voice will even be quite weak.    Does have symptoms of significant gastric reflux and will wake on occasion with the sensation of bile in the back of her throat.  She is on a PPI but does not abide by reflux precautions.    Denies recent ED visits, hospitalizations, or exacerbations.     Denies fever, chills, night sweats, or hemoptysis. No recent sick contacts. No chest pain or palpitations. Denies lower extremity swelling or calf tenderness.     Patient Active  Problem List   Diagnosis    Degenerative disc disease, lumbar    Scoliosis of lumbar spine    Menopause    Vaginal atrophy    Osteopenia    Midline cystocele    Essential hypertension    Anxiety    Seizure disorder    Pinched nerve in neck    Acquired cystic kidney disease    Allergic rhinitis    Acute sinusitis    Chronic infection of sinus    Eustachian tube disorder    Fatigue    Gastro-esophageal reflux disease without esophagitis    Hypertrophy of nasal turbinates    Muscle cramps    Moderate persistent asthma    Mixed hyperlipidemia    Low back pain    Incontinence    Tremor    Statin intolerance    Snoring    Renal colic    Otalgia    Neurological finding    Neck pain    Muscle weakness    Spasm of back muscles    Partial epilepsy with impairment of consciousness    Chest pain    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 breast in female, estrogen receptor negative    Dyslipidemia    Shortness of breath    History of breast cancer    Dysphagia    Dysphonia       Allergies   Allergen Reactions    Oxycodone-Acetaminophen Nausea And Vomiting    Sulfa Antibiotics Hives    Sulfamethoxazole-Trimethoprim Other (See Comments)       Current Outpatient Medications:     acetaminophen (TYLENOL) 325 MG tablet, Take 2 tablets by mouth Every 6 (Six) Hours As Needed for Mild Pain., Disp: , Rfl:     albuterol sulfate  (90 Base) MCG/ACT inhaler, Inhale 1 puff As Needed for Shortness of Air or Wheezing., Disp: , Rfl:     amLODIPine (NORVASC) 5 MG tablet, Take 1 tablet by mouth Daily., Disp: , Rfl:     amoxicillin-clavulanate (AUGMENTIN) 875-125 MG per tablet, Take 1 tablet by mouth 2 (Two) Times a Day., Disp: 14 tablet, Rfl: 0    azelastine (ASTELIN) 0.1 % nasal spray, Administer 1 spray into the nostril(s) as directed by provider 2 (Two) Times a Day., Disp: , Rfl:     azithromycin (ZITHROMAX) 250 MG tablet, Take 2 by mouth today then 1 daily for 4 days, Disp: 6 tablet, Rfl: 0    budesonide-formoterol (SYMBICORT)  160-4.5 MCG/ACT inhaler, Inhale 1 puff 2 (Two) Times a Day., Disp: 10.2 g, Rfl: 5    Calcium Citrate-Vitamin D (CITRACAL+D) 315-5 MG-MCG per tablet, Citracal plus D 315 mg-5 mcg (200 unit) tablet, Disp: , Rfl:     denosumab (PROLIA) 60 MG/ML solution prefilled syringe syringe, Inject 1 mL as directed Every 6 (Six) Months., Disp: , Rfl:     diazePAM (VALIUM) 2 MG tablet, Take 1 tablet by mouth Every 6 (Six) Hours As Needed for Anxiety., Disp: , Rfl:     ezetimibe (ZETIA) 10 MG tablet, Take 1 tablet by mouth Daily., Disp: , Rfl:     FIBER COMPLETE PO, Take 1 capsule by mouth Daily., Disp: , Rfl:     guaiFENesin (MUCINEX) 600 MG 12 hr tablet, Take 1 tablet by mouth Daily., Disp: , Rfl:     lamoTRIgine (LaMICtal) 100 MG tablet, Take 1 tablet by mouth 2 (two) times a day., Disp: , Rfl:     losartan (COZAAR) 50 MG tablet, Take 1 tablet by mouth Daily. Indications: High Blood Pressure, Disp: 90 tablet, Rfl: 3    Mepolizumab (Nucala) 100 MG/ML solution auto-injector, Inject 1 mL under the skin into the appropriate area as directed Every 28 (Twenty-Eight) Days., Disp: 1 mL, Rfl: 11    O2 (OXYGEN), Inhale 2 L/min Every Night., Disp: , Rfl:     omeprazole (priLOSEC) 40 MG capsule, TAKE 1 CAPSULE BY MOUTH TWICE DAILY, Disp: 180 capsule, Rfl: 0    predniSONE (DELTASONE) 10 MG tablet, Take 4 tabs daily x 3 days, then take 3 tabs daily x 3 days, then take 2 tabs daily x 3 days, then take 1 tab daily x 3 days (Patient taking differently: Take 0.5 tablets by mouth Daily.), Disp: 31 tablet, Rfl: 0    predniSONE (DELTASONE) 10 MG tablet, Take 4 tabs daily x 3 days, then take 3 tabs daily x 3 days, then take 2 tabs daily x 3 days, then take 1 tab daily x 3 days, Disp: 31 tablet, Rfl: 0    Probiotic Product (ALIGN PO), Take 1 capsule by mouth Daily. Indications: Stomach Discomfort, Disp: , Rfl:     propranolol LA (INDERAL LA) 80 MG 24 hr capsule, Take 1 capsule by mouth 2 (Two) Times a Day. Indications: High Blood Pressure, Disp: , Rfl:  "    sertraline (ZOLOFT) 100 MG tablet, Take 1 tablet by mouth Daily., Disp: 30 tablet, Rfl: 1  MEDICATION LIST AND ALLERGIES REVIEWED.    Social History     Tobacco Use    Smoking status: Never     Passive exposure: Never    Smokeless tobacco: Never   Vaping Use    Vaping status: Never Used   Substance Use Topics    Alcohol use: No    Drug use: No       FAMILY AND SOCIAL HISTORY REVIEWED.    Review of Systems   Constitutional:  Negative for chills, fatigue and fever.   Respiratory:  Positive for cough. Negative for chest tightness, shortness of breath and wheezing.    Cardiovascular:  Negative for chest pain, palpitations and leg swelling.   Skin:  Negative for color change.   Psychiatric/Behavioral:  Negative for sleep disturbance.    All other systems reviewed and are negative.      /84 (BP Location: Left arm, Patient Position: Sitting, Cuff Size: Adult)   Pulse 68   Temp 97.7 °F (36.5 °C)   Resp 16   Ht 170.2 cm (67.01\")   Wt 78.5 kg (173 lb)   LMP  (LMP Unknown)   SpO2 97% Comment: room air at resting  BMI 27.09 kg/m²     Immunization History   Administered Date(s) Administered    ABRYSVO (RSV, 60+ or pregnant women 32-36 wks) 09/05/2024    COVID-19 (PFIZER) Purple Cap Monovalent 02/18/2021, 03/11/2021, 11/04/2021    Fluzone High-Dose 65+YRS 09/04/2018, 09/24/2019, 09/14/2021, 01/24/2024, 11/13/2024    Pneumococcal Conjugate 13-Valent (PCV13) 03/17/2015    Pneumococcal Conjugate 20-Valent (PCV20) 07/22/2024    Pneumococcal Polysaccharide (PPSV23) 06/08/2016    Shingrix 10/01/2019, 12/17/2019    Zostavax 05/27/2008       Physical Exam  Vitals reviewed.   Constitutional:       General: She is not in acute distress.     Appearance: Normal appearance.   Cardiovascular:      Rate and Rhythm: Normal rate and regular rhythm.      Pulses: Normal pulses.      Heart sounds: Normal heart sounds.   Pulmonary:      Effort: Pulmonary effort is normal. No respiratory distress.      Breath sounds: No wheezing, " rhonchi or rales.   Skin:     General: Skin is warm and dry.   Neurological:      General: No focal deficit present.      Mental Status: She is alert and oriented to person, place, and time.         RESULTS    PFTs:  None today    Imaging:   CXR PA/lateral 06/27/25  Awaiting final MD interpretation. I will contact the patient if there are abnormal results.  No obvious pulmonary infiltrate, mass, or effusion detected.    Cardiac:   Results for orders placed during the hospital encounter of 07/15/24    Adult Transthoracic Echo Complete w/ Color, Spectral and Contrast if necessary per protocol    Interpretation Summary    Left ventricular systolic function is normal. Calculated left ventricular EF = 62% Left ventricular ejection fraction appears to be 61 - 65%.    Left ventricular wall thickness is consistent with mild concentric hypertrophy.    The right atrial cavity is borderline dilated.       PROBLEM LIST    Problem List Items Addressed This Visit       Gastro-esophageal reflux disease without esophagitis    Moderate persistent asthma    Shortness of breath - Primary    Relevant Orders    XR Chest PA & Lateral (Completed)    Dysphagia    Relevant Orders    Fiberoptic Endo (fees)    Dysphonia     DISCUSSION    Ms. Patel has been followed by our pulmonary clinic regarding her hypoxia, eosinophilic asthma, and gastric reflux.    Her eosinophilic asthma symptoms have significantly improved with implementation of the Nucala.  She does remain on the Symbicort 160.    Patient was last seen by Dr. Marie in early April.  At that time she was treated with a prednisone taper and Augmentin with complaints of worsening cough.    She is seen today at her request for ongoing evaluation of a cough and chest congestion.  Largely denies any constitutional symptoms or known sick contacts.  She states that when she has been on her antibiotics/steroids her symptoms transiently improved, but then do recur.    Informs me that she does  have a history of an essential tremor with vocal changes.  Her vocal changes do appear to be chronic, however she does have difficulty swallowing on occasion and will have some delayed throat clearing.  Sometimes her voice will even be quite weak.    Does have symptoms of significant gastric reflux and will wake on occasion with the sensation of bile in the back of her throat.  She is on a PPI but does not abide by reflux precautions.    Asthma  Asthma is currently moderately controlled  Was treated for bronchitis by Dr. Marie in early April with antibiotics/steroids with transient improvement in her symptoms.  I am suspicious that her waxing waning symptoms are due to a combination of dysphagia and/or gastric reflux as further documented below.  She is nontoxic in appearance, is able to speak to me in complete sentences, is 97% on room air, and does not have any adventitious breath sounds on exam today.  CXR has not been formally interpreted by radiologist but is unrevealing for any overt pulmonary process.  Continue Symbicort 160 for medication management.  Rinse mouth after use to prevent thrush.   Noted to have significant eosinophilia on CBC and bronchoscopy and underwent lab workup which was negative for vasculitis or ABPA.  Due to recurrent exacerbations and oral steroid use, she was initiated on Nucala in September 2024 for eosinophilic asthma with significant improvement in her symptoms.  AEC was 420.  Currently is enrolled in pulmonary rehab.  Latest PFTs as noted above-repeat annually  Avoid known triggers   Educated on pursed-lip breathing technique  Encouraged to remain up-to-date on vaccinations    GERD  Does have ongoing symptoms of gastric reflux and will on occasion awake with the sensation of bile in the back of her throat.  She is on a PPI, however does not abide by reflux precautions.  Her cough does seem to be worse at night, therefore I do feel gastric reflux is largely contributing and  discussed reflux precautions at length at today's visit, particularly sleeping with her head of bed elevated via wedge pillow/bed blocks and being strictly n.p.o. 2-3 hours prior to bedtime and all throughout the night.  Currently she does drink water up until bedtime and all throughout the night.  Educational pamphlet was provided.    Dysphonia  Dysphagia  Carries a history of an essential tremor with chronic vocal changes.  In the past this has been attributed to her essential tremor diagnosis.  On occasion her voice will also become quite weak and hoarse.  Does have difficulty swallowing on occasion along with delayed throat clearing.  Will go ahead and proceed with fees exam to have direct visualization of her vocal cords to ensure there is no vocal cord dysfunction and also to determine if she has any dysphagia.  She is currently followed by ENT, however reports she has never undergone laryngoscopy for vocal cord evaluation.    Hypoxia  Occasionally does need supplemental oxygen during the day during her exacerbations.  Overnight oximetry in December 24 did show nocturnal desaturation she is on supplemental oxygen at night.  SpO2 97% on room air today.    Keep appointment with me in August or May see me sooner if needed.  Should her symptoms acutely worsen, I do recommend she go to the ED for further evaluation.    I personally spent a total of 35 minutes on patient visit today including chart review, face to face with the patient obtaining the history and physical exam, review of pertinent images and tests, counseling and discussion and/or coordination of care as described above, and documentation.  Total time excludes time spent on other separate services such as performing procedures or test interpretation, if applicable.    Electronically signed by BRITTANY Frye, 06/27/25, 11:26 AM EDT.    Please note that portions of this note were completed with a voice recognition program.      CC: Ken French,  APRN

## 2025-07-07 PROBLEM — I49.1 PREMATURE ATRIAL CONTRACTIONS: Status: ACTIVE | Noted: 2025-07-07

## 2025-08-08 ENCOUNTER — OFFICE VISIT (OUTPATIENT)
Dept: PULMONOLOGY | Facility: CLINIC | Age: 85
End: 2025-08-08
Payer: MEDICARE

## 2025-08-08 VITALS
HEART RATE: 58 BPM | HEIGHT: 67 IN | OXYGEN SATURATION: 97 % | SYSTOLIC BLOOD PRESSURE: 134 MMHG | DIASTOLIC BLOOD PRESSURE: 76 MMHG | BODY MASS INDEX: 27.78 KG/M2 | WEIGHT: 177 LBS

## 2025-08-08 DIAGNOSIS — J30.9 ALLERGIC RHINITIS, UNSPECIFIED SEASONALITY, UNSPECIFIED TRIGGER: ICD-10-CM

## 2025-08-08 DIAGNOSIS — R06.02 SHORTNESS OF BREATH: ICD-10-CM

## 2025-08-08 DIAGNOSIS — J45.41 MODERATE PERSISTENT ASTHMA WITH ACUTE EXACERBATION: ICD-10-CM

## 2025-08-08 DIAGNOSIS — R13.10 DYSPHAGIA, UNSPECIFIED TYPE: ICD-10-CM

## 2025-08-08 DIAGNOSIS — R49.0 DYSPHONIA: Primary | ICD-10-CM

## 2025-08-08 DIAGNOSIS — K21.9 GASTRO-ESOPHAGEAL REFLUX DISEASE WITHOUT ESOPHAGITIS: ICD-10-CM

## 2025-08-11 ENCOUNTER — HOSPITAL ENCOUNTER (OUTPATIENT)
Dept: SPEECH THERAPY | Facility: HOSPITAL | Age: 85
Setting detail: THERAPIES SERIES
Discharge: HOME | End: 2025-08-11
Payer: MEDICARE

## 2025-08-11 DIAGNOSIS — R13.12 OROPHARYNGEAL DYSPHAGIA: Primary | ICD-10-CM

## 2025-08-11 PROCEDURE — 92612 ENDOSCOPY SWALLOW (FEES) VID: CPT

## 2025-08-15 ENCOUNTER — HOSPITAL ENCOUNTER (OUTPATIENT)
Dept: SPEECH THERAPY | Facility: HOSPITAL | Age: 85
Setting detail: THERAPIES SERIES
Discharge: HOME | End: 2025-08-15
Payer: MEDICARE

## 2025-08-15 DIAGNOSIS — R13.12 OROPHARYNGEAL DYSPHAGIA: Primary | ICD-10-CM

## 2025-08-15 PROCEDURE — 92610 EVALUATE SWALLOWING FUNCTION: CPT

## 2025-08-22 RX ORDER — OMEPRAZOLE 40 MG/1
40 CAPSULE, DELAYED RELEASE ORAL 2 TIMES DAILY
Qty: 180 CAPSULE | Refills: 1 | Status: SHIPPED | OUTPATIENT
Start: 2025-08-22

## (undated) DEVICE — BOWL UTIL STRL 32OZ

## (undated) DEVICE — STERILE POLYESTER TIPPED APPLICATORS: Brand: PURITAN

## (undated) DEVICE — SINGLE USE SUCTION VALVE MAJ-209: Brand: SINGLE USE SUCTION VALVE (STERILE)

## (undated) DEVICE — SINGLE USE BIOPSY VALVE MAJ-210: Brand: SINGLE USE BIOPSY VALVE (STERILE)

## (undated) DEVICE — SYR LUER SLPTP 50ML

## (undated) DEVICE — TRAP,MUCUS SPECIMEN,40CC: Brand: MEDLINE

## (undated) DEVICE — SYR SLP TP 10ML DISP